# Patient Record
Sex: MALE | Race: WHITE | NOT HISPANIC OR LATINO | Employment: OTHER | URBAN - METROPOLITAN AREA
[De-identification: names, ages, dates, MRNs, and addresses within clinical notes are randomized per-mention and may not be internally consistent; named-entity substitution may affect disease eponyms.]

---

## 2019-08-28 ENCOUNTER — TRANSFERRED RECORDS (OUTPATIENT)
Dept: HEALTH INFORMATION MANAGEMENT | Facility: CLINIC | Age: 64
End: 2019-08-28

## 2019-09-17 ENCOUNTER — TRANSFERRED RECORDS (OUTPATIENT)
Dept: HEALTH INFORMATION MANAGEMENT | Facility: CLINIC | Age: 64
End: 2019-09-17

## 2019-10-30 ENCOUNTER — TRANSFERRED RECORDS (OUTPATIENT)
Dept: HEALTH INFORMATION MANAGEMENT | Facility: CLINIC | Age: 64
End: 2019-10-30

## 2019-10-31 ENCOUNTER — TRANSFERRED RECORDS (OUTPATIENT)
Dept: HEALTH INFORMATION MANAGEMENT | Facility: CLINIC | Age: 64
End: 2019-10-31

## 2019-11-13 ENCOUNTER — TRANSFERRED RECORDS (OUTPATIENT)
Dept: HEALTH INFORMATION MANAGEMENT | Facility: CLINIC | Age: 64
End: 2019-11-13

## 2020-01-01 ENCOUNTER — PATIENT OUTREACH (OUTPATIENT)
Dept: GASTROENTEROLOGY | Facility: CLINIC | Age: 65
End: 2020-01-01

## 2020-01-01 ENCOUNTER — ANESTHESIA (OUTPATIENT)
Dept: SURGERY | Facility: CLINIC | Age: 65
End: 2020-01-01
Payer: MEDICARE

## 2020-01-01 ENCOUNTER — PREP FOR PROCEDURE (OUTPATIENT)
Dept: GASTROENTEROLOGY | Facility: CLINIC | Age: 65
End: 2020-01-01

## 2020-01-01 ENCOUNTER — APPOINTMENT (OUTPATIENT)
Dept: GENERAL RADIOLOGY | Facility: CLINIC | Age: 65
End: 2020-01-01
Attending: INTERNAL MEDICINE
Payer: MEDICARE

## 2020-01-01 ENCOUNTER — TELEPHONE (OUTPATIENT)
Dept: GASTROENTEROLOGY | Facility: CLINIC | Age: 65
End: 2020-01-01

## 2020-01-01 ENCOUNTER — ANESTHESIA EVENT (OUTPATIENT)
Dept: SURGERY | Facility: CLINIC | Age: 65
End: 2020-01-01
Payer: MEDICARE

## 2020-01-01 ENCOUNTER — VIRTUAL VISIT (OUTPATIENT)
Dept: GASTROENTEROLOGY | Facility: CLINIC | Age: 65
End: 2020-01-01
Payer: MEDICARE

## 2020-01-01 ENCOUNTER — HOSPITAL ENCOUNTER (OUTPATIENT)
Facility: CLINIC | Age: 65
Discharge: HOME | End: 2020-09-29
Attending: INTERNAL MEDICINE | Admitting: INTERNAL MEDICINE
Payer: MEDICARE

## 2020-01-01 ENCOUNTER — ANCILLARY PROCEDURE (OUTPATIENT)
Dept: CT IMAGING | Facility: CLINIC | Age: 65
End: 2020-01-01
Attending: INTERNAL MEDICINE
Payer: MEDICARE

## 2020-01-01 ENCOUNTER — HEALTH MAINTENANCE LETTER (OUTPATIENT)
Age: 65
End: 2020-01-01

## 2020-01-01 ENCOUNTER — HOSPITAL ENCOUNTER (OUTPATIENT)
Facility: CLINIC | Age: 65
Discharge: HOME | End: 2020-12-09
Attending: INTERNAL MEDICINE | Admitting: INTERNAL MEDICINE
Payer: MEDICARE

## 2020-01-01 ENCOUNTER — DOCUMENTATION ONLY (OUTPATIENT)
Dept: GASTROENTEROLOGY | Facility: CLINIC | Age: 65
End: 2020-01-01

## 2020-01-01 VITALS
BODY MASS INDEX: 21.4 KG/M2 | RESPIRATION RATE: 14 BRPM | OXYGEN SATURATION: 96 % | SYSTOLIC BLOOD PRESSURE: 162 MMHG | DIASTOLIC BLOOD PRESSURE: 78 MMHG | HEIGHT: 72 IN | WEIGHT: 158 LBS | HEART RATE: 61 BPM | TEMPERATURE: 97.4 F

## 2020-01-01 VITALS — WEIGHT: 160 LBS | BODY MASS INDEX: 21.67 KG/M2 | HEIGHT: 72 IN

## 2020-01-01 VITALS
DIASTOLIC BLOOD PRESSURE: 81 MMHG | HEIGHT: 72 IN | SYSTOLIC BLOOD PRESSURE: 142 MMHG | OXYGEN SATURATION: 99 % | TEMPERATURE: 97.5 F | BODY MASS INDEX: 21.67 KG/M2 | RESPIRATION RATE: 16 BRPM | WEIGHT: 160 LBS | HEART RATE: 64 BPM

## 2020-01-01 DIAGNOSIS — K86.1 CHRONIC PANCREATITIS (H): ICD-10-CM

## 2020-01-01 DIAGNOSIS — K86.1 CHRONIC PANCREATITIS (H): Primary | ICD-10-CM

## 2020-01-01 DIAGNOSIS — Z11.59 ENCOUNTER FOR SCREENING FOR OTHER VIRAL DISEASES: ICD-10-CM

## 2020-01-01 DIAGNOSIS — K83.1 BILIARY STRICTURE (H): ICD-10-CM

## 2020-01-01 DIAGNOSIS — Z11.59 ENCOUNTER FOR SCREENING FOR OTHER VIRAL DISEASES: Primary | ICD-10-CM

## 2020-01-01 DIAGNOSIS — K86.89 PANCREATIC DUCT STONES: Primary | ICD-10-CM

## 2020-01-01 DIAGNOSIS — K86.89 PANCREATIC MASS: Primary | ICD-10-CM

## 2020-01-01 DIAGNOSIS — K86.89 PANCREATIC MASS: ICD-10-CM

## 2020-01-01 DIAGNOSIS — K86.1 IDIOPATHIC CHRONIC PANCREATITIS (H): Primary | ICD-10-CM

## 2020-01-01 LAB
ALBUMIN SERPL-MCNC: 2.5 G/DL (ref 3.4–5)
ALBUMIN SERPL-MCNC: 2.6 G/DL (ref 3.4–5)
ALP SERPL-CCNC: 101 U/L (ref 40–150)
ALP SERPL-CCNC: 113 U/L (ref 40–150)
ALT SERPL W P-5'-P-CCNC: 12 U/L (ref 0–70)
ALT SERPL W P-5'-P-CCNC: 14 U/L (ref 0–70)
AMYLASE SERPL-CCNC: 27 U/L (ref 30–110)
AMYLASE SERPL-CCNC: 34 U/L (ref 30–110)
AMYLASE SERPL-CCNC: 45 U/L (ref 30–110)
ANION GAP SERPL CALCULATED.3IONS-SCNC: 6 MMOL/L (ref 3–14)
ANION GAP SERPL CALCULATED.3IONS-SCNC: 8 MMOL/L (ref 3–14)
AST SERPL W P-5'-P-CCNC: 14 U/L (ref 0–45)
AST SERPL W P-5'-P-CCNC: 7 U/L (ref 0–45)
BILIRUB SERPL-MCNC: 0.3 MG/DL (ref 0.2–1.3)
BILIRUB SERPL-MCNC: 0.3 MG/DL (ref 0.2–1.3)
BUN SERPL-MCNC: 21 MG/DL (ref 7–30)
BUN SERPL-MCNC: 27 MG/DL (ref 7–30)
CALCIUM SERPL-MCNC: 8.8 MG/DL (ref 8.5–10.1)
CALCIUM SERPL-MCNC: 8.9 MG/DL (ref 8.5–10.1)
CHLORIDE SERPL-SCNC: 110 MMOL/L (ref 94–109)
CHLORIDE SERPL-SCNC: 112 MMOL/L (ref 94–109)
CO2 SERPL-SCNC: 22 MMOL/L (ref 20–32)
CO2 SERPL-SCNC: 22 MMOL/L (ref 20–32)
CREAT SERPL-MCNC: 1 MG/DL (ref 0.66–1.25)
CREAT SERPL-MCNC: 1.01 MG/DL (ref 0.66–1.25)
ERCP: NORMAL
ERCP: NORMAL
ERYTHROCYTE [DISTWIDTH] IN BLOOD BY AUTOMATED COUNT: 14.6 % (ref 10–15)
ERYTHROCYTE [DISTWIDTH] IN BLOOD BY AUTOMATED COUNT: 14.8 % (ref 10–15)
GFR SERPL CREATININE-BSD FRML MDRD: 77 ML/MIN/{1.73_M2}
GFR SERPL CREATININE-BSD FRML MDRD: 78 ML/MIN/{1.73_M2}
GLUCOSE BLDC GLUCOMTR-MCNC: 124 MG/DL (ref 70–99)
GLUCOSE BLDC GLUCOMTR-MCNC: 129 MG/DL (ref 70–99)
GLUCOSE BLDC GLUCOMTR-MCNC: 138 MG/DL (ref 70–99)
GLUCOSE BLDC GLUCOMTR-MCNC: 148 MG/DL (ref 70–99)
GLUCOSE SERPL-MCNC: 128 MG/DL (ref 70–99)
GLUCOSE SERPL-MCNC: 158 MG/DL (ref 70–99)
HCT VFR BLD AUTO: 32.8 % (ref 40–53)
HCT VFR BLD AUTO: 33.9 % (ref 40–53)
HGB BLD-MCNC: 10.7 G/DL (ref 13.3–17.7)
HGB BLD-MCNC: 11 G/DL (ref 13.3–17.7)
INR PPP: 1.12 (ref 0.86–1.14)
INR PPP: 1.19 (ref 0.86–1.14)
INTERPRETATION ECG - MUSE: NORMAL
LIPASE SERPL-CCNC: 210 U/L (ref 73–393)
LIPASE SERPL-CCNC: 41 U/L (ref 73–393)
LIPASE SERPL-CCNC: 58 U/L (ref 73–393)
MCH RBC QN AUTO: 28.6 PG (ref 26.5–33)
MCH RBC QN AUTO: 30 PG (ref 26.5–33)
MCHC RBC AUTO-ENTMCNC: 32.4 G/DL (ref 31.5–36.5)
MCHC RBC AUTO-ENTMCNC: 32.6 G/DL (ref 31.5–36.5)
MCV RBC AUTO: 88 FL (ref 78–100)
MCV RBC AUTO: 92 FL (ref 78–100)
PLATELET # BLD AUTO: 168 10E9/L (ref 150–450)
PLATELET # BLD AUTO: 208 10E9/L (ref 150–450)
POTASSIUM SERPL-SCNC: 3.8 MMOL/L (ref 3.4–5.3)
POTASSIUM SERPL-SCNC: 4.2 MMOL/L (ref 3.4–5.3)
PROT SERPL-MCNC: 6.3 G/DL (ref 6.8–8.8)
PROT SERPL-MCNC: 6.4 G/DL (ref 6.8–8.8)
RBC # BLD AUTO: 3.67 10E12/L (ref 4.4–5.9)
RBC # BLD AUTO: 3.74 10E12/L (ref 4.4–5.9)
SARS-COV-2 RNA SPEC QL NAA+PROBE: NOT DETECTED
SARS-COV-2 RNA SPEC QL NAA+PROBE: NOT DETECTED
SODIUM SERPL-SCNC: 140 MMOL/L (ref 133–144)
SODIUM SERPL-SCNC: 140 MMOL/L (ref 133–144)
SPECIMEN SOURCE: NORMAL
SPECIMEN SOURCE: NORMAL
WBC # BLD AUTO: 5 10E9/L (ref 4–11)
WBC # BLD AUTO: 5.7 10E9/L (ref 4–11)

## 2020-01-01 PROCEDURE — 999N000181 XR SURGERY CARM FLUORO GREATER THAN 5 MIN W STILLS: Mod: TC

## 2020-01-01 PROCEDURE — U0003 INFECTIOUS AGENT DETECTION BY NUCLEIC ACID (DNA OR RNA); SEVERE ACUTE RESPIRATORY SYNDROME CORONAVIRUS 2 (SARS-COV-2) (CORONAVIRUS DISEASE [COVID-19]), AMPLIFIED PROBE TECHNIQUE, MAKING USE OF HIGH THROUGHPUT TECHNOLOGIES AS DESCRIBED BY CMS-2020-01-R: HCPCS | Performed by: INTERNAL MEDICINE

## 2020-01-01 PROCEDURE — 80053 COMPREHEN METABOLIC PANEL: CPT | Performed by: STUDENT IN AN ORGANIZED HEALTH CARE EDUCATION/TRAINING PROGRAM

## 2020-01-01 PROCEDURE — 25000566 ZZH SEVOFLURANE, EA 15 MIN: Performed by: INTERNAL MEDICINE

## 2020-01-01 PROCEDURE — 36415 COLL VENOUS BLD VENIPUNCTURE: CPT | Performed by: STUDENT IN AN ORGANIZED HEALTH CARE EDUCATION/TRAINING PROGRAM

## 2020-01-01 PROCEDURE — 25000128 H RX IP 250 OP 636: Performed by: ANESTHESIOLOGY

## 2020-01-01 PROCEDURE — 71000014 ZZH RECOVERY PHASE 1 LEVEL 2 FIRST HR: Performed by: INTERNAL MEDICINE

## 2020-01-01 PROCEDURE — 999N000139 HC STATISTIC PRE-PROCEDURE ASSESSMENT II: Performed by: INTERNAL MEDICINE

## 2020-01-01 PROCEDURE — 250N000011 HC RX IP 250 OP 636

## 2020-01-01 PROCEDURE — 250N000003 HC SEVOFLURANE, EA 15 MIN: Performed by: INTERNAL MEDICINE

## 2020-01-01 PROCEDURE — 999N001017 HC STATISTIC GLUCOSE BY METER IP

## 2020-01-01 PROCEDURE — C1877 STENT, NON-COAT/COV W/O DEL: HCPCS | Performed by: INTERNAL MEDICINE

## 2020-01-01 PROCEDURE — 25000125 ZZHC RX 250: Performed by: NURSE ANESTHETIST, CERTIFIED REGISTERED

## 2020-01-01 PROCEDURE — 85610 PROTHROMBIN TIME: CPT | Performed by: STUDENT IN AN ORGANIZED HEALTH CARE EDUCATION/TRAINING PROGRAM

## 2020-01-01 PROCEDURE — 25800030 ZZH RX IP 258 OP 636: Performed by: ANESTHESIOLOGY

## 2020-01-01 PROCEDURE — C1769 GUIDE WIRE: HCPCS | Performed by: INTERNAL MEDICINE

## 2020-01-01 PROCEDURE — 258N000003 HC RX IP 258 OP 636: Performed by: ANESTHESIOLOGY

## 2020-01-01 PROCEDURE — 85027 COMPLETE CBC AUTOMATED: CPT | Performed by: STUDENT IN AN ORGANIZED HEALTH CARE EDUCATION/TRAINING PROGRAM

## 2020-01-01 PROCEDURE — 40000065 ZZH STATISTIC EKG NON-CHARGEABLE

## 2020-01-01 PROCEDURE — 36000059 ZZH SURGERY LEVEL 3 EA 15 ADDTL MIN UMMC: Performed by: INTERNAL MEDICINE

## 2020-01-01 PROCEDURE — 370N000001 HC ANESTHESIA TECHNICAL FEE, 1ST 30 MIN: Performed by: INTERNAL MEDICINE

## 2020-01-01 PROCEDURE — C1725 CATH, TRANSLUMIN NON-LASER: HCPCS | Performed by: INTERNAL MEDICINE

## 2020-01-01 PROCEDURE — 83690 ASSAY OF LIPASE: CPT | Performed by: STUDENT IN AN ORGANIZED HEALTH CARE EDUCATION/TRAINING PROGRAM

## 2020-01-01 PROCEDURE — C1726 CATH, BAL DIL, NON-VASCULAR: HCPCS | Performed by: INTERNAL MEDICINE

## 2020-01-01 PROCEDURE — 761N000003 HC RECOVERY PHASE 1 LEVEL 2 FIRST HR: Performed by: INTERNAL MEDICINE

## 2020-01-01 PROCEDURE — 25000125 ZZHC RX 250: Performed by: INTERNAL MEDICINE

## 2020-01-01 PROCEDURE — 82150 ASSAY OF AMYLASE: CPT | Performed by: STUDENT IN AN ORGANIZED HEALTH CARE EDUCATION/TRAINING PROGRAM

## 2020-01-01 PROCEDURE — 761N000007 HC RECOVERY PHASE 2 EACH 15 MINS: Performed by: INTERNAL MEDICINE

## 2020-01-01 PROCEDURE — 360N000023 HC SURGERY LEVEL 3 EA 15 ADDTL MIN UMMC: Performed by: INTERNAL MEDICINE

## 2020-01-01 PROCEDURE — 272N000001 HC OR GENERAL SUPPLY STERILE: Performed by: INTERNAL MEDICINE

## 2020-01-01 PROCEDURE — 36000061 ZZH SURGERY LEVEL 3 W FLUORO 1ST 30 MIN - UMMC: Performed by: INTERNAL MEDICINE

## 2020-01-01 PROCEDURE — 27210794 ZZH OR GENERAL SUPPLY STERILE: Performed by: INTERNAL MEDICINE

## 2020-01-01 PROCEDURE — 37000009 ZZH ANESTHESIA TECHNICAL FEE, EACH ADDTL 15 MIN: Performed by: INTERNAL MEDICINE

## 2020-01-01 PROCEDURE — 250N000009 HC RX 250

## 2020-01-01 PROCEDURE — 37000008 ZZH ANESTHESIA TECHNICAL FEE, 1ST 30 MIN: Performed by: INTERNAL MEDICINE

## 2020-01-01 PROCEDURE — 25500064 ZZH RX 255 OP 636: Performed by: INTERNAL MEDICINE

## 2020-01-01 PROCEDURE — 71000027 ZZH RECOVERY PHASE 2 EACH 15 MINS: Performed by: INTERNAL MEDICINE

## 2020-01-01 PROCEDURE — 82962 GLUCOSE BLOOD TEST: CPT | Mod: 91

## 2020-01-01 PROCEDURE — 43274 ERCP DUCT STENT PLACEMENT: CPT | Performed by: INTERNAL MEDICINE

## 2020-01-01 PROCEDURE — 40000170 ZZH STATISTIC PRE-PROCEDURE ASSESSMENT II: Performed by: INTERNAL MEDICINE

## 2020-01-01 PROCEDURE — 25800030 ZZH RX IP 258 OP 636: Performed by: NURSE ANESTHETIST, CERTIFIED REGISTERED

## 2020-01-01 PROCEDURE — 360N000025 HC SURGERY LEVEL 3 W FLUORO 1ST 30 MIN - UMMC: Performed by: INTERNAL MEDICINE

## 2020-01-01 PROCEDURE — 255N000002 HC RX 255 OP 636: Performed by: INTERNAL MEDICINE

## 2020-01-01 PROCEDURE — 258N000003 HC RX IP 258 OP 636

## 2020-01-01 PROCEDURE — 25000125 ZZHC RX 250: Performed by: STUDENT IN AN ORGANIZED HEALTH CARE EDUCATION/TRAINING PROGRAM

## 2020-01-01 PROCEDURE — 74328 X-RAY BILE DUCT ENDOSCOPY: CPT | Mod: 26 | Performed by: INTERNAL MEDICINE

## 2020-01-01 PROCEDURE — 40000277 XR SURGERY CARM FLUORO LESS THAN 5 MIN W STILLS: Mod: TC

## 2020-01-01 PROCEDURE — 250N000009 HC RX 250: Performed by: STUDENT IN AN ORGANIZED HEALTH CARE EDUCATION/TRAINING PROGRAM

## 2020-01-01 PROCEDURE — 25000128 H RX IP 250 OP 636: Performed by: NURSE ANESTHETIST, CERTIFIED REGISTERED

## 2020-01-01 PROCEDURE — 93010 ELECTROCARDIOGRAM REPORT: CPT | Mod: 59 | Performed by: INTERNAL MEDICINE

## 2020-01-01 PROCEDURE — 370N000002 HC ANESTHESIA TECHNICAL FEE, EACH ADDTL 15 MIN: Performed by: INTERNAL MEDICINE

## 2020-01-01 PROCEDURE — 82150 ASSAY OF AMYLASE: CPT | Mod: 91 | Performed by: STUDENT IN AN ORGANIZED HEALTH CARE EDUCATION/TRAINING PROGRAM

## 2020-01-01 PROCEDURE — 99215 OFFICE O/P EST HI 40 MIN: CPT | Mod: 95 | Performed by: INTERNAL MEDICINE

## 2020-01-01 DEVICE — ZIMMON PANCREATIC STENT
Type: IMPLANTABLE DEVICE | Site: BILE DUCT | Status: NON-FUNCTIONAL
Brand: ZIMMON
Removed: 2020-12-09

## 2020-01-01 DEVICE — IMPLANTABLE DEVICE: Type: IMPLANTABLE DEVICE | Site: BILE DUCT | Status: FUNCTIONAL

## 2020-01-01 DEVICE — IMPLANTABLE DEVICE
Type: IMPLANTABLE DEVICE | Site: BILE DUCT | Status: NON-FUNCTIONAL
Removed: 2021-01-13

## 2020-01-01 DEVICE — IMPLANTABLE DEVICE
Type: IMPLANTABLE DEVICE | Site: BILE DUCT | Status: NON-FUNCTIONAL
Removed: 2020-12-09

## 2020-01-01 RX ORDER — LIDOCAINE 40 MG/G
CREAM TOPICAL
Status: DISCONTINUED | OUTPATIENT
Start: 2020-01-01 | End: 2020-01-01 | Stop reason: HOSPADM

## 2020-01-01 RX ORDER — NALOXONE HYDROCHLORIDE 0.4 MG/ML
0.4 INJECTION, SOLUTION INTRAMUSCULAR; INTRAVENOUS; SUBCUTANEOUS
Status: DISCONTINUED | OUTPATIENT
Start: 2020-01-01 | End: 2020-01-01 | Stop reason: HOSPADM

## 2020-01-01 RX ORDER — ONDANSETRON 2 MG/ML
4 INJECTION INTRAMUSCULAR; INTRAVENOUS EVERY 30 MIN PRN
Status: DISCONTINUED | OUTPATIENT
Start: 2020-01-01 | End: 2020-01-01 | Stop reason: HOSPADM

## 2020-01-01 RX ORDER — ONDANSETRON 2 MG/ML
INJECTION INTRAMUSCULAR; INTRAVENOUS PRN
Status: DISCONTINUED | OUTPATIENT
Start: 2020-01-01 | End: 2020-01-01

## 2020-01-01 RX ORDER — FENTANYL CITRATE 50 UG/ML
25-50 INJECTION, SOLUTION INTRAMUSCULAR; INTRAVENOUS
Status: DISCONTINUED | OUTPATIENT
Start: 2020-01-01 | End: 2020-01-01 | Stop reason: HOSPADM

## 2020-01-01 RX ORDER — INDOMETHACIN 50 MG/1
100 SUPPOSITORY RECTAL
Status: COMPLETED | OUTPATIENT
Start: 2020-01-01 | End: 2020-01-01

## 2020-01-01 RX ORDER — NALOXONE HYDROCHLORIDE 0.4 MG/ML
0.2 INJECTION, SOLUTION INTRAMUSCULAR; INTRAVENOUS; SUBCUTANEOUS
Status: DISCONTINUED | OUTPATIENT
Start: 2020-01-01 | End: 2020-01-01 | Stop reason: HOSPADM

## 2020-01-01 RX ORDER — SODIUM CHLORIDE, SODIUM LACTATE, POTASSIUM CHLORIDE, CALCIUM CHLORIDE 600; 310; 30; 20 MG/100ML; MG/100ML; MG/100ML; MG/100ML
INJECTION, SOLUTION INTRAVENOUS CONTINUOUS
Status: DISCONTINUED | OUTPATIENT
Start: 2020-01-01 | End: 2020-01-01 | Stop reason: HOSPADM

## 2020-01-01 RX ORDER — ONDANSETRON 4 MG/1
4 TABLET, ORALLY DISINTEGRATING ORAL EVERY 30 MIN PRN
Status: DISCONTINUED | OUTPATIENT
Start: 2020-01-01 | End: 2020-01-01 | Stop reason: HOSPADM

## 2020-01-01 RX ORDER — LIDOCAINE HYDROCHLORIDE 20 MG/ML
INJECTION, SOLUTION INFILTRATION; PERINEURAL PRN
Status: DISCONTINUED | OUTPATIENT
Start: 2020-01-01 | End: 2020-01-01

## 2020-01-01 RX ORDER — ACETAMINOPHEN 325 MG/1
975 TABLET ORAL ONCE
Status: DISCONTINUED | OUTPATIENT
Start: 2020-01-01 | End: 2020-01-01 | Stop reason: HOSPADM

## 2020-01-01 RX ORDER — DEXAMETHASONE SODIUM PHOSPHATE 4 MG/ML
INJECTION, SOLUTION INTRA-ARTICULAR; INTRALESIONAL; INTRAMUSCULAR; INTRAVENOUS; SOFT TISSUE PRN
Status: DISCONTINUED | OUTPATIENT
Start: 2020-01-01 | End: 2020-01-01

## 2020-01-01 RX ORDER — PROPOFOL 10 MG/ML
INJECTION, EMULSION INTRAVENOUS PRN
Status: DISCONTINUED | OUTPATIENT
Start: 2020-01-01 | End: 2020-01-01

## 2020-01-01 RX ORDER — FLUMAZENIL 0.1 MG/ML
0.2 INJECTION, SOLUTION INTRAVENOUS
Status: DISCONTINUED | OUTPATIENT
Start: 2020-01-01 | End: 2020-01-01 | Stop reason: HOSPADM

## 2020-01-01 RX ORDER — NALOXONE HYDROCHLORIDE 0.4 MG/ML
.1-.4 INJECTION, SOLUTION INTRAMUSCULAR; INTRAVENOUS; SUBCUTANEOUS
Status: DISCONTINUED | OUTPATIENT
Start: 2020-01-01 | End: 2020-01-01 | Stop reason: HOSPADM

## 2020-01-01 RX ORDER — EPHEDRINE SULFATE 50 MG/ML
INJECTION, SOLUTION INTRAMUSCULAR; INTRAVENOUS; SUBCUTANEOUS PRN
Status: DISCONTINUED | OUTPATIENT
Start: 2020-01-01 | End: 2020-01-01

## 2020-01-01 RX ORDER — IOPAMIDOL 510 MG/ML
INJECTION, SOLUTION INTRAVASCULAR PRN
Status: DISCONTINUED | OUTPATIENT
Start: 2020-01-01 | End: 2020-01-01 | Stop reason: HOSPADM

## 2020-01-01 RX ORDER — HYDROMORPHONE HYDROCHLORIDE 1 MG/ML
.3-.5 INJECTION, SOLUTION INTRAMUSCULAR; INTRAVENOUS; SUBCUTANEOUS EVERY 10 MIN PRN
Status: DISCONTINUED | OUTPATIENT
Start: 2020-01-01 | End: 2020-01-01 | Stop reason: HOSPADM

## 2020-01-01 RX ORDER — ATORVASTATIN CALCIUM 20 MG/1
20 TABLET, FILM COATED ORAL DAILY
COMMUNITY
Start: 2020-01-01

## 2020-01-01 RX ORDER — IOPAMIDOL 755 MG/ML
101 INJECTION, SOLUTION INTRAVASCULAR ONCE
Status: COMPLETED | OUTPATIENT
Start: 2020-01-01 | End: 2020-01-01

## 2020-01-01 RX ORDER — BLOOD SUGAR DIAGNOSTIC
STRIP MISCELLANEOUS
COMMUNITY
Start: 2020-01-01

## 2020-01-01 RX ORDER — MEPERIDINE HYDROCHLORIDE 25 MG/ML
12.5 INJECTION INTRAMUSCULAR; INTRAVENOUS; SUBCUTANEOUS
Status: DISCONTINUED | OUTPATIENT
Start: 2020-01-01 | End: 2020-01-01 | Stop reason: HOSPADM

## 2020-01-01 RX ORDER — FENTANYL CITRATE 50 UG/ML
INJECTION, SOLUTION INTRAMUSCULAR; INTRAVENOUS PRN
Status: DISCONTINUED | OUTPATIENT
Start: 2020-01-01 | End: 2020-01-01

## 2020-01-01 RX ORDER — OXYCODONE HYDROCHLORIDE 5 MG/1
5 TABLET ORAL EVERY 6 HOURS PRN
COMMUNITY
End: 2021-01-01

## 2020-01-01 RX ADMIN — ONDANSETRON 4 MG: 2 INJECTION INTRAMUSCULAR; INTRAVENOUS at 09:15

## 2020-01-01 RX ADMIN — PHENYLEPHRINE HYDROCHLORIDE 100 MCG: 10 INJECTION INTRAVENOUS at 08:31

## 2020-01-01 RX ADMIN — SODIUM CHLORIDE, POTASSIUM CHLORIDE, SODIUM LACTATE AND CALCIUM CHLORIDE: 600; 310; 30; 20 INJECTION, SOLUTION INTRAVENOUS at 12:01

## 2020-01-01 RX ADMIN — PROPOFOL 120 MG: 10 INJECTION, EMULSION INTRAVENOUS at 08:06

## 2020-01-01 RX ADMIN — SUGAMMADEX 200 MG: 100 INJECTION, SOLUTION INTRAVENOUS at 09:17

## 2020-01-01 RX ADMIN — ONDANSETRON 4 MG: 2 INJECTION INTRAMUSCULAR; INTRAVENOUS at 13:57

## 2020-01-01 RX ADMIN — DEXAMETHASONE SODIUM PHOSPHATE 10 MG: 4 INJECTION, SOLUTION INTRA-ARTICULAR; INTRALESIONAL; INTRAMUSCULAR; INTRAVENOUS; SOFT TISSUE at 08:06

## 2020-01-01 RX ADMIN — ROCURONIUM BROMIDE 50 MG: 10 INJECTION INTRAVENOUS at 12:09

## 2020-01-01 RX ADMIN — PHENYLEPHRINE HYDROCHLORIDE 150 MCG: 10 INJECTION INTRAVENOUS at 12:13

## 2020-01-01 RX ADMIN — Medication 10 MG: at 08:06

## 2020-01-01 RX ADMIN — SUGAMMADEX 100 MG: 100 INJECTION, SOLUTION INTRAVENOUS at 14:20

## 2020-01-01 RX ADMIN — FENTANYL CITRATE 100 MCG: 50 INJECTION, SOLUTION INTRAMUSCULAR; INTRAVENOUS at 12:07

## 2020-01-01 RX ADMIN — FENTANYL CITRATE 25 MCG: 50 INJECTION, SOLUTION INTRAMUSCULAR; INTRAVENOUS at 09:48

## 2020-01-01 RX ADMIN — PHENYLEPHRINE HYDROCHLORIDE 100 MCG: 10 INJECTION INTRAVENOUS at 12:58

## 2020-01-01 RX ADMIN — PHENYLEPHRINE HYDROCHLORIDE 100 MCG: 10 INJECTION INTRAVENOUS at 12:49

## 2020-01-01 RX ADMIN — PHENYLEPHRINE HYDROCHLORIDE 100 MCG: 10 INJECTION INTRAVENOUS at 12:27

## 2020-01-01 RX ADMIN — MIDAZOLAM 2 MG: 1 INJECTION INTRAMUSCULAR; INTRAVENOUS at 08:06

## 2020-01-01 RX ADMIN — LIDOCAINE HYDROCHLORIDE 50 MG: 20 INJECTION, SOLUTION INFILTRATION; PERINEURAL at 12:07

## 2020-01-01 RX ADMIN — IOPAMIDOL 101 ML: 755 INJECTION, SOLUTION INTRAVASCULAR at 14:01

## 2020-01-01 RX ADMIN — ROCURONIUM BROMIDE 50 MG: 10 INJECTION INTRAVENOUS at 08:06

## 2020-01-01 RX ADMIN — PROPOFOL 100 MG: 10 INJECTION, EMULSION INTRAVENOUS at 12:07

## 2020-01-01 RX ADMIN — SODIUM CHLORIDE, POTASSIUM CHLORIDE, SODIUM LACTATE AND CALCIUM CHLORIDE: 600; 310; 30; 20 INJECTION, SOLUTION INTRAVENOUS at 07:57

## 2020-01-01 RX ADMIN — FENTANYL CITRATE 50 MCG: 50 INJECTION, SOLUTION INTRAMUSCULAR; INTRAVENOUS at 15:57

## 2020-01-01 RX ADMIN — PHENYLEPHRINE HYDROCHLORIDE 150 MCG: 10 INJECTION INTRAVENOUS at 12:20

## 2020-01-01 RX ADMIN — FENTANYL CITRATE 25 MCG: 50 INJECTION, SOLUTION INTRAMUSCULAR; INTRAVENOUS at 09:56

## 2020-01-01 RX ADMIN — PHENYLEPHRINE HYDROCHLORIDE 50 MCG: 10 INJECTION INTRAVENOUS at 13:53

## 2020-01-01 RX ADMIN — FENTANYL CITRATE 50 MCG: 50 INJECTION, SOLUTION INTRAMUSCULAR; INTRAVENOUS at 08:06

## 2020-01-01 RX ADMIN — PHENYLEPHRINE HYDROCHLORIDE 100 MCG: 10 INJECTION INTRAVENOUS at 12:37

## 2020-01-01 RX ADMIN — Medication 10 MG: at 08:10

## 2020-01-01 RX ADMIN — Medication 10 MG: at 08:12

## 2020-01-01 RX ADMIN — LIDOCAINE HYDROCHLORIDE 100 MG: 20 INJECTION, SOLUTION INFILTRATION; PERINEURAL at 08:06

## 2020-01-01 RX ADMIN — LIDOCAINE HYDROCHLORIDE 50 MG: 20 INJECTION, SOLUTION INFILTRATION; PERINEURAL at 12:06

## 2020-01-01 SDOH — HEALTH STABILITY: MENTAL HEALTH: CURRENT SMOKER: 0

## 2020-01-01 ASSESSMENT — MIFFLIN-ST. JEOR
SCORE: 1543.76
SCORE: 1534.68
SCORE: 1543.76

## 2020-01-01 ASSESSMENT — LIFESTYLE VARIABLES
TOBACCO_USE: 0
TOBACCO_USE: 1

## 2020-01-01 ASSESSMENT — PAIN SCALES - GENERAL: PAINLEVEL: NO PAIN (0)

## 2020-02-12 ENCOUNTER — TRANSFERRED RECORDS (OUTPATIENT)
Dept: HEALTH INFORMATION MANAGEMENT | Facility: CLINIC | Age: 65
End: 2020-02-12

## 2020-06-08 ENCOUNTER — TRANSCRIBE ORDERS (OUTPATIENT)
Dept: OTHER | Age: 65
End: 2020-06-08

## 2020-06-08 DIAGNOSIS — K86.89 PANCREATIC STONES: ICD-10-CM

## 2020-06-08 DIAGNOSIS — K86.89 PANCREATIC DUCT STRICTURE: Primary | ICD-10-CM

## 2020-06-10 ENCOUNTER — TELEPHONE (OUTPATIENT)
Dept: GASTROENTEROLOGY | Facility: CLINIC | Age: 65
End: 2020-06-10

## 2020-06-10 DIAGNOSIS — K86.89 PANCREATIC DUCT STRICTURE: Primary | ICD-10-CM

## 2020-06-10 NOTE — TELEPHONE ENCOUNTER
Advanced Endoscopy     Referring provider:  Jamari Rivas from [facility] referring     Provider Requested:  NA     Referral Received: 6/10/2020     Records received: in Care Everywhere     Images received: @ [facility], requested push 6/11/2020    Evaluation for:   ERCP needed - pancreatic duct stricture and stone     Clinical History (per RN review):     Abnormal CT scan, Common bile duct stricture found on                        ERCP, Pancreatic duct stricture found on ERCP.                        Suspected chronic pancreatitis on CT scan. No                        pancreas mass noted on the CT. Bile duct stricture                        sampling done during the ERCP is negative for                        malignancy. CEA and IgG4 is normal. CA 19-9 is                        borderline elevated. Plan: EUS for further evaluation      EUS- 5/26/2020- in CareEverywhere  Recommendation:      - Await fluid analysis results.                       - Levaquin 500 mg po daily x 5 days starting tomorrow                        for infection prophylaxis after FNA                       - Repeat CA 19-9 in few months                       - Would obtain MRI/MRCP with IV contrast for further                        evaluation of pancreatobiliary tree                       - Will likely need a repeat ERCP to remove/exchange                        biliary stent and further evaluation/treatment of                        bile and pancreatic ducts strictures with stenting.                       - Consider checking stool elastase if ongoing diarrhea                       - Return to primary care/referring provider for                        ongoing medical care                       - Above findings and recommendations were discussed                        with the patient, all questions were answered. He                        verbalized understanding and agreed with the plan.      ERCP 5/22/2020  Impression:          - Congested  duodenal mucosa.                       - Acquired duodenal stenosis.                       - A single severe diffuse biliary stricture was found                        in the lower third of the main bile duct. The                        stricture was indeterminate.                       - The upper third of the main bile duct and middle                        third of the main bile duct were moderately dilated.                       - Moderate dilatation of the pancreatic duct in the                        body of the pancreas and the pancreatic duct in the                        genu of the pancreas was found diffusely.                       - A pancreatic duct stricture was found.                       - A pancreatic sphincterotomy was performed.                       - One plastic pancreatic stent was placed into the                        ventral pancreatic duct. It will not go through the                        PD stricture, removed from the pancreatic duct.                       - A biliary sphincterotomy was performed.                       - Cells for cytology obtained in the lower third of                        the main duct.                       - Biopsy was performed in the lower third of the main                        duct.                       - One plastic biliary stent was placed into the                        common bile duct.                       - One plastic pancreatic stent was placed into the                        ventral pancreatic duct. It will not go through the                        PD stricture.                       - Finding are overall concerning for malignancy vs                        very complicated chronic pancreatitis with PD                        stricture/stone or IG4 disease. Will likely needed                        repeat imaging and/or EUS      CT 5/22/2020- @ [facility], read in CE  IMPRESSION:   1.  The previously visualized central biliary dilatation has improved and  currently there is a stent in the common bile duct.    2.  There is also a stent in the pancreatic duct at the head of the pancreas which terminates at the level of a stone which may be in the pancreatic duct itself. Pancreatic duct remains mildly dilated measuring up to 4 mm. No pancreatic mass lesion is seen.    3.  Ascites has increased from the prior exam.    4.  Small bowel wall thickening seen on the prior exam has improved. There are postoperative changes involving the small bowel.    5.  Dystrophic calcification and soft tissue thickening along the midline anterior abdominal wall and left pelvic wall. This is unchanged.    6.  Extensive aortoiliac calcification as well as calcification of the proximal celiac and proximal SMA and proximal renal arteries.    MD review date: 6/11/2020  MD Decision for clinic consultation/Orders:            Referral updates/Patient contacted:

## 2020-06-11 NOTE — TELEPHONE ENCOUNTER
Per Dr. Pepe    Procedure/Imaging/Clinic: Clinic visit (virtual) followed by ERCP possible EUS   Physician: Afshin   Timing: Within 2m   Procedure length: 90   Anesthesia:Gen   Dx: Pancreatic stone, stricture   Tier: 2   [place]         Comments: I havent checked but lets makes sure we have most recent MRI and CT and have these both overread by our radiologists, query, exclude malignancy    Called to resolve images in PACS. Orders placed for Overread to exclude malignancy.     Virtual clinic scheduled 7/2/2020- [facility] activation sent.    Lorrie Bansal, RN Care Coordinator

## 2020-06-15 ENCOUNTER — DOCUMENTATION ONLY (OUTPATIENT)
Dept: CARE COORDINATION | Facility: CLINIC | Age: 65
End: 2020-06-15

## 2020-06-17 ENCOUNTER — TELEPHONE (OUTPATIENT)
Dept: GASTROENTEROLOGY | Facility: CLINIC | Age: 65
End: 2020-06-17

## 2020-06-17 NOTE — TELEPHONE ENCOUNTER
[facility] Call Center    Phone Message    May a detailed message be left on voicemail: yes     Reason for Call: Other: Patient calling wondering if his Dr. Pepe appointment can be moved to a sooner date. Please call him on his home phone to discuss.     Action Taken: Message routed to:  [facility] ([facility] AE    Travel Screening: Not Applicable

## 2020-06-18 ENCOUNTER — PREP FOR PROCEDURE (OUTPATIENT)
Dept: GASTROENTEROLOGY | Facility: CLINIC | Age: 65
End: 2020-06-18

## 2020-06-18 DIAGNOSIS — K86.89 PANCREATIC STONES: Primary | ICD-10-CM

## 2020-06-18 DIAGNOSIS — K86.89 PANCREATIC DUCT STRICTURE: ICD-10-CM

## 2020-06-18 NOTE — TELEPHONE ENCOUNTER
Returned call to tell patient he has first open with Dr. Pepe, wants to move things along d/t continued symptoms. Verified overread. 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    Advised 7/2 first clinic visit. Will schedule procedure for 7/6    Procedure/Imaging/Clinic: Clinic visit (virtual) followed by ERCP possible EUS   Physician: Afshin   Timing: ASAP after clinic on 7/2/2020  Procedure length: 90   Anesthesia:Gen   Dx: Pancreatic stone, stricture   Tier: 2   Location: [place]       Called to discuss with patient. Explained they can expect a call for date and time for procedure, will need a , someone to stay with them for 24 hours and should stay in town for 24 hours (within 45 min of Hospital) post procedure    Patient needs to get pre-op physical completed and will fax a copy to us along with bringing a hard copy with them. Fax number given. [phone] Patients PCP will do preop  *If you do not get a preop physical, your procedure could be cancelled, patient voiced understanding*    Blood thinner -  no  ASA - no  Diabetic - on metformin, no insulin      A pre-op nurse will call 1-2 days prior to the procedure. Is advised to be NPO/no solid food 8 hours before the procedure. Ok to drink clear liquids (Water, Apple Juice or Gatorade) up to 2 hours prior to procedure.     Other specific details/comments:discussed need for COVID test    Verbalized understanding of all instructions. All questions answered.

## 2020-07-02 ENCOUNTER — VIRTUAL VISIT (OUTPATIENT)
Dept: GASTROENTEROLOGY | Facility: CLINIC | Age: 65
End: 2020-07-02
Attending: INTERNAL MEDICINE
Payer: MEDICARE

## 2020-07-02 ENCOUNTER — PREP FOR PROCEDURE (OUTPATIENT)
Dept: GASTROENTEROLOGY | Facility: CLINIC | Age: 65
End: 2020-07-02

## 2020-07-02 VITALS — BODY MASS INDEX: 22.35 KG/M2 | HEIGHT: 72 IN | WEIGHT: 165 LBS

## 2020-07-02 DIAGNOSIS — Z11.59 SCREENING FOR VIRAL DISEASE: Primary | ICD-10-CM

## 2020-07-02 DIAGNOSIS — K86.89 PANCREATIC STONES: ICD-10-CM

## 2020-07-02 DIAGNOSIS — K86.89 PANCREATIC DUCT STONES: ICD-10-CM

## 2020-07-02 DIAGNOSIS — K86.89 PANCREATIC DUCT STRICTURE: Primary | ICD-10-CM

## 2020-07-02 DIAGNOSIS — K86.89 PANCREATIC DUCT STRICTURE: ICD-10-CM

## 2020-07-02 SDOH — HEALTH STABILITY: MENTAL HEALTH: HOW OFTEN DO YOU HAVE A DRINK CONTAINING ALCOHOL?: NEVER

## 2020-07-02 ASSESSMENT — MIFFLIN-ST. JEOR: SCORE: 1571.44

## 2020-07-02 ASSESSMENT — PAIN SCALES - GENERAL: PAINLEVEL: MILD PAIN (2)

## 2020-07-02 NOTE — PROGRESS NOTES
"Nick Zamudio is a 64 year old male who is being evaluated via a billable telephone visit.      The patient has been notified of following:     \"This telephone visit will be conducted via a call between you and your physician/provider. We have found that certain health care needs can be provided without the need for a physical exam.  This service lets us provide the care you need with a short phone conversation.  If a prescription is necessary we can send it directly to your pharmacy.  If lab work is needed we can place an order for that and you can then stop by our lab to have the test done at a later time.    Telephone visits are billed at different rates depending on your insurance coverage. During this emergency period, for some insurers they may be billed the same as an in-person visit.  Please reach out to your insurance provider with any questions.    If during the course of the call the physician/provider feels a telephone visit is not appropriate, you will not be charged for this service.\"    Patient has given verbal consent for Telephone visit?  Yes    What phone number would you like to be contacted at? Mobile    How would you like to obtain your AVS? 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 This prompted an ERCP at [facility] in May which delineated both biliary and pancreatic strictures concerning for malignancy. A pancreatic sphincterotomy was performed though a stent could not be placed across the stenosis. The bile duct was sampled by biopsy and a stent " placed. An EUS followed demonstrating chronic calcific pancreatitis and the strictures were thought to be inflammatory without mass though shadowing from stone complicated the evaluation. The main duct was irregular and ventral dominant with intraductal stones. Perihepatic ascites was sampled and found to have candida. He is now referred for further evaluation.    CT from June was reinterpreted here confirming chronic pancreatitis with numerous calcifications, including one obstructing the main duct with the pancreatic stent seen terminating just short of this. There was distortion of the head with possible soft tissue, suggesting the possibility of malignancy. The bile duct stent was in place and the biliary system was decompressed.    He has been struggling with nausea and vomiting, vacillating with symptoms dating back years. They were previously sporadic and less severe, though more recently symptoms worsened and progressed in frequency. In September of 2019 he had his gallbladder removed and soon thereafter had his small bowel surgery for obstruction. Since that time he has had diarrhea, pain and vomiting. He has never taken pancreatic enzymes. He notes pain with vomiting.    His father had pancreatic cancer. He never abuse tobacco or alcohol.    Medications  Current Outpatient Medications   Medication     metFORMIN (GLUCOPHAGE) 500 MG tablet     No current facility-administered medications for this visit.      Past Medical    Medical History Date Comments   Diabetes mellitus (HC)       Paralysis of both lower limbs (HC)       Testicular cancer (HC)   age 17    PVD (peripheral vascular disease) (HC)       HTN (hypertension)       Kidney stone       Dependent edema       Status post radiation therapy   radiation syndrome - progressive paralysis,    UTI (urinary tract infection) 11/2017     Lymphedema       Fatigue 10/02/2019     Bowel obstruction (HC) 10/30/2019       Past Surgical  Surgery Date Site/Laterality  Comments   ORCHIECTOMY   Left Age 17 for testicular cancer     COLECTOMY 1980's   For radiation-induced problems     INCISION AND DRAINAGE LEFT GROIN ABCESS 04/24/2017   DR. EVANS     LEFT AXILLOFEMORAL BYPASS WITH COMPLETION ANGIOGRAM 10/31/2017   DR HARVEY, left 5th toe removed     INCISION AND DRAINAGE OF BONE CORTEX WITH ROTATIONAL FLAP CLOSURE LEFT FOOT 11/01/2017   DR DIEZ     CYSTOSCOPY RIGHT RETROGRADES. 12/05/2017 Right      PERCUTANEOUS NEPHROSTOMY 12/05/2017 Right Placed by Dr Hoover, [facility]     IR URETERAL STENT PLCMT WWO NEPHR/URETERGRAM EXIST ACCESS RIGHT 12/15/2017 Right PCN to JJ stent, Dr. Madrid, [place]     CYSTOSCOPY REMOVAL OF RIGHT URETERAL CATHETER RIGHT URETEROSCOPY WITH HOLMIUM LASER AND RIGHT URETERAL STENT PLACEMENT 01/04/2018 Right Streitz 6x26 contour     COLONOSCOPY W/ POLYPECTOMY 08/31/2019   Dr. Guerrero     ESOPHAGOSCOPY / EGD 08/31/2019   Dr. Guerrero     LAP CHOLECYSTECTOMY 09/17/2019          Social History  Tobacco Use Types Packs/Day Years Used Date   Never Smoker           Smokeless Tobacco: Never Used           Alcohol Use Drinks/Week oz/Week Comments   Yes     socially-rare     Sex Assigned at Birth Date Recorded   Not on file       Job Start Date Occupation Industry   Not on file Not on file Not on file     Travel History Travel Start Travel End     Family History  Medical History Relation Name Comments   Cancer-pancreatic Father         Relation Name Status Comments   Father           Objective:  Reported vitals:  There were no vitals taken for this visit.   GEN: Alert and no distress  PSYCH: Alert and oriented times 3; coherent speech, normal   rate and volume, able to articulate logical thoughts, able   to abstract reason, no tangential thoughts, no hallucinations   or delusions  His affect is normal  RESP: No cough, no audible wheezing, able to talk in full sentences  Remainder of exam unable to be completed due to phone visit     Outside Imaging  summaries:    Assessment and plan:  Mr Zamudio is a 65yo gentleman with chronic calcific pancreatitis of unclear etiology, perhaps hereditary or even secondary to his radiation therapy. He has never abused tobacco or alcohol. He is now found to have dual stenoses of the distal common and downstream main ducts with imaging by CT and EUS concerning for malignancy. Findings include soft tissue and vascular abnormalities as well as ascites. Of course, most of this may be explained by the chronic pancreatitis alone, however it is paramount to further exclude malignancy, in particular with his family history. We therefore will plan an EUS with sampling of any suspicious lesion in tandem with repeat ERCP which is needed regardless to reinterrogate the stenoses and for stent exchange or removal and to get at least a 3F stent across the stone to the tail. Sampling may occur during the ERCP as appropriate as well.    We treat what sounds to be pancreatic insufficiency with high dose Creon (72K units with meals) which may also assist with his pain and nausea. If he has improvement in symptoms, we may consider deferring aggressive management of his pancreatic stone disease. I will also see if his insurance will allow for a visit with our dietitian.    It was a pleasure to participate in the care of this patient; please contact us with any further questions.  A total of 45 minutes was spent in evaluation with this patient, >50% of which was counseling regarding the above delineated issues.    Christo Pepe MD PhD FACG VIC LEVI  Director of Endoscopy  Associate Professor of Medicine, Surgery and Pediatrics  Interventional and Therapeutic Endoscopy    [facility]  Division of Gastroenterology and Hepatology  [facility] [address], [place] [address]    New Consultations  [phone]  Procedure Scheduling [phone]  Clinical Nurse Coordinator [phone]  Clinical  Fax   [phone]  Administrative   [phone]  Administrative Fax  [phone]

## 2020-07-02 NOTE — NURSING NOTE
Chief Complaint   Patient presents with     New Patient     Pre ERCP/EUS       Vitals:    07/02/20 1115   Weight: 74.8 kg (165 lb)   Height: 1.829 m (6')       Body mass index is 22.38 kg/m .      Sally Manzano CMA

## 2020-07-02 NOTE — PATIENT INSTRUCTIONS
Follow up:    Dr. Pepe has outlined the following steps after your recent clinic visit:    EUS/ERCP- PCP will do preop, will call Curbside testing for COVID test, [phone]    Creon- medication ordered, sent to preferred pharmacy. Education follows the end of this document    Visit with dietian, we will reach out to schedule    Please call with any questions or concerns regarding your clinic visit today.    It is a pleasure being involved in your health care.    Contacts post-consultation depending on your need:    Schedule Clinic Appointments            [phone] # 1   M-F 7:30 - 5 pm    Lorrie Bansal RN Care Coordinator  [phone]     OR Procedure Scheduling                             [phone]    My Chart is available 24 hours a day and is a secure way to access your records and communicate with your care team.  I strongly recommend signing up if you haven't already done so, if you are comfortable with computers.  If you would like to inquire about this or are having problems with My Chart access, you may call [phone] or go online at [email or web address].  Please allow at least 24 hours for a response and extra time on weekends and Holidays.       Creon was sent to your Pharmacy, with refills.  Please call your Pharmacy to ensure script has been received.  Please let me know if the cost is very high.     With a current Creon prescription, you can register for the Creon ON COURSE program ([email or web address]) which is a very simple process.  By doing this, you can receive FREE Vitamin supplements & delivery, for all eligible patients (You cannot be on Medicare, Medicaid, Ucare or Government assistance programs)   You can look into [email or web address] if you don't have any coverage and cannot afford the medication.  It is based on your income with no guarantee that assistance would be available.  Also, you can look into: www.pparx.org for assistance.     You ve been prescribed pancreatic enzyme  therapy to help aid in your digestion because of pancreatic insufficiency and/or the symptoms you are exhibiting.  With normal digestion, pancreatic enzymes are released once nourishment is introduced by mouth, to aid in the breakdown of protein, carbohydrates and other elements.  When you have some insufficiency in this process, you can experience abdominal pain, bloating, nausea, vomiting, diarrhea +/- oily stools and/or abdominal cramping.     It s important to continue with a low fat diet, taking in small amounts of nourishment at a time, having a solid baseline of hydration at all times.  When pancreatic enzymes are recommended for you, it s best to take one enzyme pill at the start of getting nourishment; whether it s your first bite of solid food or initial drink of nutritional liquid supplements.  Keep in mind some liquid alternatives, like [employer] Instant Breakfast, Ensure and Boost, as well as smoothies and protein shakes.  It isn t necessary to take enzymes with clear liquids, but important to have water and clear liquids with you at all times for hydration.  Take another enzyme during that nourishment and at the end, up to prescription recommendations.     You may notice worsening or initiation of symptoms when introducing pancreatic enzymes to your digestive process, but should improve or subside within a week or so.  If you are experiencing significant symptoms, stop taking the enzymes and call your RN Care Coordinator.  If you are unsure if you are getting the response you should be and/or have any questions or concerns, please contact your RN Care Coordinator.      Important Safety Facts    The most common side effects include: increased (hyperglycemia) or decreased (hypoglycemia) blood sugars, pain in your stomach area (abdominal area), frequent or abnormal bowel movements, gas, vomiting, dizziness or sore throat and cough.    CREON may increase blood uric acid levels. This may cause worsening of  swollen, painful joints (gout).    CREON and other pancreatic enzyme products are made from the pancreas of pigs, the same pigs people eat as pork. These pigs may carry viruses. Although it has never been reported, it may be possible for a person to get a viral infection from taking pancreatic enzyme products that come from pigs.    CREON may increase your chance of having a rare bowel disorder called fibrosing colonopathy. This condition is serious and may require surgery. The risk of having this condition may be reduced by following the dosing instructions that your doctor gave you.  Before Starting  Tell your doctor if you:    are allergic to pork (pig) products    have gout, kidney disease, or high blood uric acid (hyperuricemia)    have trouble swallowing capsules    are pregnant or plan to become pregnant. It is not known if CREON will harm your unborn baby.    are breast-feeding or plan to breast-feed. It is not known if CREON passes into your breast milk.  CREON is a prescription medicine used to treat people who cannot digest food normally because their pancreas does not make enough enzymes.    Always take CREON with a meal or snack and enough liquid to swallow CREON completely.    Do not crush or chew CREON capsules or its contents, and do not hold the capsule or capsule contents in your mouth. This may cause irritation in your mouth or change the way CREON works in your body.

## 2020-07-02 NOTE — LETTER
7/2/2020     RE: Nick Zamudio  [address] [place] [address]    Dear Colleague,    Thank you for referring your patient, Nick Zamudio, to the [facility]. Please see a copy of my visit note below.    Nick Zamudio is a 64 year old male who is being evaluated via a billable telephone visit.      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 This prompted an ERCP at [facility] in May which delineated both biliary and pancreatic strictures concerning for malignancy. A pancreatic sphincterotomy was performed though a stent could not be placed across the stenosis. The bile duct was sampled by biopsy and a stent placed. An EUS followed demonstrating chronic calcific pancreatitis and the strictures were thought to be inflammatory without mass though shadowing from stone complicated the evaluation. The main duct was irregular and ventral dominant with intraductal stones. Perihepatic ascites was sampled and found to have candida. He is now referred for further evaluation.    CT from June was reinterpreted here confirming chronic pancreatitis with numerous calcifications, including one obstructing the main duct with the pancreatic stent seen terminating just short of this. There was distortion of the head with possible soft tissue, suggesting the possibility of malignancy. The bile duct stent was in place and the biliary system was decompressed.    He has been struggling with nausea and vomiting, vacillating with symptoms dating back years. They were previously sporadic  and less severe, though more recently symptoms worsened and progressed in frequency. In September of 2019 he had his gallbladder removed and soon thereafter had his small bowel surgery for obstruction. Since that time he has had diarrhea, pain and vomiting. He has never taken pancreatic enzymes. He notes pain with vomiting.    His father had pancreatic cancer. He never abuse tobacco or alcohol.    Medications  Current Outpatient Medications   Medication     metFORMIN (GLUCOPHAGE) 500 MG tablet     No current facility-administered medications for this visit.      Past Medical    Medical History Date Comments   Diabetes mellitus (HC)       Paralysis of both lower limbs (HC)       Testicular cancer (HC)   age 17    PVD (peripheral vascular disease) (HC)       HTN (hypertension)       Kidney stone       Dependent edema       Status post radiation therapy   radiation syndrome - progressive paralysis,    UTI (urinary tract infection) 11/2017     Lymphedema       Fatigue 10/02/2019     Bowel obstruction (HC) 10/30/2019       Past Surgical  Surgery Date Site/Laterality Comments   ORCHIECTOMY   Left Age 17 for testicular cancer     COLECTOMY 1980's   For radiation-induced problems     INCISION AND DRAINAGE LEFT GROIN ABCESS 04/24/2017   DR. EVANS     LEFT AXILLOFEMORAL BYPASS WITH COMPLETION ANGIOGRAM 10/31/2017   DR HARVEY, left 5th toe removed     INCISION AND DRAINAGE OF BONE CORTEX WITH ROTATIONAL FLAP CLOSURE LEFT FOOT 11/01/2017   DR DIEZ     CYSTOSCOPY RIGHT RETROGRADES. 12/05/2017 Right      PERCUTANEOUS NEPHROSTOMY 12/05/2017 Right Placed by [facility]     IR URETERAL STENT [facility] NEPHR/URETERGRAM EXIST ACCESS RIGHT 12/15/2017 Right PCN to JJ stent, [facility]     CYSTOSCOPY REMOVAL OF RIGHT URETERAL CATHETER RIGHT URETEROSCOPY WITH HOLMIUM LASER AND RIGHT URETERAL STENT PLACEMENT 01/04/2018 Right [place] 6x26 contour     COLONOSCOPY W/ POLYPECTOMY 08/31/2019   Dr. Guerrero     ESOPHAGOSCOPY  / EGD 08/31/2019   Dr. Guerrero     LAP CHOLECYSTECTOMY 09/17/2019          Social History  Tobacco Use Types Packs/Day Years Used Date   Never Smoker           Smokeless Tobacco: Never Used           Alcohol Use Drinks/Week oz/Week Comments   Yes     socially-rare     Sex Assigned at Birth Date Recorded   Not on file       Job Start Date Occupation Industry   Not on file Not on file Not on file     Travel History Travel Start Travel End     Family History  Medical History Relation Name Comments   Cancer-pancreatic Father         Relation Name Status Comments   Father           Objective:  Reported vitals:  There were no vitals taken for this visit.   GEN: Alert and no distress  PSYCH: Alert and oriented times 3; coherent speech, normal   rate and volume, able to articulate logical thoughts, able   to abstract reason, no tangential thoughts, no hallucinations   or delusions  His affect is normal  RESP: No cough, no audible wheezing, able to talk in full sentences  Remainder of exam unable to be completed due to phone visit     Outside Imaging summaries:    Assessment and plan:  Mr Zamudio is a 65yo gentleman with chronic calcific pancreatitis of unclear etiology, perhaps hereditary or even secondary to his radiation therapy. He has never abused tobacco or alcohol. He is now found to have dual stenoses of the distal common and downstream main ducts with imaging by CT and EUS concerning for malignancy. Findings include soft tissue and vascular abnormalities as well as ascites. Of course, most of this may be explained by the chronic pancreatitis alone, however it is paramount to further exclude malignancy, in particular with his family history. We therefore will plan an EUS with sampling of any suspicious lesion in tandem with repeat ERCP which is needed regardless to reinterrogate the stenoses and for stent exchange or removal and to get at least a 3F stent across the stone to the tail. Sampling may occur during the ERCP  as appropriate as well.    We treat what sounds to be pancreatic insufficiency with high dose Creon (72K units with meals) which may also assist with his pain and nausea. If he has improvement in symptoms, we may consider deferring aggressive management of his pancreatic stone disease. I will also see if his insurance will allow for a visit with our dietitian.    It was a pleasure to participate in the care of this patient; please contact us with any further questions.  A total of 45 minutes was spent in evaluation with this patient, >50% of which was counseling regarding the above delineated issues.    Christo Pepe MD PhD FACG VIC LEVI  Director of Endoscopy  Associate Professor of Medicine, Surgery and Pediatrics  Interventional and Therapeutic Endoscopy    [facility]  Division of Gastroenterology and Hepatology  [facility] [address]  [address] [id number]    New Consultations  [phone]  Procedure Scheduling [phone]  Clinical Nurse Coordinator [phone]  Clinical Fax   [phone]  Administrative   [phone]  Administrative Fax  [phone]        Again, thank you for allowing me to participate in the care of your patient.      Sincerely,      Christo Pepe MD

## 2020-07-10 DIAGNOSIS — Z11.59 SCREENING FOR VIRAL DISEASE: ICD-10-CM

## 2020-07-10 PROCEDURE — U0003 INFECTIOUS AGENT DETECTION BY NUCLEIC ACID (DNA OR RNA); SEVERE ACUTE RESPIRATORY SYNDROME CORONAVIRUS 2 (SARS-COV-2) (CORONAVIRUS DISEASE [COVID-19]), AMPLIFIED PROBE TECHNIQUE, MAKING USE OF HIGH THROUGHPUT TECHNOLOGIES AS DESCRIBED BY CMS-2020-01-R: HCPCS | Performed by: INTERNAL MEDICINE

## 2020-07-11 LAB
SARS-COV-2 RNA SPEC QL NAA+PROBE: NOT DETECTED
SPECIMEN SOURCE: NORMAL

## 2020-07-13 ENCOUNTER — APPOINTMENT (OUTPATIENT)
Dept: GENERAL RADIOLOGY | Facility: CLINIC | Age: 65
End: 2020-07-13
Attending: INTERNAL MEDICINE
Payer: MEDICARE

## 2020-07-13 ENCOUNTER — HOSPITAL ENCOUNTER (OUTPATIENT)
Facility: CLINIC | Age: 65
Discharge: HOME | End: 2020-07-13
Attending: INTERNAL MEDICINE | Admitting: INTERNAL MEDICINE
Payer: MEDICARE

## 2020-07-13 ENCOUNTER — ANESTHESIA (OUTPATIENT)
Dept: SURGERY | Facility: CLINIC | Age: 65
End: 2020-07-13
Payer: MEDICARE

## 2020-07-13 ENCOUNTER — ANESTHESIA EVENT (OUTPATIENT)
Dept: SURGERY | Facility: CLINIC | Age: 65
End: 2020-07-13
Payer: MEDICARE

## 2020-07-13 VITALS
TEMPERATURE: 97 F | RESPIRATION RATE: 20 BRPM | OXYGEN SATURATION: 99 % | HEIGHT: 72 IN | BODY MASS INDEX: 22.35 KG/M2 | SYSTOLIC BLOOD PRESSURE: 134 MMHG | DIASTOLIC BLOOD PRESSURE: 75 MMHG | WEIGHT: 165 LBS

## 2020-07-13 DIAGNOSIS — K86.89 PANCREATIC DUCT STONES: ICD-10-CM

## 2020-07-13 DIAGNOSIS — K86.89 PANCREATIC DUCT STRICTURE: ICD-10-CM

## 2020-07-13 LAB
AMYLASE SERPL-CCNC: 93 U/L (ref 30–110)
ERCP: NORMAL
GLUCOSE BLDC GLUCOMTR-MCNC: 128 MG/DL (ref 70–99)
GLUCOSE SERPL-MCNC: 131 MG/DL (ref 70–99)
INR PPP: 1.14 (ref 0.86–1.14)
LIPASE SERPL-CCNC: 1051 U/L (ref 73–393)
PLATELET # BLD AUTO: 150 10E9/L (ref 150–450)
UPPER EUS: NORMAL

## 2020-07-13 PROCEDURE — 27210286 ZZH BALLOON ADDITIONAL: Performed by: INTERNAL MEDICINE

## 2020-07-13 PROCEDURE — 71000014 ZZH RECOVERY PHASE 1 LEVEL 2 FIRST HR: Performed by: INTERNAL MEDICINE

## 2020-07-13 PROCEDURE — 36000056 ZZH SURGERY LEVEL 3 1ST 30 MIN: Performed by: INTERNAL MEDICINE

## 2020-07-13 PROCEDURE — 88172 CYTP DX EVAL FNA 1ST EA SITE: CPT | Performed by: INTERNAL MEDICINE

## 2020-07-13 PROCEDURE — 88161 CYTOPATH SMEAR OTHER SOURCE: CPT | Mod: 26,XU | Performed by: INTERNAL MEDICINE

## 2020-07-13 PROCEDURE — 82947 ASSAY GLUCOSE BLOOD QUANT: CPT | Mod: 91 | Performed by: INTERNAL MEDICINE

## 2020-07-13 PROCEDURE — 27210249 ZZH NEEDLE EUS, EACH ADDITIONAL: Performed by: INTERNAL MEDICINE

## 2020-07-13 PROCEDURE — 40000169 ZZH STATISTIC PRE-PROCEDURE ASSESSMENT I: Performed by: INTERNAL MEDICINE

## 2020-07-13 PROCEDURE — 88161 CYTOPATH SMEAR OTHER SOURCE: CPT | Performed by: INTERNAL MEDICINE

## 2020-07-13 PROCEDURE — C1769 GUIDE WIRE: HCPCS | Performed by: INTERNAL MEDICINE

## 2020-07-13 PROCEDURE — 36000058 ZZH SURGERY LEVEL 3 EA 15 ADDTL MIN: Performed by: INTERNAL MEDICINE

## 2020-07-13 PROCEDURE — 82962 GLUCOSE BLOOD TEST: CPT

## 2020-07-13 PROCEDURE — 25000128 H RX IP 250 OP 636: Performed by: NURSE ANESTHETIST, CERTIFIED REGISTERED

## 2020-07-13 PROCEDURE — 88173 CYTOPATH EVAL FNA REPORT: CPT | Mod: 26 | Performed by: INTERNAL MEDICINE

## 2020-07-13 PROCEDURE — 85610 PROTHROMBIN TIME: CPT | Performed by: INTERNAL MEDICINE

## 2020-07-13 PROCEDURE — 37000008 ZZH ANESTHESIA TECHNICAL FEE, 1ST 30 MIN: Performed by: INTERNAL MEDICINE

## 2020-07-13 PROCEDURE — 25800030 ZZH RX IP 258 OP 636: Performed by: NURSE ANESTHETIST, CERTIFIED REGISTERED

## 2020-07-13 PROCEDURE — C2617 STENT, NON-COR, TEM W/O DEL: HCPCS | Performed by: INTERNAL MEDICINE

## 2020-07-13 PROCEDURE — 25800030 ZZH RX IP 258 OP 636: Performed by: ANESTHESIOLOGY

## 2020-07-13 PROCEDURE — 74330 X-RAY BILE/PANC ENDOSCOPY: CPT

## 2020-07-13 PROCEDURE — 25000125 ZZHC RX 250: Performed by: NURSE ANESTHETIST, CERTIFIED REGISTERED

## 2020-07-13 PROCEDURE — 88305 TISSUE EXAM BY PATHOLOGIST: CPT | Mod: 26 | Performed by: INTERNAL MEDICINE

## 2020-07-13 PROCEDURE — 88173 CYTOPATH EVAL FNA REPORT: CPT | Performed by: INTERNAL MEDICINE

## 2020-07-13 PROCEDURE — 82150 ASSAY OF AMYLASE: CPT | Performed by: INTERNAL MEDICINE

## 2020-07-13 PROCEDURE — 88172 CYTP DX EVAL FNA 1ST EA SITE: CPT | Mod: 26 | Performed by: INTERNAL MEDICINE

## 2020-07-13 PROCEDURE — 37000009 ZZH ANESTHESIA TECHNICAL FEE, EACH ADDTL 15 MIN: Performed by: INTERNAL MEDICINE

## 2020-07-13 PROCEDURE — 88305 TISSUE EXAM BY PATHOLOGIST: CPT | Performed by: INTERNAL MEDICINE

## 2020-07-13 PROCEDURE — C1887 CATHETER, GUIDING: HCPCS | Performed by: INTERNAL MEDICINE

## 2020-07-13 PROCEDURE — 36415 COLL VENOUS BLD VENIPUNCTURE: CPT | Performed by: INTERNAL MEDICINE

## 2020-07-13 PROCEDURE — 25000566 ZZH SEVOFLURANE, EA 15 MIN: Performed by: INTERNAL MEDICINE

## 2020-07-13 PROCEDURE — 83690 ASSAY OF LIPASE: CPT | Performed by: INTERNAL MEDICINE

## 2020-07-13 PROCEDURE — 85049 AUTOMATED PLATELET COUNT: CPT | Performed by: INTERNAL MEDICINE

## 2020-07-13 PROCEDURE — 71000015 ZZH RECOVERY PHASE 1 LEVEL 2 EA ADDTL HR: Performed by: INTERNAL MEDICINE

## 2020-07-13 DEVICE — IMPLANTABLE DEVICE
Type: IMPLANTABLE DEVICE | Site: BILE DUCT | Status: NON-FUNCTIONAL
Removed: 2020-09-29

## 2020-07-13 RX ORDER — NALOXONE HYDROCHLORIDE 0.4 MG/ML
.1-.4 INJECTION, SOLUTION INTRAMUSCULAR; INTRAVENOUS; SUBCUTANEOUS
Status: DISCONTINUED | OUTPATIENT
Start: 2020-07-13 | End: 2020-07-13 | Stop reason: HOSPADM

## 2020-07-13 RX ORDER — FENTANYL CITRATE 50 UG/ML
INJECTION, SOLUTION INTRAMUSCULAR; INTRAVENOUS PRN
Status: DISCONTINUED | OUTPATIENT
Start: 2020-07-13 | End: 2020-07-13

## 2020-07-13 RX ORDER — GLYCOPYRROLATE 0.2 MG/ML
INJECTION, SOLUTION INTRAMUSCULAR; INTRAVENOUS PRN
Status: DISCONTINUED | OUTPATIENT
Start: 2020-07-13 | End: 2020-07-13

## 2020-07-13 RX ORDER — ONDANSETRON 2 MG/ML
INJECTION INTRAMUSCULAR; INTRAVENOUS PRN
Status: DISCONTINUED | OUTPATIENT
Start: 2020-07-13 | End: 2020-07-13

## 2020-07-13 RX ORDER — INDOMETHACIN 50 MG/1
100 SUPPOSITORY RECTAL
Status: DISCONTINUED | OUTPATIENT
Start: 2020-07-13 | End: 2020-07-13 | Stop reason: HOSPADM

## 2020-07-13 RX ORDER — NEOSTIGMINE METHYLSULFATE 1 MG/ML
VIAL (ML) INJECTION PRN
Status: DISCONTINUED | OUTPATIENT
Start: 2020-07-13 | End: 2020-07-13

## 2020-07-13 RX ORDER — ONDANSETRON 4 MG/1
4 TABLET, ORALLY DISINTEGRATING ORAL EVERY 30 MIN PRN
Status: DISCONTINUED | OUTPATIENT
Start: 2020-07-13 | End: 2020-07-13 | Stop reason: HOSPADM

## 2020-07-13 RX ORDER — EPHEDRINE SULFATE 50 MG/ML
INJECTION, SOLUTION INTRAMUSCULAR; INTRAVENOUS; SUBCUTANEOUS PRN
Status: DISCONTINUED | OUTPATIENT
Start: 2020-07-13 | End: 2020-07-13

## 2020-07-13 RX ORDER — LIDOCAINE 40 MG/G
CREAM TOPICAL
Status: DISCONTINUED | OUTPATIENT
Start: 2020-07-13 | End: 2020-07-13 | Stop reason: HOSPADM

## 2020-07-13 RX ORDER — ONDANSETRON 2 MG/ML
4 INJECTION INTRAMUSCULAR; INTRAVENOUS EVERY 30 MIN PRN
Status: DISCONTINUED | OUTPATIENT
Start: 2020-07-13 | End: 2020-07-13 | Stop reason: HOSPADM

## 2020-07-13 RX ORDER — FENTANYL CITRATE 50 UG/ML
25-50 INJECTION, SOLUTION INTRAMUSCULAR; INTRAVENOUS
Status: DISCONTINUED | OUTPATIENT
Start: 2020-07-13 | End: 2020-07-13 | Stop reason: HOSPADM

## 2020-07-13 RX ORDER — PROPOFOL 10 MG/ML
INJECTION, EMULSION INTRAVENOUS PRN
Status: DISCONTINUED | OUTPATIENT
Start: 2020-07-13 | End: 2020-07-13

## 2020-07-13 RX ORDER — LIDOCAINE HYDROCHLORIDE 20 MG/ML
INJECTION, SOLUTION INFILTRATION; PERINEURAL PRN
Status: DISCONTINUED | OUTPATIENT
Start: 2020-07-13 | End: 2020-07-13

## 2020-07-13 RX ORDER — HYDROMORPHONE HYDROCHLORIDE 1 MG/ML
.3-.5 INJECTION, SOLUTION INTRAMUSCULAR; INTRAVENOUS; SUBCUTANEOUS EVERY 5 MIN PRN
Status: DISCONTINUED | OUTPATIENT
Start: 2020-07-13 | End: 2020-07-13 | Stop reason: HOSPADM

## 2020-07-13 RX ORDER — SODIUM CHLORIDE, SODIUM LACTATE, POTASSIUM CHLORIDE, CALCIUM CHLORIDE 600; 310; 30; 20 MG/100ML; MG/100ML; MG/100ML; MG/100ML
INJECTION, SOLUTION INTRAVENOUS CONTINUOUS
Status: DISCONTINUED | OUTPATIENT
Start: 2020-07-13 | End: 2020-07-13 | Stop reason: HOSPADM

## 2020-07-13 RX ADMIN — GLYCOPYRROLATE 0.8 MG: 0.2 INJECTION, SOLUTION INTRAMUSCULAR; INTRAVENOUS at 12:18

## 2020-07-13 RX ADMIN — PROPOFOL 150 MG: 10 INJECTION, EMULSION INTRAVENOUS at 10:30

## 2020-07-13 RX ADMIN — ROCURONIUM BROMIDE 40 MG: 10 INJECTION INTRAVENOUS at 10:30

## 2020-07-13 RX ADMIN — LIDOCAINE HYDROCHLORIDE 100 MG: 20 INJECTION, SOLUTION INFILTRATION; PERINEURAL at 10:30

## 2020-07-13 RX ADMIN — ONDANSETRON 4 MG: 2 INJECTION INTRAMUSCULAR; INTRAVENOUS at 12:22

## 2020-07-13 RX ADMIN — PHENYLEPHRINE HYDROCHLORIDE 50 MCG: 10 INJECTION INTRAVENOUS at 11:15

## 2020-07-13 RX ADMIN — SODIUM CHLORIDE, POTASSIUM CHLORIDE, SODIUM LACTATE AND CALCIUM CHLORIDE: 600; 310; 30; 20 INJECTION, SOLUTION INTRAVENOUS at 10:27

## 2020-07-13 RX ADMIN — Medication 10 MG: at 10:37

## 2020-07-13 RX ADMIN — DEXMEDETOMIDINE HYDROCHLORIDE 8 MCG: 100 INJECTION, SOLUTION INTRAVENOUS at 10:48

## 2020-07-13 RX ADMIN — DEXMEDETOMIDINE HYDROCHLORIDE 8 MCG: 100 INJECTION, SOLUTION INTRAVENOUS at 12:12

## 2020-07-13 RX ADMIN — PHENYLEPHRINE HYDROCHLORIDE 25 MCG: 10 INJECTION INTRAVENOUS at 12:09

## 2020-07-13 RX ADMIN — NEOSTIGMINE METHYLSULFATE 4 MG: 1 INJECTION, SOLUTION INTRAVENOUS at 12:18

## 2020-07-13 RX ADMIN — FENTANYL CITRATE 100 MCG: 50 INJECTION, SOLUTION INTRAMUSCULAR; INTRAVENOUS at 10:30

## 2020-07-13 RX ADMIN — PHENYLEPHRINE HYDROCHLORIDE 50 MCG: 10 INJECTION INTRAVENOUS at 10:40

## 2020-07-13 ASSESSMENT — MIFFLIN-ST. JEOR: SCORE: 1571.44

## 2020-07-13 ASSESSMENT — LIFESTYLE VARIABLES: TOBACCO_USE: 0

## 2020-07-13 NOTE — DISCHARGE INSTRUCTIONS
Same Day Surgery Discharge Instructions for  Sedation and General Anesthesia       It's not unusual to feel dizzy, light-headed or faint for up to 24 hours after surgery or while taking pain medication.  If you have these symptoms: sit for a few minutes before standing and have someone assist you when you get up to walk or use the bathroom.      You should rest and relax for the next 24 hours. We recommend you make arrangements to have an adult stay with you for at least 24 hours after your discharge.  Avoid hazardous and strenuous activity.      DO NOT DRIVE any vehicle or operate mechanical equipment for 24 hours following the end of your surgery.  Even though you may feel normal, your reactions may be affected by the medication you have received.      Do not drink alcoholic beverages for 24 hours following surgery.       Slowly progress to your regular diet as you feel able. It's not unusual to feel nauseated and/or vomit after receiving anesthesia.  If you develop these symptoms, drink clear liquids (apple juice, ginger ale, broth, 7-up, etc. ) until you feel better.  If your nausea and vomiting persists for 24 hours, please notify your surgeon.        All narcotic pain medications, along with inactivity and anesthesia, can cause constipation. Drinking plenty of liquids and increasing fiber intake will help.      For any questions of a medical nature, call your surgeon.      Do not make important decisions for 24 hours.      If you had general anesthesia, you may have a sore throat for a couple of days related to the breathing tube used during surgery.  You may use Cepacol lozenges to help with this discomfort.  If it worsens or if you develop a fever, contact your surgeon.       If you feel your pain is not well managed with the pain medications prescribed by your surgeon, please contact your surgeon's office to let them know so they can address your concerns.       CoVid 19 Information    We want to give you  information regarding Covid. Please consult your primary care provider with any questions you might have.     Patient who have symptoms (cough, fever, or shortness of breath), need to isolate for 7 days from when symptoms started OR 72 hours after fever resolves (without fever reducing medications) AND improvement of respiratory symptoms (whichever is longer).      Isolate yourself at home (in own room/own bathroom if possible)    Do Not allow any visitors    Do Not go to work or school    Do Not go to [facility],  centers, shopping, or other public places.    Do Not shake hands.    Avoid close and intimate contact with others (hugging, kissing).    Follow CDC recommendations for household cleaning of frequently touched services.     After the initial 7 days, continue to isolate yourself from household members as much as possible. To continue decrease the risk of community spread and exposure, you and any members of your household should limit activities in public for 14 days after starting home isolation.     You can reference the following CDC link for helpful home isolation/care tips:  https://www.cdc.gov/coronavirus/2019-ncov/downloads/10Things.pdf    Protect Others:    Cover Your Mouth and Nose with a mask, disposable tissue or wash cloth to avoid spreading germs to others.    Wash your hands and face frequently with soap and water    Call Your Primary Doctor If: Breathing difficulty develops or you become worse.    For more information about COVID19 and options for caring for yourself at home, please visit the CDC website at https://www.cdc.gov/coronavirus/2019-ncov/about/steps-when-sick.html  For more options for care at [facility], please visit our website at [email or web address]    **If you have questions or concerns about your procedure,   call Dr. Pepe at [phone]**

## 2020-07-13 NOTE — OR NURSING
Discharge instructions read and given to patient and family.  Instructed on followup.  No questions/concerns.

## 2020-07-13 NOTE — OR NURSING
EUS with fine needle biopsy and ERCP and removal of stent, sphincterotomy and placement of CBD stent done in OR.  Specimen left with OR staff.

## 2020-07-13 NOTE — ANESTHESIA PREPROCEDURE EVALUATION
Anesthesia Pre-Procedure Evaluation    Patient: Nick Zamudio   MRN: [id number] : 1955          Preoperative Diagnosis: Pancreatic duct stricture [K86.89]  Pancreatic duct stones [K86.89]    Procedure(s):  ENDOSCOPIC ULTRASOUND, ESOPHAGOSCOPY / UPPER GASTROINTESTINAL TRACT (GI)  ENDOSCOPIC RETROGRADE CHOLANGIOPANCREATOGRAPHY    Past Medical History:   Diagnosis Date     Diabetes (H)      Hypertension      Intussusception (H)      Iron deficiency anemia      Lymphedema      PVD (peripheral vascular disease) (H)      Past Surgical History:   Procedure Laterality Date     AMPUTATION      left toe     CHOLECYSTECTOMY       GI SURGERY      bowel resection     TONSILLECTOMY         Anesthesia Evaluation     .             ROS/MED HX    ENT/Pulmonary:      (-) tobacco use and sleep apnea   Neurologic: Comment: Paraplegia, delayed onset after radiation for cancer      Cardiovascular:     (+) hypertension-Peripheral Vascular Disease---. : . . . :. .       METS/Exercise Tolerance:     Hematologic:     (+) Anemia, -      Musculoskeletal:         GI/Hepatic: Comment: Pancreatic stones  Intussusception, SB resection         Renal/Genitourinary:     (+) Nephrolithiasis ,       Endo:         Psychiatric:         Infectious Disease:         Malignancy:   (+) Malignancy History of Other  Other CA status post Radiation         Other:                          Physical Exam  Normal systems: cardiovascular, pulmonary and dental    Airway   Mallampati: II  TM distance: >3 FB  Neck ROM: full    Dental     Cardiovascular       Pulmonary             Lab Results   Component Value Date     2020     (H) 2020    LIPASE 1,051 (H) 2020    AMYLASE 93 2020    INR 1.14 2020       Preop Vitals  BP Readings from Last 3 Encounters:   20 130/66    Pulse Readings from Last 3 Encounters:   No data found for Pulse      Resp Readings from Last 3 Encounters:   20 16    SpO2 Readings from Last 3  Encounters:   07/13/20 100%      Temp Readings from Last 1 Encounters:   07/13/20 36.1  C (97  F) (Temporal)    Ht Readings from Last 1 Encounters:   07/13/20 1.829 m (6')      Wt Readings from Last 1 Encounters:   07/13/20 74.8 kg (165 lb)    Estimated body mass index is 22.38 kg/m  as calculated from the following:    Height as of this encounter: 1.829 m (6').    Weight as of this encounter: 74.8 kg (165 lb).       Anesthesia Plan      History & Physical Review  History and physical reviewed and following examination; no interval change.    ASA Status:  2 .    NPO Status:  > 8 hours    Plan for General with Intravenous and Propofol induction. Maintenance will be Inhalation.    PONV prophylaxis:  Ondansetron (or other 5HT-3) and Dexamethasone or Solumedrol         Postoperative Care  Postoperative pain management:  IV analgesics and Oral pain medications.      Consents  Anesthetic plan, risks, benefits and alternatives discussed with:  Patient..                 Alejandro Vazquez MD

## 2020-07-13 NOTE — ANESTHESIA POSTPROCEDURE EVALUATION
Patient: Nick Zamudio    Procedure(s):  ENDOSCOPIC ULTRASOUND, ESOPHAGOSCOPY / UPPER GASTROINTESTINAL TRACT (GI) WITH FINE NEEDLE BIOPSY  ENDOSCOPIC RETROGRADE CHOLANGIOPANCREATOGRAPHY, STENT RETRIEVAL, STENT PLACEMENT, BILIARY SPHINCTEROTOMY    Diagnosis:Pancreatic duct stricture [K86.89]  Pancreatic duct stones [K86.89]  Diagnosis Additional Information: No value filed.    Anesthesia Type:  General    Note:  Anesthesia Post Evaluation    Patient location during evaluation: PACU  Patient participation: Able to fully participate in evaluation  Level of consciousness: awake and alert  Pain management: adequate  Airway patency: patent  Cardiovascular status: acceptable  Respiratory status: acceptable  Hydration status: acceptable  PONV: none     Anesthetic complications: None          Last vitals:  Vitals:    07/13/20 0905 07/13/20 1236   BP: 130/66 134/75   Resp: 16 20   Temp: 36.1  C (97  F) 36.1  C (97  F)   SpO2: 100% 99%         Electronically Signed By: Alejandro Vazquez MD  July 13, 2020  3:10 PM

## 2020-07-13 NOTE — OP NOTE
ERCP  07/13/2020 10:21 AM  [facility]    [facility] Endoscopy Department   _______________________________________________________________________________   Patient Name: Nick Zamudio           Procedure Date: 7/13/2020 10:21 AM   MRN: [id number]                       Account Number: [id number]   YOB: 1955              Admit Type: Outpatient   Age: 64                               Room: OR   Note Status: Finalized                Attending MD: Christo Pepe MD   Total Sedation Time:                  Instrument Name: 408 [id number]-[id number] ERCP   _______________________________________________________________________________       Procedure:                ERCP   Indications:              Chronic pancreatitis, Bile duct stricture   Providers:                Christo Pepe MD, Tasneem Mejia RN, Shandra Garcia, Technician   Patient Profile:          Mr Zamudio is a 65yo gentleman with chronic calcific                             pancreatitis of unclear etiology, perhaps                             hereditary or even secondary to his radiation                             therapy (he has never abused tobacco or alcohol.)                             He is now found to have dual stenoses of the distal                             common and downstream main ducts with imaging by CT                             concerning for malignancy. Tandem EUS was without                             suspicious finding of malignancy/mass though                             abnormally enlarged periportal nodes were sampled                             and impressive calcifications of the head were                             found. He now proceeds to ERCP for management of                             the stent and biliary stricture.   Referring MD:             Jamari Rivas MD   Medicines:                General Anesthesia, Contraindication to indomethacin   Complications:             No immediate complications.   _______________________________________________________________________________   Procedure:                Pre-Anesthesia Assessment:                             - Prior to the procedure, a History and Physical                             was performed, and patient medications and                             allergies were reviewed. The patient is competent.                             The risks and benefits of the procedure and the                             sedation options and risks were discussed with the                             patient. All questions were answered and informed                             consent was obtained. Patient identification and                             proposed procedure were verified by the nurse in                             the pre-procedure area. Mental Status Examination:                             alert and oriented. Airway Examination: Mallampati                             Class II (the uvula but not tonsillar pillars                             visualized). Respiratory Examination: clear to                             auscultation. CV Examination: normal. ASA Grade                             Assessment: III - A patient with severe systemic                             disease. After reviewing the risks and benefits,                             the patient was deemed in satisfactory condition to                             undergo the procedure. The anesthesia plan was to                             use general anesthesia. Immediately prior to                             administration of medications, the patient was                             re-assessed for adequacy to receive sedatives. The                             heart rate, respiratory rate, oxygen saturations,                             blood pressure, adequacy of pulmonary ventilation,                             and response to care were monitored throughout the        "                      procedure. The physical status of the patient was                             re-assessed after the procedure. After obtaining                             informed consent, the scope was passed under direct                             vision. Throughout the procedure, the patient's                             blood pressure, pulse, and oxygen saturations were                             monitored continuously. The duodenoscope was                             introduced through the mouth, and used to inject                             contrast into and used to inject contrast into the                             bile duct. The ERCP was accomplished without                             difficulty. The patient tolerated the procedure                             well.                                                                                     Findings:        Like with the EUS,  films demonstrated the biliary stent and the        pancreatic stent appeared to have spontaneously ejected. Limited white        light views of the foregut were notable for impressive edema of the        sweep which complicated passage and position of the duodensocope. The        ampulla was also edematous and the in situ stent had migratred into the        duct. Using a sphincterotome and an 0.025\" Visiglide wire the stent was        cannulated and over this wire a snare was used to retrieve the stent        from a small biliary sphincterotomy. The bile duct was then deeply        cannulated with the short-nosed traction sphincterotome and 12 mm        balloon using the Visiglide wire. Contrast was injected and I personally        interpreted the bile duct images. Ductal flow of contrast was adequate,        image quality was excellent and contrast extended to the hepatic ducts.        The intrahepatic were decompressed and overall healthy appearing though        somewhat distorted in course. The bifucation " and common duct were        decompressed though a 3cm distal common duct stenosis was found in the        region of the head of the pancreas. The cystic stump was unremarkable. A        3 mm biliary sphincterotomy extension was made with a monofilament        traction (standard) sphincterotome using ERBE electrocautery. There was        no post-sphincterotomy bleeding. To discover objects, the biliary tree        was swept with a 12 mm balloon starting at the left intrahepatic duct(s)        and right intrahepatic duct(s). Nothing but contrast and bile were        recovered. A 10 Fr by 7 cm SofFlex stent with a single external flap and        a single internal flap was placed 7 cm into the common bile duct. Bile        flowed through the stent and the stent was in good position. A decision        as made against pancreartic duct cannulation given his improving        symptoms over time and with initiating Creon as well as his decompressed        ventral duct upstream of a main duct stone seen on EUS.                                                                                     Impression:               - Edematous duodenal sweep consistent with acute on                             chronic pancreatitis complicating position and                             visualization (Mod 22)                             - Uncomplicated retrieval of migrated (upstream)                             biliary stent as above                             - Spontaneous ejection of the pancreatic stent                             - Persistent distal common duct stenosis in the                             region of the pancreatic head without upstream                             dilation consistent with acute on chronic                             pancreatitis                             - Successful and uncomplicated biliary                             sphincterotomy extension and replacement of biliary                             stent                              - A decision as made against pancreartic duct                             cannulation given his improving symptoms over time                             and with initiating Creon as well as his                             decompressed ventral duct upstream of a main duct                             stone seen on EUS.   Recommendation:           - General anesthesia recovery with probable                             discharge home this afternoon                             - Refer to tandem EUS note                             - Repeat ERCP in 10 weeks for interval                             interrogation of the biliary system and stent                             exchange/upsize/removal as well consideration of                             pancreatic duct and stone management pending                             interval course                             - Continue high dose Creon and prescribed as well                             low fat pancreatitis diet                             - The findings and recommendations were discussed                             with the patient and their family

## 2020-07-13 NOTE — LETTER
Patient:  Nick Zamudio  :   1955  MRN:     [id number]        Mr.Arthur ELIO Zamudio  [address] [place] [address] [address] [place] [address]        2020    Dear ,    We are writing to inform you of your test results. In short, good news. The sampling of the lymph node returned without any concerning features to suggest cancer. As discussed previously, our plan includes a repeat CT prior to your planned interval ERCP.    I have included the formal documtentation of the results below. It continues to be a pleasure participating in your care.  Please feel free to contact our clinic with any further questions.      Sincerely,    Christo Jones MD PhD CHELO LEVI  Director of Endoscopy  Associate Professor of Medicine, Surgery and Pediatrics  Interventional and Therapeutic Endoscopy    [facility]  Division of Gastroenterology and Hepatology  [facility] [address] [address]    New Consultations  [phone]  Procedure Scheduling [phone]  Clinical Nurse Coordinator [phone]  Clinical Fax   [phone]  Administrative   [phone]  Administrative Fax  [phone]        Resulted Orders   Surgical pathology exam   Result Value Ref Range    Copath Report       Patient Name: NICK ZAMUDIO  MR#: [id number]  Specimen #: [id number]  Collected: 2020  Received: 2020  Reported: 2020 15:24  Ordering Phy(s): CHRISTO JONES    For improved result formatting, select 'View Enhanced Report Format' under   Linked Documents section.    SPECIMEN(S):  FNA-EUS guided, periportal lymph node    FINAL DIAGNOSIS:  Lymph node, randy hepatis, EUS guided FNA biopsy-  - Scant crushed lymphoid tissue, compatible with sampling of lymph node.   Negative for metastatic malignancy.  (Please see microscopic description)    I have reviewed this specimen and edited this report    Electronically signed out by:  Holli [place] M.D.    CLINICAL  "HISTORY:  Pancreatic duct stricture, pancreatic duct stones    GROSS:  A12 The specimen is received labelled \"lymph node randy hepatis pass #1 &   2 \" and consists of 3 glass slide  smears. 1 is stained with rapid Romanowsky stain and 2 are stained with   Hematoxylin and Eosin stain. Stat  results of \"diagnostic  tissue present\" is relayed to Dr Pepe by Dr Abbie TOMPKINS 3. A cell block is performed and entirely submitted in A3. (Dictated by:   Holden Balderrama 7/13/2020 01:50 PM)    INTRAOPERATIVE CONSULTATION:  A rapid adequacy assessment was performed by Dr. Leiva.  Adequacy/Interpretation:  A1: Not adequate  A2: Not adequate; Strips of bland gastric intestinal epithelium, no lymph   node identified, no evidence of  carcinoma    MICROSCOPIC:  A1-2: Examination of the cytologic smear preparations demonstrate strips   of bland gastrointestinal epithelium  and connective tissue sampling. There is no definite lymph node sampling   identified and no evidence of  carcinoma. (This part of the assessment done by Dr. Leiva)    A3.The cell block specimen shows crushed lymphohistiocytic tissue,   suggestive of possible sampling of the  lymph node. Additionally present is bland gastrointestinal epithelium   (needle pickup). Specimen is negative  for metastatic malignancy.    The technical component o f this testing was completed at the [facility], with the professional component performed   at the [facility]  Laboratory, [address] [place] [address]  [phone] ([phone])    CPT Codes:  A: 21135-YUNM, 54214-IDOH-XLQ, 39029-GSTI-TYU, 98728-DCVVA, 88245-QI5    COLLECTION SITE:  Client: [facility]  Location: [facility] (S)             "

## 2020-07-13 NOTE — ANESTHESIA CARE TRANSFER NOTE
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    Anesthesia Type:   General     Note:  Airway :Face Mask  Patient transferred to:PACU  Comments: Anesthesia Care Note    Patient: Nick Zamudio    Transferred to: PACU    Patient vital signs: Stable    Airway: FM    Monitors placed. VSS. PIV patent. No change in dentition. Report given to BERLIN Correa CRNA   7/13/2020  Handoff Report: Identifed the Patient, Identified the Reponsible Provider, Reviewed the pertinent medical history, Discussed the surgical course, Reviewed Intra-OP anesthesia mangement and issues during anesthesia, Set expectations for post-procedure period and Allowed opportunity for questions and acknowledgement of understanding      Vitals: (Last set prior to Anesthesia Care Transfer)    CRNA VITALS  7/13/2020 1205 - 7/13/2020 1237      7/13/2020             Pulse:  91    Ht Rate:  168    SpO2:  90 %    Resp Rate (set):  10                Electronically Signed By: DANICA Simmons CRNA  July 13, 2020  12:37 PM

## 2020-07-13 NOTE — OP NOTE
Upper EUS  07/13/2020 10:24 AM  [facility]    [facility]   [facility] Endoscopy Department   _______________________________________________________________________________   Patient Name: Nick Zamudio           Procedure Date: 7/13/2020 10:24 AM   MRN: [id number]                       Account Number: [id number]   YOB: 1955              Admit Type: Outpatient   Age: 64                               Room: OR   Note Status: Finalized                Attending MD: Christo Pepe MD   Total Sedation Time:                  Instrument Name: 428 [id number] EUS Linear   _______________________________________________________________________________       Procedure:                Upper EUS   Indications:              Suspected mass in pancreas on CT scan   Providers:                Christo Pepe MD, Tasneem Mejia, GIANCARLO, Shandra Garcia, Technician   Patient Profile:          Mr Zamudio is a 65yo gentleman with chronic calcific                             pancreatitis of unclear etiology, perhaps                              hereditary or even secondary to his radiation                             therapy (he has never abused tobacco or alcohol.)                             He is now found to have dual stenoses of the distal                             common and downstream main ducts with imaging by CT                             concerning for malignancy. He now proceeds to EUS                             with plans for tandem ERCP.   Referring MD:             Jamari Rivas MD   Medicines:                General Anesthesia   Complications:            No immediate complications.   _______________________________________________________________________________   Procedure:                Pre-Anesthesia Assessment:                             - Prior to the procedure, a History and Physical                             was performed, and patient medications and                              allergies were reviewed. The patient is competent.                             The risks and benefits of the procedure and the                             sedation options and risks were discussed with the                             patient. All questions were answered and informed                             consent was obtained. Patient identification and                             proposed procedure were verified by the nurse in                             the pre-procedure area. Mental Status Examination:                             alert and oriented. Airway Examination: Mallampati                             Class II (the uvula but not tonsillar pillars                             visualized). Respiratory Examination: clear to                             auscultation. CV Examination: normal. ASA Grade                             Assessment: III - A patient with severe systemic                             disease. After reviewing the risks and benefits,                             the patient was deemed in satisfactory condition to                             undergo the procedure. The anesthesia plan was to                             use general anesthesia. Immediately prior to                             administration of medications, the patient was                             re-assessed for adequacy to receive sedatives. The                             heart rate, respiratory rate, oxygen saturations,                             blood pressure, adequacy of pulmonary ventilation,                             and response to care were monitored throughout the                             procedure. The physical status of the patient was                             re-assessed after the procedure. After obtaining                             informed consent, the endoscope was passed under                             direct vision. Throughout the procedure, the                              patient's blood pressure, pulse, and oxygen                             saturations were monitored continuously. The                             endoscopes were introduced through the mouth, and                             advanced to the second part of duodenum. The upper                             EUS was accomplished without difficulty. The                             patient tolerated the procedure well.                                                                                     Findings:        White light and endosonographic findings :        We began with a duodenoscope and with difficulty identifying the biliary        stent due to stent migration upstream and duodenal edema, fluoroscopy        was utilized. Beyond this moderate edema, white light imaging was        unremarkable though limited. The biliary stent was left in situ to        ensure identifcation of the biliary orifice during subsequent ERCP. We        then exchanged for a linear echoendoscope. The pancreatic parenchyma was        diffusely (subjectively) atrophic, though without solid or liquid        lesions, beyond intraparenchymal and intraductal bright hyperechoic        structures with shadowing (calcifications). One of the calcifications,        4-5mm in diameter, was clearly in the ventral (main) duct in the head.        However the main duct upstream of the stone was diffusely decompressed        suggesting adequate drainage. The gallbladder was absent. The common        duct was traced from bifurcation to the ampulla and was found        decompressed, in particular within the head, suggesting stenosis. There        were no internal solid objects to suggest stone, nor was there any        suggestion of wall asymmetric thickening to suggest mass. The        intrahepatics were also decompressed. There were no suspicious lesions        of the left lobe, right lobe or caudate. There were abnormal (enalarged        over 1cm and  hypoechoic) in the periportal region, though these had        fuzzy borders, off shapes and did not involve adjacent structures. There        was no evidence of peripancreatic, perigastric or celiac region        lymphadenopathy. The narrowing of the portal confluence was appreciated        however there was no evidence of mass. Moreover, the gastroduodenal,        splenics, superior mesenterics and celiac were traditional and        unremarkable. Fine needle biopsy was performed and color Doppler imaging        was utilized prior to needle puncture to confirm a lack of significant        vascular structures within the needle path. Four passes were made with        the 22 gauge ultrasound biopsy needle using a transduodenal approach to        sample the periportal lymphadenopathy. A visible core of tissue was        obtained. Preliminary cytologic examination and touch preps were        performed. Final cytology results are pending.                                                                                     Impression:               - No evidence of a pancreatic, biliary, duodenal or                             ampullary mass, however the procedure was                             complicated and limited by active edema (Mod 22) of                             the duodenal sweep, migrated (upstream) of the                             biliary stent and numerous shadowing calcifications                             of the pancreatic head, at least one of which was                             in the main duct. The main duct upstream of the                             stent however was decompresed and the pancreatic                             parenchyma was diffusely atrophic.                             - Absent gallbladder with decompressed biliary                             system and migrated biliary stent.                             - Indeterminate periportal lymphadenopathy,                              sampled. No other abdominal lymphadenopathy was                             appreciated                             - Portal confluence narrowing appreciated as                             demonstrated on CT, however, this was no associated                             with a mass or irregular soft tissue growth; other                             vasculature were unremarkable.   Recommendation:           - Proceed to ERCP as planned, though given the                             patient's course, response to Creon and                             decompressed ventral duct we may elect to defer                             pancreatic duct management                             - Dr Pepe to communiate the results of histology                             when available                             - Interval CT in 9-10 weeks just before repeat ERCP                             to determine need for tandem EUS                             - Avoid antithrombotics for at least three days                             - Diet may resume as previous with Creon                             - The findings and recommendations were discussed                             with the patient and their family

## 2020-07-14 LAB — COPATH REPORT: NORMAL

## 2020-07-15 ENCOUNTER — VIRTUAL VISIT (OUTPATIENT)
Dept: GASTROENTEROLOGY | Facility: CLINIC | Age: 65
End: 2020-07-15
Payer: MEDICARE

## 2020-07-15 DIAGNOSIS — K86.89 PANCREATIC DUCT STONES: ICD-10-CM

## 2020-07-15 DIAGNOSIS — K86.1 CHRONIC PANCREATITIS (H): Primary | ICD-10-CM

## 2020-07-15 DIAGNOSIS — K86.89 PANCREATIC DUCT STRICTURE: ICD-10-CM

## 2020-07-15 DIAGNOSIS — Z71.3 NUTRITIONAL COUNSELING: ICD-10-CM

## 2020-07-15 NOTE — PATIENT INSTRUCTIONS
It was nice speaking with you today. Below are the nutrition recommendations we discussed at your visit today.  Please let me know if you have any additional questions.    Nutrition recommendations:    - Aim for eating 4-6 smaller meals. (or 3 small meals + 2-3 snacks) per day.    - Eat a lower fat diet:  -Choose lean proteins/lean cuts of meats. Remove skins from chicken and turkey breast, lean cuts of meats, 93% lean beef, fish (not fried/baked).  -Use lower fat cooking methods such as baking, broiling, grilling.  -Limit using a lot of fats/oil/high fat sauces/butter when preparing foods.  -Choose skim or 1% based dairy products (such as lowfat yogurts, skim/1% milk, non fat/1% cottage cheese, lowfat pudding).  -Do not eat large amounts of nuts, seeds, nut butters, avocado at a time.    - Can have a protein drink as a small meal/snack (these count towards your 4-6 smaller meals).   -Some examples include Ensure Max, Ensure high protein, Boost high protein, Premier protein, Pure protein or can make your own using a protein powder and mix with fluids.    -If feeling nausea, a clear protein drink may be better tolerated such as Protein 2O, Isopure or Premier Clear.    - Take enzyme dose as directed by MD. Take your enzyme capsules at the beginning meals, snacks, protein drinks. Or you can spread your dose out throughout the meal if this is better tolerated. For example can take 1 capsule at the beginning of the meal, 1 mid-meal and 1 capsule at end of meal.    - Do not drink alcoholic beverages.    - Drink plenty of fluids, at least 48 oz-64 oz or more water per day.    *limit caffeinated beverages to 1-2 cups per day unless otherwise directed by MD.   *carbonated beverage may contribute to gas pain/bloating.    -Continue to keep daily foods and beverage journals. If you would like to see how much you are eating, you can use a cell phone jessica or online options such as My Fitness pal or Lose it for example to track you  total daily calories and % of carbohydrates, fats and protein you are eating each day.    Can follow up as needed.    If you would like to schedule a follow up appointment with Mel Centeno, Registered Dietitian, please call [phone].    Mel Centeno, MS, RD, LD

## 2020-07-15 NOTE — PROGRESS NOTES
"Nick Zamudio is a 64 year old male who is being evaluated via a billable telephone visit.       The patient has been notified of following:      \"This telephone visit will be conducted via a call between you and your physician/provider. We have found that certain health care needs can be provided without the need for a physical exam.  This service lets us provide the care you need with a short phone conversation.  If a prescription is necessary we can send it directly to your pharmacy.  If lab work is needed we can place an order for that and you can then stop by our lab to have the test done at a later time.     Telephone visits are billed at different rates depending on your insurance coverage. During this emergency period, for some insurers they may be billed the same as an in-person visit.  Please reach out to your insurance provider with any questions.     If during the course of the call the physician/provider feels a telephone visit is not appropriate, you will not be charged for this service.\"     Patient has given verbal consent for Telephone visit?  Yes  During this virtual visit the patient is located in [place], patient verifies this as the location during the entirety of this visit.      What phone number would you like to be contacted at? [phone].     How would you like to obtain your AVS? FAD ? IO     Phone call duration: 37 minutes     Mel Centeno, MS, RD, LD     [facility] Medical Nutrition Therapy         Time Spent:  37 minutes  Session Type:  Initial session  Referring Physician:  Dr. Christo Pepe  Reason for RD Visit:   Chronic pancreatitis      Nutrition Assessment:  Patient is a 64 year old male with history of chronic calcific pancreatitis, pancreatic duct stricture and stones, type 2 diabetes, paraplegia to both lower limbs, small bowel obstruction with resection, testicular cancer, hypertension, kidney stone.  Patient stated that he has had abdominal surgeries over the past " couple of years and due to symptoms he was not able to eat much and had decreased appetite so lost 35 pounds over the past couple of years.  He stated that he has been told to follow many different diets in the past such as low-fat, low fiber and low carbohydrate, so he reported overall some confusion on what to eat.  He stated that he recently started PERT and is taking 2 to 3 capsules with meals and 2 with snacks and has noticed an improvement in his stools.  He has less frequency of stool, less urgency to have bowel movement and able to make it to the bathroom now as well as stools are becoming darker in color than previously were.  He denies having any pain with eating only when he was having a flareup then he would have pain and vomiting.  He reported improvement in symptoms more recently and also had an ERCP on Monday (2 days ago).  He typically eats 3 meals per day and will have a snack in the evening.  He has been tracking his intake on a calendar.  He has been avoiding alcohol over the last 9 months.  Prior to that he would drink up to a couple of beers once a week or less.  Had abdominal surgeries over 2 years and was not able to eat much due to symptoms and eating smaller amounts to reduce symptoms     Height:       Ht Readings from Last 1 Encounters:   07/13/20 1.829 m (6')     Weight:  Used to weight 200 lbs and over the past 2 years lost weight to 165 lbs. He would like to regain some weight         Wt Readings from Last 10 Encounters:   07/13/20 74.8 kg (165 lb)   07/02/20 74.8 kg (165 lb)     BMI: 22.38     Diet Recall:  (some recent meals below):   Meal Food    Breakfast Oatmeal with skim milk OR english muffin with butter occas PB OR cherrios with skim + 2 slice homemade zucchini bread   Lunch 1/2 turkey or ham sandwich with mustard or chicken noodle soup or small amount of pasta with meatless tomato sauce   Dinner Chicken (air fried), mashed potatoes/baked potato with butter or mashed, occas mixed  veg (caul/jason) OR thin crust veggie pizza OR small salad with lite Swedish/1000 island OR 2x/month: hamburger on bun with air fried fries/baked potato/mashed potato   Snacks In pm: popsicle or sherbet or frozen yogurt or prev ice cream bar   Beverages 16.9 oz water, 16 oz diet soda, 4-6 oz 50% lite OJ or 4 oz occas fresh squeezed. 1/2 c black Coffee today (none over 3 weeks)   Alcohol Intake None over the past 9 months. Prior to that 2 beers once per week or less.      Labs:  On 7/13/2020: Lipase 1,051, glucose 128. Others reviewed in EMR.     Pertinent Medications/vitamin and mineral supplements:  Creon 2-3 caps with meals, 2 with snacks and takes metformin.  Food Allergies:  NKFA     MALNUTRITION:  % Weight Loss:  Weight loss does not meet criteria for malnutrition   % Intake:  <75% for >/= 1 month (non-severe malnutrition)  Subcutaneous Fat Loss:  Unable to determine due to telephone visit  Muscle Loss:  Unable to determine due to telephone visit  Fluid Retention:  None noted     Malnutrition Diagnosis: Patient does not meet two of the above criteria necessary for diagnosing malnutrition  In Context of:  Chronic illness or disease     Nutrition Diagnosis:    Food and nutrition related knowledge deficit related to lack of previous diet education/lack of complete recall of previous diet education as evidenced by pt report and interest in diet education/review.      Nutrition Prescription: Nutrition education. Pancreatitis diet recommendations     Nutrition Intervention:    Nutrition Education/Counseling:  Nutrition Education: Provided diet education for pancreatitis as follows:     -Can eat 4-6 smaller meals/snacks per day if better tolerated. Do not skip meals.  -Explained diet lower in fat/moderate amounts of fat may be better tolerated than higher fat diet. Reviewed higher lower fat foods with tips and suggestions for eating lower to moderate fat diet as tolerated and discussed some lower fat substitutions for  some of patient's usual higher fat food choices.  -Can have protein drink/low fat oral nutrition supplement drink if unable to eat a meal/instead of skipping meals.   -Discussed adequate hydration and recommended patient drink at least 48 oz-64 oz fluids per day.  -Encouraged patient to continue to avoid all alcoholic beverages.  -Recommended taking enzymes as directed by MD/NP/PA.  Explained recommendation to take all capsules at the beginning of meals/snack OR if prefers/better tolerated can spread dose out throughout meal. For example take 1 capsule at beginning of meal, 1 mid-meal and 1 end of meal.     Patient verbalized understanding of education provided. See Goals below.      Goals:     - Aim for eating 4-6 smaller meals. (or 3 small meals + 2-3 snacks) per day.     - Eat a lower fat diet:  -Choose lean proteins/lean cuts of meats. Remove skins from chicken and turkey breast, lean cuts of meats, 93% lean beef, fish (not fried/baked).  -Use lower fat cooking methods such as baking, broiling, grilling.  -Limit using a lot of fats/oil/high fat sauces/butter when preparing foods.  -Choose skim or 1% based dairy products (such as lowfat yogurts, skim/1% milk, non fat/1% cottage cheese, lowfat pudding).  -Do not eat large amounts of nuts, seeds, nut butters, avocado at a time.     - Can have a protein drink as a small meal/snack (these count towards your 4-6 smaller meals).   -Some examples include Ensure Max, Ensure high protein, Boost high protein, Premier protein, Pure protein or can make your own using a protein powder and mix with fluids.     -If feeling nausea, a clear protein drink may be better tolerated such as Protein 2O, Isopure or Premier Clear.     - Take enzyme dose as directed by MD. Take your enzyme capsules at the beginning meals, snacks, protein drinks. Or you can spread your dose out throughout the meal if this is better tolerated. For example can take 1 capsule at the beginning of the meal, 1  mid-meal and 1 capsule at end of meal.     - Do not drink alcoholic beverages.     - Drink plenty of fluids, at least 48 oz-64 oz or more water per day.               *limit caffeinated beverages to 1-2 cups per day or less unless otherwise directed by MD.              *carbonated beverage may contribute to gas pain/bloating.     -Continue to keep daily foods and beverage journals. If you would like to see how much you are eating, you can use a cell phone jessica or online options such as My Fitness pal or Lose it for example to track you total daily calories and % of carbohydrates, fats and protein you are eating each day.     Nutrition Monitoring and Evaluation: Will monitor adherence to nutrition recommendations at future RD visits.      Further Medical Nutrition Therapy:  Follow up in as needed     Patient was encouraged to call/contact RD with any further questions.     Mel Centeno, MS, RD, LD

## 2020-07-15 NOTE — LETTER
7/15/2020         RE: Nick Zamudio  [address] [place] [address]        Dear Colleague,    Thank you for referring your patient, Nick Zamudio, to the [facility]. Please see a copy of my visit note below.    Nick Zamudio is a 64 year old male who is being evaluated via a billable telephone visit.          Phone call duration: 37 minutes     Mel Centeno, MS, RD, LD     [facility] Outpatient Medical Nutrition Therapy         Time Spent:  37 minutes  Session Type:  Initial session  Referring Physician:  Dr. Christo Pepe  Reason for RD Visit:   Chronic pancreatitis      Nutrition Assessment:  Patient is a 64 year old male with history of chronic calcific pancreatitis, pancreatic duct stricture and stones, type 2 diabetes, paraplegia to both lower limbs, small bowel obstruction with resection, testicular cancer, hypertension, kidney stone.  Patient stated that he has had abdominal surgeries over the past couple of years and due to symptoms he was not able to eat much and had decreased appetite so lost 35 pounds over the past couple of years.  He stated that he has been told to follow many different diets in the past such as low-fat, low fiber and low carbohydrate, so he reported overall some confusion on what to eat.  He stated that he recently started PERT and is taking 2 to 3 capsules with meals and 2 with snacks and has noticed an improvement in his stools.  He has less frequency of stool, less urgency to have bowel movement and able to make it to the bathroom now as well as stools are becoming darker in color than previously were.  He denies having any pain with eating only when he was having a flareup then he would have pain and vomiting.  He reported improvement in symptoms more recently and also had an ERCP on Monday (2 days ago).  He typically eats 3 meals per day and will have a snack in the evening.  He has been tracking his intake on a calendar.  He has been  avoiding alcohol over the last 9 months.  Prior to that he would drink up to a couple of beers once a week or less.  Had abdominal surgeries over 2 years and was not able to eat much due to symptoms and eating smaller amounts to reduce symptoms     Height:       Ht Readings from Last 1 Encounters:   07/13/20 1.829 m (6')     Weight:  Used to weight 200 lbs and over the past 2 years lost weight to 165 lbs. He would like to regain some weight         Wt Readings from Last 10 Encounters:   07/13/20 74.8 kg (165 lb)   07/02/20 74.8 kg (165 lb)     BMI: 22.38     Diet Recall:  (some recent meals below):   Meal Food    Breakfast Oatmeal with skim milk OR english muffin with butter occas PB OR cherrios with skim + 2 slice homemade zucchini bread   Lunch 1/2 turkey or ham sandwich with mustard or chicken noodle soup or small amount of pasta with meatless tomato sauce   Dinner Chicken (air fried), mashed potatoes/baked potato with butter or mashed, occas mixed veg (caul/jason) OR thin crust veggie pizza OR small salad with lite Albanian/1000 island OR 2x/month: hamburger on bun with air fried fries/baked potato/mashed potato   Snacks In pm: popsicle or sherbet or frozen yogurt or prev ice cream bar   Beverages 16.9 oz water, 16 oz diet soda, 4-6 oz 50% lite OJ or 4 oz occas fresh squeezed. 1/2 c black Coffee today (none over 3 weeks)   Alcohol Intake None over the past 9 months. Prior to that 2 beers once per week or less.      Labs:  On 7/13/2020: Lipase 1,051, glucose 128. Others reviewed in EMR.     Pertinent Medications/vitamin and mineral supplements:  Creon 2-3 caps with meals, 2 with snacks and takes metformin.  Food Allergies:  NKFA     MALNUTRITION:  % Weight Loss:  Weight loss does not meet criteria for malnutrition   % Intake:  <75% for >/= 1 month (non-severe malnutrition)  Subcutaneous Fat Loss:  Unable to determine due to telephone visit  Muscle Loss:  Unable to determine due to telephone visit  Fluid Retention:   None noted     Malnutrition Diagnosis: Patient does not meet two of the above criteria necessary for diagnosing malnutrition  In Context of:  Chronic illness or disease     Nutrition Diagnosis:    Food and nutrition related knowledge deficit related to lack of previous diet education/lack of complete recall of previous diet education as evidenced by pt report and interest in diet education/review.      Nutrition Prescription: Nutrition education. Pancreatitis diet recommendations     Nutrition Intervention:    Nutrition Education/Counseling:  Nutrition Education: Provided diet education for pancreatitis as follows:     -Can eat 4-6 smaller meals/snacks per day if better tolerated. Do not skip meals.  -Explained diet lower in fat/moderate amounts of fat may be better tolerated than higher fat diet. Reviewed higher lower fat foods with tips and suggestions for eating lower to moderate fat diet as tolerated and discussed some lower fat substitutions for some of patient's usual higher fat food choices.  -Can have protein drink/low fat oral nutrition supplement drink if unable to eat a meal/instead of skipping meals.   -Discussed adequate hydration and recommended patient drink at least 48 oz-64 oz fluids per day.  -Encouraged patient to continue to avoid all alcoholic beverages.  -Recommended taking enzymes as directed by MD/NP/PA.  Explained recommendation to take all capsules at the beginning of meals/snack OR if prefers/better tolerated can spread dose out throughout meal. For example take 1 capsule at beginning of meal, 1 mid-meal and 1 end of meal.     Patient verbalized understanding of education provided. See Goals below.      Goals:     - Aim for eating 4-6 smaller meals. (or 3 small meals + 2-3 snacks) per day.     - Eat a lower fat diet:  -Choose lean proteins/lean cuts of meats. Remove skins from chicken and turkey breast, lean cuts of meats, 93% lean beef, fish (not fried/baked).  -Use lower fat cooking  methods such as baking, broiling, grilling.  -Limit using a lot of fats/oil/high fat sauces/butter when preparing foods.  -Choose skim or 1% based dairy products (such as lowfat yogurts, skim/1% milk, non fat/1% cottage cheese, lowfat pudding).  -Do not eat large amounts of nuts, seeds, nut butters, avocado at a time.     - Can have a protein drink as a small meal/snack (these count towards your 4-6 smaller meals).   -Some examples include Ensure Max, Ensure high protein, Boost high protein, Premier protein, Pure protein or can make your own using a protein powder and mix with fluids.     -If feeling nausea, a clear protein drink may be better tolerated such as Protein 2O, Isopure or Premier Clear.     - Take enzyme dose as directed by MD. Take your enzyme capsules at the beginning meals, snacks, protein drinks. Or you can spread your dose out throughout the meal if this is better tolerated. For example can take 1 capsule at the beginning of the meal, 1 mid-meal and 1 capsule at end of meal.     - Do not drink alcoholic beverages.     - Drink plenty of fluids, at least 48 oz-64 oz or more water per day.               *limit caffeinated beverages to 1-2 cups per day or less unless otherwise directed by MD.              *carbonated beverage may contribute to gas pain/bloating.     -Continue to keep daily foods and beverage journals. If you would like to see how much you are eating, you can use a cell phone jessica or online options such as My Fitness pal or Lose it for example to track you total daily calories and % of carbohydrates, fats and protein you are eating each day.     Nutrition Monitoring and Evaluation: Will monitor adherence to nutrition recommendations at future RD visits.      Further Medical Nutrition Therapy:  Follow up in as needed     Patient was encouraged to call/contact RD with any further questions.     Mel Centeno, MS, RD, LD                        Nick Zamudio is a 64 year old male who is  "being evaluated via a billable telephone visit.      The patient has been notified of following:     \"This telephone visit will be conducted via a call between you and your physician/provider. We have found that certain health care needs can be provided without the need for a physical exam.  This service lets us provide the care you need with a short phone conversation.  If a prescription is necessary we can send it directly to your pharmacy.  If lab work is needed we can place an order for that and you can then stop by our lab to have the test done at a later time.    Telephone visits are billed at different rates depending on your insurance coverage. During this emergency period, for some insurers they may be billed the same as an in-person visit.  Please reach out to your insurance provider with any questions.    If during the course of the call the physician/provider feels a telephone visit is not appropriate, you will not be charged for this service.\"    Patient has given verbal consent for Telephone visit?  Yes  During this virtual visit the patient is located in [place], patient verifies this as the location during the entirety of this visit.     What phone number would you like to be contacted at? [phone].    How would you like to obtain your AVS? Happlinkt    Phone call duration: 37 minutes    Mel Centeno MS, RD, LD    Note: there is a duplicate note for visit today. System closed on writer during visit, so caused duplicate note.  [facility] Outpatient Medical Nutrition Therapy      Time Spent:  37 minutes  Session Type:  Initial session  Referring Physician:  Dr. Christo Pepe  Reason for RD Visit:   Chronic pancreatitis     Nutrition Assessment:  Patient is a 64 year old male with history of chronic calcific pancreatitis, pancreatic duct stricture and stones, type 2 diabetes, paraplegia to both lower limbs, small bowel obstruction with resection, testicular cancer, hypertension, kidney stone.  Patient " stated that he has had abdominal surgeries over the past couple of years and due to symptoms he was not able to eat much and had decreased appetite so lost 35 pounds over the past couple of years.  He stated that he has been told to follow many different diets in the past such as low-fat, low fiber and low carbohydrate, so he reported overall some confusion on what to eat.  He stated that he recently started PERT and is taking 2 to 3 capsules with meals and 2 with snacks and has noticed an improvement in his stools.  He has less frequency of stool, less urgency to have bowel movement and able to make it to the bathroom now as well as stools are becoming darker in color than previously were.  He denies having any pain with eating only when he was having a flareup then he would have pain and vomiting.  He reported improvement in symptoms more recently and also had an ERCP on Monday (2 days ago).  He typically eats 3 meals per day and will have a snack in the evening.  He has been tracking his intake on a calendar.  He has been avoiding alcohol over the last 9 months.  Prior to that he would drink up to a couple of beers once a week or less.  Had abdominal surgeries over 2 years and was not able to eat much due to symptoms and eating smaller amounts to reduce symptoms    Height:   Ht Readings from Last 1 Encounters:   07/13/20 1.829 m (6')     Weight:  Used to weight 200 lbs and over the past 2 years lost weight to 165 lbs. He would like to regain some weight    Wt Readings from Last 10 Encounters:   07/13/20 74.8 kg (165 lb)   07/02/20 74.8 kg (165 lb)     BMI: 22.38    Diet Recall:  (some recent meals below):   Meal Food    Breakfast Oatmeal with skim milk OR english muffin with butter occas PB OR cherrios with skim + 2 slice homemade zucchini bread   Lunch 1/2 turkey or ham sandwich with mustard or chicken noodle soup or small amount of pasta with meatless tomato sauce   Dinner Chicken (air fried), mashed  potatoes/baked potato with butter or mashed, occas mixed veg (caul/jason) OR thin crust veggie pizza OR small salad with lite Welsh/1000 island OR 2x/month: hamburger on bun with air fried fries/baked potato/mashed potato   Snacks In pm: popsicle or sherbet or frozen yogurt or prev ice cream bar   Beverages 16.9 oz water, 16 oz diet soda, 4-6 oz 50% lite OJ or 4 oz occas fresh squeezed. 1/2 c black Coffee today (none over 3 weeks)   Alcohol Intake None over the past 9 months. Prior to that 2 beers once per week or less.      Labs:  On 7/13/2020: Lipase 1,051, glucose 128. Others reviewed in EMR.    Pertinent Medications/vitamin and mineral supplements:  Creon 2-3 caps with meals, 2 with snacks and takes metformin.  Food Allergies:  NKFA    MALNUTRITION:  % Weight Loss:  Weight loss does not meet criteria for malnutrition   % Intake:  <75% for >/= 1 month (non-severe malnutrition)  Subcutaneous Fat Loss:  Unable to determine due to telephone visit  Muscle Loss:  Unable to determine due to telephone visit  Fluid Retention:  None noted    Malnutrition Diagnosis: Patient does not meet two of the above criteria necessary for diagnosing malnutrition  In Context of:  Chronic illness or disease    Nutrition Diagnosis:    Food and nutrition related knowledge deficit related to lack of previous diet education/lack of complete recall of previous diet education as evidenced by pt report and interest in diet education/review.     Nutrition Prescription: Nutrition education. Pancreatitis diet recommendations    Nutrition Intervention:    Nutrition Education/Counseling:  Nutrition Education: Provided diet education for pancreatitis as follows:    -Can eat 4-6 smaller meals/snacks per day if better tolerated. Do not skip meals.  -Explained diet lower in fat/moderate amounts of fat may be better tolerated than higher fat diet. Reviewed higher lower fat foods with tips and suggestions for eating lower to moderate fat diet as  tolerated and discussed some lower fat substitutions for some of patient's usual higher fat food choices.  -Can have protein drink/low fat oral nutrition supplement drink if unable to eat a meal/instead of skipping meals.   -Discussed adequate hydration and recommended patient drink at least 48 oz-64 oz fluids per day.  -Encouraged patient to continue to avoid all alcoholic beverages.  -Recommended taking enzymes as directed by MD/NP/PA.  Explained recommendation to take all capsules at the beginning of meals/snack OR if prefers/better tolerated can spread dose out throughout meal. For example take 1 capsule at beginning of meal, 1 mid-meal and 1 end of meal.    Patient verbalized understanding of education provided. See Goals below.     Goals:    - Aim for eating 4-6 smaller meals. (or 3 small meals + 2-3 snacks) per day.    - Eat a lower fat diet:  -Choose lean proteins/lean cuts of meats. Remove skins from chicken and turkey breast, lean cuts of meats, 93% lean beef, fish (not fried/baked).  -Use lower fat cooking methods such as baking, broiling, grilling.  -Limit using a lot of fats/oil/high fat sauces/butter when preparing foods.  -Choose skim or 1% based dairy products (such as lowfat yogurts, skim/1% milk, non fat/1% cottage cheese, lowfat pudding).  -Do not eat large amounts of nuts, seeds, nut butters, avocado at a time.    - Can have a protein drink as a small meal/snack (these count towards your 4-6 smaller meals).   -Some examples include Ensure Max, Ensure high protein, Boost high protein, Premier protein, Pure protein or can make your own using a protein powder and mix with fluids.    -If feeling nausea, a clear protein drink may be better tolerated such as Protein 2O, Isopure or Premier Clear.    - Take enzyme dose as directed by MD. Take your enzyme capsules at the beginning meals, snacks, protein drinks. Or you can spread your dose out throughout the meal if this is better tolerated. For example  can take 1 capsule at the beginning of the meal, 1 mid-meal and 1 capsule at end of meal.    - Do not drink alcoholic beverages.    - Drink plenty of fluids, at least 48 oz-64 oz or more water per day.    *limit caffeinated beverages to 1-2 cups per day or less unless otherwise directed by MD.   *carbonated beverage may contribute to gas pain/bloating.    -Continue to keep daily foods and beverage journals. If you would like to see how much you are eating, you can use a cell phone jessica or online options such as My Fitness pal or Lose it for example to track you total daily calories and % of carbohydrates, fats and protein you are eating each day.    Nutrition Monitoring and Evaluation: Will monitor adherence to nutrition recommendations at future RD visits.     Further Medical Nutrition Therapy:  Follow up in as needed    Patient was encouraged to call/contact RD with any further questions.    Mel Centeno, MS, RD, LD

## 2020-07-15 NOTE — PROGRESS NOTES
"Nick Zamudio is a 64 year old male who is being evaluated via a billable telephone visit.      The patient has been notified of following:     \"This telephone visit will be conducted via a call between you and your physician/provider. We have found that certain health care needs can be provided without the need for a physical exam.  This service lets us provide the care you need with a short phone conversation.  If a prescription is necessary we can send it directly to your pharmacy.  If lab work is needed we can place an order for that and you can then stop by our lab to have the test done at a later time.    Telephone visits are billed at different rates depending on your insurance coverage. During this emergency period, for some insurers they may be billed the same as an in-person visit.  Please reach out to your insurance provider with any questions.    If during the course of the call the physician/provider feels a telephone visit is not appropriate, you will not be charged for this service.\"    Patient has given verbal consent for Telephone visit?  Yes  During this virtual visit the patient is located in [place], patient verifies this as the location during the entirety of this visit.     What phone number would you like to be contacted at? [phone].    How would you like to obtain your AVS? Yatedot    Phone call duration: 37 minutes    Mel Centeno MS, RD, LD    Note: there is a duplicate note for visit today. System closed on writer during visit, so caused duplicate note.  [facility] Outpatient Medical Nutrition Therapy      Time Spent:  37 minutes  Session Type:  Initial session  Referring Physician:  Dr. Christo Pepe  Reason for RD Visit:   Chronic pancreatitis     Nutrition Assessment:  Patient is a 64 year old male with history of chronic calcific pancreatitis, pancreatic duct stricture and stones, type 2 diabetes, paraplegia to both lower limbs, small bowel obstruction with resection, testicular " cancer, hypertension, kidney stone.  Patient stated that he has had abdominal surgeries over the past couple of years and due to symptoms he was not able to eat much and had decreased appetite so lost 35 pounds over the past couple of years.  He stated that he has been told to follow many different diets in the past such as low-fat, low fiber and low carbohydrate, so he reported overall some confusion on what to eat.  He stated that he recently started PERT and is taking 2 to 3 capsules with meals and 2 with snacks and has noticed an improvement in his stools.  He has less frequency of stool, less urgency to have bowel movement and able to make it to the bathroom now as well as stools are becoming darker in color than previously were.  He denies having any pain with eating only when he was having a flareup then he would have pain and vomiting.  He reported improvement in symptoms more recently and also had an ERCP on Monday (2 days ago).  He typically eats 3 meals per day and will have a snack in the evening.  He has been tracking his intake on a calendar.  He has been avoiding alcohol over the last 9 months.  Prior to that he would drink up to a couple of beers once a week or less.  Had abdominal surgeries over 2 years and was not able to eat much due to symptoms and eating smaller amounts to reduce symptoms    Height:   Ht Readings from Last 1 Encounters:   07/13/20 1.829 m (6')     Weight:  Used to weight 200 lbs and over the past 2 years lost weight to 165 lbs. He would like to regain some weight    Wt Readings from Last 10 Encounters:   07/13/20 74.8 kg (165 lb)   07/02/20 74.8 kg (165 lb)     BMI: 22.38    Diet Recall:  (some recent meals below):   Meal Food    Breakfast Oatmeal with skim milk OR english muffin with butter occas PB OR cherrios with skim + 2 slice homemade zucchini bread   Lunch 1/2 turkey or ham sandwich with mustard or chicken noodle soup or small amount of pasta with meatless tomato sauce    Dinner Chicken (air fried), mashed potatoes/baked potato with butter or mashed, occas mixed veg (caul/jason) OR thin crust veggie pizza OR small salad with lite Polish/1000 island OR 2x/month: hamburger on bun with air fried fries/baked potato/mashed potato   Snacks In pm: popsicle or sherbet or frozen yogurt or prev ice cream bar   Beverages 16.9 oz water, 16 oz diet soda, 4-6 oz 50% lite OJ or 4 oz occas fresh squeezed. 1/2 c black Coffee today (none over 3 weeks)   Alcohol Intake None over the past 9 months. Prior to that 2 beers once per week or less.      Labs:  On 7/13/2020: Lipase 1,051, glucose 128. Others reviewed in EMR.    Pertinent Medications/vitamin and mineral supplements:  Creon 2-3 caps with meals, 2 with snacks and takes metformin.  Food Allergies:  NKFA    MALNUTRITION:  % Weight Loss:  Weight loss does not meet criteria for malnutrition   % Intake:  <75% for >/= 1 month (non-severe malnutrition)  Subcutaneous Fat Loss:  Unable to determine due to telephone visit  Muscle Loss:  Unable to determine due to telephone visit  Fluid Retention:  None noted    Malnutrition Diagnosis: Patient does not meet two of the above criteria necessary for diagnosing malnutrition  In Context of:  Chronic illness or disease    Nutrition Diagnosis:    Food and nutrition related knowledge deficit related to lack of previous diet education/lack of complete recall of previous diet education as evidenced by pt report and interest in diet education/review.     Nutrition Prescription: Nutrition education. Pancreatitis diet recommendations    Nutrition Intervention:    Nutrition Education/Counseling:  Nutrition Education: Provided diet education for pancreatitis as follows:    -Can eat 4-6 smaller meals/snacks per day if better tolerated. Do not skip meals.  -Explained diet lower in fat/moderate amounts of fat may be better tolerated than higher fat diet. Reviewed higher lower fat foods with tips and suggestions for eating  lower to moderate fat diet as tolerated and discussed some lower fat substitutions for some of patient's usual higher fat food choices.  -Can have protein drink/low fat oral nutrition supplement drink if unable to eat a meal/instead of skipping meals.   -Discussed adequate hydration and recommended patient drink at least 48 oz-64 oz fluids per day.  -Encouraged patient to continue to avoid all alcoholic beverages.  -Recommended taking enzymes as directed by MD/NP/PA.  Explained recommendation to take all capsules at the beginning of meals/snack OR if prefers/better tolerated can spread dose out throughout meal. For example take 1 capsule at beginning of meal, 1 mid-meal and 1 end of meal.    Patient verbalized understanding of education provided. See Goals below.     Goals:    - Aim for eating 4-6 smaller meals. (or 3 small meals + 2-3 snacks) per day.    - Eat a lower fat diet:  -Choose lean proteins/lean cuts of meats. Remove skins from chicken and turkey breast, lean cuts of meats, 93% lean beef, fish (not fried/baked).  -Use lower fat cooking methods such as baking, broiling, grilling.  -Limit using a lot of fats/oil/high fat sauces/butter when preparing foods.  -Choose skim or 1% based dairy products (such as lowfat yogurts, skim/1% milk, non fat/1% cottage cheese, lowfat pudding).  -Do not eat large amounts of nuts, seeds, nut butters, avocado at a time.    - Can have a protein drink as a small meal/snack (these count towards your 4-6 smaller meals).   -Some examples include Ensure Max, Ensure high protein, Boost high protein, Premier protein, Pure protein or can make your own using a protein powder and mix with fluids.    -If feeling nausea, a clear protein drink may be better tolerated such as Protein 2O, Isopure or Premier Clear.    - Take enzyme dose as directed by MD. Take your enzyme capsules at the beginning meals, snacks, protein drinks. Or you can spread your dose out throughout the meal if this is  better tolerated. For example can take 1 capsule at the beginning of the meal, 1 mid-meal and 1 capsule at end of meal.    - Do not drink alcoholic beverages.    - Drink plenty of fluids, at least 48 oz-64 oz or more water per day.    *limit caffeinated beverages to 1-2 cups per day or less unless otherwise directed by MD.   *carbonated beverage may contribute to gas pain/bloating.    -Continue to keep daily foods and beverage journals. If you would like to see how much you are eating, you can use a cell phone jessica or online options such as My Fitness pal or Lose it for example to track you total daily calories and % of carbohydrates, fats and protein you are eating each day.    Nutrition Monitoring and Evaluation: Will monitor adherence to nutrition recommendations at future RD visits.     Further Medical Nutrition Therapy:  Follow up in as needed    Patient was encouraged to call/contact RD with any further questions.    Mel Centeno, MS, RD, LD

## 2020-07-20 NOTE — TELEPHONE ENCOUNTER
Post ERCP (7/13/2020) with Dr. Pepe: Follow-up    Post procedure recommendations:   Interval CT in 9-10 weeks just before repeat ERCP                             to determine need for tandem EUS                             - Avoid antithrombotics for at least three days                             - Diet may resume as previous with Creon     Orders placed: CT, 9-10 weeks. Will place orders for f/up procedure once site clarified by Dr. Pepe    Please assist in scheduling:     Procedure/Imaging/Clinic: ERCP +/-EUS  Physician: Dr. Pepe    Timing: 10-11 weeks  Procedure length:75 minutes  Anesthesia:general  Dx: pancreatic mass  Tier:2  Location: [facility]    Patient states left message, patient returned call.     Feeling ok, eating well, working on lowfat diet. Taking creon, concerned about copay for medication.    Clinic contact and scheduling numbers verified for future questions/concerns.    Lorrie Bansal, RN Care Coordinator

## 2020-08-13 NOTE — PROGRESS NOTES
Started to have right sided abdominal pain, radiates to the back. Haven't eaten much today, but started to develop abdominal pain 2 hours ago. Had a granola bar & mini muffins this AM and for lunch - he had low fat hotdog and no bun and potato chips. Denies any nausea or emesis. Had bowel movement this AM, loose brown stool without hematochezia or melena. Denies lightheadedness or dizziness. Took his pancreatic enzymes today. Denies any fevers or chills. The discomfort is more like a gas/bloating discomfort, starting to yeyo in the last hour.     Plan for a clear liquid diet this evening for dinner given he feels unwell. If he develops nausea or emesis or his pain worsens, he will plan to seek further medical care in the ED.     He expressed understanding and agreement with this plan.

## 2020-08-19 PROBLEM — K86.89 PANCREATIC MASS: Status: ACTIVE | Noted: 2020-01-01

## 2020-08-21 NOTE — TELEPHONE ENCOUNTER
LVM for patient in regards to scheduled procedure with Dr. Pepe. Left direct line for patient to call to go over scheduling details.

## 2020-08-24 NOTE — TELEPHONE ENCOUNTER
Patient called in stating still having some symptoms, states he still has some vomiting (last vomited Friday) and still has sharp pain.    Per last procedure note:  Repeat ERCP in 10 weeks for interval                             interrogation of the biliary system and stent                             exchange/upsize/removal as well consideration of                             pancreatic duct and stone management pending                             interval course                             - Continue high dose Creon and prescribed as well                             low fat pancreatitis diet     Left message.    Lorrie Bansal RN Care Coordinator

## 2020-09-01 NOTE — TELEPHONE ENCOUNTER
LVM for patient in regards to scheduled procedure with Dr. Pepe on 9/29. Left direct line for patient to call to go over scheduling details.

## 2020-09-04 NOTE — TELEPHONE ENCOUNTER
Called patient to discuss return to OR, due 10 weeks after last procedure. Scheduling unsuccessful in connecting with patient.     Left message, letter sent.     ML

## 2020-09-04 NOTE — LETTER
September 4, 2020    Nick Zamudio                              [address] [place] [address]    Dear Nick,    You are due to return for procedure with Dr. Pepe the end of September. Per Dr. Pepe' s last post op note, the purpose of this procedure is to:    Repeat ERCP in 10 weeks for interval interrogation of the biliary system and stent    exchange/upsize/removal as well consideration of pancreatic duct and stone management pending interval course      We've made several attempts to reach you by phone, but have been unable to connect.     Please contact us as soon as possible if you wish to schedule a follow up procedure. OR slots do fill up fast and are in high demand. Please call scheduling directly at [phone].  If you have questions about the procedure, please feel free to contact me at the number below.      Sincerely,    Lorrie Bansal, RN Care Coordinator  Dr. Hicks, Dr. Pepe & Dr. Cronin  [facility]  [phone]

## 2020-09-23 NOTE — TELEPHONE ENCOUNTER
"Patient called to discuss concern of symptoms. Has had 10 \"flares\" since his last procedure, wondering if that's excessive. Has a few good days,then bad days. Taking creon, thinks its helping. Is monitoring diet and managing amount of food and foods that cause flares. Advised he should call PCP for pain meds and that flares that resolve are not unusual, although they are unfortunate.    Pt scheduled for repeat procedure next week. Number given to schedule covid test, pt encouraged to call PCP for preop. Understands requirements.    Lorrie Bansal RN Care Coordinator        "

## 2020-09-29 NOTE — ANESTHESIA PREPROCEDURE EVALUATION
Anesthesia Pre-Procedure Evaluation    Patient: Nick Zamudio   MRN: [id number] : 1955          Preoperative Diagnosis: Pancreatic duct stricture [K86.89]  Pancreatic duct stones [K86.89]    Procedure(s):  ENDOSCOPIC ULTRASOUND, ESOPHAGOSCOPY / UPPER GASTROINTESTINAL TRACT (GI)  ENDOSCOPIC RETROGRADE CHOLANGIOPANCREATOGRAPHY    Past Medical History:   Diagnosis Date     Diabetes (H)      Hypertension      Intussusception (H)      Iron deficiency anemia      Lymphedema      Pancreatic disease     calcific pancreas, multiple panc stones.     Paraplegia (H)     Due to radiation treatment testicular cancer     PVD (peripheral vascular disease) (H)      Renal disease     stone disease     Testicular cancer (H)     Radiation treatment     Past Surgical History:   Procedure Laterality Date     ABDOMEN SURGERY      Partial colectomy for damage due to radiation treatment testicular ca     AMPUTATION      left toe     CHOLECYSTECTOMY       COLONOSCOPY      polyp     ENDOSCOPIC RETROGRADE CHOLANGIOPANCREATOGRAM N/A 2020    Procedure: ENDOSCOPIC RETROGRADE CHOLANGIOPANCREATOGRAPHY, STENT RETRIEVAL, STENT PLACEMENT, BILIARY SPHINCTEROTOMY;  Surgeon: Christo Pepe MD;  Location:  OR     ENDOSCOPIC ULTRASOUND UPPER GASTROINTESTINAL TRACT (GI) N/A 2020    Procedure: ENDOSCOPIC ULTRASOUND, ESOPHAGOSCOPY / UPPER GASTROINTESTINAL TRACT (GI) WITH FINE NEEDLE BIOPSY;  Surgeon: Christo Pepe MD;  Location:  OR     GI SURGERY      bowel resection     ORTHOPEDIC SURGERY       TONSILLECTOMY         Anesthesia Evaluation     .             ROS/MED HX    ENT/Pulmonary:      (-) tobacco use and sleep apnea   Neurologic: Comment: Paraplegia, delayed onset after radiation for cancer      Cardiovascular:     (+) hypertension-Peripheral Vascular Disease---. : . . . :. .       METS/Exercise Tolerance:     Hematologic:     (+) Anemia, -      Musculoskeletal:         GI/Hepatic: Comment: Pancreatic  stones  Intussusception, SB resection         Renal/Genitourinary:     (+) Nephrolithiasis ,       Endo:         Psychiatric:         Infectious Disease:         Malignancy:   (+) Malignancy History of Other  Other CA status post Radiation         Other:                          Physical Exam  Normal systems: cardiovascular, pulmonary and dental    Airway   Mallampati: II  TM distance: >3 FB  Neck ROM: full    Dental     Cardiovascular       Pulmonary             Lab Results   Component Value Date     07/13/2020     (H) 07/13/2020    LIPASE 1,051 (H) 07/13/2020    AMYLASE 93 07/13/2020    INR 1.14 07/13/2020       Preop Vitals  BP Readings from Last 3 Encounters:   09/29/20 129/74   07/13/20 134/75    Pulse Readings from Last 3 Encounters:   09/29/20 74      Resp Readings from Last 3 Encounters:   09/29/20 16   07/13/20 20    SpO2 Readings from Last 3 Encounters:   09/29/20 100%   07/13/20 99%      Temp Readings from Last 1 Encounters:   09/29/20 36.8  C (98.2  F) (Oral)    Ht Readings from Last 1 Encounters:   09/29/20 1.829 m (6')      Wt Readings from Last 1 Encounters:   09/29/20 71.7 kg (158 lb)    Estimated body mass index is 21.43 kg/m  as calculated from the following:    Height as of an earlier encounter on 9/29/20: 1.829 m (6').    Weight as of an earlier encounter on 9/29/20: 71.7 kg (158 lb).       Anesthesia Plan      History & Physical Review  History and physical reviewed and following examination; no interval change.    ASA Status:  2 .    NPO Status:  > 8 hours    Plan for General with Intravenous and Propofol induction. Maintenance will be Inhalation.    PONV prophylaxis:  Ondansetron (or other 5HT-3) and Dexamethasone or Solumedrol         Postoperative Care  Postoperative pain management:  IV analgesics and Oral pain medications.      Consents  Anesthetic plan, risks, benefits and alternatives discussed with:  Patient..                 Hermann Horan MD        PHYSICAL EXAM:    Mental Status/Neuro: A/A/O   Airway: Facies: Feasible  Mallampati: I  Mouth/Opening: Full  TM distance: > 6 cm  Neck ROM: Full   Respiratory: Auscultation: CTAB     Resp. Rate: Normal     Resp. Effort: Normal      CV: Rhythm: Regular  Rate: Age appropriate  Heart: Normal Sounds  Edema: None   Comments:      Dental: Normal Dentition                Assessment:   ASA SCORE: 2    H&P: History and physical reviewed and following examination; no interval change.         Plan:   Anes. Type:  General   Pre-Medication: None   Induction:  IV (Standard)   Airway: ETT; Oral   Access/Monitoring: PIV   Maintenance: Balanced     Postop Plan:   Postop Pain: Opioids  Postop Sedation/Airway: Not planned     PONV Management:   Adult Risk Factors:, Postop Opioids   Prevention: Ondansetron, Dexamethasone     CONSENT: Direct conversation   Plan and risks discussed with: Patient   Blood Products: Consent Deferred (Minimal Blood Loss)

## 2020-09-29 NOTE — DISCHARGE INSTRUCTIONS
[facility]  Same-Day Surgery   Adult Discharge Orders & Instructions     For 24 hours after surgery    1. Get plenty of rest.  A responsible adult must stay with you for at least 24 hours after you leave the hospital.   2. Do not drive or use heavy equipment.  If you have weakness or tingling, don't drive or use heavy equipment until this feeling goes away.  3. Do not drink alcohol.  4. Avoid strenuous or risky activities.  Ask for help when climbing stairs.   5. You may feel lightheaded.  IF so, sit for a few minutes before standing.  Have someone help you get up.   6. If you have nausea (feel sick to your stomach): Drink only clear liquids such as apple juice, ginger ale, broth or 7-Up.  Rest may also help.  Be sure to drink enough fluids.  Move to a regular diet as you feel able.  7. You may have a slight fever. Call the doctor if your fever is over 100 F (37.7 C) (taken under the tongue) or lasts longer than 24 hours.  8. You may have a dry mouth, a sore throat, muscle aches or trouble sleeping.  These should go away after 24 hours.  9. Do not make important or legal decisions.   Call your doctor for any of the followin.  Signs of infection (fever, growing tenderness at the surgery site, a large amount of drainage or bleeding, severe pain, foul-smelling drainage, redness, swelling).    2. It has been over 8 to 10 hours since surgery and you are still not able to urinate (pass water).    3.  Headache for over 24 hours.    4.  Numbness, tingling or weakness the day after surgery (if you had spinal anesthesia).  To contact a doctor, call Dr Pepe at [phone] (clinic)  or:        [phone] and ask for the resident on call for   Gastroenterology (answered 24 hours a day)      Emergency Department:    [facility]: [phone]       (TTY for hearing impaired: [phone])    [facility]: [phone]       (TTY for hearing impaired:  [phone])

## 2020-09-29 NOTE — ANESTHESIA CARE TRANSFER NOTE
Patient: Nick Zamudio    Procedure(s):  ENDOSCOPIC RETROGRADE CHOLANGIOPANCREATOGRAPHY with balloon sweep of bile ducts, bile ducts stents exchanged    Diagnosis: Pancreatic mass [K86.89]  Diagnosis Additional Information: No value filed.    Anesthesia Type:   General     Note:  Airway :Nasal Cannula  Patient transferred to:PACU  Comments: Anesthesia Care Transfer Note    Patient: Nick Zamudio    Transferred to: PACU    Patient vital signs: stable    Airway: none    Monitors on, VSS, pt. Stable, Report given to PACU GIANCARLO.     Hilario Andres CRNA  9/29/2020 9:27 AM      Handoff Report: Identifed the Patient, Identified the Reponsible Provider, Reviewed the pertinent medical history, Discussed the surgical course, Reviewed Intra-OP anesthesia mangement and issues during anesthesia, Set expectations for post-procedure period and Allowed opportunity for questions and acknowledgement of understanding      Vitals: (Last set prior to Anesthesia Care Transfer)    CRNA VITALS  9/29/2020 0853 - 9/29/2020 0926      9/29/2020             Temp:  (!) 17.4  C (63.3  F)                Electronically Signed By: DANICA Champagne CRNA  September 29, 2020  9:26 AM

## 2020-09-29 NOTE — OP NOTE
ERCP  09/29/2020  7:21 AM  [facility], [address] [place] [address] [phone]     Endoscopy Department   _______________________________________________________________________________   Patient Name: Nick Zamudio           Procedure Date: 9/29/2020 7:21 AM   MRN: [id number]                       Account Number: [id number]   YOB: 1955              Admit Type: Outpatient   Age: 65                               Room: OR   Gender: Male                          Note Status: Finalized   Attending MD: Christo Pepe MD   Total Sedation Time:   _______________________________________________________________________________       Procedure:           ERCP   Indications:         Chronic pancreatitis, Bile duct stricture   Providers:           Christo Pepe MD   Patient Profile:     Mr Zamudio is a 64yo gentleman with chronic calcific                        pancreatitis of unclear etiology, perhaps hereditary or                        even secondary to his radiation therapy (he has never                        abused tobacco or alcohol.) He was found to have dual                        stenoses of the distal common and downstream main ducts                        with imaging by CT concerning for malignancy. EUS and                        interval imaging was without concerning mass though                        intraductal calcifcations were observed. He now proceeds                        to management of the biliary stricture by ERCP.   Referring MD:        Jamari Rivas MD   Medicines:           General Anesthesia, Indomethacin 100 mg LA   Complications:       No immediate complications.   _______________________________________________________________________________   Procedure:           Pre-Anesthesia Assessment:                        - Prior to the procedure, a History and Physical was                        performed, and patient medications and  allergies were                        reviewed. The patient is competent. The risks and                        benefits of the procedure and the sedation options and                        risks were discussed with the patient. All questions                        were answered and informed consent was obtained. Patient                        identification and proposed procedure were verified by                        the nurse in the pre-procedure area. Mental Status                        Examination: alert and oriented. Airway Examination:                        Mallampati Class II (the uvula but not tonsillar pillars                        visualized). Respiratory Examination: clear to                        auscultation. CV Examination: normal. ASA Grade                        Assessment: III - A patient with severe systemic                        disease. After reviewing the risks and benefits, the                        patient was deemed in satisfactory condition to undergo                        the procedure. The anesthesia plan was to use general                        anesthesia. Immediately prior to administration of                        medications, the patient was re-assessed for adequacy to                        receive sedatives. The heart rate, respiratory rate,                        oxygen saturations, blood pressure, adequacy of                        pulmonary ventilation, and response to care were                        monitored throughout the procedure. The physical status                        of the patient was re-assessed after the procedure.                        After obtaining informed consent, the scope was passed                        under direct vision. Throughout the procedure, the                        patient's blood pressure, pulse, and oxygen saturations                        were monitored continuously. The duodenoscpoe was                        introduced through  "the mouth, and used to inject                        contrast into and used to inject contrast into the bile                        duct. The ERCP was accomplished without difficulty. The                        patient tolerated the procedure well.                                                                                     Findings:        Various positions were attempted to pass both the 140 duodenscope as        well as the 180 duodenosocpe, ultimately being successful with the 180        in supine position.  films demonstrated the biliary stent in its        anticipated position. Again, the antrum and sweep were markedly        distorted positioning the ampulla at 9 oclock. The biliary stent was        removed using a rat-toothed forceps. The bile duct was deeply cannulated        with the 11.5 mm balloon and an angled 0.025\" Visiglide wire. Contrast        was injected and I personally interpreted the bile duct images. Ductal        flow of contrast was adequate, image quality was excellent and contrast        extended throughout the intrahepatics. The intrahepatics were mostly        decompressed without filling defects or stenoses. The bifurcation and        the common duct were also decompressed and without filling defect,        though there was a mild, persistent smooth stenosis in the region of the        panreatic head. To discover objects, the biliary tree was swept with an        11.5 mm balloon starting at the bifurcation. Nothing beyond bile and        contrast were recovered. A 7 Fr by 7 cm Zimmon stent with a single        external pigtail and a single internal flap was placed 7 cm into the        common bile duct. A 10 Fr by 5 cm SF stent with a single external flap        and a single internal flap was placed 5 cm into the common bile duct.        Bile flowed through the stents and the stents were in good position. A        decision was made not to pursue pancreatic duct manipulation. "                                                                                     Impression:          - Supine position required, due to the distorted course                        of the stomach and sweep the 180 was suitable                        - Uncomplicated removal of the in situ biliary stent                        from a patent biliary sphincterotomy                        - Mild residual distal common duct stenosis, benign                        appearing, and improved from previous                        - Otherwise unremarkable cholangiography                        - Successful placement of a two biliary stents, one 7F                        and one 10F across the distal bile stenosis                        - Cholangioscopy and lithotripsy likely not feasible due                        to anatomy   Recommendation:      - General anesthesia recovery with probable discharge                        home this morning                        - Clinic visit before next ERCP to discuss management of                        chronic pancreatitis and to consider options regarding                        intraductal stone                        - Repeat ERCP in 12 weeks                        - The findings and recommendations were discussed with                        the patient and their family                                                                                       electronically signed by LYDIA Pepe

## 2020-09-29 NOTE — OR NURSING
Wife, Ines given discharge instructions via telephone. Ines expressed understanding of teaching and pt is okay to discharge home.

## 2020-09-29 NOTE — ANESTHESIA POSTPROCEDURE EVALUATION
Anesthesia POST Procedure Evaluation    Patient: Nick Zamudio   MRN:     [id number] Gender:   male   Age:    65 year old :      1955        Preoperative Diagnosis: Pancreatic mass [K86.89]   Procedure(s):  ENDOSCOPIC RETROGRADE CHOLANGIOPANCREATOGRAPHY with balloon sweep of bile ducts, bile ducts stents exchanged   Postop Comments: No value filed.     Anesthesia Type: General       Disposition: Outpatient   Postop Pain Control: Uneventful            Sign Out: Well controlled pain   PONV: No   Neuro/Psych: Uneventful            Sign Out: Acceptable/Baseline neuro status   Airway/Respiratory: Uneventful            Sign Out: Acceptable/Baseline resp. status   CV/Hemodynamics: Uneventful            Sign Out: Acceptable CV status   Other NRE: NONE   DID A NON-ROUTINE EVENT OCCUR? No         Last Anesthesia Record Vitals:  CRNA VITALS  2020 0853 - 2020 0951      2020             Temp:  (!) 17.4  C (63.3  F)          Last PACU Vitals:  Vitals Value Taken Time   /77 2020  9:45 AM   Temp 36.3  C (97.4  F) 2020  9:25 AM   Pulse 68 2020  9:50 AM   Resp 14 2020  9:25 AM   SpO2 100 % 2020  9:50 AM   Temp src     NIBP     Pulse     SpO2     Resp     Temp     Ht Rate     Temp 2     Vitals shown include unvalidated device data.      Electronically Signed By: Hermann Horan MD, 2020, 9:51 AM

## 2020-10-02 NOTE — TELEPHONE ENCOUNTER
Post ERCP (2020) with Dr. Pepe: Follow-up    Post procedure recommendations:   Clinic visit before next ERCP to discuss management of  chronic pancreatitis and to consider options regarding    intraductal stone   - Repeat ERCP in 12 weeks     Orders placed:   Please assist in scheduling:     Procedure/Imaging/Clinic: ERCP  Physician: Dr. Pepe  Timin weeks  Procedure length:75 min  Anesthesia:gen  Dx: chronic panc, bile duct stricture  Tier:2  Location: [facility]     Patient states: feeling ok after ERCP, no concerning symptoms. Clinic scheduled  via video visit.     Clinic contact and scheduling numbers verified for future questions/concerns.    Lorrie Bansal, RN Care Coordinator

## 2020-10-05 NOTE — TELEPHONE ENCOUNTER
Pt called in reporting symptoms, returned call to finish triage. Having pain, nausea, no vomiting yet. Started at 10am, started to feel ill. Diffuse abd pain, some back pain. Has chills/sweats, has not taken temperature. Asked him to take temp, 97.9. By time I called him back he had vomited before, usually needs to vomit 4 times before he's feeling better. Pain usually better after vomiting. Poor appetite. Ate low fat yesterday    PCP took oxy at noon, seems to help. PCP also ordered Zofran has not filled yet. Encouraged hydration, comfortable to manage at home. Will check on him tomorrow.     Lorrie Bansal, RN Care Coordinator

## 2020-10-06 NOTE — TELEPHONE ENCOUNTER
"Called to check on symptoms. \"all better, takes 24 hours\" Only vomited x2, had diarrhea. 24 days since last attack. Reviewing his diet history from what he ate on Sunday, Reeses Peanut butter cups, noted fat intake was higher that day, does a great job of tracking his overall fat intake. Is on creon, taking regularly, 8-10 per day.     Reviewed reasons to go to emergency room for future flares. Clinic scheduled 11/12. Will f/ up with PCP related to PA for zofran.   ML  "

## 2020-11-12 NOTE — NURSING NOTE
Chief Complaint   Patient presents with     New Patient     ERCP follow up       Vitals:    11/12/20 0845   Weight: 72.6 kg (160 lb)   Height: 1.829 m (6')       Body mass index is 21.7 kg/m .      Dez Ferrara LPN

## 2020-11-12 NOTE — LETTER
"  11/12/2020      RE: Nick Zamudio  [address] [place] [address] [place] [address]      Dear Colleague,    Thank you for referring your patient, Nick Zamudio, to the [facility]. Please see a copy of my visit note below.    The patient has been notified of following:     \"This video visit will be conducted via a call between you and your physician/provider. We have found that certain health care needs can be provided without the need for an in-person physical exam.  This service lets us provide the care you need with a video conversation.  If a prescription is necessary we can send it directly to your pharmacy.  If lab work is needed we can place an order for that and you can then stop by our lab to have the test done at a later time.    Video visits are billed at different rates depending on your insurance coverage.  Please reach out to your insurance provider with any questions.    If during the course of the call the physician/provider feels a video visit is not appropriate, you will not be charged for this service.\"    Patient has given verbal consent for Video visit? Yes  How would you like to obtain your AVS? My Chart  If you are dropped from the video visit, the video invite should be resent to: Cell phone  Will anyone else be joining your video visit? No  {If patient encounters technical issues they should call [phone]     Video-Visit Details    Type of service:  Video Visit    Video Start Time: 0945  Video End Time: 1030    Originating Location (pt. Location): Home    Distant Location (provider location):  [facility]     Platform used for Video Visit: [facility]    Referring provider  Echo Lopez MD; Jamari Rivas MD  Query Chronic calcific pancreatitis    HPI  Mr Zamudio was referred to our group for evaluation of double duct dilation with dual duct stenoses in the setting of chronic calcific pancreatitis. He underwent EUS " "in July of this year which did not demonstrate a concerning mass, though there was evidence of acute and chronic pancreatitis in the form of inflammation and stones, one of which was in the main duct and at that time a decompressed biliary system. The portal confluence was narrowed though again no mass was seen. Tandem ERCP was performed to capture a migrated biliary stent and a distal common stenosis, though manipulation of the main duct of the pancreas was deferred as his symptoms improved dramatically on pancreatic supplementation alone. Interval CT was without suspicious mass though again demonstrated intraductal main duct stone (large stone in the head without significant upstream dilation) and mild ascites. Repeat ERCP was performed and two biliary stents were placed given ongoing distal common duct stenosis.    Currently, he notes symptoms despite monitoring his diet and using Creon. In October, he had nausea and vomiting and diarrhea and again with diarrhea alone in the beginning of November. He currently is taking Creon 24K units, 6-7 per day, 2 with meal. His appetite waxes and wanes. He is trying to stay below 30g of fat and 100g carbs, and he has seen a dietitian a couple months back. Pain is very infrequent and only happens with \"severe\" exacerbations, described as epigastric with radiation to the back    His father  of pancreatic cancer diagnosed at the age of 62; no others with pancreatic disease. He has no history of tobacco or alcohol abuse though had radiation to his abdomen/pelvis for testicular cancer.    Medications  Medication Sig Dispensed Refills Start Date End Date Status   blood sugar diagnostic (ONETOUCH VERIO) strip    Indications: Type 2 diabetes mellitus without complication, without long-term current use of insulin (HC) Dispense item covered by pt ins. E11.9 NIDDM type II - Test 2 times/day. Reason: High A1C 60 Each   2 2017   Active   terazosin (HYTRIN) 5 mg capsule  "   Indications: hypertension Take 5 mg by mouth once daily in the evening. Indications: high blood pressure   0 04/14/2018   Active   atorvastatin (LIPITOR) 20 mg tablet    Indications: hyperlipidemia Take 20 mg by mouth once daily in the evening. Indications: excessive fat in the blood   0     Active   metFORMIN (GLUCOPHAGE) 500 mg tablet    Indications: type 2 diabetes mellitus Take 500 mg by mouth once daily with a meal.   0     Active   docusate (COLACE) 100 mg capsule    Indications: constipation Take 1 capsule by mouth once daily.   0 11/14/2019        Past Medical  Problem Noted Date   Bowel obstruction 11/13/2019   Hydronephrosis of right kidney 11/13/2019   Bilateral pleural effusion 11/13/2019   Small bowel obstruction 11/13/2019   Hyperkalemia 09/18/2019   Stage 3 chronic kidney disease 09/18/2019   Rectal polyp 09/18/2019   Chronic anemia 09/18/2019   Hyperlipidemia 09/17/2019   HTN (hypertension) 09/17/2019   Acute cholecystitis 09/17/2019   Biliary colic 08/28/2019   Rule out Upper GI bleed 08/28/2019   Anemia 08/28/2019   Other osteomyelitis 10/29/2017   PVD (peripheral vascular disease) 10/29/2017   Type 2 diabetes mellitus with complication 10/29/2017   Lymphedema 10/29/2017   Cellulitis of left groin with abscess 04/23/2017   Hyperglycemia 04/23/2017   Paraparesis of both lower limbs 04/23/2017   Chronic lymphedema legs 04/23/2017   H/O testicular cancer 04/23/2017   Anemia 04/23/2017   Abnormal skin growth     Viral warts     Small bowel intussusception       Medical History Date Comments   Diabetes mellitus (HC)       Paralysis of both lower limbs (HC)       Testicular cancer (HC)   age 17    PVD (peripheral vascular disease) (HC)       HTN (hypertension)       Kidney stone       Dependent edema       Status post radiation therapy   radiation syndrome - progressive paralysis,    UTI (urinary tract infection) 11/2017     Lymphedema       Fatigue 10/02/2019     Bowel obstruction (HC) 10/30/2019        Past Surgical  Surgery Date Site/Laterality Comments   ORCHIECTOMY   Left Age 17 for testicular cancer     COLECTOMY 1980's   For radiation-induced problems     INCISION AND DRAINAGE LEFT GROIN ABCESS 04/24/2017   DR. EVANS     LEFT AXILLOFEMORAL BYPASS WITH COMPLETION ANGIOGRAM 10/31/2017   DR HARVEY, left 5th toe removed     INCISION AND DRAINAGE OF BONE CORTEX WITH ROTATIONAL FLAP CLOSURE LEFT FOOT 11/01/2017   DR DIEZ     CYSTOSCOPY RIGHT RETROGRADES. 12/05/2017 Right      PERCUTANEOUS NEPHROSTOMY 12/05/2017 Right Placed by Dr Hoover, [place] [facility]     IR URETERAL STENT [facility] NEPHR/URETERGRAM EXIST ACCESS RIGHT 12/15/2017 Right PCN to JJ stent, [place]     CYSTOSCOPY REMOVAL OF RIGHT URETERAL CATHETER RIGHT URETEROSCOPY WITH HOLMIUM LASER AND RIGHT URETERAL STENT PLACEMENT 01/04/2018 Right [place] 6x26 contour     COLONOSCOPY W/ POLYPECTOMY 08/31/2019   Dr. Guerrero     ESOPHAGOSCOPY / EGD 08/31/2019   Dr. Guerrero     LAP CHOLECYSTECTOMY 09/17/2019          Social History  Tobacco Use Types Packs/Day Years Used Date   Never Smoker           Smokeless Tobacco: Never Used           Alcohol Use Drinks/Week oz/Week Comments   Yes     socially-rare     Sex Assigned at Birth Date Recorded   Not on file       Family History  Medical History Relation Name Comments   Cancer-pancreatic Father         Relation Name Status Comments   Father           Objective:  Reported vitals:  There were no vitals taken for this visit.   GEN: Healthy, alert and no distress  PSYCH: Alert and oriented times 3; coherent speech, normal   rate and volume, able to articulate logical thoughts, able   to abstract reason, no tangential thoughts, no hallucinations   or delusions, affect seems normal  RESP: No cough, no audible wheezing, able to talk in full sentences  Remainder of exam unable to be completed due to virtual visit     Outside Imaging summaries:    Assessment and plan:  Mr Zamudio is a 64yo gentleman with idiopathic  chronic calcific pancreatitis complicated by a distal common duct stenosis managed by ERCP and biliary stent placement. There was initially concern for a pancreatic head mass, though this has been excluded by serial imaging including CT and EUS. He has an impressive stone (or conglomeration of stones) in the head of the pancreas, and as he had minimal symptoms we deferred directed therapy. Unfortunately, as of late, he has struggled with increasing nausea and lack of appetite at times with diarrhea and pain. Therefore, we will plan for main duct manipulation at the time of his next ERCP which was to occur in December. This will be organized as next available at this point and should take place on the [facility] as it will be complex. Of note, reaching the ampulla was difficult due to distortion and required supine positioning. While stone clearance is the goal, we will at least attempt to get a stent in place spanning the entire length of the duct to assess if poor drainage underlies his symptoms. I do have concerns that this is not the case as he has minimal dilation upstream of the stones. Of course during this procedure we will also evaluate and further manage the biliary stricture.  He seems to be doing an excellent job with his diet, though we will increase his Creon 24K units to three tabs with meals, even if they are small. If he has no benefit, he will back down to two.     The etiology of his pancreatitis remains elusive, though perhaps there is a hereditary component. It is noted that his father  of pancreatic cancer, though this alone would not significantly increase his odds.    It was a pleasure to participate in the care of this patient; please contact us with any further questions.  A total of 45 minutes was spent in evaluation with this patient, >50% of which was counseling regarding the above delineated issues.    Christo Pepe MD PhD CHELO LEVI  Director of Endoscopy    of Medicine, Surgery and Pediatrics  Interventional and Therapeutic Endoscopy    [facility]  Division of Gastroenterology and Hepatology  [facility] [address], [place] [address]    New Consultations  [phone]  Procedure Scheduling [phone]  Clinical Nurse Coordinator [phone]  Clinical Fax   [phone]  Administrative   [phone]  Administrative Fax  [phone]

## 2020-11-12 NOTE — PROGRESS NOTES
"The patient has been notified of following:     \"This video visit will be conducted via a call between you and your physician/provider. We have found that certain health care needs can be provided without the need for an in-person physical exam.  This service lets us provide the care you need with a video conversation.  If a prescription is necessary we can send it directly to your pharmacy.  If lab work is needed we can place an order for that and you can then stop by our lab to have the test done at a later time.    Video visits are billed at different rates depending on your insurance coverage.  Please reach out to your insurance provider with any questions.    If during the course of the call the physician/provider feels a video visit is not appropriate, you will not be charged for this service.\"    Patient has given verbal consent for Video visit? Yes  How would you like to obtain your AVS? My Chart  If you are dropped from the video visit, the video invite should be resent to: Cell phone  Will anyone else be joining your video visit? No  {If patient encounters technical issues they should call [phone]     Video-Visit Details    Type of service:  Video Visit    Video Start Time: 0945  Video End Time: 1030    Originating Location (pt. Location): Home    Distant Location (provider location):  [facility] [place]     Platform used for Video Visit: [employer]    Referring provider  Echo Lopez MD; Jamari Rivas MD  Query Chronic calcific pancreatitis    HPI  Mr Zamudio was referred to our group for evaluation of double duct dilation with dual duct stenoses in the setting of chronic calcific pancreatitis. He underwent EUS in July of this year which did not demonstrate a concerning mass, though there was evidence of acute and chronic pancreatitis in the form of inflammation and stones, one of which was in the main duct and at that time a decompressed biliary system. The portal " "confluence was narrowed though again no mass was seen. Tandem ERCP was performed to capture a migrated biliary stent and a distal common stenosis, though manipulation of the main duct of the pancreas was deferred as his symptoms improved dramatically on pancreatic supplementation alone. Interval CT was without suspicious mass though again demonstrated intraductal main duct stone (large stone in the head without significant upstream dilation) and mild ascites. Repeat ERCP was performed and two biliary stents were placed given ongoing distal common duct stenosis.    Currently, he notes symptoms despite monitoring his diet and using Creon. In October, he had nausea and vomiting and diarrhea and again with diarrhea alone in the beginning of November. He currently is taking Creon 24K units, 6-7 per day, 2 with meal. His appetite waxes and wanes. He is trying to stay below 30g of fat and 100g carbs, and he has seen a dietitian a couple months back. Pain is very infrequent and only happens with \"severe\" exacerbations, described as epigastric with radiation to the back    His father  of pancreatic cancer diagnosed at the age of 62; no others with pancreatic disease. He has no history of tobacco or alcohol abuse though had radiation to his abdomen/pelvis for testicular cancer.    Medications  Medication Sig Dispensed Refills Start Date End Date Status   blood sugar diagnostic (ONETOUCH VERIO) strip    Indications: Type 2 diabetes mellitus without complication, without long-term current use of insulin (HC) Dispense item covered by pt ins. E11.9 NIDDM type II - Test 2 times/day. Reason: High A1C 60 Each   2 2017   Active   terazosin (HYTRIN) 5 mg capsule    Indications: hypertension Take 5 mg by mouth once daily in the evening. Indications: high blood pressure   0 2018   Active   atorvastatin (LIPITOR) 20 mg tablet    Indications: hyperlipidemia Take 20 mg by mouth once daily in the evening. Indications: " excessive fat in the blood   0     Active   metFORMIN (GLUCOPHAGE) 500 mg tablet    Indications: type 2 diabetes mellitus Take 500 mg by mouth once daily with a meal.   0     Active   docusate (COLACE) 100 mg capsule    Indications: constipation Take 1 capsule by mouth once daily.   0 11/14/2019        Past Medical  Problem Noted Date   Bowel obstruction 11/13/2019   Hydronephrosis of right kidney 11/13/2019   Bilateral pleural effusion 11/13/2019   Small bowel obstruction 11/13/2019   Hyperkalemia 09/18/2019   Stage 3 chronic kidney disease 09/18/2019   Rectal polyp 09/18/2019   Chronic anemia 09/18/2019   Hyperlipidemia 09/17/2019   HTN (hypertension) 09/17/2019   Acute cholecystitis 09/17/2019   Biliary colic 08/28/2019   Rule out Upper GI bleed 08/28/2019   Anemia 08/28/2019   Other osteomyelitis 10/29/2017   PVD (peripheral vascular disease) 10/29/2017   Type 2 diabetes mellitus with complication 10/29/2017   Lymphedema 10/29/2017   Cellulitis of left groin with abscess 04/23/2017   Hyperglycemia 04/23/2017   Paraparesis of both lower limbs 04/23/2017   Chronic lymphedema legs 04/23/2017   H/O testicular cancer 04/23/2017   Anemia 04/23/2017   Abnormal skin growth     Viral warts     Small bowel intussusception       Medical History Date Comments   Diabetes mellitus (HC)       Paralysis of both lower limbs (HC)       Testicular cancer (HC)   age 17    PVD (peripheral vascular disease) (HC)       HTN (hypertension)       Kidney stone       Dependent edema       Status post radiation therapy   radiation syndrome - progressive paralysis,    UTI (urinary tract infection) 11/2017     Lymphedema       Fatigue 10/02/2019     Bowel obstruction (HC) 10/30/2019       Past Surgical  Surgery Date Site/Laterality Comments   ORCHIECTOMY   Left Age 17 for testicular cancer     COLECTOMY 1980's   For radiation-induced problems     INCISION AND DRAINAGE LEFT GROIN ABCESS 04/24/2017   DR. EVANS     LEFT AXILLOFEMORAL BYPASS  WITH COMPLETION ANGIOGRAM 10/31/2017   DR HARVEY, left 5th toe removed     INCISION AND DRAINAGE OF BONE CORTEX WITH ROTATIONAL FLAP CLOSURE LEFT FOOT 11/01/2017   DR DIEZ     CYSTOSCOPY RIGHT RETROGRADES. 12/05/2017 Right      PERCUTANEOUS NEPHROSTOMY 12/05/2017 Right Placed by [facility]     [facility] 12/15/2017 Right PCN to JJ stent, [facility]     CYSTOSCOPY REMOVAL OF RIGHT URETERAL CATHETER RIGHT URETEROSCOPY WITH HOLMIUM LASER AND RIGHT URETERAL STENT PLACEMENT 01/04/2018 Right [place] 6x26 contour     COLONOSCOPY W/ POLYPECTOMY 08/31/2019   Dr. Guerrero     ESOPHAGOSCOPY / EGD 08/31/2019   Dr. Guerrero     LAP CHOLECYSTECTOMY 09/17/2019          Social History  Tobacco Use Types Packs/Day Years Used Date   Never Smoker           Smokeless Tobacco: Never Used           Alcohol Use Drinks/Week oz/Week Comments   Yes     socially-rare     Sex Assigned at Birth Date Recorded   Not on file       Family History  Medical History Relation Name Comments   Cancer-pancreatic Father         Relation Name Status Comments   Father           Objective:  Reported vitals:  There were no vitals taken for this visit.   GEN: Healthy, alert and no distress  PSYCH: Alert and oriented times 3; coherent speech, normal   rate and volume, able to articulate logical thoughts, able   to abstract reason, no tangential thoughts, no hallucinations   or delusions, affect seems normal  RESP: No cough, no audible wheezing, able to talk in full sentences  Remainder of exam unable to be completed due to virtual visit     Outside Imaging summaries:    Assessment and plan:  Mr Zamudio is a 66yo gentleman with idiopathic chronic calcific pancreatitis complicated by a distal common duct stenosis managed by ERCP and biliary stent placement. There was initially concern for a pancreatic head mass, though this has been excluded by serial imaging including CT and EUS. He has an  impressive stone (or conglomeration of stones) in the head of the pancreas, and as he had minimal symptoms we deferred directed therapy. Unfortunately, as of late, he has struggled with increasing nausea and lack of appetite at times with diarrhea and pain. Therefore, we will plan for main duct manipulation at the time of his next ERCP which was to occur in December. This will be organized as next available at this point and should take place [place] as it will be complex. Of note, reaching the ampulla was difficult due to distortion and required supine positioning. While stone clearance is the goal, we will at least attempt to get a stent in place spanning the entire length of the duct to assess if poor drainage underlies his symptoms. I do have concerns that this is not the case as he has minimal dilation upstream of the stones. Of course during this procedure we will also evaluate and further manage the biliary stricture.  He seems to be doing an excellent job with his diet, though we will increase his Creon 24K units to three tabs with meals, even if they are small. If he has no benefit, he will back down to two.     The etiology of his pancreatitis remains elusive, though perhaps there is a hereditary component. It is noted that his father  of pancreatic cancer, though this alone would not significantly increase his odds.    It was a pleasure to participate in the care of this patient; please contact us with any further questions.  A total of 45 minutes was spent in evaluation with this patient, >50% of which was counseling regarding the above delineated issues.    Christo Pepe MD PhD CHELO LEVI  Director of Endoscopy  Associate Professor of Medicine, Surgery and Pediatrics  Interventional and Therapeutic Endoscopy    [facility]  Division of Gastroenterology and Hepatology  [facility] [address] [id number]  Consultations  [phone]  Procedure Scheduling [phone]  Clinical Nurse Coordinator [phone]  Clinical Fax   [phone]  Administrative   [phone]  Administrative Fax  [phone]

## 2020-11-12 NOTE — PATIENT INSTRUCTIONS
Follow up:    Dr. Pepe has outlined the following steps after your recent clinic visit:    ERCP, to be scheduled in December. We will reach out to find a day that works for you.    Increase Creon 24K units to three tabs with meals, even if they are small. If he has no benefit, he will back down to two.     Please call with any questions or concerns regarding your clinic visit today.    It is a pleasure being involved in your health care.    Contacts post-consultation depending on your need:    Schedule Clinic Appointments            [phone] # 1   M-F 7:30 - 5 pm    Lorrie Bansal RN Care Coordinator  [phone]     OR Procedure Scheduling                             [phone]    My Chart is available 24 hours a day and is a secure way to access your records and communicate with your care team.  I strongly recommend signing up if you haven't already done so, if you are comfortable with computers.  If you would like to inquire about this or are having problems with My Chart access, you may call [phone] or go online at [email or web address].  Please allow at least 24 hours for a response and extra time on weekends and Holidays.

## 2020-11-19 NOTE — TELEPHONE ENCOUNTER
LVM for patient in regards to scheduling procedure with Dr. Pepe. Left direct line for patient to call to go over scheduling details.

## 2020-11-30 PROBLEM — K83.1 BILIARY STRICTURE (H): Status: ACTIVE | Noted: 2020-01-01

## 2020-11-30 PROBLEM — K86.1 CHRONIC PANCREATITIS (H): Status: ACTIVE | Noted: 2020-01-01

## 2020-12-09 NOTE — ANESTHESIA CARE TRANSFER NOTE
Patient: Nick Zamudio    Procedure(s):  ENDOSCOPIC RETROGRADE CHOLANGIOPANCREATOGRAPHY, STENT EXCHANGE    Diagnosis: Chronic pancreatitis (H) [K86.1]  Biliary stricture [K83.1]  Diagnosis Additional Information: No value filed.    Anesthesia Type:   General     Note:  Airway :Face Mask  Patient transferred to:PACU  Comments: Patient arrives to PACU hemodynamically stable, denies PONV, and has minimal to moderate post-operative pain as expected.   Handoff Report: Identifed the Patient, Identified the Reponsible Provider, Reviewed the pertinent medical history, Discussed the surgical course, Reviewed Intra-OP anesthesia mangement and issues during anesthesia, Set expectations for post-procedure period and Allowed opportunity for questions and acknowledgement of understanding      Vitals: (Last set prior to Anesthesia Care Transfer)    CRNA VITALS  12/9/2020 1406 - 12/9/2020 1449      12/9/2020             Resp Rate (observed):  12                Electronically Signed By: Luis Carlos Parisi MD  December 9, 2020  2:49 PM

## 2020-12-09 NOTE — BRIEF OP NOTE
[facility] Brief Operative Note    Pre-operative diagnosis: Chronic pancreatitis (H) [K86.1]  Biliary stricture [K83.1]   Post-operative diagnosis Pancreatic duct stones   Procedure: Procedure(s):  ENDOSCOPIC RETROGRADE CHOLANGIOPANCREATOGRAPHY, STENT EXCHANGE   Surgeon: Christo Pepe MD, PhD   Assistants(s): Holden Blake MD   Estimated blood loss: None    Specimens: None       Findings:  - Proximal duodenal wall edema from adjacent chronic pancreatitis.  - Extraction of 2 indwelling plastic biliary stents.  - Prior biliary sphincterotomy widely patent.   - Cholangiogram with small calibre common bile duct, with excellent biliary drainage.  - Superficial ventral pancreatic ductal cannulation achieved (ie., sphincterotome advanced to head of pancreas), but unable to deeply cannulate ventral PD due to obstructing PD stones in the head of the pancreas and the genu.  - Balloon dilation of main pancreatic duct in the head of the pancreas to 3-mm using an angioplasty dilation balloon catheter.  - Freehand needle knife pancreatic sphincterotomy (septotomy).  - Deployment of Advanix 3-Fr x 9-cm single external pigtail plastic stent (deployed senior care into duct, senior care into duodenal lumen). Unable to advance stent beyond pancreatic genu due to obstructing stone burden in the genu.  - Deployment of Zimmon 5-Fr x 3-cm single external pigtail plastic stent (deployed senior care into duct, senior care into duodenal lumen). Unable to advance stent beyond head of pancreas due to obstructing stone burden in the genu.    Recommendations:  - Observe in PACU and administer Lactated Ringers 1 L IV bolus.  - Check amylase/lipase in 2-hours post-procedure.  - Discharge.    Holden Blake MD on 12/9/2020 at 2:37 PM

## 2020-12-09 NOTE — OP NOTE
ERCP 12/09/2020 11:59 AM [facility], [place], operated by [facility], [address] [place] [address] [phone]     Endoscopy Department   _______________________________________________________________________________   Patient Name: Nick Zamudio           Procedure Date: 12/9/2020 11:59 AM   MRN: [id number]                       Account Number: [id number]   YOB: 1955              Admit Type: Outpatient   Age: 65                               Room: OR   Gender: Male                          Note Status: Finalized   Attending MD: Christo Pepe MD   Total Sedation Time:   _______________________________________________________________________________       Procedure:           ERCP   Indications:         For therapy of chronic pancreatitis   Providers:           Christo Pepe MD, Holden Blake MD   Patient Profile:     Mr Zamudio is 64yo gentleman with idiopathic chronic                        calcific pancreatitis complicated by biliary stenosis                        initially managed by biliary focused ERCP. Mass was                        excluded in the head of the pancreas by EUS and cross                        sectional imaging and the pancreatic stones were                        observed as he was without abdominal pain or nausea. He                        more recently however has progressive nausea only                        minimally improved high dose enzyme supplementation. We                        therefore proceed to ERCP for biliary reiniterrogation                        and for pancreatic manipulation of the stones in the                        setting of upstream dilation.   Referring MD:        Echo Lopez   Requesting Provider: Jamari Rivas MD   Medicines:           General Anesthesia   Complications:       No immediate complications.   _______________________________________________________________________________   Procedure:           Pre-Anesthesia  Assessment:                        - Prior to the procedure, a History and Physical was                        performed, and patient medications and allergies were                        reviewed. The patient is competent. The risks and                        benefits of the procedure and the sedation options and                        risks were discussed with the patient. All questions                        were answered and informed consent was obtained. Patient                        identification and proposed procedure were verified by                        the nurse in the pre-procedure area. Mental Status                        Examination: alert and oriented. Airway Examination:                        Mallampati Class II (the uvula but not tonsillar pillars                        visualized). Respiratory Examination: clear to                        auscultation. CV Examination: systolic murmur. ASA Grade                        Assessment: III - A patient with severe systemic                        disease. After reviewing the risks and benefits, the                        patient was deemed in satisfactory condition to undergo                        the procedure. The anesthesia plan was to use general                        anesthesia. Immediately prior to administration of                        medications, the patient was re-assessed for adequacy to                        receive sedatives. The heart rate, respiratory rate,                        oxygen saturations, blood pressure, adequacy of                        pulmonary ventilation, and response to care were                        monitored throughout the procedure. The physical status                        of the patient was re-assessed after the procedure.                        After obtaining informed consent, the scope was passed                        under direct vision. Throughout the procedure, the                        patient's  blood pressure, pulse, and oxygen saturations                        were monitored continuously. The duodenoscop ewas                        introduced through the mouth, and used to inject                        contrast into and used to inject contrast into the bile                        duct and ventral pancreatic duct. The ERCP was                        accomplished without difficulty. The patient tolerated                        the procedure well.                                                                                     Findings:        A  film of the right upper quadrant demonstrated the two biliary        stents in their anticipated position. Limited white light imaging of the        foregut was notable only for these two stents internalized across a        patent biliary sphincterotomy. They were both partially occluded. The        two stents were removed by snare to allow improved access of the        pancreatic duct. The ventral pancreatic duct was deeply cannulated with        the short-nosed traction sphincterotome (21 and 35) in concert with        multiple wires (018 and 025). Contrast was injected and I personally        interpreted the bile duct images. Ductal flow of contrast was adequate,        image quality was adequate and contrast extended throughout the        pancreatic duct. The duct was diffusely dilated to 4mm with tight        stenoses (likely stones) in the distal head and downstream genu. The        duct in the upstream tail did not opacify suggesting either another        stenosis or atrophy. The stenose were next dilated to 3.5mm with an        angioplasty balloon however devices could still not pass. Attempts to        pass a 3F stent to the body/tail across the stenoses were not successful        and a 3F 9cm Advanix stent was left mostly out of the duct though with        the proximal end partially through the downstream stenosis. A 2 mm        ventral pancreatic  sphincterotomy was made with a monofilament needle        knife over a pancreatic stent using ERBE electrocautery. There was no        post-sphincterotomy bleeding. A 5F 3cm Zimmon stent with a single        external pigtail and a single internal flap was placed 1.5 cm alongside        the 3F stent. Clear fluid flowed through the stents and the stents werre        in good position. The bile duct was then deeply cannulated with the        short-nosed traction sphincterotome. Contrast was injected. The        intraheapatic and extrahepatic biliary tree was decompressed and while        the distal common tapered in the region of the head, drainage was        excellent and a decision was made against replacement of stents.                                                                                     Impression:          - Mod 22 (ultratight pancreatic stenoses)                        - Uncomplicated removal of two partially occluded                        biliary stents from a patent biliary sphincterotomy                        - Tapering of the distal common duct, however there was                        excellent drainage and no evidence of upstream dilation                        to suggest persistent stenosis; stents were not replaced                        - Two tight stenoses of the main duct, consistent with                        known location of the stones in the head and genu,                        limiting passage of devices and stents                        - Succssful dilation of the head and genu stenoses to                        3.5mm using an angioplasty balloon                        - Pancreatic stents could not bridge either stenosis,                        and therefore stents were placed downstream of the                        distal stone to increase caliber of the main duct for                        subsequent EHL                        - Uncomplicated, modest over the stent pancreatic                         sphincterotomy   Recommendation:      - General anesthesia recovery with 2h observation                        followed by laboratories with disposition based on                        course and results                        - Repeat ERCP in 4 weeks with attempt at                        electrohydraulic lithotripsy per [facility]                        - Continue high dose pancreatic enzyme supplementation                        - The findings and recommendations were discussed with                        the patient and their family                                                                                       electronically signed by LYDIA Pepe

## 2020-12-09 NOTE — ANESTHESIA PROCEDURE NOTES
Airway   Date/Time: 12/9/2020 12:12 PM   Patient location during procedure: OR    Staff -   Resident/Fellow: Luis Carlos Parisi MD  Performed By: resident    Indications and Patient Condition  Indications for airway management: crystal-procedural  Induction type:intravenousMask difficulty assessment: 1 - vent by mask    Final Airway Details  Final airway type: endotracheal airway  Successful airway:ETT - single  Endotracheal Airway Details   ETT size (mm): 7.5  Cuffed: yes  Successful intubation technique: direct laryngoscopy  Grade View of Cords: 1  Adjucts: stylet  Measured from: lips  Secured at (cm): 23  Secured with: pink tape  Bite Block used: ERCP bite block.    Post intubation assessment   Placement verified by: capnometry and equal breath sounds   Number of attempts at approach: 1  Secured with:pink tape  Ease of procedure: easy  Dentition: Intact and Unchanged

## 2020-12-09 NOTE — OR NURSING
The following page was sent to Dr. Blake: PACU 13. [facility] Ines requesting intraop update via phone. You may reach her at [phone]. Thanks! Cade MONDRAGON [phone]

## 2020-12-09 NOTE — ANESTHESIA POSTPROCEDURE EVALUATION
Anesthesia POST Procedure Evaluation    Patient: Nick Zamudio   MRN:     [id number] Gender:   male   Age:    65 year old :      1955        Preoperative Diagnosis: Chronic pancreatitis (H) [K86.1]  Biliary stricture [K83.1]   Procedure(s):  ENDOSCOPIC RETROGRADE CHOLANGIOPANCREATOGRAPHY, STENT EXCHANGE   Postop Comments: No value filed.     Anesthesia Type: General       Disposition: Outpatient   Postop Pain Control: Uneventful            Sign Out: Well controlled pain   PONV: No   Neuro/Psych: Uneventful            Sign Out: Acceptable/Baseline neuro status   Airway/Respiratory: Uneventful            Sign Out: Acceptable/Baseline resp. status   CV/Hemodynamics: Uneventful            Sign Out: Acceptable CV status   Other NRE: NONE   DID A NON-ROUTINE EVENT OCCUR? No         Last Anesthesia Record Vitals:  CRNA VITALS  2020 1406 - 2020 1506      2020             Resp Rate (observed):  12          Last PACU Vitals:  Vitals Value Taken Time   /70 20 1520   Temp     Pulse 53 20 1525   Resp     SpO2 100 % 20 1525   Temp src     NIBP     Pulse     SpO2     Resp     Temp     Ht Rate     Temp 2     Vitals shown include unvalidated device data.      Electronically Signed By: Indio Martin MD, 2020, 3:27 PM

## 2020-12-09 NOTE — DISCHARGE INSTRUCTIONS
[facility], [place]  Same-Day Surgery ERCP Procedure   Adult Discharge Orders & Instructions     You had a procedure known as an Endoscopic Retrograde Cholangiopancreatography (ERCP). Your healthcare provider performed the ERCP to look at your bile or pancreatic ducts, and to locate and/or treat blockages (dilation, stenting, removal, etc.) in these ducts. Often biopsies, small samples of tissue, are taken to help diagnose and/or classify stages of disease growth. This procedure is used to diagnose diseases of the pancreas, bile ducts, pancreatic duct, liver, and gallbladder.     After your procedure   1. Make sure to clarify with your healthcare provider any diet restrictions (For example, clear liquid, low fat, no caffeine, etc.)   2. Do NOT take aspirin containing medications or any other blood-thinning medicines (anticoagulants) until your healthcare provider says it's OK.     For 24 hours after surgery  1. Get plenty of rest.  A responsible adult must stay with you for at least 24 hours after you leave the hospital.   2. Do not drive or use heavy equipment.  If you have weakness or tingling, don't drive or use heavy equipment until this feeling goes away.  3. Do not drink alcohol.  4. Avoid strenuous or risky activities (gym, yoga, cycling, etc.).  Ask for help when climbing stairs.   5. You may feel lightheaded.  IF so, sit for a few minutes before standing.  Have someone help you get up.   6. If you have nausea (feel sick to your stomach): Drink only clear liquids such as apple juice, ginger ale, broth or 7-Up.  Rest may also help.  Be sure to drink enough fluids.  Move to a regular diet as you feel able.  7. If you feel bloated or have too much gas, use a heating pad on your belly to help reduce the discomfort. This should help you feel better.   8. You may have a slight fever. This is normal for the first 24 hours.   9. You may have a dry mouth, a sore throat, muscle aches or  trouble sleeping.  These are normal and will go away after 24 hours. A sore throat is most common. Use lozenges or gargle with salt water to ease the discomfort.   10. Do not make important or legal decisions.      Call your doctor for any of the followin. Chest pain, and/or shortness of breath  2. Abdominal  pain, bloating or cramping that has not improved or does not respond to pain reliving medications (Tylenol or narcotics if prescribed)   3. Difficulty swallowing or feeling as though food or liquids are stuck in your throat   4. Sore throat lasting more than 2 days or pain that has worsened over time   5. Black or tarry stools   6. Nausea and/or vomiting that is not resolving or has not responded to anti-nausea medications prescribed to you   7. It has been over 8 to 10 hours since surgery and you are still not able to urinate (pass water)   8. Headache for over 24 hours   9. Fever over 100.5 F (38 C) lasting more than 24 hours after the procedure   10. Signs of jaundice or blockage (fever, chills, abdominal pain, yellowing of the whites of your eyes, yellowing of your skin, and/or passing darker than normal urine)     To contact a doctor, call:   [ ] Dr Pepe at [phone] (clinic) (Monday thru Friday 8:00am to 4:30pm)   [ ] [phone] and ask for the Gastroenterology resident on call (answered 24 hours a day)   [ ] Emergency Department: [facility]: [phone]     Take it easy when you get home.  Remember, same day surgery DOES NOT MEAN SAME DAY RECOVERY!  Healing is a gradual process.  You will need some time to recover - you may be more tired than you realize at first.  Rest and relax for at least the first 24 hours at home.  You'll feel better and heal faster if you take good care of yourself.

## 2020-12-09 NOTE — ANESTHESIA PREPROCEDURE EVALUATION
Anesthesia Pre-Procedure Evaluation    Patient: Nick Zamuido   MRN: [id number] : 1955          Preoperative Diagnosis: Pancreatic duct stricture [K86.89]  Pancreatic duct stones [K86.89]    Procedure(s):  ENDOSCOPIC ULTRASOUND, ESOPHAGOSCOPY / UPPER GASTROINTESTINAL TRACT (GI)  ENDOSCOPIC RETROGRADE CHOLANGIOPANCREATOGRAPHY    Past Medical History:   Diagnosis Date     Diabetes (H)      Hypertension      Intussusception (H)      Iron deficiency anemia      Lymphedema      Pancreatic disease     calcific pancreas, multiple panc stones.     Paraplegia (H)     Due to radiation treatment testicular cancer     PVD (peripheral vascular disease) (H)      Renal disease     stone disease     Testicular cancer (H)     Radiation treatment     Past Surgical History:   Procedure Laterality Date     ABDOMEN SURGERY      Partial colectomy for damage due to radiation treatment testicular ca     AMPUTATION      left toe     CHOLECYSTECTOMY       COLONOSCOPY      polyp     ENDOSCOPIC RETROGRADE CHOLANGIOPANCREATOGRAM N/A 2020    Procedure: ENDOSCOPIC RETROGRADE CHOLANGIOPANCREATOGRAPHY, STENT RETRIEVAL, STENT PLACEMENT, BILIARY SPHINCTEROTOMY;  Surgeon: Christo Pepe MD;  Location:  OR     ENDOSCOPIC RETROGRADE CHOLANGIOPANCREATOGRAM N/A 2020    Procedure: ENDOSCOPIC RETROGRADE CHOLANGIOPANCREATOGRAPHY with balloon sweep of bile ducts, bile ducts stents exchanged;  Surgeon: Christo Pepe MD;  Location: [facility] OR     ENDOSCOPIC ULTRASOUND UPPER GASTROINTESTINAL TRACT (GI) N/A 2020    Procedure: ENDOSCOPIC ULTRASOUND, ESOPHAGOSCOPY / UPPER GASTROINTESTINAL TRACT (GI) WITH FINE NEEDLE BIOPSY;  Surgeon: Christo Pepe MD;  Location:  OR     GI SURGERY      bowel resection     ORTHOPEDIC SURGERY       TONSILLECTOMY         Anesthesia Evaluation     . Pt has had prior anesthetic. Type: General and MAC           ROS/MED HX    ENT/Pulmonary:     (+)tobacco use, Past use , . .   (-) sleep  apnea   Neurologic: Comment: Paraplegia, delayed onset after radiation for cancer    (+)other neuro    Spinal cord injury: paraplegia from XRT.   Cardiovascular:     (+) hypertension-Peripheral Vascular Disease---. : . . . :. .       METS/Exercise Tolerance:     Hematologic:     (+) Anemia, -      Musculoskeletal:  - neg musculoskeletal ROS       GI/Hepatic: Comment: Pancreatic stones, chronic pancreatitis and biliary stricture  Intussusception, SB resection for colon cancer    (+) Other GI/Hepatic pancreatitis      Renal/Genitourinary: Comment: History of testicular cancer s/p radiation treatment    (+) Nephrolithiasis ,       Endo:     (+) type II DM .      Psychiatric:  - neg psychiatric ROS       Infectious Disease:  - neg infectious disease ROS       Malignancy:   (+) Malignancy History of Other  Other CA status post Radiation         Other:                          Physical Exam  Normal systems: cardiovascular, pulmonary and dental    Airway   Mallampati: II  TM distance: >3 FB  Neck ROM: full    Dental     Cardiovascular       Pulmonary             Lab Results   Component Value Date    WBC 5.0 09/29/2020    HGB 10.7 (L) 09/29/2020    HCT 32.8 (L) 09/29/2020     09/29/2020     09/29/2020    POTASSIUM 4.2 09/29/2020    CHLORIDE 112 (H) 09/29/2020    CO2 22 09/29/2020    BUN 27 09/29/2020    CR 1.01 09/29/2020     (H) 09/29/2020    ELODIA 8.8 09/29/2020    ALBUMIN 2.6 (L) 09/29/2020    PROTTOTAL 6.3 (L) 09/29/2020    ALT 14 09/29/2020    AST 14 09/29/2020    ALKPHOS 101 09/29/2020    BILITOTAL 0.3 09/29/2020    LIPASE 210 09/29/2020    AMYLASE 45 09/29/2020    INR 1.12 09/29/2020       Preop Vitals  BP Readings from Last 3 Encounters:   09/29/20 (!) 162/78   07/13/20 134/75    Pulse Readings from Last 3 Encounters:   09/29/20 61      Resp Readings from Last 3 Encounters:   09/29/20 14   07/13/20 20    SpO2 Readings from Last 3 Encounters:   09/29/20 96%   07/13/20 99%      Temp Readings from  Last 1 Encounters:   09/29/20 36.3  C (97.4  F) (Oral)    Ht Readings from Last 1 Encounters:   11/12/20 1.829 m (6')      Wt Readings from Last 1 Encounters:   11/12/20 72.6 kg (160 lb)    Estimated body mass index is 21.7 kg/m  as calculated from the following:    Height as of 11/12/20: 1.829 m (6').    Weight as of 11/12/20: 72.6 kg (160 lb).       Anesthesia Plan      History & Physical Review  History and physical reviewed and following examination; no interval change.    ASA Status:  2 .    NPO Status:  > 8 hours    Plan for General with Intravenous and Propofol induction. Maintenance will be Balanced.    PONV prophylaxis:  Ondansetron (or other 5HT-3) and Dexamethasone or Solumedrol    The patient is not a current smoker      Postoperative Care  Postoperative pain management:  IV analgesics and Oral pain medications.      Consents  Anesthetic plan, risks, benefits and alternatives discussed with:  Patient..                 Luis Carlos Parisi MD        PHYSICAL EXAM:   Mental Status/Neuro: A/A/O   Airway: Facies: Feasible  Mallampati: I  Mouth/Opening: Full  TM distance: > 6 cm  Neck ROM: Full   Respiratory: Auscultation: CTAB     Resp. Rate: Normal     Resp. Effort: Normal      CV: Rhythm: Regular  Rate: Age appropriate  Heart: Normal Sounds  Edema: None   Comments:      Dental: Normal Dentition                Assessment:   ASA SCORE: 2    H&P: History and physical reviewed and following examination; no interval change.         Plan:   Anes. Type:  General   Pre-Medication: None   Induction:  IV (Standard)   Airway: ETT; Oral   Access/Monitoring: PIV   Maintenance: Balanced     Postop Plan:   Postop Pain: Opioids  Postop Sedation/Airway: Not planned     PONV Management:   Adult Risk Factors:, Postop Opioids   Prevention: Ondansetron, Dexamethasone     CONSENT: Direct conversation   Plan and risks discussed with: Patient   Blood Products: Consent Deferred (Minimal Blood Loss)

## 2020-12-15 NOTE — TELEPHONE ENCOUNTER
Post ERCP (2020) with Dr. Pepe: Follow-up    Post procedure recommendations:   Repeat ERCP in 4 weeks with attempt at  electrohydraulic lithotripsy per [facility]    - Continue high dose pancreatic enzyme supplementation    - The findings and recommendations were discussed with  the patient and their family     Orders placed:   Please assist in scheduling:     Procedure/Imaging/Clinic: ERCP w/ aylin  Physician: Dr. Pepe  Timin weeks  Procedure length:75 min  Anesthesia:gen  Dx: panc stones  Tier:2  Location: [facility]     Patient states: left message    Clinic contact and scheduling numbers verified for future questions/concerns.    Lrorie Bansal, RN Care Coordinator

## 2020-12-15 NOTE — TELEPHONE ENCOUNTER
LVM for patient in regards to scheduled procedure with Dr. Pepe on 1/13/2021 Left direct line for patient to call to go over scheduling details.

## 2020-12-15 NOTE — TELEPHONE ENCOUNTER
"Pt has had mild symptoms every day, diarrhea \"predominatant\", \"less severe but more consistent\" Still taking enzymes, reviewed next procedure scheduled on 1/13, ok per patient. No concerning symptoms. Has clinic number for follow up.    ML  "

## 2021-01-01 ENCOUNTER — TRANSFERRED RECORDS (OUTPATIENT)
Dept: HEALTH INFORMATION MANAGEMENT | Facility: CLINIC | Age: 66
End: 2021-01-01

## 2021-01-01 ENCOUNTER — PATIENT OUTREACH (OUTPATIENT)
Dept: GASTROENTEROLOGY | Facility: CLINIC | Age: 66
End: 2021-01-01

## 2021-01-01 ENCOUNTER — ANESTHESIA (OUTPATIENT)
Dept: SURGERY | Facility: CLINIC | Age: 66
End: 2021-01-01
Payer: MEDICARE

## 2021-01-01 ENCOUNTER — APPOINTMENT (OUTPATIENT)
Dept: CT IMAGING | Facility: CLINIC | Age: 66
DRG: 329 | End: 2021-01-01
Attending: EMERGENCY MEDICINE
Payer: MEDICARE

## 2021-01-01 ENCOUNTER — ANESTHESIA (OUTPATIENT)
Dept: SURGERY | Facility: CLINIC | Age: 66
DRG: 394 | End: 2021-01-01
Payer: MEDICARE

## 2021-01-01 ENCOUNTER — APPOINTMENT (OUTPATIENT)
Dept: PHYSICAL THERAPY | Facility: CLINIC | Age: 66
DRG: 394 | End: 2021-01-01
Payer: MEDICARE

## 2021-01-01 ENCOUNTER — APPOINTMENT (OUTPATIENT)
Dept: GENERAL RADIOLOGY | Facility: CLINIC | Age: 66
DRG: 329 | End: 2021-01-01
Attending: STUDENT IN AN ORGANIZED HEALTH CARE EDUCATION/TRAINING PROGRAM
Payer: MEDICARE

## 2021-01-01 ENCOUNTER — VIRTUAL VISIT (OUTPATIENT)
Dept: GASTROENTEROLOGY | Facility: CLINIC | Age: 66
End: 2021-01-01
Payer: MEDICARE

## 2021-01-01 ENCOUNTER — HOSPITAL ENCOUNTER (OUTPATIENT)
Facility: CLINIC | Age: 66
Discharge: HOME | End: 2021-04-06
Attending: INTERNAL MEDICINE | Admitting: INTERNAL MEDICINE
Payer: MEDICARE

## 2021-01-01 ENCOUNTER — ANCILLARY PROCEDURE (OUTPATIENT)
Dept: CT IMAGING | Facility: CLINIC | Age: 66
End: 2021-01-01
Attending: INTERNAL MEDICINE
Payer: MEDICARE

## 2021-01-01 ENCOUNTER — RECORDS - HEALTHEAST (OUTPATIENT)
Dept: LAB | Facility: CLINIC | Age: 66
End: 2021-01-01

## 2021-01-01 ENCOUNTER — APPOINTMENT (OUTPATIENT)
Dept: OCCUPATIONAL THERAPY | Facility: CLINIC | Age: 66
DRG: 329 | End: 2021-01-01
Payer: MEDICARE

## 2021-01-01 ENCOUNTER — APPOINTMENT (OUTPATIENT)
Dept: CT IMAGING | Facility: CLINIC | Age: 66
DRG: 394 | End: 2021-01-01
Attending: STUDENT IN AN ORGANIZED HEALTH CARE EDUCATION/TRAINING PROGRAM
Payer: MEDICARE

## 2021-01-01 ENCOUNTER — APPOINTMENT (OUTPATIENT)
Dept: GENERAL RADIOLOGY | Facility: CLINIC | Age: 66
End: 2021-01-01
Attending: INTERNAL MEDICINE
Payer: MEDICARE

## 2021-01-01 ENCOUNTER — HOSPITAL ENCOUNTER (INPATIENT)
Facility: CLINIC | Age: 66
LOS: 9 days | Discharge: NURSING, REHAB OR LONG-TERM CARE | DRG: 394 | End: 2021-06-12
Attending: EMERGENCY MEDICINE | Admitting: STUDENT IN AN ORGANIZED HEALTH CARE EDUCATION/TRAINING PROGRAM
Payer: MEDICARE

## 2021-01-01 ENCOUNTER — APPOINTMENT (OUTPATIENT)
Dept: GENERAL RADIOLOGY | Facility: CLINIC | Age: 66
DRG: 329 | End: 2021-01-01
Payer: MEDICARE

## 2021-01-01 ENCOUNTER — ANESTHESIA EVENT (OUTPATIENT)
Dept: SURGERY | Facility: CLINIC | Age: 66
DRG: 329 | End: 2021-01-01
Payer: MEDICARE

## 2021-01-01 ENCOUNTER — TELEPHONE (OUTPATIENT)
Dept: INTERVENTIONAL RADIOLOGY/VASCULAR | Facility: CLINIC | Age: 66
End: 2021-01-01

## 2021-01-01 ENCOUNTER — VIRTUAL VISIT (OUTPATIENT)
Dept: VASCULAR SURGERY | Facility: CLINIC | Age: 66
End: 2021-01-01
Payer: MEDICARE

## 2021-01-01 ENCOUNTER — APPOINTMENT (OUTPATIENT)
Dept: OCCUPATIONAL THERAPY | Facility: CLINIC | Age: 66
DRG: 329 | End: 2021-01-01
Attending: STUDENT IN AN ORGANIZED HEALTH CARE EDUCATION/TRAINING PROGRAM
Payer: MEDICARE

## 2021-01-01 ENCOUNTER — HOSPITAL ENCOUNTER (OUTPATIENT)
Facility: CLINIC | Age: 66
Discharge: HOME | End: 2021-01-13
Attending: INTERNAL MEDICINE | Admitting: INTERNAL MEDICINE
Payer: MEDICARE

## 2021-01-01 ENCOUNTER — HOSPITAL ENCOUNTER (OUTPATIENT)
Facility: CLINIC | Age: 66
Discharge: HOME | DRG: 394 | End: 2021-06-01
Attending: INTERNAL MEDICINE | Admitting: INTERNAL MEDICINE
Payer: MEDICARE

## 2021-01-01 ENCOUNTER — PATIENT OUTREACH (OUTPATIENT)
Dept: SURGERY | Facility: CLINIC | Age: 66
End: 2021-01-01

## 2021-01-01 ENCOUNTER — ANESTHESIA (OUTPATIENT)
Dept: SURGERY | Facility: CLINIC | Age: 66
DRG: 329 | End: 2021-01-01
Payer: MEDICARE

## 2021-01-01 ENCOUNTER — APPOINTMENT (OUTPATIENT)
Dept: CT IMAGING | Facility: CLINIC | Age: 66
DRG: 329 | End: 2021-01-01
Attending: STUDENT IN AN ORGANIZED HEALTH CARE EDUCATION/TRAINING PROGRAM
Payer: MEDICARE

## 2021-01-01 ENCOUNTER — APPOINTMENT (OUTPATIENT)
Dept: GENERAL RADIOLOGY | Facility: CLINIC | Age: 66
DRG: 394 | End: 2021-01-01
Attending: INTERNAL MEDICINE
Payer: MEDICARE

## 2021-01-01 ENCOUNTER — DOCUMENTATION ONLY (OUTPATIENT)
Dept: GASTROENTEROLOGY | Facility: CLINIC | Age: 66
End: 2021-01-01

## 2021-01-01 ENCOUNTER — ANCILLARY PROCEDURE (OUTPATIENT)
Dept: ULTRASOUND IMAGING | Facility: CLINIC | Age: 66
End: 2021-01-01
Attending: INTERNAL MEDICINE
Payer: MEDICARE

## 2021-01-01 ENCOUNTER — PRE VISIT (OUTPATIENT)
Dept: SURGERY | Facility: CLINIC | Age: 66
End: 2021-01-01

## 2021-01-01 ENCOUNTER — APPOINTMENT (OUTPATIENT)
Dept: GENERAL RADIOLOGY | Facility: CLINIC | Age: 66
DRG: 394 | End: 2021-01-01
Attending: STUDENT IN AN ORGANIZED HEALTH CARE EDUCATION/TRAINING PROGRAM
Payer: MEDICARE

## 2021-01-01 ENCOUNTER — APPOINTMENT (OUTPATIENT)
Dept: CT IMAGING | Facility: CLINIC | Age: 66
DRG: 394 | End: 2021-01-01
Attending: INTERNAL MEDICINE
Payer: MEDICARE

## 2021-01-01 ENCOUNTER — MEDICAL CORRESPONDENCE (OUTPATIENT)
Dept: HEALTH INFORMATION MANAGEMENT | Facility: CLINIC | Age: 66
End: 2021-01-01

## 2021-01-01 ENCOUNTER — ANESTHESIA EVENT (OUTPATIENT)
Dept: GASTROENTEROLOGY | Facility: CLINIC | Age: 66
DRG: 394 | End: 2021-01-01
Payer: MEDICARE

## 2021-01-01 ENCOUNTER — TELEPHONE (OUTPATIENT)
Dept: SURGERY | Facility: CLINIC | Age: 66
End: 2021-01-01

## 2021-01-01 ENCOUNTER — HOSPITAL ENCOUNTER (OUTPATIENT)
Facility: CLINIC | Age: 66
Discharge: HOME | End: 2021-03-15
Attending: RADIOLOGY | Admitting: RADIOLOGY
Payer: MEDICARE

## 2021-01-01 ENCOUNTER — APPOINTMENT (OUTPATIENT)
Dept: INTERVENTIONAL RADIOLOGY/VASCULAR | Facility: CLINIC | Age: 66
End: 2021-01-01
Attending: RADIOLOGY
Payer: MEDICARE

## 2021-01-01 ENCOUNTER — PREP FOR PROCEDURE (OUTPATIENT)
Dept: GASTROENTEROLOGY | Facility: CLINIC | Age: 66
End: 2021-01-01

## 2021-01-01 ENCOUNTER — ANESTHESIA (OUTPATIENT)
Dept: GASTROENTEROLOGY | Facility: CLINIC | Age: 66
DRG: 394 | End: 2021-01-01
Payer: MEDICARE

## 2021-01-01 ENCOUNTER — HOSPITAL ENCOUNTER (INPATIENT)
Facility: CLINIC | Age: 66
LOS: 11 days | DRG: 329 | End: 2021-07-16
Attending: EMERGENCY MEDICINE | Admitting: SURGERY
Payer: MEDICARE

## 2021-01-01 ENCOUNTER — ANESTHESIA EVENT (OUTPATIENT)
Dept: SURGERY | Facility: CLINIC | Age: 66
DRG: 394 | End: 2021-01-01
Payer: MEDICARE

## 2021-01-01 ENCOUNTER — APPOINTMENT (OUTPATIENT)
Dept: MEDSURG UNIT | Facility: CLINIC | Age: 66
End: 2021-01-01
Attending: RADIOLOGY
Payer: MEDICARE

## 2021-01-01 ENCOUNTER — RESULTS ONLY (OUTPATIENT)
Dept: GASTROENTEROLOGY | Facility: CLINIC | Age: 66
End: 2021-01-01

## 2021-01-01 ENCOUNTER — DOCUMENTATION ONLY (OUTPATIENT)
Dept: OTHER | Facility: CLINIC | Age: 66
End: 2021-01-01

## 2021-01-01 ENCOUNTER — HEALTH MAINTENANCE LETTER (OUTPATIENT)
Age: 66
End: 2021-01-01

## 2021-01-01 ENCOUNTER — PRE VISIT (OUTPATIENT)
Dept: VASCULAR SURGERY | Facility: CLINIC | Age: 66
End: 2021-01-01

## 2021-01-01 ENCOUNTER — APPOINTMENT (OUTPATIENT)
Dept: PHYSICAL THERAPY | Facility: CLINIC | Age: 66
DRG: 394 | End: 2021-01-01
Attending: STUDENT IN AN ORGANIZED HEALTH CARE EDUCATION/TRAINING PROGRAM
Payer: MEDICARE

## 2021-01-01 ENCOUNTER — TELEPHONE (OUTPATIENT)
Dept: VASCULAR SURGERY | Facility: CLINIC | Age: 66
End: 2021-01-01

## 2021-01-01 ENCOUNTER — APPOINTMENT (OUTPATIENT)
Dept: CT IMAGING | Facility: CLINIC | Age: 66
DRG: 329 | End: 2021-01-01
Payer: MEDICARE

## 2021-01-01 ENCOUNTER — ANESTHESIA EVENT (OUTPATIENT)
Dept: SURGERY | Facility: CLINIC | Age: 66
End: 2021-01-01
Payer: MEDICARE

## 2021-01-01 ENCOUNTER — APPOINTMENT (OUTPATIENT)
Dept: EDUCATION SERVICES | Facility: CLINIC | Age: 66
End: 2021-01-01
Attending: INTERNAL MEDICINE
Payer: MEDICARE

## 2021-01-01 VITALS
BODY MASS INDEX: 20.2 KG/M2 | WEIGHT: 141.09 LBS | HEART RATE: 88 BPM | OXYGEN SATURATION: 99 % | RESPIRATION RATE: 14 BRPM | DIASTOLIC BLOOD PRESSURE: 72 MMHG | HEIGHT: 70 IN | TEMPERATURE: 96.9 F | SYSTOLIC BLOOD PRESSURE: 127 MMHG

## 2021-01-01 VITALS
BODY MASS INDEX: 19.64 KG/M2 | OXYGEN SATURATION: 100 % | HEIGHT: 72 IN | SYSTOLIC BLOOD PRESSURE: 137 MMHG | DIASTOLIC BLOOD PRESSURE: 82 MMHG | WEIGHT: 145 LBS | RESPIRATION RATE: 16 BRPM | TEMPERATURE: 97.9 F | HEART RATE: 80 BPM

## 2021-01-01 VITALS
HEART RATE: 81 BPM | RESPIRATION RATE: 16 BRPM | OXYGEN SATURATION: 100 % | DIASTOLIC BLOOD PRESSURE: 58 MMHG | HEIGHT: 72 IN | TEMPERATURE: 97 F | SYSTOLIC BLOOD PRESSURE: 127 MMHG | BODY MASS INDEX: 19.67 KG/M2

## 2021-01-01 VITALS
TEMPERATURE: 98.6 F | BODY MASS INDEX: 18.1 KG/M2 | RESPIRATION RATE: 18 BRPM | OXYGEN SATURATION: 98 % | WEIGHT: 133.6 LBS | SYSTOLIC BLOOD PRESSURE: 108 MMHG | DIASTOLIC BLOOD PRESSURE: 48 MMHG | HEART RATE: 90 BPM | HEIGHT: 72 IN

## 2021-01-01 VITALS
BODY MASS INDEX: 20.26 KG/M2 | OXYGEN SATURATION: 100 % | WEIGHT: 149.6 LBS | TEMPERATURE: 97.9 F | HEIGHT: 72 IN | RESPIRATION RATE: 18 BRPM | SYSTOLIC BLOOD PRESSURE: 147 MMHG | DIASTOLIC BLOOD PRESSURE: 75 MMHG | HEART RATE: 84 BPM

## 2021-01-01 VITALS — BODY MASS INDEX: 20.32 KG/M2 | HEIGHT: 72 IN | WEIGHT: 150 LBS

## 2021-01-01 VITALS
HEART RATE: 68 BPM | TEMPERATURE: 98.1 F | OXYGEN SATURATION: 100 % | SYSTOLIC BLOOD PRESSURE: 97 MMHG | RESPIRATION RATE: 16 BRPM | DIASTOLIC BLOOD PRESSURE: 60 MMHG

## 2021-01-01 DIAGNOSIS — K86.89 PANCREATIC DUCT STRICTURE: ICD-10-CM

## 2021-01-01 DIAGNOSIS — K63.89 PNEUMATOSIS INTESTINALIS: ICD-10-CM

## 2021-01-01 DIAGNOSIS — E11.9 DIABETES MELLITUS (H): ICD-10-CM

## 2021-01-01 DIAGNOSIS — K86.89 PANCREATIC DUCT STONES: ICD-10-CM

## 2021-01-01 DIAGNOSIS — Z11.59 ENCOUNTER FOR SCREENING FOR OTHER VIRAL DISEASES: ICD-10-CM

## 2021-01-01 DIAGNOSIS — K86.1 IDIOPATHIC CHRONIC PANCREATITIS (H): ICD-10-CM

## 2021-01-01 DIAGNOSIS — R10.13 ABDOMINAL PAIN, EPIGASTRIC: ICD-10-CM

## 2021-01-01 DIAGNOSIS — Z20.822 CONTACT WITH AND (SUSPECTED) EXPOSURE TO COVID-19: ICD-10-CM

## 2021-01-01 DIAGNOSIS — K62.89 RECTAL MASS: Primary | ICD-10-CM

## 2021-01-01 DIAGNOSIS — K83.1 BILIARY STRICTURE (H): ICD-10-CM

## 2021-01-01 DIAGNOSIS — K55.1 SUPERIOR MESENTERIC ARTERY STENOSIS (H): ICD-10-CM

## 2021-01-01 DIAGNOSIS — Z09 ENCOUNTER FOR FOLLOW-UP EXAMINATION AFTER COMPLETED TREATMENT FOR CONDITIONS OTHER THAN MALIGNANT NEOPLASM: ICD-10-CM

## 2021-01-01 DIAGNOSIS — R62.7 ADULT FAILURE TO THRIVE: Primary | ICD-10-CM

## 2021-01-01 DIAGNOSIS — K55.029 ISCHEMIC NECROSIS OF SMALL BOWEL (H): Primary | ICD-10-CM

## 2021-01-01 DIAGNOSIS — K86.1 CHRONIC PANCREATITIS (H): Primary | ICD-10-CM

## 2021-01-01 DIAGNOSIS — I10 ESSENTIAL HYPERTENSION, BENIGN: ICD-10-CM

## 2021-01-01 DIAGNOSIS — K55.1 SUPERIOR MESENTERIC ARTERY STENOSIS (H): Primary | ICD-10-CM

## 2021-01-01 DIAGNOSIS — K92.2 GASTROINTESTINAL HEMORRHAGE, UNSPECIFIED GASTROINTESTINAL HEMORRHAGE TYPE: ICD-10-CM

## 2021-01-01 DIAGNOSIS — R19.7 DIARRHEA: ICD-10-CM

## 2021-01-01 DIAGNOSIS — N13.30 HYDRONEPHROSIS, UNSPECIFIED HYDRONEPHROSIS TYPE: ICD-10-CM

## 2021-01-01 DIAGNOSIS — R93.3 ABNORMAL FINDING ON GI TRACT IMAGING: ICD-10-CM

## 2021-01-01 DIAGNOSIS — E11.69 TYPE 2 DIABETES MELLITUS WITH OTHER SPECIFIED COMPLICATION, UNSPECIFIED WHETHER LONG TERM INSULIN USE (H): ICD-10-CM

## 2021-01-01 DIAGNOSIS — K86.1 CHRONIC PANCREATITIS, UNSPECIFIED PANCREATITIS TYPE (H): ICD-10-CM

## 2021-01-01 DIAGNOSIS — K31.5 DUODENAL STENOSIS: ICD-10-CM

## 2021-01-01 DIAGNOSIS — R63.4 UNINTENDED WEIGHT LOSS: ICD-10-CM

## 2021-01-01 DIAGNOSIS — K31.5 DUODENAL STENOSIS: Primary | ICD-10-CM

## 2021-01-01 DIAGNOSIS — R62.7 ADULT FAILURE TO THRIVE: ICD-10-CM

## 2021-01-01 DIAGNOSIS — Z71.3 NUTRITIONAL COUNSELING: ICD-10-CM

## 2021-01-01 DIAGNOSIS — R11.0 NAUSEA: Primary | ICD-10-CM

## 2021-01-01 DIAGNOSIS — Z46.59 ENCOUNTER FOR CARE RELATED TO FEEDING TUBE: ICD-10-CM

## 2021-01-01 DIAGNOSIS — R19.7 DIARRHEA: Primary | ICD-10-CM

## 2021-01-01 DIAGNOSIS — K86.1 CHRONIC PANCREATITIS (H): ICD-10-CM

## 2021-01-01 LAB
A-TOCOPHEROL VIT E SERPL-MCNC: 3.5 MG/L (ref 5.5–18)
A-TOCOPHEROL VIT E SERPL-MCNC: 3.7 MG/L (ref 5.5–18)
ABO + RH BLD: NORMAL
ALBUMIN SERPL-MCNC: 1.2 G/DL (ref 3.4–5)
ALBUMIN SERPL-MCNC: 1.4 G/DL (ref 3.5–5)
ALBUMIN SERPL-MCNC: 1.5 G/DL (ref 3.5–5)
ALBUMIN SERPL-MCNC: 1.5 G/DL (ref 3.5–5)
ALBUMIN SERPL-MCNC: 1.6 G/DL (ref 3.4–5)
ALBUMIN SERPL-MCNC: 1.7 G/DL (ref 3.4–5)
ALBUMIN SERPL-MCNC: 1.7 G/DL (ref 3.5–5)
ALBUMIN SERPL-MCNC: 1.8 G/DL (ref 3.5–5)
ALBUMIN SERPL-MCNC: 1.9 G/DL (ref 3.4–5)
ALBUMIN SERPL-MCNC: 2.4 G/DL (ref 3.4–5)
ALBUMIN SERPL-MCNC: 2.7 G/DL (ref 3.4–5)
ALBUMIN UR-MCNC: 30 MG/DL
ALBUMIN UR-MCNC: 50 MG/DL
ALBUMIN UR-MCNC: NEGATIVE MG/DL
ALP SERPL-CCNC: 111 U/L (ref 40–150)
ALP SERPL-CCNC: 114 U/L (ref 40–150)
ALP SERPL-CCNC: 50 U/L (ref 40–150)
ALP SERPL-CCNC: 62 U/L (ref 40–150)
ALP SERPL-CCNC: 65 U/L (ref 40–150)
ALP SERPL-CCNC: 67 U/L (ref 45–120)
ALP SERPL-CCNC: 69 U/L (ref 40–150)
ALP SERPL-CCNC: 72 U/L (ref 45–120)
ALP SERPL-CCNC: 74 U/L (ref 45–120)
ALP SERPL-CCNC: 75 U/L (ref 45–120)
ALP SERPL-CCNC: 80 U/L (ref 45–120)
ALP SERPL-CCNC: 85 U/L (ref 45–120)
ALP SERPL-CCNC: 91 U/L (ref 40–150)
ALP SERPL-CCNC: 95 U/L (ref 45–120)
ALT SERPL W P-5'-P-CCNC: 14 U/L (ref 0–70)
ALT SERPL W P-5'-P-CCNC: 15 U/L (ref 0–45)
ALT SERPL W P-5'-P-CCNC: 17 U/L (ref 0–70)
ALT SERPL W P-5'-P-CCNC: 19 U/L (ref 0–45)
ALT SERPL W P-5'-P-CCNC: 46 U/L (ref 0–45)
ALT SERPL W P-5'-P-CCNC: 47 U/L (ref 0–45)
ALT SERPL W P-5'-P-CCNC: 7 U/L (ref 0–70)
ALT SERPL W P-5'-P-CCNC: 7 U/L (ref 0–70)
ALT SERPL W P-5'-P-CCNC: 8 U/L (ref 0–70)
ALT SERPL W P-5'-P-CCNC: 9 U/L (ref 0–45)
ALT SERPL W P-5'-P-CCNC: 9 U/L (ref 0–70)
ALT SERPL W P-5'-P-CCNC: <9 U/L (ref 0–45)
ALT SERPL W P-5'-P-CCNC: <9 U/L (ref 0–45)
ALT SERPL-CCNC: <9 U/L (ref 0–45)
AMYLASE SERPL-CCNC: 26 U/L (ref 30–110)
AMYLASE SERPL-CCNC: 28 U/L (ref 30–110)
ANION GAP SERPL CALCULATED.3IONS-SCNC: 10 MMOL/L (ref 3–14)
ANION GAP SERPL CALCULATED.3IONS-SCNC: 10 MMOL/L (ref 3–14)
ANION GAP SERPL CALCULATED.3IONS-SCNC: 2 MMOL/L (ref 3–14)
ANION GAP SERPL CALCULATED.3IONS-SCNC: 2 MMOL/L (ref 3–14)
ANION GAP SERPL CALCULATED.3IONS-SCNC: 3 MMOL/L (ref 3–14)
ANION GAP SERPL CALCULATED.3IONS-SCNC: 4 MMOL/L (ref 3–14)
ANION GAP SERPL CALCULATED.3IONS-SCNC: 4 MMOL/L (ref 3–14)
ANION GAP SERPL CALCULATED.3IONS-SCNC: 5 MMOL/L (ref 3–14)
ANION GAP SERPL CALCULATED.3IONS-SCNC: 5 MMOL/L (ref 5–18)
ANION GAP SERPL CALCULATED.3IONS-SCNC: 6 MMOL/L (ref 3–14)
ANION GAP SERPL CALCULATED.3IONS-SCNC: 6 MMOL/L (ref 5–18)
ANION GAP SERPL CALCULATED.3IONS-SCNC: 6 MMOL/L (ref 5–18)
ANION GAP SERPL CALCULATED.3IONS-SCNC: 7 MMOL/L (ref 5–18)
ANION GAP SERPL CALCULATED.3IONS-SCNC: 8 MMOL/L (ref 3–14)
ANION GAP SERPL CALCULATED.3IONS-SCNC: 8 MMOL/L (ref 5–18)
ANION GAP SERPL CALCULATED.3IONS-SCNC: 9 MMOL/L (ref 3–14)
ANION GAP SERPL CALCULATED.3IONS-SCNC: 9 MMOL/L (ref 3–14)
ANION GAP SERPL CALCULATED.3IONS-SCNC: 9 MMOL/L (ref 5–18)
ANNOTATION COMMENT IMP: ABNORMAL
ANNOTATION COMMENT IMP: ABNORMAL
APPEARANCE UR: ABNORMAL
APTT PPP: 30 SEC (ref 22–37)
AST SERPL W P-5'-P-CCNC: 10 U/L (ref 0–40)
AST SERPL W P-5'-P-CCNC: 10 U/L (ref 0–40)
AST SERPL W P-5'-P-CCNC: 11 U/L (ref 0–45)
AST SERPL W P-5'-P-CCNC: 24 U/L (ref 0–40)
AST SERPL W P-5'-P-CCNC: 28 U/L (ref 0–40)
AST SERPL W P-5'-P-CCNC: 6 U/L (ref 0–45)
AST SERPL W P-5'-P-CCNC: 7 U/L (ref 0–40)
AST SERPL W P-5'-P-CCNC: 7 U/L (ref 0–40)
AST SERPL W P-5'-P-CCNC: 7 U/L (ref 0–45)
AST SERPL W P-5'-P-CCNC: 8 U/L (ref 0–40)
AST SERPL W P-5'-P-CCNC: 8 U/L (ref 0–45)
AST SERPL W P-5'-P-CCNC: 8 U/L (ref 0–45)
AST SERPL W P-5'-P-CCNC: <3 U/L (ref 0–45)
AST SERPL-CCNC: 8 U/L (ref 0–40)
BACTERIA #/AREA URNS HPF: ABNORMAL /HPF
BACTERIA SPEC CULT: ABNORMAL
BACTERIA SPEC CULT: NORMAL
BACTERIA UR CULT: ABNORMAL
BASE DEFICIT BLDA-SCNC: 0.3 MMOL/L
BASE DEFICIT BLDA-SCNC: 3.2 MMOL/L
BASE DEFICIT BLDA-SCNC: 5.5 MMOL/L
BASE EXCESS BLDA CALC-SCNC: -12.7 MMOL/L (ref -9.6–2)
BASE EXCESS BLDA CALC-SCNC: -13.7 MMOL/L (ref -9.6–2)
BASE EXCESS BLDA CALC-SCNC: -14.9 MMOL/L (ref -9.6–2)
BASE EXCESS BLDA CALC-SCNC: 0.1 MMOL/L
BASE EXCESS BLDA CALC-SCNC: 0.5 MMOL/L
BASOPHILS # BLD AUTO: 0 10E9/L (ref 0–0.2)
BASOPHILS NFR BLD AUTO: 0.1 %
BASOPHILS NFR BLD AUTO: 0.2 %
BASOPHILS NFR BLD AUTO: 0.2 %
BETA+GAMMA TOCOPHEROL SERPL-MCNC: 1.1 MG/L (ref 0–6)
BETA+GAMMA TOCOPHEROL SERPL-MCNC: 3.2 MG/L (ref 0–6)
BILIRUB DIRECT SERPL-MCNC: <0.1 MG/DL (ref 0–0.2)
BILIRUB DIRECT SERPL-MCNC: <0.1 MG/DL (ref 0–0.2)
BILIRUB SERPL-MCNC: 0.1 MG/DL (ref 0–1)
BILIRUB SERPL-MCNC: 0.2 MG/DL (ref 0.2–1.3)
BILIRUB SERPL-MCNC: 0.2 MG/DL (ref 0–1)
BILIRUB SERPL-MCNC: 0.3 MG/DL (ref 0–1)
BILIRUB SERPL-MCNC: 0.5 MG/DL (ref 0.2–1.3)
BILIRUB SERPL-MCNC: 0.8 MG/DL (ref 0.2–1.3)
BILIRUB UR QL STRIP: NEGATIVE
BLD GP AB SCN SERPL QL: NORMAL
BLD GP AB SCN SERPL QL: NORMAL
BLD PROD TYP BPU: NORMAL
BLD UNIT ID BPU: 0
BLOOD BANK CMNT PATIENT-IMP: NORMAL
BLOOD PRODUCT CODE: NORMAL
BPU ID: NORMAL
BUN SERPL-MCNC: 14 MG/DL (ref 7–30)
BUN SERPL-MCNC: 14 MG/DL (ref 7–30)
BUN SERPL-MCNC: 16 MG/DL (ref 7–30)
BUN SERPL-MCNC: 19 MG/DL (ref 7–30)
BUN SERPL-MCNC: 19 MG/DL (ref 7–30)
BUN SERPL-MCNC: 22 MG/DL (ref 7–30)
BUN SERPL-MCNC: 26 MG/DL (ref 7–30)
BUN SERPL-MCNC: 28 MG/DL (ref 7–30)
BUN SERPL-MCNC: 31 MG/DL (ref 7–30)
BUN SERPL-MCNC: 33 MG/DL (ref 7–30)
BUN SERPL-MCNC: 33 MG/DL (ref 7–30)
BUN SERPL-MCNC: 35 MG/DL (ref 7–30)
BUN SERPL-MCNC: 37 MG/DL (ref 7–30)
BUN SERPL-MCNC: 38 MG/DL (ref 7–30)
BUN SERPL-MCNC: 41 MG/DL (ref 7–30)
BUN SERPL-MCNC: 41 MG/DL (ref 7–30)
BUN SERPL-MCNC: 45 MG/DL (ref 8–22)
BUN SERPL-MCNC: 46 MG/DL (ref 7–30)
BUN SERPL-MCNC: 47 MG/DL (ref 7–30)
BUN SERPL-MCNC: 50 MG/DL (ref 7–30)
BUN SERPL-MCNC: 50 MG/DL (ref 8–22)
BUN SERPL-MCNC: 50 MG/DL (ref 8–22)
BUN SERPL-MCNC: 52 MG/DL (ref 7–30)
BUN SERPL-MCNC: 53 MG/DL (ref 7–30)
BUN SERPL-MCNC: 54 MG/DL (ref 8–22)
BUN SERPL-MCNC: 59 MG/DL (ref 8–22)
BUN SERPL-MCNC: 61 MG/DL (ref 8–22)
BUN SERPL-MCNC: 62 MG/DL (ref 8–22)
BUN SERPL-MCNC: 62 MG/DL (ref 8–22)
BUN SERPL-MCNC: 65 MG/DL (ref 7–30)
BUN SERPL-MCNC: 65 MG/DL (ref 8–22)
C DIFF TOX B STL QL: NEGATIVE
CA-I BLD-MCNC: 4.9 MG/DL (ref 4.4–5.2)
CA-I BLD-MCNC: 5 MG/DL (ref 4.4–5.2)
CA-I BLD-MCNC: 5.1 MG/DL (ref 4.4–5.2)
CA-I BLD-MCNC: 5.1 MG/DL (ref 4.4–5.2)
CA-I BLD-MCNC: 5.4 MG/DL (ref 4.4–5.2)
CA-I BLD-MCNC: 5.6 MG/DL (ref 4.4–5.2)
CALCIUM SERPL-MCNC: 6.9 MG/DL (ref 8.5–10.1)
CALCIUM SERPL-MCNC: 7.3 MG/DL (ref 8.5–10.5)
CALCIUM SERPL-MCNC: 7.6 MG/DL (ref 8.5–10.1)
CALCIUM SERPL-MCNC: 7.7 MG/DL (ref 8.5–10.5)
CALCIUM SERPL-MCNC: 7.8 MG/DL (ref 8.5–10.1)
CALCIUM SERPL-MCNC: 7.8 MG/DL (ref 8.5–10.1)
CALCIUM SERPL-MCNC: 7.8 MG/DL (ref 8.5–10.5)
CALCIUM SERPL-MCNC: 7.8 MG/DL (ref 8.5–10.5)
CALCIUM SERPL-MCNC: 7.9 MG/DL (ref 8.5–10.1)
CALCIUM SERPL-MCNC: 7.9 MG/DL (ref 8.5–10.5)
CALCIUM SERPL-MCNC: 8 MG/DL (ref 8.5–10.1)
CALCIUM SERPL-MCNC: 8 MG/DL (ref 8.5–10.5)
CALCIUM SERPL-MCNC: 8 MG/DL (ref 8.5–10.5)
CALCIUM SERPL-MCNC: 8.3 MG/DL (ref 8.5–10.1)
CALCIUM SERPL-MCNC: 8.5 MG/DL (ref 8.5–10.1)
CALCIUM SERPL-MCNC: 8.8 MG/DL (ref 8.5–10.1)
CALCIUM SERPL-MCNC: 8.9 MG/DL (ref 8.5–10.1)
CALCIUM SERPL-MCNC: 9 MG/DL (ref 8.5–10.1)
CALCIUM SERPL-MCNC: 9.1 MG/DL (ref 8.5–10.1)
CALCIUM SERPL-MCNC: 9.3 MG/DL (ref 8.5–10.1)
CALCIUM SERPL-MCNC: 9.3 MG/DL (ref 8.5–10.1)
CEA SERPL-MCNC: 1.4 UG/L (ref 0–2.5)
CHLORIDE BLD-SCNC: 103 MMOL/L (ref 98–107)
CHLORIDE BLD-SCNC: 104 MMOL/L (ref 98–107)
CHLORIDE BLD-SCNC: 104 MMOL/L (ref 98–107)
CHLORIDE BLD-SCNC: 105 MMOL/L (ref 98–107)
CHLORIDE BLD-SCNC: 106 MMOL/L (ref 98–107)
CHLORIDE BLD-SCNC: 117 MMOL/L (ref 94–109)
CHLORIDE BLD-SCNC: 118 MMOL/L (ref 94–109)
CHLORIDE BLD-SCNC: 121 MMOL/L (ref 94–109)
CHLORIDE SERPL-SCNC: 104 MMOL/L (ref 94–109)
CHLORIDE SERPL-SCNC: 104 MMOL/L (ref 94–109)
CHLORIDE SERPL-SCNC: 105 MMOL/L (ref 94–109)
CHLORIDE SERPL-SCNC: 105 MMOL/L (ref 94–109)
CHLORIDE SERPL-SCNC: 106 MMOL/L (ref 94–109)
CHLORIDE SERPL-SCNC: 107 MMOL/L (ref 94–109)
CHLORIDE SERPL-SCNC: 108 MMOL/L (ref 94–109)
CHLORIDE SERPL-SCNC: 109 MMOL/L (ref 94–109)
CHLORIDE SERPL-SCNC: 111 MMOL/L (ref 94–109)
CHLORIDE SERPL-SCNC: 111 MMOL/L (ref 94–109)
CHLORIDE SERPL-SCNC: 114 MMOL/L (ref 94–109)
CHLORIDE SERPL-SCNC: 115 MMOL/L (ref 94–109)
CHLORIDE SERPL-SCNC: 119 MMOL/L (ref 94–109)
CHLORIDE SERPL-SCNC: 121 MMOL/L (ref 94–109)
CHLORIDE SERPL-SCNC: 123 MMOL/L (ref 94–109)
CHOLEST SERPL-MCNC: 59 MG/DL
CHOLEST SERPL-MCNC: 60 MG/DL
CHOLEST SERPL-MCNC: 63 MG/DL
CHOLEST SERPL-MCNC: 67 MG/DL
CHOLEST SERPL-MCNC: 70 MG/DL
CHOLEST SERPL-MCNC: 72 MG/DL
CO2 SERPL-SCNC: 18 MMOL/L (ref 20–32)
CO2 SERPL-SCNC: 19 MMOL/L (ref 20–32)
CO2 SERPL-SCNC: 20 MMOL/L (ref 20–32)
CO2 SERPL-SCNC: 21 MMOL/L (ref 22–31)
CO2 SERPL-SCNC: 22 MMOL/L (ref 20–32)
CO2 SERPL-SCNC: 22 MMOL/L (ref 22–31)
CO2 SERPL-SCNC: 23 MMOL/L (ref 20–32)
CO2 SERPL-SCNC: 23 MMOL/L (ref 22–31)
CO2 SERPL-SCNC: 24 MMOL/L (ref 20–32)
CO2 SERPL-SCNC: 24 MMOL/L (ref 22–31)
CO2 SERPL-SCNC: 25 MMOL/L (ref 20–32)
CO2 SERPL-SCNC: 25 MMOL/L (ref 20–32)
CO2 SERPL-SCNC: 26 MMOL/L (ref 20–32)
CO2 SERPL-SCNC: 27 MMOL/L (ref 20–32)
CO2 SERPL-SCNC: 28 MMOL/L (ref 20–32)
CO2 SERPL-SCNC: 28 MMOL/L (ref 20–32)
CO2 SERPL-SCNC: 29 MMOL/L (ref 20–32)
CO2 SERPL-SCNC: 31 MMOL/L (ref 20–32)
COLONOSCOPY: NORMAL
COLOR UR AUTO: ABNORMAL
COLOR UR AUTO: YELLOW
COLOR UR AUTO: YELLOW
COPATH REPORT: NORMAL
CREAT BLD-MCNC: 1.7 MG/DL (ref 0.66–1.25)
CREAT SERPL-MCNC: 0.74 MG/DL (ref 0.7–1.3)
CREAT SERPL-MCNC: 0.75 MG/DL (ref 0.7–1.3)
CREAT SERPL-MCNC: 0.77 MG/DL (ref 0.7–1.3)
CREAT SERPL-MCNC: 0.77 MG/DL (ref 0.7–1.3)
CREAT SERPL-MCNC: 0.78 MG/DL (ref 0.7–1.3)
CREAT SERPL-MCNC: 0.79 MG/DL (ref 0.7–1.3)
CREAT SERPL-MCNC: 0.79 MG/DL (ref 0.7–1.3)
CREAT SERPL-MCNC: 0.84 MG/DL (ref 0.66–1.25)
CREAT SERPL-MCNC: 0.88 MG/DL (ref 0.66–1.25)
CREAT SERPL-MCNC: 0.88 MG/DL (ref 0.7–1.3)
CREAT SERPL-MCNC: 0.89 MG/DL (ref 0.66–1.25)
CREAT SERPL-MCNC: 0.91 MG/DL (ref 0.66–1.25)
CREAT SERPL-MCNC: 0.94 MG/DL (ref 0.66–1.25)
CREAT SERPL-MCNC: 0.96 MG/DL (ref 0.66–1.25)
CREAT SERPL-MCNC: 0.97 MG/DL (ref 0.66–1.25)
CREAT SERPL-MCNC: 0.98 MG/DL (ref 0.66–1.25)
CREAT SERPL-MCNC: 0.98 MG/DL (ref 0.66–1.25)
CREAT SERPL-MCNC: 0.99 MG/DL (ref 0.66–1.25)
CREAT SERPL-MCNC: 0.99 MG/DL (ref 0.66–1.25)
CREAT SERPL-MCNC: 1 MG/DL (ref 0.66–1.25)
CREAT SERPL-MCNC: 1.04 MG/DL (ref 0.66–1.25)
CREAT SERPL-MCNC: 1.08 MG/DL (ref 0.66–1.25)
CREAT SERPL-MCNC: 1.09 MG/DL (ref 0.66–1.25)
CREAT SERPL-MCNC: 1.11 MG/DL (ref 0.66–1.25)
CREAT SERPL-MCNC: 1.13 MG/DL (ref 0.66–1.25)
CREAT SERPL-MCNC: 1.17 MG/DL (ref 0.66–1.25)
CRP SERPL-MCNC: 250 MG/L (ref 0–8)
DEPRECATED CALCIDIOL+CALCIFEROL SERPL-MC: 5 UG/L (ref 20–75)
DEPRECATED CALCIDIOL+CALCIFEROL SERPL-MC: 6 UG/L (ref 20–75)
DIFFERENTIAL METHOD BLD: ABNORMAL
ELASTASE PANC STL-MCNT: 4.4 UG/G
ELASTASE PANC STL-MCNT: 6.8 UG/G
EOSINOPHIL # BLD AUTO: 0 10E9/L (ref 0–0.7)
EOSINOPHIL # BLD AUTO: 0 10E9/L (ref 0–0.7)
EOSINOPHIL # BLD AUTO: 0.1 10E9/L (ref 0–0.7)
EOSINOPHIL NFR BLD AUTO: 0 %
EOSINOPHIL NFR BLD AUTO: 0.4 %
EOSINOPHIL NFR BLD AUTO: 0.5 %
ERCP: NORMAL
ERYTHROCYTE [DISTWIDTH] IN BLOOD BY AUTOMATED COUNT: 13.6 % (ref 10–15)
ERYTHROCYTE [DISTWIDTH] IN BLOOD BY AUTOMATED COUNT: 13.7 % (ref 10–15)
ERYTHROCYTE [DISTWIDTH] IN BLOOD BY AUTOMATED COUNT: 13.8 % (ref 10–15)
ERYTHROCYTE [DISTWIDTH] IN BLOOD BY AUTOMATED COUNT: 13.9 % (ref 10–15)
ERYTHROCYTE [DISTWIDTH] IN BLOOD BY AUTOMATED COUNT: 14.1 % (ref 10–15)
ERYTHROCYTE [DISTWIDTH] IN BLOOD BY AUTOMATED COUNT: 14.1 % (ref 10–15)
ERYTHROCYTE [DISTWIDTH] IN BLOOD BY AUTOMATED COUNT: 14.2 % (ref 10–15)
ERYTHROCYTE [DISTWIDTH] IN BLOOD BY AUTOMATED COUNT: 14.3 % (ref 10–15)
ERYTHROCYTE [DISTWIDTH] IN BLOOD BY AUTOMATED COUNT: 14.4 % (ref 10–15)
ERYTHROCYTE [DISTWIDTH] IN BLOOD BY AUTOMATED COUNT: 14.6 % (ref 10–15)
ERYTHROCYTE [DISTWIDTH] IN BLOOD BY AUTOMATED COUNT: 14.7 % (ref 10–15)
ERYTHROCYTE [DISTWIDTH] IN BLOOD BY AUTOMATED COUNT: 14.8 % (ref 10–15)
ERYTHROCYTE [DISTWIDTH] IN BLOOD BY AUTOMATED COUNT: 15.1 % (ref 10–15)
ERYTHROCYTE [DISTWIDTH] IN BLOOD BY AUTOMATED COUNT: 15.5 % (ref 10–15)
ERYTHROCYTE [DISTWIDTH] IN BLOOD BY AUTOMATED COUNT: 15.7 % (ref 10–15)
ERYTHROCYTE [DISTWIDTH] IN BLOOD BY AUTOMATED COUNT: 15.9 % (ref 10–15)
ERYTHROCYTE [DISTWIDTH] IN BLOOD BY AUTOMATED COUNT: 15.9 % (ref 10–15)
ERYTHROCYTE [DISTWIDTH] IN BLOOD BY AUTOMATED COUNT: NORMAL % (ref 10–15)
FASTING STATUS PATIENT QL REPORTED: ABNORMAL
GFR SERPL CREATININE-BSD FRML MDRD: 41 ML/MIN/{1.73_M2}
GFR SERPL CREATININE-BSD FRML MDRD: 65 ML/MIN/1.73M2
GFR SERPL CREATININE-BSD FRML MDRD: 67 ML/MIN/{1.73_M2}
GFR SERPL CREATININE-BSD FRML MDRD: 69 ML/MIN/{1.73_M2}
GFR SERPL CREATININE-BSD FRML MDRD: 70 ML/MIN/{1.73_M2}
GFR SERPL CREATININE-BSD FRML MDRD: 71 ML/MIN/{1.73_M2}
GFR SERPL CREATININE-BSD FRML MDRD: 74 ML/MIN/1.73M2
GFR SERPL CREATININE-BSD FRML MDRD: 78 ML/MIN/{1.73_M2}
GFR SERPL CREATININE-BSD FRML MDRD: 79 ML/MIN/{1.73_M2}
GFR SERPL CREATININE-BSD FRML MDRD: 80 ML/MIN/{1.73_M2}
GFR SERPL CREATININE-BSD FRML MDRD: 80 ML/MIN/{1.73_M2}
GFR SERPL CREATININE-BSD FRML MDRD: 81 ML/MIN/{1.73_M2}
GFR SERPL CREATININE-BSD FRML MDRD: 82 ML/MIN/{1.73_M2}
GFR SERPL CREATININE-BSD FRML MDRD: 82 ML/MIN/{1.73_M2}
GFR SERPL CREATININE-BSD FRML MDRD: 83 ML/MIN/{1.73_M2}
GFR SERPL CREATININE-BSD FRML MDRD: 87 ML/MIN/{1.73_M2}
GFR SERPL CREATININE-BSD FRML MDRD: 88 ML/MIN/1.73M2
GFR SERPL CREATININE-BSD FRML MDRD: 88 ML/MIN/{1.73_M2}
GFR SERPL CREATININE-BSD FRML MDRD: 89 ML/MIN/{1.73_M2}
GFR SERPL CREATININE-BSD FRML MDRD: >60 ML/MIN/1.73M2
GFR SERPL CREATININE-BSD FRML MDRD: >90 ML/MIN/{1.73_M2}
GLUCOSE BLD-MCNC: 137 MG/DL (ref 70–99)
GLUCOSE BLD-MCNC: 139 MG/DL (ref 70–125)
GLUCOSE BLD-MCNC: 154 MG/DL (ref 70–99)
GLUCOSE BLD-MCNC: 155 MG/DL (ref 70–99)
GLUCOSE BLD-MCNC: 156 MG/DL (ref 70–99)
GLUCOSE BLD-MCNC: 163 MG/DL (ref 70–125)
GLUCOSE BLD-MCNC: 163 MG/DL (ref 70–125)
GLUCOSE BLD-MCNC: 170 MG/DL (ref 70–99)
GLUCOSE BLD-MCNC: 175 MG/DL (ref 70–99)
GLUCOSE BLD-MCNC: 176 MG/DL (ref 70–99)
GLUCOSE BLD-MCNC: 178 MG/DL (ref 70–125)
GLUCOSE BLD-MCNC: 184 MG/DL (ref 70–125)
GLUCOSE BLD-MCNC: 184 MG/DL (ref 70–99)
GLUCOSE BLD-MCNC: 190 MG/DL (ref 70–125)
GLUCOSE BLD-MCNC: 197 MG/DL (ref 70–99)
GLUCOSE BLD-MCNC: 253 MG/DL (ref 70–125)
GLUCOSE BLD-MCNC: 340 MG/DL (ref 70–125)
GLUCOSE BLDC GLUCOMTR-MCNC: 100 MG/DL (ref 70–99)
GLUCOSE BLDC GLUCOMTR-MCNC: 100 MG/DL (ref 70–99)
GLUCOSE BLDC GLUCOMTR-MCNC: 101 MG/DL (ref 70–99)
GLUCOSE BLDC GLUCOMTR-MCNC: 102 MG/DL (ref 70–99)
GLUCOSE BLDC GLUCOMTR-MCNC: 103 MG/DL (ref 70–99)
GLUCOSE BLDC GLUCOMTR-MCNC: 103 MG/DL (ref 70–99)
GLUCOSE BLDC GLUCOMTR-MCNC: 104 MG/DL (ref 70–99)
GLUCOSE BLDC GLUCOMTR-MCNC: 104 MG/DL (ref 70–99)
GLUCOSE BLDC GLUCOMTR-MCNC: 106 MG/DL (ref 70–99)
GLUCOSE BLDC GLUCOMTR-MCNC: 107 MG/DL (ref 70–99)
GLUCOSE BLDC GLUCOMTR-MCNC: 107 MG/DL (ref 70–99)
GLUCOSE BLDC GLUCOMTR-MCNC: 108 MG/DL (ref 70–99)
GLUCOSE BLDC GLUCOMTR-MCNC: 109 MG/DL (ref 70–99)
GLUCOSE BLDC GLUCOMTR-MCNC: 110 MG/DL (ref 70–99)
GLUCOSE BLDC GLUCOMTR-MCNC: 110 MG/DL (ref 70–99)
GLUCOSE BLDC GLUCOMTR-MCNC: 111 MG/DL (ref 70–99)
GLUCOSE BLDC GLUCOMTR-MCNC: 111 MG/DL (ref 70–99)
GLUCOSE BLDC GLUCOMTR-MCNC: 112 MG/DL (ref 70–99)
GLUCOSE BLDC GLUCOMTR-MCNC: 113 MG/DL (ref 70–99)
GLUCOSE BLDC GLUCOMTR-MCNC: 113 MG/DL (ref 70–99)
GLUCOSE BLDC GLUCOMTR-MCNC: 114 MG/DL (ref 70–99)
GLUCOSE BLDC GLUCOMTR-MCNC: 116 MG/DL (ref 70–99)
GLUCOSE BLDC GLUCOMTR-MCNC: 116 MG/DL (ref 70–99)
GLUCOSE BLDC GLUCOMTR-MCNC: 117 MG/DL (ref 70–99)
GLUCOSE BLDC GLUCOMTR-MCNC: 117 MG/DL (ref 70–99)
GLUCOSE BLDC GLUCOMTR-MCNC: 118 MG/DL (ref 70–99)
GLUCOSE BLDC GLUCOMTR-MCNC: 119 MG/DL (ref 70–99)
GLUCOSE BLDC GLUCOMTR-MCNC: 120 MG/DL (ref 70–99)
GLUCOSE BLDC GLUCOMTR-MCNC: 120 MG/DL (ref 70–99)
GLUCOSE BLDC GLUCOMTR-MCNC: 121 MG/DL (ref 70–99)
GLUCOSE BLDC GLUCOMTR-MCNC: 122 MG/DL (ref 70–99)
GLUCOSE BLDC GLUCOMTR-MCNC: 123 MG/DL (ref 70–99)
GLUCOSE BLDC GLUCOMTR-MCNC: 124 MG/DL (ref 70–99)
GLUCOSE BLDC GLUCOMTR-MCNC: 125 MG/DL (ref 70–99)
GLUCOSE BLDC GLUCOMTR-MCNC: 126 MG/DL (ref 70–99)
GLUCOSE BLDC GLUCOMTR-MCNC: 128 MG/DL (ref 70–99)
GLUCOSE BLDC GLUCOMTR-MCNC: 128 MG/DL (ref 70–99)
GLUCOSE BLDC GLUCOMTR-MCNC: 129 MG/DL (ref 70–99)
GLUCOSE BLDC GLUCOMTR-MCNC: 130 MG/DL (ref 70–99)
GLUCOSE BLDC GLUCOMTR-MCNC: 131 MG/DL (ref 70–99)
GLUCOSE BLDC GLUCOMTR-MCNC: 132 MG/DL (ref 70–99)
GLUCOSE BLDC GLUCOMTR-MCNC: 133 MG/DL (ref 70–99)
GLUCOSE BLDC GLUCOMTR-MCNC: 133 MG/DL (ref 70–99)
GLUCOSE BLDC GLUCOMTR-MCNC: 134 MG/DL (ref 70–99)
GLUCOSE BLDC GLUCOMTR-MCNC: 135 MG/DL (ref 70–99)
GLUCOSE BLDC GLUCOMTR-MCNC: 136 MG/DL (ref 70–99)
GLUCOSE BLDC GLUCOMTR-MCNC: 136 MG/DL (ref 70–99)
GLUCOSE BLDC GLUCOMTR-MCNC: 137 MG/DL (ref 70–99)
GLUCOSE BLDC GLUCOMTR-MCNC: 138 MG/DL (ref 70–99)
GLUCOSE BLDC GLUCOMTR-MCNC: 139 MG/DL (ref 70–99)
GLUCOSE BLDC GLUCOMTR-MCNC: 140 MG/DL (ref 70–99)
GLUCOSE BLDC GLUCOMTR-MCNC: 140 MG/DL (ref 70–99)
GLUCOSE BLDC GLUCOMTR-MCNC: 141 MG/DL (ref 70–99)
GLUCOSE BLDC GLUCOMTR-MCNC: 141 MG/DL (ref 70–99)
GLUCOSE BLDC GLUCOMTR-MCNC: 144 MG/DL (ref 70–99)
GLUCOSE BLDC GLUCOMTR-MCNC: 145 MG/DL (ref 70–99)
GLUCOSE BLDC GLUCOMTR-MCNC: 146 MG/DL (ref 70–99)
GLUCOSE BLDC GLUCOMTR-MCNC: 146 MG/DL (ref 70–99)
GLUCOSE BLDC GLUCOMTR-MCNC: 147 MG/DL (ref 70–99)
GLUCOSE BLDC GLUCOMTR-MCNC: 148 MG/DL (ref 70–99)
GLUCOSE BLDC GLUCOMTR-MCNC: 148 MG/DL (ref 70–99)
GLUCOSE BLDC GLUCOMTR-MCNC: 149 MG/DL (ref 70–99)
GLUCOSE BLDC GLUCOMTR-MCNC: 151 MG/DL (ref 70–99)
GLUCOSE BLDC GLUCOMTR-MCNC: 152 MG/DL (ref 70–99)
GLUCOSE BLDC GLUCOMTR-MCNC: 154 MG/DL (ref 70–99)
GLUCOSE BLDC GLUCOMTR-MCNC: 155 MG/DL (ref 70–99)
GLUCOSE BLDC GLUCOMTR-MCNC: 156 MG/DL (ref 70–99)
GLUCOSE BLDC GLUCOMTR-MCNC: 157 MG/DL (ref 70–99)
GLUCOSE BLDC GLUCOMTR-MCNC: 157 MG/DL (ref 70–99)
GLUCOSE BLDC GLUCOMTR-MCNC: 158 MG/DL (ref 70–99)
GLUCOSE BLDC GLUCOMTR-MCNC: 158 MG/DL (ref 70–99)
GLUCOSE BLDC GLUCOMTR-MCNC: 161 MG/DL (ref 70–99)
GLUCOSE BLDC GLUCOMTR-MCNC: 163 MG/DL (ref 70–99)
GLUCOSE BLDC GLUCOMTR-MCNC: 163 MG/DL (ref 70–99)
GLUCOSE BLDC GLUCOMTR-MCNC: 164 MG/DL (ref 70–99)
GLUCOSE BLDC GLUCOMTR-MCNC: 164 MG/DL (ref 70–99)
GLUCOSE BLDC GLUCOMTR-MCNC: 165 MG/DL (ref 70–99)
GLUCOSE BLDC GLUCOMTR-MCNC: 169 MG/DL (ref 70–99)
GLUCOSE BLDC GLUCOMTR-MCNC: 170 MG/DL (ref 70–99)
GLUCOSE BLDC GLUCOMTR-MCNC: 170 MG/DL (ref 70–99)
GLUCOSE BLDC GLUCOMTR-MCNC: 172 MG/DL (ref 70–99)
GLUCOSE BLDC GLUCOMTR-MCNC: 172 MG/DL (ref 70–99)
GLUCOSE BLDC GLUCOMTR-MCNC: 173 MG/DL (ref 70–99)
GLUCOSE BLDC GLUCOMTR-MCNC: 174 MG/DL (ref 70–99)
GLUCOSE BLDC GLUCOMTR-MCNC: 175 MG/DL (ref 70–99)
GLUCOSE BLDC GLUCOMTR-MCNC: 176 MG/DL (ref 70–99)
GLUCOSE BLDC GLUCOMTR-MCNC: 177 MG/DL (ref 70–99)
GLUCOSE BLDC GLUCOMTR-MCNC: 177 MG/DL (ref 70–99)
GLUCOSE BLDC GLUCOMTR-MCNC: 180 MG/DL (ref 70–99)
GLUCOSE BLDC GLUCOMTR-MCNC: 181 MG/DL (ref 70–99)
GLUCOSE BLDC GLUCOMTR-MCNC: 185 MG/DL (ref 70–99)
GLUCOSE BLDC GLUCOMTR-MCNC: 186 MG/DL (ref 70–99)
GLUCOSE BLDC GLUCOMTR-MCNC: 187 MG/DL (ref 70–99)
GLUCOSE BLDC GLUCOMTR-MCNC: 192 MG/DL (ref 70–99)
GLUCOSE BLDC GLUCOMTR-MCNC: 192 MG/DL (ref 70–99)
GLUCOSE BLDC GLUCOMTR-MCNC: 195 MG/DL (ref 70–99)
GLUCOSE BLDC GLUCOMTR-MCNC: 198 MG/DL (ref 70–99)
GLUCOSE BLDC GLUCOMTR-MCNC: 199 MG/DL (ref 70–99)
GLUCOSE BLDC GLUCOMTR-MCNC: 201 MG/DL (ref 70–99)
GLUCOSE BLDC GLUCOMTR-MCNC: 201 MG/DL (ref 70–99)
GLUCOSE BLDC GLUCOMTR-MCNC: 202 MG/DL (ref 70–99)
GLUCOSE BLDC GLUCOMTR-MCNC: 205 MG/DL (ref 70–99)
GLUCOSE BLDC GLUCOMTR-MCNC: 208 MG/DL (ref 70–99)
GLUCOSE BLDC GLUCOMTR-MCNC: 210 MG/DL (ref 70–99)
GLUCOSE BLDC GLUCOMTR-MCNC: 214 MG/DL (ref 70–99)
GLUCOSE BLDC GLUCOMTR-MCNC: 218 MG/DL (ref 70–99)
GLUCOSE BLDC GLUCOMTR-MCNC: 219 MG/DL (ref 70–99)
GLUCOSE BLDC GLUCOMTR-MCNC: 230 MG/DL (ref 70–99)
GLUCOSE BLDC GLUCOMTR-MCNC: 243 MG/DL (ref 70–99)
GLUCOSE BLDC GLUCOMTR-MCNC: 248 MG/DL (ref 70–99)
GLUCOSE BLDC GLUCOMTR-MCNC: 263 MG/DL (ref 70–99)
GLUCOSE BLDC GLUCOMTR-MCNC: 272 MG/DL (ref 70–99)
GLUCOSE BLDC GLUCOMTR-MCNC: 288 MG/DL (ref 70–99)
GLUCOSE BLDC GLUCOMTR-MCNC: 321 MG/DL (ref 70–99)
GLUCOSE BLDC GLUCOMTR-MCNC: 338 MG/DL (ref 70–99)
GLUCOSE BLDC GLUCOMTR-MCNC: 58 MG/DL (ref 70–99)
GLUCOSE BLDC GLUCOMTR-MCNC: 62 MG/DL (ref 70–99)
GLUCOSE BLDC GLUCOMTR-MCNC: 67 MG/DL (ref 70–99)
GLUCOSE BLDC GLUCOMTR-MCNC: 67 MG/DL (ref 70–99)
GLUCOSE BLDC GLUCOMTR-MCNC: 70 MG/DL (ref 70–99)
GLUCOSE BLDC GLUCOMTR-MCNC: 72 MG/DL (ref 70–99)
GLUCOSE BLDC GLUCOMTR-MCNC: 75 MG/DL (ref 70–99)
GLUCOSE BLDC GLUCOMTR-MCNC: 76 MG/DL (ref 70–99)
GLUCOSE BLDC GLUCOMTR-MCNC: 77 MG/DL (ref 70–99)
GLUCOSE BLDC GLUCOMTR-MCNC: 79 MG/DL (ref 70–99)
GLUCOSE BLDC GLUCOMTR-MCNC: 79 MG/DL (ref 70–99)
GLUCOSE BLDC GLUCOMTR-MCNC: 80 MG/DL (ref 70–99)
GLUCOSE BLDC GLUCOMTR-MCNC: 86 MG/DL (ref 70–99)
GLUCOSE BLDC GLUCOMTR-MCNC: 87 MG/DL (ref 70–99)
GLUCOSE BLDC GLUCOMTR-MCNC: 88 MG/DL (ref 70–99)
GLUCOSE BLDC GLUCOMTR-MCNC: 89 MG/DL (ref 70–99)
GLUCOSE BLDC GLUCOMTR-MCNC: 94 MG/DL (ref 70–99)
GLUCOSE BLDC GLUCOMTR-MCNC: 94 MG/DL (ref 70–99)
GLUCOSE BLDC GLUCOMTR-MCNC: 98 MG/DL (ref 70–99)
GLUCOSE BLDC GLUCOMTR-MCNC: 98 MG/DL (ref 70–99)
GLUCOSE SERPL-MCNC: 104 MG/DL (ref 70–99)
GLUCOSE SERPL-MCNC: 116 MG/DL (ref 70–99)
GLUCOSE SERPL-MCNC: 117 MG/DL (ref 70–99)
GLUCOSE SERPL-MCNC: 119 MG/DL (ref 70–99)
GLUCOSE SERPL-MCNC: 128 MG/DL (ref 70–99)
GLUCOSE SERPL-MCNC: 135 MG/DL (ref 70–99)
GLUCOSE SERPL-MCNC: 139 MG/DL (ref 70–125)
GLUCOSE SERPL-MCNC: 144 MG/DL (ref 70–99)
GLUCOSE SERPL-MCNC: 147 MG/DL (ref 70–99)
GLUCOSE SERPL-MCNC: 153 MG/DL (ref 70–99)
GLUCOSE SERPL-MCNC: 155 MG/DL (ref 70–99)
GLUCOSE SERPL-MCNC: 161 MG/DL (ref 70–99)
GLUCOSE SERPL-MCNC: 185 MG/DL (ref 70–99)
GLUCOSE SERPL-MCNC: 198 MG/DL (ref 70–99)
GLUCOSE SERPL-MCNC: 201 MG/DL (ref 70–99)
GLUCOSE SERPL-MCNC: 207 MG/DL (ref 70–99)
GLUCOSE SERPL-MCNC: 211 MG/DL (ref 70–99)
GLUCOSE SERPL-MCNC: 267 MG/DL (ref 70–99)
GLUCOSE SERPL-MCNC: 322 MG/DL (ref 70–99)
GLUCOSE SERPL-MCNC: 345 MG/DL (ref 70–99)
GLUCOSE SERPL-MCNC: 96 MG/DL (ref 70–99)
GLUCOSE UR STRIP-MCNC: NEGATIVE MG/DL
HBA1C MFR BLD: 6.4 % (ref 0–5.6)
HBA1C MFR BLD: 6.8 % (ref 0–5.6)
HCO3 BLD-SCNC: 19 MMOL/L (ref 21–28)
HCO3 BLD-SCNC: 22 MMOL/L (ref 21–28)
HCO3 BLD-SCNC: 24 MMOL/L (ref 21–28)
HCO3 BLD-SCNC: 25 MMOL/L (ref 21–28)
HCO3 BLD-SCNC: 25 MMOL/L (ref 21–28)
HCO3 BLDA-SCNC: 13 MMOL/L (ref 21–28)
HCO3 BLDA-SCNC: 13 MMOL/L (ref 21–28)
HCO3 BLDA-SCNC: 14 MMOL/L (ref 21–28)
HCT VFR BLD AUTO: 21 % (ref 40–53)
HCT VFR BLD AUTO: 22.7 % (ref 40–53)
HCT VFR BLD AUTO: 22.8 % (ref 40–53)
HCT VFR BLD AUTO: 22.9 % (ref 40–53)
HCT VFR BLD AUTO: 22.9 % (ref 40–53)
HCT VFR BLD AUTO: 23.1 % (ref 40–53)
HCT VFR BLD AUTO: 23.3 % (ref 40–53)
HCT VFR BLD AUTO: 23.8 % (ref 40–53)
HCT VFR BLD AUTO: 24.2 % (ref 40–53)
HCT VFR BLD AUTO: 24.3 % (ref 40–53)
HCT VFR BLD AUTO: 25 % (ref 40–53)
HCT VFR BLD AUTO: 25.1 % (ref 40–53)
HCT VFR BLD AUTO: 25.6 % (ref 40–53)
HCT VFR BLD AUTO: 25.9 % (ref 40–53)
HCT VFR BLD AUTO: 26.3 % (ref 40–53)
HCT VFR BLD AUTO: 26.6 % (ref 40–53)
HCT VFR BLD AUTO: 26.6 % (ref 40–53)
HCT VFR BLD AUTO: 26.8 % (ref 40–53)
HCT VFR BLD AUTO: 26.8 % (ref 40–53)
HCT VFR BLD AUTO: 27.4 % (ref 40–53)
HCT VFR BLD AUTO: 28.4 % (ref 40–53)
HCT VFR BLD AUTO: 35.7 % (ref 40–53)
HCT VFR BLD AUTO: 36.9 % (ref 40–53)
HCT VFR BLD AUTO: NORMAL % (ref 40–53)
HDLC SERPL-MCNC: 19 MG/DL
HDLC SERPL-MCNC: 21 MG/DL
HDLC SERPL-MCNC: 21 MG/DL
HDLC SERPL-MCNC: 22 MG/DL
HDLC SERPL-MCNC: 22 MG/DL
HDLC SERPL-MCNC: 25 MG/DL
HDLC SERPL-MCNC: 25 MG/DL
HDLC SERPL-MCNC: 28 MG/DL
HGB BLD-MCNC: 11.5 G/DL (ref 13.3–17.7)
HGB BLD-MCNC: 11.9 G/DL (ref 13.3–17.7)
HGB BLD-MCNC: 6.7 G/DL (ref 13.3–17.7)
HGB BLD-MCNC: 7 G/DL (ref 13.3–17.7)
HGB BLD-MCNC: 7.1 G/DL (ref 13.3–17.7)
HGB BLD-MCNC: 7.1 G/DL (ref 13.3–17.7)
HGB BLD-MCNC: 7.2 G/DL (ref 13.3–17.7)
HGB BLD-MCNC: 7.2 G/DL (ref 13.3–17.7)
HGB BLD-MCNC: 7.3 G/DL (ref 13.3–17.7)
HGB BLD-MCNC: 7.3 G/DL (ref 13.3–17.7)
HGB BLD-MCNC: 7.4 G/DL (ref 13.3–17.7)
HGB BLD-MCNC: 7.5 G/DL (ref 13.3–17.7)
HGB BLD-MCNC: 7.6 G/DL (ref 13.3–17.7)
HGB BLD-MCNC: 7.6 G/DL (ref 13.3–17.7)
HGB BLD-MCNC: 7.7 G/DL (ref 13.3–17.7)
HGB BLD-MCNC: 7.8 G/DL (ref 13.3–17.7)
HGB BLD-MCNC: 7.8 G/DL (ref 13.3–17.7)
HGB BLD-MCNC: 7.9 G/DL (ref 13.3–17.7)
HGB BLD-MCNC: 7.9 G/DL (ref 13.3–17.7)
HGB BLD-MCNC: 8 G/DL (ref 13.3–17.7)
HGB BLD-MCNC: 8 G/DL (ref 13.3–17.7)
HGB BLD-MCNC: 8.1 G/DL (ref 13.3–17.7)
HGB BLD-MCNC: 8.1 G/DL (ref 13.3–17.7)
HGB BLD-MCNC: 8.2 G/DL (ref 13.3–17.7)
HGB BLD-MCNC: 8.3 G/DL (ref 13.3–17.7)
HGB BLD-MCNC: 8.6 G/DL (ref 13.3–17.7)
HGB BLD-MCNC: 8.6 G/DL (ref 13.3–17.7)
HGB BLD-MCNC: 9 G/DL (ref 13.3–17.7)
HGB BLD-MCNC: 9.1 G/DL (ref 13.3–17.7)
HGB BLD-MCNC: 9.3 G/DL (ref 13.3–17.7)
HGB BLD-MCNC: NORMAL G/DL (ref 13.3–17.7)
HGB UR QL STRIP: ABNORMAL
IMM GRANULOCYTES # BLD: 0.1 10E9/L (ref 0–0.4)
IMM GRANULOCYTES # BLD: 0.1 10E9/L (ref 0–0.4)
IMM GRANULOCYTES # BLD: 0.2 10E9/L (ref 0–0.4)
IMM GRANULOCYTES NFR BLD: 0.7 %
IMM GRANULOCYTES NFR BLD: 1.2 %
IMM GRANULOCYTES NFR BLD: 1.3 %
INR PPP: 1.09 (ref 0.86–1.14)
INR PPP: 1.12 (ref 0.86–1.14)
INR PPP: 1.28 (ref 0.86–1.14)
INR PPP: 1.3 (ref 0.86–1.14)
INTERPRETATION ECG - MUSE: NORMAL
INTERPRETATION ECG - MUSE: NORMAL
KETONES UR STRIP-MCNC: NEGATIVE MG/DL
LABORATORY COMMENT REPORT: NORMAL
LACTATE BLD-SCNC: 1 MMOL/L (ref 0.7–2)
LACTATE BLD-SCNC: 1.3 MMOL/L (ref 0.7–2)
LACTATE BLD-SCNC: 1.3 MMOL/L (ref 0.7–2)
LACTATE BLD-SCNC: 1.8 MMOL/L
LACTATE BLD-SCNC: 1.9 MMOL/L (ref 0.7–2)
LACTATE BLD-SCNC: 1.9 MMOL/L (ref 0.7–2)
LACTATE BLD-SCNC: 2.1 MMOL/L (ref 0.7–2)
LACTATE BLD-SCNC: 2.4 MMOL/L
LACTATE BLD-SCNC: 2.7 MMOL/L
LACTATE BLD-SCNC: 3 MMOL/L (ref 0.7–2)
LACTATE SERPL-SCNC: 1.9 MMOL/L (ref 0.7–2)
LDLC SERPL CALC-MCNC: 26 MG/DL
LDLC SERPL CALC-MCNC: 26 MG/DL
LDLC SERPL CALC-MCNC: 31 MG/DL
LDLC SERPL CALC-MCNC: 32 MG/DL
LDLC SERPL CALC-MCNC: 32 MG/DL
LDLC SERPL CALC-MCNC: 33 MG/DL
LDLC SERPL CALC-MCNC: 36 MG/DL
LDLC SERPL CALC-MCNC: 45 MG/DL
LEUKOCYTE ESTERASE UR QL STRIP: ABNORMAL
LIPASE SERPL-CCNC: 28 U/L (ref 73–393)
LIPASE SERPL-CCNC: 42 U/L (ref 73–393)
LIPASE SERPL-CCNC: <10 U/L (ref 73–393)
LIPASE SERPL-CCNC: <9 U/L (ref 0–52)
LYMPHOCYTES # BLD AUTO: 0.2 10E9/L (ref 0.8–5.3)
LYMPHOCYTES # BLD AUTO: 0.3 10E9/L (ref 0.8–5.3)
LYMPHOCYTES # BLD AUTO: 0.8 10E9/L (ref 0.8–5.3)
LYMPHOCYTES NFR BLD AUTO: 3.2 %
LYMPHOCYTES NFR BLD AUTO: 6 %
LYMPHOCYTES NFR BLD AUTO: 7.2 %
Lab: NORMAL
MAGNESIUM SERPL-MCNC: 1.6 MG/DL (ref 1.6–2.3)
MAGNESIUM SERPL-MCNC: 1.7 MG/DL (ref 1.6–2.3)
MAGNESIUM SERPL-MCNC: 1.8 MG/DL (ref 1.6–2.3)
MAGNESIUM SERPL-MCNC: 1.8 MG/DL (ref 1.8–2.6)
MAGNESIUM SERPL-MCNC: 1.9 MG/DL (ref 1.8–2.6)
MAGNESIUM SERPL-MCNC: 1.9 MG/DL (ref 1.8–2.6)
MAGNESIUM SERPL-MCNC: 2 MG/DL (ref 1.6–2.3)
MAGNESIUM SERPL-MCNC: 2 MG/DL (ref 1.8–2.6)
MAGNESIUM SERPL-MCNC: 2.1 MG/DL (ref 1.6–2.3)
MAGNESIUM SERPL-MCNC: 2.1 MG/DL (ref 1.8–2.6)
MAGNESIUM SERPL-MCNC: 2.1 MG/DL (ref 1.8–2.6)
MAGNESIUM SERPL-MCNC: 2.2 MG/DL (ref 1.6–2.3)
MAGNESIUM SERPL-MCNC: 2.2 MG/DL (ref 1.6–2.3)
MCH RBC QN AUTO: 27.8 PG (ref 26.5–33)
MCH RBC QN AUTO: 27.9 PG (ref 26.5–33)
MCH RBC QN AUTO: 28 PG (ref 26.5–33)
MCH RBC QN AUTO: 28 PG (ref 26.5–33)
MCH RBC QN AUTO: 28.1 PG (ref 26.5–33)
MCH RBC QN AUTO: 28.1 PG (ref 26.5–33)
MCH RBC QN AUTO: 28.2 PG (ref 26.5–33)
MCH RBC QN AUTO: 28.2 PG (ref 26.5–33)
MCH RBC QN AUTO: 28.4 PG (ref 26.5–33)
MCH RBC QN AUTO: 28.6 PG (ref 26.5–33)
MCH RBC QN AUTO: 28.9 PG (ref 26.5–33)
MCH RBC QN AUTO: 29.2 PG (ref 26.5–33)
MCH RBC QN AUTO: 29.3 PG (ref 26.5–33)
MCH RBC QN AUTO: 29.7 PG (ref 26.5–33)
MCH RBC QN AUTO: 29.8 PG (ref 26.5–33)
MCH RBC QN AUTO: 29.9 PG (ref 26.5–33)
MCH RBC QN AUTO: 30.1 PG (ref 26.5–33)
MCH RBC QN AUTO: 30.1 PG (ref 26.5–33)
MCH RBC QN AUTO: 30.4 PG (ref 26.5–33)
MCH RBC QN AUTO: NORMAL PG (ref 26.5–33)
MCHC RBC AUTO-ENTMCNC: 29.7 G/DL (ref 31.5–36.5)
MCHC RBC AUTO-ENTMCNC: 30 G/DL (ref 31.5–36.5)
MCHC RBC AUTO-ENTMCNC: 30.1 G/DL (ref 31.5–36.5)
MCHC RBC AUTO-ENTMCNC: 30.2 G/DL (ref 31.5–36.5)
MCHC RBC AUTO-ENTMCNC: 30.3 G/DL (ref 31.5–36.5)
MCHC RBC AUTO-ENTMCNC: 30.3 G/DL (ref 31.5–36.5)
MCHC RBC AUTO-ENTMCNC: 30.6 G/DL (ref 31.5–36.5)
MCHC RBC AUTO-ENTMCNC: 30.7 G/DL (ref 31.5–36.5)
MCHC RBC AUTO-ENTMCNC: 30.8 G/DL (ref 31.5–36.5)
MCHC RBC AUTO-ENTMCNC: 31 G/DL (ref 31.5–36.5)
MCHC RBC AUTO-ENTMCNC: 31 G/DL (ref 31.5–36.5)
MCHC RBC AUTO-ENTMCNC: 31.1 G/DL (ref 31.5–36.5)
MCHC RBC AUTO-ENTMCNC: 31.3 G/DL (ref 31.5–36.5)
MCHC RBC AUTO-ENTMCNC: 31.4 G/DL (ref 31.5–36.5)
MCHC RBC AUTO-ENTMCNC: 31.7 G/DL (ref 31.5–36.5)
MCHC RBC AUTO-ENTMCNC: 31.8 G/DL (ref 31.5–36.5)
MCHC RBC AUTO-ENTMCNC: 31.9 G/DL (ref 31.5–36.5)
MCHC RBC AUTO-ENTMCNC: 32 G/DL (ref 31.5–36.5)
MCHC RBC AUTO-ENTMCNC: 32.2 G/DL (ref 31.5–36.5)
MCHC RBC AUTO-ENTMCNC: 32.3 G/DL (ref 31.5–36.5)
MCHC RBC AUTO-ENTMCNC: 32.7 G/DL (ref 31.5–36.5)
MCHC RBC AUTO-ENTMCNC: NORMAL G/DL (ref 31.5–36.5)
MCV RBC AUTO: 87 FL (ref 78–100)
MCV RBC AUTO: 87 FL (ref 78–100)
MCV RBC AUTO: 89 FL (ref 78–100)
MCV RBC AUTO: 89 FL (ref 78–100)
MCV RBC AUTO: 90 FL (ref 78–100)
MCV RBC AUTO: 90 FL (ref 78–100)
MCV RBC AUTO: 91 FL (ref 78–100)
MCV RBC AUTO: 91 FL (ref 78–100)
MCV RBC AUTO: 92 FL (ref 78–100)
MCV RBC AUTO: 93 FL (ref 78–100)
MCV RBC AUTO: 94 FL (ref 78–100)
MCV RBC AUTO: 94 FL (ref 78–100)
MCV RBC AUTO: 95 FL (ref 78–100)
MCV RBC AUTO: 96 FL (ref 78–100)
MCV RBC AUTO: 96 FL (ref 78–100)
MCV RBC AUTO: 97 FL (ref 78–100)
MCV RBC AUTO: 97 FL (ref 78–100)
MCV RBC AUTO: 98 FL (ref 78–100)
MCV RBC AUTO: 98 FL (ref 78–100)
MCV RBC AUTO: NORMAL FL (ref 78–100)
MONOCYTES # BLD AUTO: 0.3 10E9/L (ref 0–1.3)
MONOCYTES # BLD AUTO: 0.3 10E9/L (ref 0–1.3)
MONOCYTES # BLD AUTO: 0.7 10E9/L (ref 0–1.3)
MONOCYTES NFR BLD AUTO: 4.5 %
MONOCYTES NFR BLD AUTO: 5.5 %
MONOCYTES NFR BLD AUTO: 7.7 %
MRSA DNA SPEC QL NAA+PROBE: POSITIVE
MUCOUS THREADS #/AREA URNS LPF: PRESENT /LPF
MUCOUS THREADS #/AREA URNS LPF: PRESENT /LPF
NEUTROPHILS # BLD AUTO: 11.3 10E9/L (ref 1.6–8.3)
NEUTROPHILS # BLD AUTO: 3.5 10E9/L (ref 1.6–8.3)
NEUTROPHILS # BLD AUTO: 7 10E9/L (ref 1.6–8.3)
NEUTROPHILS NFR BLD AUTO: 83.2 %
NEUTROPHILS NFR BLD AUTO: 86.6 %
NEUTROPHILS NFR BLD AUTO: 91.5 %
NITRATE UR QL: NEGATIVE
NRBC # BLD AUTO: 0 10*3/UL
NRBC BLD AUTO-RTO: 0 /100
NUM BPU REQUESTED: 1
NUM BPU REQUESTED: 3
O2/TOTAL GAS SETTING VFR VENT: 40 %
O2/TOTAL GAS SETTING VFR VENT: 40 %
O2/TOTAL GAS SETTING VFR VENT: 45 %
O2/TOTAL GAS SETTING VFR VENT: 45 %
O2/TOTAL GAS SETTING VFR VENT: 60 %
OXYHGB MFR BLD: 98 % (ref 92–100)
OXYHGB MFR BLD: 99 % (ref 92–100)
OXYHGB MFR BLDA: 97 % (ref 92–100)
PCO2 BLD: 31 MM HG (ref 35–45)
PCO2 BLD: 38 MM HG (ref 35–45)
PCO2 BLD: 38 MM HG (ref 35–45)
PCO2 BLD: 39 MM HG (ref 35–45)
PCO2 BLD: 40 MM HG (ref 35–45)
PCO2 BLDA: 32 MM HG (ref 35–45)
PCO2 BLDA: 32 MM HG (ref 35–45)
PCO2 BLDA: 37 MM HG (ref 35–45)
PH BLD: 7.37 PH (ref 7.35–7.45)
PH BLD: 7.4 PH (ref 7.35–7.45)
PH BLD: 7.41 PH (ref 7.35–7.45)
PH BLDA: 7.15 [PH] (ref 7.35–7.45)
PH BLDA: 7.21 [PH] (ref 7.35–7.45)
PH BLDA: 7.24 [PH] (ref 7.35–7.45)
PH UR STRIP: 5.5 PH (ref 5–7)
PH UR STRIP: 5.5 [PH] (ref 5–7)
PH UR STRIP: 6.5 PH (ref 5–7)
PHOSPHATE SERPL-MCNC: 2.1 MG/DL (ref 2.5–4.5)
PHOSPHATE SERPL-MCNC: 2.3 MG/DL (ref 2.5–4.5)
PHOSPHATE SERPL-MCNC: 2.3 MG/DL (ref 2.5–4.5)
PHOSPHATE SERPL-MCNC: 2.6 MG/DL (ref 2.5–4.5)
PHOSPHATE SERPL-MCNC: 2.6 MG/DL (ref 2.5–4.5)
PHOSPHATE SERPL-MCNC: 2.7 MG/DL (ref 2.5–4.5)
PHOSPHATE SERPL-MCNC: 2.8 MG/DL (ref 2.5–4.5)
PHOSPHATE SERPL-MCNC: 3.1 MG/DL (ref 2.5–4.5)
PHOSPHATE SERPL-MCNC: 3.4 MG/DL (ref 2.5–4.5)
PHOSPHATE SERPL-MCNC: 3.6 MG/DL (ref 2.5–4.5)
PHOSPHATE SERPL-MCNC: 3.6 MG/DL (ref 2.5–4.5)
PHOSPHATE SERPL-MCNC: 4.2 MG/DL (ref 2.5–4.5)
PHOSPHATE SERPL-MCNC: 4.3 MG/DL (ref 2.5–4.5)
PHOSPHATE SERPL-MCNC: 4.7 MG/DL (ref 2.5–4.5)
PHOSPHATE SERPL-MCNC: 4.7 MG/DL (ref 2.5–4.5)
PHOSPHATE SERPL-MCNC: 4.9 MG/DL (ref 2.5–4.5)
PHOSPHATE SERPL-MCNC: 5.5 MG/DL (ref 2.5–4.5)
PHYTONADIONE SERPL-MCNC: 0.22 NMOL/L (ref 0.22–4.88)
PHYTONADIONE SERPL-MCNC: 8.43 NMOL/L (ref 0.22–4.88)
PLATELET # BLD AUTO: 127 10E9/L (ref 150–450)
PLATELET # BLD AUTO: 144 10E9/L (ref 150–450)
PLATELET # BLD AUTO: 150 10E9/L (ref 150–450)
PLATELET # BLD AUTO: 151 10E9/L (ref 150–450)
PLATELET # BLD AUTO: 158 THOU/UL (ref 140–440)
PLATELET # BLD AUTO: 160 10E9/L (ref 150–450)
PLATELET # BLD AUTO: 164 THOU/UL (ref 140–440)
PLATELET # BLD AUTO: 169 10E9/L (ref 150–450)
PLATELET # BLD AUTO: 173 10E9/L (ref 150–450)
PLATELET # BLD AUTO: 177 THOU/UL (ref 140–440)
PLATELET # BLD AUTO: 181 THOU/UL (ref 140–440)
PLATELET # BLD AUTO: 186 THOU/UL (ref 140–440)
PLATELET # BLD AUTO: 191 10E9/L (ref 150–450)
PLATELET # BLD AUTO: 194 10E9/L (ref 150–450)
PLATELET # BLD AUTO: 199 10E9/L (ref 150–450)
PLATELET # BLD AUTO: 200 10E9/L (ref 150–450)
PLATELET # BLD AUTO: 208 10E9/L (ref 150–450)
PLATELET # BLD AUTO: 210 10E9/L (ref 150–450)
PLATELET # BLD AUTO: 218 THOU/UL (ref 140–440)
PLATELET # BLD AUTO: 224 10E9/L (ref 150–450)
PLATELET # BLD AUTO: 224 10E9/L (ref 150–450)
PLATELET # BLD AUTO: 231 THOU/UL (ref 140–440)
PLATELET # BLD AUTO: 240 10E9/L (ref 150–450)
PLATELET # BLD AUTO: 247 10E3/UL (ref 150–450)
PLATELET # BLD AUTO: 247 10E9/L (ref 150–450)
PLATELET # BLD AUTO: 271 10E9/L (ref 150–450)
PLATELET # BLD AUTO: 297 10E9/L (ref 150–450)
PLATELET # BLD AUTO: 312 10E3/UL (ref 150–450)
PLATELET # BLD AUTO: 354 10E3/UL (ref 150–450)
PLATELET # BLD AUTO: 397 10E9/L (ref 150–450)
PLATELET # BLD AUTO: NORMAL 10E9/L (ref 150–450)
PO2 BLD: 183 MM HG (ref 80–105)
PO2 BLD: 192 MM HG (ref 80–105)
PO2 BLD: 193 MM HG (ref 80–105)
PO2 BLD: 195 MM HG (ref 80–105)
PO2 BLD: 356 MM HG (ref 80–105)
PO2 BLDA: 108 MM HG (ref 80–105)
PO2 BLDA: 110 MM HG (ref 80–105)
PO2 BLDA: 117 MM HG (ref 80–105)
POTASSIUM BLD-SCNC: 2.9 MMOL/L (ref 3.4–5.3)
POTASSIUM BLD-SCNC: 2.9 MMOL/L (ref 3.4–5.3)
POTASSIUM BLD-SCNC: 3.2 MMOL/L (ref 3.4–5.3)
POTASSIUM BLD-SCNC: 3.3 MMOL/L (ref 3.4–5.3)
POTASSIUM BLD-SCNC: 3.4 MMOL/L (ref 3.5–5)
POTASSIUM BLD-SCNC: 3.5 MMOL/L (ref 3.5–5)
POTASSIUM BLD-SCNC: 3.5 MMOL/L (ref 3.5–5)
POTASSIUM BLD-SCNC: 3.9 MMOL/L (ref 3.4–5.3)
POTASSIUM BLD-SCNC: 4.3 MMOL/L (ref 3.4–5.3)
POTASSIUM BLD-SCNC: 4.4 MMOL/L (ref 3.4–5.3)
POTASSIUM BLD-SCNC: 4.5 MMOL/L (ref 3.5–5)
POTASSIUM BLD-SCNC: 4.5 MMOL/L (ref 3.5–5)
POTASSIUM BLD-SCNC: 4.6 MMOL/L (ref 3.5–5)
POTASSIUM BLD-SCNC: 4.7 MMOL/L (ref 3.5–5)
POTASSIUM BLD-SCNC: 4.8 MMOL/L (ref 3.5–5)
POTASSIUM BLD-SCNC: 4.8 MMOL/L (ref 3.5–5)
POTASSIUM BLD-SCNC: 4.9 MMOL/L (ref 3.5–5)
POTASSIUM BLD-SCNC: 4.9 MMOL/L (ref 3.5–5)
POTASSIUM SERPL-SCNC: 3.1 MMOL/L (ref 3.4–5.3)
POTASSIUM SERPL-SCNC: 3.3 MMOL/L (ref 3.4–5.3)
POTASSIUM SERPL-SCNC: 3.3 MMOL/L (ref 3.4–5.3)
POTASSIUM SERPL-SCNC: 3.5 MMOL/L (ref 3.4–5.3)
POTASSIUM SERPL-SCNC: 3.5 MMOL/L (ref 3.4–5.3)
POTASSIUM SERPL-SCNC: 3.6 MMOL/L (ref 3.4–5.3)
POTASSIUM SERPL-SCNC: 3.7 MMOL/L (ref 3.4–5.3)
POTASSIUM SERPL-SCNC: 3.7 MMOL/L (ref 3.4–5.3)
POTASSIUM SERPL-SCNC: 3.8 MMOL/L (ref 3.4–5.3)
POTASSIUM SERPL-SCNC: 3.8 MMOL/L (ref 3.4–5.3)
POTASSIUM SERPL-SCNC: 4 MMOL/L (ref 3.4–5.3)
POTASSIUM SERPL-SCNC: 4.1 MMOL/L (ref 3.4–5.3)
POTASSIUM SERPL-SCNC: 4.2 MMOL/L (ref 3.4–5.3)
POTASSIUM SERPL-SCNC: 4.3 MMOL/L (ref 3.4–5.3)
POTASSIUM SERPL-SCNC: 4.3 MMOL/L (ref 3.4–5.3)
POTASSIUM SERPL-SCNC: 4.5 MMOL/L (ref 3.4–5.3)
POTASSIUM SERPL-SCNC: 4.5 MMOL/L (ref 3.5–5)
POTASSIUM SERPL-SCNC: 4.8 MMOL/L (ref 3.4–5.3)
POTASSIUM SERPL-SCNC: 5.1 MMOL/L (ref 3.4–5.3)
POTASSIUM SERPL-SCNC: 5.4 MMOL/L (ref 3.4–5.3)
PREALB SERPL IA-MCNC: 10 MG/DL (ref 15–45)
PREALB SERPL IA-MCNC: 14 MG/DL (ref 15–45)
PROCALCITONIN SERPL-MCNC: 3.18 NG/ML
PROT SERPL-MCNC: 4 G/DL (ref 6–8)
PROT SERPL-MCNC: 4.2 G/DL (ref 6–8)
PROT SERPL-MCNC: 4.5 G/DL (ref 6–8)
PROT SERPL-MCNC: 4.6 G/DL (ref 6–8)
PROT SERPL-MCNC: 4.7 G/DL (ref 6.8–8.8)
PROT SERPL-MCNC: 4.7 G/DL (ref 6–8)
PROT SERPL-MCNC: 4.8 G/DL (ref 6–8)
PROT SERPL-MCNC: 4.9 G/DL (ref 6.8–8.8)
PROT SERPL-MCNC: 4.9 G/DL (ref 6–8)
PROT SERPL-MCNC: 5.1 G/DL (ref 6.8–8.8)
PROT SERPL-MCNC: 5.3 G/DL (ref 6.8–8.8)
PROT SERPL-MCNC: 6 G/DL (ref 6.8–8.8)
PROT SERPL-MCNC: 6.9 G/DL (ref 6.8–8.8)
PROVATION GI EXAM: NORMAL
RADIOLOGIST FLAGS: ABNORMAL
RBC # BLD AUTO: 2.36 10E12/L (ref 4.4–5.9)
RBC # BLD AUTO: 2.36 10E12/L (ref 4.4–5.9)
RBC # BLD AUTO: 2.41 10E12/L (ref 4.4–5.9)
RBC # BLD AUTO: 2.46 10E12/L (ref 4.4–5.9)
RBC # BLD AUTO: 2.46 10E12/L (ref 4.4–5.9)
RBC # BLD AUTO: 2.49 10E12/L (ref 4.4–5.9)
RBC # BLD AUTO: 2.49 10E12/L (ref 4.4–5.9)
RBC # BLD AUTO: 2.52 10E12/L (ref 4.4–5.9)
RBC # BLD AUTO: 2.54 10E12/L (ref 4.4–5.9)
RBC # BLD AUTO: 2.59 10E12/L (ref 4.4–5.9)
RBC # BLD AUTO: 2.63 10E12/L (ref 4.4–5.9)
RBC # BLD AUTO: 2.71 10E12/L (ref 4.4–5.9)
RBC # BLD AUTO: 2.74 10E12/L (ref 4.4–5.9)
RBC # BLD AUTO: 2.79 10E12/L (ref 4.4–5.9)
RBC # BLD AUTO: 2.82 10E6/UL (ref 4.4–5.9)
RBC # BLD AUTO: 2.91 10E12/L (ref 4.4–5.9)
RBC # BLD AUTO: 2.91 10E6/UL (ref 4.4–5.9)
RBC # BLD AUTO: 2.97 10E6/UL (ref 4.4–5.9)
RBC # BLD AUTO: 3.03 10E12/L (ref 4.4–5.9)
RBC # BLD AUTO: 3.05 10E12/L (ref 4.4–5.9)
RBC # BLD AUTO: 3.15 10E12/L (ref 4.4–5.9)
RBC # BLD AUTO: 3.87 10E12/L (ref 4.4–5.9)
RBC # BLD AUTO: 3.92 10E12/L (ref 4.4–5.9)
RBC # BLD AUTO: NORMAL 10E12/L (ref 4.4–5.9)
RBC #/AREA URNS AUTO: 1 /HPF (ref 0–2)
RBC #/AREA URNS AUTO: <1 /HPF (ref 0–2)
RBC URINE: 6 /HPF
RETINYL PALMITATE SERPL-MCNC: <0.02 MG/L (ref 0–0.1)
RETINYL PALMITATE SERPL-MCNC: <0.02 MG/L (ref 0–0.1)
SARS-COV-2 PCR COMMENT: NORMAL
SARS-COV-2 RNA RESP QL NAA+PROBE: NEGATIVE
SARS-COV-2 RNA RESP QL NAA+PROBE: NORMAL
SARS-COV-2 RNA SPEC QL NAA+PROBE: NEGATIVE
SARS-COV-2 VIRUS SPECIMEN SOURCE: NORMAL
SODIUM BLD-SCNC: 135 MMOL/L (ref 133–144)
SODIUM BLD-SCNC: 136 MMOL/L (ref 133–144)
SODIUM BLD-SCNC: 137 MMOL/L (ref 133–144)
SODIUM BLD-SCNC: 137 MMOL/L (ref 133–144)
SODIUM BLD-SCNC: 138 MMOL/L (ref 133–144)
SODIUM BLD-SCNC: 139 MMOL/L (ref 133–144)
SODIUM SERPL-SCNC: 134 MMOL/L (ref 136–145)
SODIUM SERPL-SCNC: 135 MMOL/L (ref 133–144)
SODIUM SERPL-SCNC: 135 MMOL/L (ref 136–145)
SODIUM SERPL-SCNC: 137 MMOL/L (ref 133–144)
SODIUM SERPL-SCNC: 138 MMOL/L (ref 133–144)
SODIUM SERPL-SCNC: 138 MMOL/L (ref 133–144)
SODIUM SERPL-SCNC: 139 MMOL/L (ref 133–144)
SODIUM SERPL-SCNC: 140 MMOL/L (ref 133–144)
SODIUM SERPL-SCNC: 140 MMOL/L (ref 133–144)
SODIUM SERPL-SCNC: 141 MMOL/L (ref 133–144)
SODIUM SERPL-SCNC: 142 MMOL/L (ref 133–144)
SODIUM SERPL-SCNC: 144 MMOL/L (ref 133–144)
SODIUM SERPL-SCNC: 145 MMOL/L (ref 133–144)
SODIUM SERPL-SCNC: 146 MMOL/L (ref 133–144)
SODIUM SERPL-SCNC: 147 MMOL/L (ref 133–144)
SODIUM SERPL-SCNC: 149 MMOL/L (ref 133–144)
SODIUM SERPL-SCNC: 151 MMOL/L (ref 133–144)
SODIUM SERPL-SCNC: 151 MMOL/L (ref 133–144)
SOURCE: ABNORMAL
SOURCE: ABNORMAL
SP GR UR STRIP: 1.01 (ref 1–1.03)
SP GR UR STRIP: 1.02 (ref 1–1.03)
SP GR UR STRIP: 1.03 (ref 1–1.03)
SPECIMEN EXP DATE BLD: NORMAL
SPECIMEN EXP DATE BLD: NORMAL
SPECIMEN SOURCE: ABNORMAL
SPECIMEN SOURCE: ABNORMAL
SPECIMEN SOURCE: NORMAL
SQUAMOUS #/AREA URNS AUTO: 0 /HPF (ref 0–1)
SQUAMOUS #/AREA URNS AUTO: <1 /HPF (ref 0–1)
SQUAMOUS EPITHELIAL: 1 /HPF
TRANSFUSION STATUS PATIENT QL: NORMAL
TRANSITIONAL EPI: <1 /HPF
TRIGL SERPL-MCNC: 18 MG/DL
TRIGL SERPL-MCNC: 29 MG/DL
TRIGL SERPL-MCNC: 30 MG/DL
TRIGL SERPL-MCNC: 40 MG/DL
TRIGL SERPL-MCNC: 45 MG/DL
TRIGL SERPL-MCNC: 62 MG/DL
TRIGL SERPL-MCNC: 62 MG/DL
TRIGL SERPL-MCNC: 65 MG/DL
TRIGL SERPL-MCNC: 81 MG/DL
TROPONIN I SERPL-MCNC: <0.015 UG/L (ref 0–0.04)
UPPER GI ENDOSCOPY: NORMAL
UROBILINOGEN UR STRIP-MCNC: NORMAL MG/DL
UROBILINOGEN UR STRIP-MCNC: NORMAL MG/DL (ref 0–2)
UROBILINOGEN UR STRIP-MCNC: NORMAL MG/DL (ref 0–2)
VIT A SERPL-MCNC: 0.11 MG/L (ref 0.3–1.2)
VIT A SERPL-MCNC: 0.2 MG/L (ref 0.3–1.2)
WBC # BLD AUTO: 10 10E9/L (ref 4–11)
WBC # BLD AUTO: 13 10E9/L (ref 4–11)
WBC # BLD AUTO: 13 10E9/L (ref 4–11)
WBC # BLD AUTO: 13.5 10E9/L (ref 4–11)
WBC # BLD AUTO: 13.6 10E9/L (ref 4–11)
WBC # BLD AUTO: 20.3 10E3/UL (ref 4–11)
WBC # BLD AUTO: 21.9 10E9/L (ref 4–11)
WBC # BLD AUTO: 25.7 10E3/UL (ref 4–11)
WBC # BLD AUTO: 3.9 10E9/L (ref 4–11)
WBC # BLD AUTO: 4 10E9/L (ref 4–11)
WBC # BLD AUTO: 4.2 10E9/L (ref 4–11)
WBC # BLD AUTO: 4.4 10E9/L (ref 4–11)
WBC # BLD AUTO: 4.4 10E9/L (ref 4–11)
WBC # BLD AUTO: 4.8 10E9/L (ref 4–11)
WBC # BLD AUTO: 43.1 10E3/UL (ref 4–11)
WBC # BLD AUTO: 5.1 10E9/L (ref 4–11)
WBC # BLD AUTO: 5.1 10E9/L (ref 4–11)
WBC # BLD AUTO: 5.4 10E9/L (ref 4–11)
WBC # BLD AUTO: 5.5 10E9/L (ref 4–11)
WBC # BLD AUTO: 7 10E9/L (ref 4–11)
WBC # BLD AUTO: 7.2 10E9/L (ref 4–11)
WBC # BLD AUTO: 7.6 10E9/L (ref 4–11)
WBC # BLD AUTO: 9.8 10E9/L (ref 4–11)
WBC # BLD AUTO: NORMAL 10E9/L (ref 4–11)
WBC #/AREA URNS AUTO: 58 /HPF (ref 0–5)
WBC #/AREA URNS AUTO: >100 /HPF (ref 0–5)
WBC CLUMPS #/AREA URNS HPF: PRESENT /HPF
WBC URINE: >182 /HPF
YEAST #/AREA URNS HPF: ABNORMAL /HPF
YEAST #/AREA URNS HPF: ABNORMAL /HPF
ZINC SERPL-MCNC: 21.6 UG/DL (ref 60–120)
ZINC SERPL-MCNC: 23.7 UG/DL (ref 60–120)

## 2021-01-01 PROCEDURE — 80048 BASIC METABOLIC PNL TOTAL CA: CPT

## 2021-01-01 PROCEDURE — 250N000013 HC RX MED GY IP 250 OP 250 PS 637

## 2021-01-01 PROCEDURE — 250N000009 HC RX 250: Performed by: SURGERY

## 2021-01-01 PROCEDURE — 82805 BLOOD GASES W/O2 SATURATION: CPT | Performed by: STUDENT IN AN ORGANIZED HEALTH CARE EDUCATION/TRAINING PROGRAM

## 2021-01-01 PROCEDURE — 87641 MR-STAPH DNA AMP PROBE: CPT | Performed by: STUDENT IN AN ORGANIZED HEALTH CARE EDUCATION/TRAINING PROGRAM

## 2021-01-01 PROCEDURE — 258N000001 HC RX 258

## 2021-01-01 PROCEDURE — 999N000065 XR ABDOMEN PORT 1 VIEWS

## 2021-01-01 PROCEDURE — 82150 ASSAY OF AMYLASE: CPT | Mod: 91 | Performed by: STUDENT IN AN ORGANIZED HEALTH CARE EDUCATION/TRAINING PROGRAM

## 2021-01-01 PROCEDURE — 999N001017 HC STATISTIC GLUCOSE BY METER IP

## 2021-01-01 PROCEDURE — 36592 COLLECT BLOOD FROM PICC: CPT | Performed by: STUDENT IN AN ORGANIZED HEALTH CARE EDUCATION/TRAINING PROGRAM

## 2021-01-01 PROCEDURE — 80053 COMPREHEN METABOLIC PANEL: CPT | Performed by: STUDENT IN AN ORGANIZED HEALTH CARE EDUCATION/TRAINING PROGRAM

## 2021-01-01 PROCEDURE — 250N000011 HC RX IP 250 OP 636: Performed by: STUDENT IN AN ORGANIZED HEALTH CARE EDUCATION/TRAINING PROGRAM

## 2021-01-01 PROCEDURE — 250N000013 HC RX MED GY IP 250 OP 250 PS 637: Performed by: STUDENT IN AN ORGANIZED HEALTH CARE EDUCATION/TRAINING PROGRAM

## 2021-01-01 PROCEDURE — 88307 TISSUE EXAM BY PATHOLOGIST: CPT | Mod: TC | Performed by: SURGERY

## 2021-01-01 PROCEDURE — 84132 ASSAY OF SERUM POTASSIUM: CPT | Performed by: SURGERY

## 2021-01-01 PROCEDURE — 360N000076 HC SURGERY LEVEL 3, PER MIN: Performed by: SURGERY

## 2021-01-01 PROCEDURE — 82378 CARCINOEMBRYONIC ANTIGEN: CPT | Performed by: HOSPITALIST

## 2021-01-01 PROCEDURE — 3E0436Z INTRODUCTION OF NUTRITIONAL SUBSTANCE INTO CENTRAL VEIN, PERCUTANEOUS APPROACH: ICD-10-PCS | Performed by: INTERNAL MEDICINE

## 2021-01-01 PROCEDURE — 83735 ASSAY OF MAGNESIUM: CPT | Performed by: STUDENT IN AN ORGANIZED HEALTH CARE EDUCATION/TRAINING PROGRAM

## 2021-01-01 PROCEDURE — 74177 CT ABD & PELVIS W/CONTRAST: CPT

## 2021-01-01 PROCEDURE — 85027 COMPLETE CBC AUTOMATED: CPT

## 2021-01-01 PROCEDURE — 250N000025 HC SEVOFLURANE, PER MIN: Performed by: SURGERY

## 2021-01-01 PROCEDURE — 85027 COMPLETE CBC AUTOMATED: CPT | Performed by: SURGERY

## 2021-01-01 PROCEDURE — 88307 TISSUE EXAM BY PATHOLOGIST: CPT | Mod: 26 | Performed by: PATHOLOGY

## 2021-01-01 PROCEDURE — 84630 ASSAY OF ZINC: CPT | Performed by: STUDENT IN AN ORGANIZED HEALTH CARE EDUCATION/TRAINING PROGRAM

## 2021-01-01 PROCEDURE — 83036 HEMOGLOBIN GLYCOSYLATED A1C: CPT | Performed by: STUDENT IN AN ORGANIZED HEALTH CARE EDUCATION/TRAINING PROGRAM

## 2021-01-01 PROCEDURE — 0JN80ZZ RELEASE ABDOMEN SUBCUTANEOUS TISSUE AND FASCIA, OPEN APPROACH: ICD-10-PCS | Performed by: SURGERY

## 2021-01-01 PROCEDURE — 82962 GLUCOSE BLOOD TEST: CPT

## 2021-01-01 PROCEDURE — 99232 SBSQ HOSP IP/OBS MODERATE 35: CPT | Performed by: INTERNAL MEDICINE

## 2021-01-01 PROCEDURE — C9113 INJ PANTOPRAZOLE SODIUM, VIA: HCPCS | Performed by: STUDENT IN AN ORGANIZED HEALTH CARE EDUCATION/TRAINING PROGRAM

## 2021-01-01 PROCEDURE — U0005 INFEC AGEN DETEC AMPLI PROBE: HCPCS | Performed by: INTERNAL MEDICINE

## 2021-01-01 PROCEDURE — 36415 COLL VENOUS BLD VENIPUNCTURE: CPT | Performed by: STUDENT IN AN ORGANIZED HEALTH CARE EDUCATION/TRAINING PROGRAM

## 2021-01-01 PROCEDURE — C1760 CLOSURE DEV, VASC: HCPCS

## 2021-01-01 PROCEDURE — 85610 PROTHROMBIN TIME: CPT | Performed by: EMERGENCY MEDICINE

## 2021-01-01 PROCEDURE — 36416 COLLJ CAPILLARY BLOOD SPEC: CPT | Performed by: STUDENT IN AN ORGANIZED HEALTH CARE EDUCATION/TRAINING PROGRAM

## 2021-01-01 PROCEDURE — 360N000075 HC SURGERY LEVEL 2, PER MIN: Performed by: INTERNAL MEDICINE

## 2021-01-01 PROCEDURE — C1769 GUIDE WIRE: HCPCS

## 2021-01-01 PROCEDURE — 87493 C DIFF AMPLIFIED PROBE: CPT

## 2021-01-01 PROCEDURE — 999N000015 HC STATISTIC ARTERIAL MONITORING DAILY

## 2021-01-01 PROCEDURE — 250N000011 HC RX IP 250 OP 636: Performed by: EMERGENCY MEDICINE

## 2021-01-01 PROCEDURE — 83735 ASSAY OF MAGNESIUM: CPT | Performed by: SURGERY

## 2021-01-01 PROCEDURE — 36592 COLLECT BLOOD FROM PICC: CPT | Performed by: HOSPITALIST

## 2021-01-01 PROCEDURE — C9113 INJ PANTOPRAZOLE SODIUM, VIA: HCPCS | Performed by: EMERGENCY MEDICINE

## 2021-01-01 PROCEDURE — 250N000009 HC RX 250: Performed by: NURSE ANESTHETIST, CERTIFIED REGISTERED

## 2021-01-01 PROCEDURE — 258N000003 HC RX IP 258 OP 636

## 2021-01-01 PROCEDURE — U0003 INFECTIOUS AGENT DETECTION BY NUCLEIC ACID (DNA OR RNA); SEVERE ACUTE RESPIRATORY SYNDROME CORONAVIRUS 2 (SARS-COV-2) (CORONAVIRUS DISEASE [COVID-19]), AMPLIFIED PROBE TECHNIQUE, MAKING USE OF HIGH THROUGHPUT TECHNOLOGIES AS DESCRIBED BY CMS-2020-01-R: HCPCS | Performed by: INTERNAL MEDICINE

## 2021-01-01 PROCEDURE — 82803 BLOOD GASES ANY COMBINATION: CPT

## 2021-01-01 PROCEDURE — 74177 CT ABD & PELVIS W/CONTRAST: CPT | Mod: 26 | Performed by: RADIOLOGY

## 2021-01-01 PROCEDURE — 83605 ASSAY OF LACTIC ACID: CPT | Performed by: EMERGENCY MEDICINE

## 2021-01-01 PROCEDURE — 360N000082 HC SURGERY LEVEL 2 W/ FLUORO, PER MIN: Performed by: INTERNAL MEDICINE

## 2021-01-01 PROCEDURE — 258N000003 HC RX IP 258 OP 636: Performed by: SURGERY

## 2021-01-01 PROCEDURE — 88321 CONSLTJ&REPRT SLD PREP ELSWR: CPT | Performed by: PATHOLOGY

## 2021-01-01 PROCEDURE — 94003 VENT MGMT INPAT SUBQ DAY: CPT

## 2021-01-01 PROCEDURE — 82656 EL-1 FECAL QUAL/SEMIQ: CPT

## 2021-01-01 PROCEDURE — G1004 CDSM NDSC: HCPCS | Performed by: RADIOLOGY

## 2021-01-01 PROCEDURE — 250N000024 HC ISOFLURANE, PER MIN: Performed by: SURGERY

## 2021-01-01 PROCEDURE — 99291 CRITICAL CARE FIRST HOUR: CPT | Performed by: SURGERY

## 2021-01-01 PROCEDURE — 86923 COMPATIBILITY TEST ELECTRIC: CPT | Performed by: EMERGENCY MEDICINE

## 2021-01-01 PROCEDURE — 258N000003 HC RX IP 258 OP 636: Performed by: STUDENT IN AN ORGANIZED HEALTH CARE EDUCATION/TRAINING PROGRAM

## 2021-01-01 PROCEDURE — 272N000192 HC ACCESSORY CR2

## 2021-01-01 PROCEDURE — 258N000003 HC RX IP 258 OP 636: Performed by: NURSE ANESTHETIST, CERTIFIED REGISTERED

## 2021-01-01 PROCEDURE — 999N000179 XR SURGERY CARM FLUORO LESS THAN 5 MIN W STILLS

## 2021-01-01 PROCEDURE — 370N000017 HC ANESTHESIA TECHNICAL FEE, PER MIN: Performed by: INTERNAL MEDICINE

## 2021-01-01 PROCEDURE — 97140 MANUAL THERAPY 1/> REGIONS: CPT | Mod: GO | Performed by: OCCUPATIONAL THERAPIST

## 2021-01-01 PROCEDURE — 85025 COMPLETE CBC W/AUTO DIFF WBC: CPT | Performed by: HOSPITALIST

## 2021-01-01 PROCEDURE — 84100 ASSAY OF PHOSPHORUS: CPT | Performed by: STUDENT IN AN ORGANIZED HEALTH CARE EDUCATION/TRAINING PROGRAM

## 2021-01-01 PROCEDURE — 85027 COMPLETE CBC AUTOMATED: CPT | Performed by: INTERNAL MEDICINE

## 2021-01-01 PROCEDURE — 272N000001 HC OR GENERAL SUPPLY STERILE: Performed by: INTERNAL MEDICINE

## 2021-01-01 PROCEDURE — 85018 HEMOGLOBIN: CPT | Performed by: STUDENT IN AN ORGANIZED HEALTH CARE EDUCATION/TRAINING PROGRAM

## 2021-01-01 PROCEDURE — 250N000013 HC RX MED GY IP 250 OP 250 PS 637: Performed by: PHYSICIAN ASSISTANT

## 2021-01-01 PROCEDURE — 250N000025 HC SEVOFLURANE, PER MIN: Performed by: INTERNAL MEDICINE

## 2021-01-01 PROCEDURE — 99291 CRITICAL CARE FIRST HOUR: CPT | Mod: 24 | Performed by: STUDENT IN AN ORGANIZED HEALTH CARE EDUCATION/TRAINING PROGRAM

## 2021-01-01 PROCEDURE — C9803 HOPD COVID-19 SPEC COLLECT: HCPCS | Performed by: EMERGENCY MEDICINE

## 2021-01-01 PROCEDURE — 999N000067 HC STATISTIC FAILED PERIPHERAL IV START

## 2021-01-01 PROCEDURE — 250N000009 HC RX 250

## 2021-01-01 PROCEDURE — 258N000001 HC RX 258: Performed by: STUDENT IN AN ORGANIZED HEALTH CARE EDUCATION/TRAINING PROGRAM

## 2021-01-01 PROCEDURE — 99291 CRITICAL CARE FIRST HOUR: CPT | Mod: 25 | Performed by: EMERGENCY MEDICINE

## 2021-01-01 PROCEDURE — 71260 CT THORAX DX C+: CPT | Mod: MG

## 2021-01-01 PROCEDURE — 85027 COMPLETE CBC AUTOMATED: CPT | Performed by: HOSPITALIST

## 2021-01-01 PROCEDURE — 250N000012 HC RX MED GY IP 250 OP 636 PS 637: Performed by: STUDENT IN AN ORGANIZED HEALTH CARE EDUCATION/TRAINING PROGRAM

## 2021-01-01 PROCEDURE — 97110 THERAPEUTIC EXERCISES: CPT | Mod: GP

## 2021-01-01 PROCEDURE — 83036 HEMOGLOBIN GLYCOSYLATED A1C: CPT

## 2021-01-01 PROCEDURE — 120N000002 HC R&B MED SURG/OB UMMC

## 2021-01-01 PROCEDURE — 250N000009 HC RX 250: Performed by: EMERGENCY MEDICINE

## 2021-01-01 PROCEDURE — 250N000011 HC RX IP 250 OP 636: Performed by: NURSE ANESTHETIST, CERTIFIED REGISTERED

## 2021-01-01 PROCEDURE — 36415 COLL VENOUS BLD VENIPUNCTURE: CPT | Performed by: ANESTHESIOLOGY

## 2021-01-01 PROCEDURE — 85610 PROTHROMBIN TIME: CPT | Performed by: STUDENT IN AN ORGANIZED HEALTH CARE EDUCATION/TRAINING PROGRAM

## 2021-01-01 PROCEDURE — 250N000013 HC RX MED GY IP 250 OP 250 PS 637: Performed by: NURSE PRACTITIONER

## 2021-01-01 PROCEDURE — 93005 ELECTROCARDIOGRAM TRACING: CPT | Performed by: EMERGENCY MEDICINE

## 2021-01-01 PROCEDURE — 272N000002 HC OR SUPPLY OTHER OPNP: Performed by: INTERNAL MEDICINE

## 2021-01-01 PROCEDURE — 85027 COMPLETE CBC AUTOMATED: CPT | Performed by: STUDENT IN AN ORGANIZED HEALTH CARE EDUCATION/TRAINING PROGRAM

## 2021-01-01 PROCEDURE — 250N000013 HC RX MED GY IP 250 OP 250 PS 637: Performed by: INTERNAL MEDICINE

## 2021-01-01 PROCEDURE — 0DB80ZZ EXCISION OF SMALL INTESTINE, OPEN APPROACH: ICD-10-PCS | Performed by: SURGERY

## 2021-01-01 PROCEDURE — 370N000017 HC ANESTHESIA TECHNICAL FEE, PER MIN: Performed by: SURGERY

## 2021-01-01 PROCEDURE — 3E043XZ INTRODUCTION OF VASOPRESSOR INTO CENTRAL VEIN, PERCUTANEOUS APPROACH: ICD-10-PCS | Performed by: SURGERY

## 2021-01-01 PROCEDURE — G0463 HOSPITAL OUTPT CLINIC VISIT: HCPCS

## 2021-01-01 PROCEDURE — 83690 ASSAY OF LIPASE: CPT | Performed by: STUDENT IN AN ORGANIZED HEALTH CARE EDUCATION/TRAINING PROGRAM

## 2021-01-01 PROCEDURE — 200N000002 HC R&B ICU UMMC

## 2021-01-01 PROCEDURE — 36415 COLL VENOUS BLD VENIPUNCTURE: CPT

## 2021-01-01 PROCEDURE — 99233 SBSQ HOSP IP/OBS HIGH 50: CPT | Performed by: SURGERY

## 2021-01-01 PROCEDURE — 250N000011 HC RX IP 250 OP 636

## 2021-01-01 PROCEDURE — 250N000013 HC RX MED GY IP 250 OP 250 PS 637: Performed by: HOSPITALIST

## 2021-01-01 PROCEDURE — 75726 ARTERY X-RAYS ABDOMEN: CPT

## 2021-01-01 PROCEDURE — 83735 ASSAY OF MAGNESIUM: CPT

## 2021-01-01 PROCEDURE — 99285 EMERGENCY DEPT VISIT HI MDM: CPT | Mod: 25 | Performed by: EMERGENCY MEDICINE

## 2021-01-01 PROCEDURE — 87106 FUNGI IDENTIFICATION YEAST: CPT

## 2021-01-01 PROCEDURE — 999N000157 HC STATISTIC RCP TIME EA 10 MIN

## 2021-01-01 PROCEDURE — 82330 ASSAY OF CALCIUM: CPT

## 2021-01-01 PROCEDURE — 94002 VENT MGMT INPAT INIT DAY: CPT

## 2021-01-01 PROCEDURE — 99153 MOD SED SAME PHYS/QHP EA: CPT

## 2021-01-01 PROCEDURE — 99233 SBSQ HOSP IP/OBS HIGH 50: CPT | Mod: 24 | Performed by: PHYSICIAN ASSISTANT

## 2021-01-01 PROCEDURE — 84075 ASSAY ALKALINE PHOSPHATASE: CPT

## 2021-01-01 PROCEDURE — 250N000009 HC RX 250: Performed by: PEDIATRICS

## 2021-01-01 PROCEDURE — 84134 ASSAY OF PREALBUMIN: CPT | Performed by: STUDENT IN AN ORGANIZED HEALTH CARE EDUCATION/TRAINING PROGRAM

## 2021-01-01 PROCEDURE — 71045 X-RAY EXAM CHEST 1 VIEW: CPT | Mod: 26 | Performed by: RADIOLOGY

## 2021-01-01 PROCEDURE — 36415 COLL VENOUS BLD VENIPUNCTURE: CPT | Performed by: SURGERY

## 2021-01-01 PROCEDURE — 36592 COLLECT BLOOD FROM PICC: CPT | Performed by: INTERNAL MEDICINE

## 2021-01-01 PROCEDURE — 82810 BLOOD GASES O2 SAT ONLY: CPT

## 2021-01-01 PROCEDURE — 86901 BLOOD TYPING SEROLOGIC RH(D): CPT | Performed by: EMERGENCY MEDICINE

## 2021-01-01 PROCEDURE — 84590 ASSAY OF VITAMIN A: CPT

## 2021-01-01 PROCEDURE — 87086 URINE CULTURE/COLONY COUNT: CPT | Performed by: EMERGENCY MEDICINE

## 2021-01-01 PROCEDURE — 82947 ASSAY GLUCOSE BLOOD QUANT: CPT | Performed by: RADIOLOGY

## 2021-01-01 PROCEDURE — 80053 COMPREHEN METABOLIC PANEL: CPT | Performed by: INTERNAL MEDICINE

## 2021-01-01 PROCEDURE — 255N000002 HC RX 255 OP 636: Performed by: INTERNAL MEDICINE

## 2021-01-01 PROCEDURE — 44120 REMOVAL OF SMALL INTESTINE: CPT | Mod: 52 | Performed by: SURGERY

## 2021-01-01 PROCEDURE — 99207 XR SURGERY CARM FLUORO LESS THAN 5 MIN W STILLS: CPT | Performed by: RADIOLOGY

## 2021-01-01 PROCEDURE — 86140 C-REACTIVE PROTEIN: CPT

## 2021-01-01 PROCEDURE — U0003 INFECTIOUS AGENT DETECTION BY NUCLEIC ACID (DNA OR RNA); SEVERE ACUTE RESPIRATORY SYNDROME CORONAVIRUS 2 (SARS-COV-2) (CORONAVIRUS DISEASE [COVID-19]), AMPLIFIED PROBE TECHNIQUE, MAKING USE OF HIGH THROUGHPUT TECHNOLOGIES AS DESCRIBED BY CMS-2020-01-R: HCPCS | Performed by: RADIOLOGY

## 2021-01-01 PROCEDURE — 36246 INS CATH ABD/L-EXT ART 2ND: CPT

## 2021-01-01 PROCEDURE — 74177 CT ABD & PELVIS W/CONTRAST: CPT | Mod: MG | Performed by: RADIOLOGY

## 2021-01-01 PROCEDURE — 82656 EL-1 FECAL QUAL/SEMIQ: CPT | Performed by: STUDENT IN AN ORGANIZED HEALTH CARE EDUCATION/TRAINING PROGRAM

## 2021-01-01 PROCEDURE — 999N000181 XR SURGERY CARM FLUORO GREATER THAN 5 MIN W STILLS: Mod: TC

## 2021-01-01 PROCEDURE — 710N000010 HC RECOVERY PHASE 1, LEVEL 2, PER MIN: Performed by: INTERNAL MEDICINE

## 2021-01-01 PROCEDURE — 85610 PROTHROMBIN TIME: CPT | Performed by: RADIOLOGY

## 2021-01-01 PROCEDURE — 83735 ASSAY OF MAGNESIUM: CPT | Performed by: EMERGENCY MEDICINE

## 2021-01-01 PROCEDURE — 97530 THERAPEUTIC ACTIVITIES: CPT | Mod: GP

## 2021-01-01 PROCEDURE — 97535 SELF CARE MNGMENT TRAINING: CPT | Mod: GO | Performed by: OCCUPATIONAL THERAPIST

## 2021-01-01 PROCEDURE — 88309 TISSUE EXAM BY PATHOLOGIST: CPT | Mod: 26 | Performed by: PATHOLOGY

## 2021-01-01 PROCEDURE — 87086 URINE CULTURE/COLONY COUNT: CPT

## 2021-01-01 PROCEDURE — 84132 ASSAY OF SERUM POTASSIUM: CPT

## 2021-01-01 PROCEDURE — 250N000012 HC RX MED GY IP 250 OP 636 PS 637: Performed by: INTERNAL MEDICINE

## 2021-01-01 PROCEDURE — 0DJD8ZZ INSPECTION OF LOWER INTESTINAL TRACT, VIA NATURAL OR ARTIFICIAL OPENING ENDOSCOPIC: ICD-10-PCS | Performed by: INTERNAL MEDICINE

## 2021-01-01 PROCEDURE — 84597 ASSAY OF VITAMIN K: CPT | Performed by: STUDENT IN AN ORGANIZED HEALTH CARE EDUCATION/TRAINING PROGRAM

## 2021-01-01 PROCEDURE — 82565 ASSAY OF CREATININE: CPT | Performed by: RADIOLOGY

## 2021-01-01 PROCEDURE — 83735 ASSAY OF MAGNESIUM: CPT | Performed by: INTERNAL MEDICINE

## 2021-01-01 PROCEDURE — 84100 ASSAY OF PHOSPHORUS: CPT | Performed by: EMERGENCY MEDICINE

## 2021-01-01 PROCEDURE — 99221 1ST HOSP IP/OBS SF/LOW 40: CPT | Mod: GC | Performed by: SURGERY

## 2021-01-01 PROCEDURE — 80048 BASIC METABOLIC PNL TOTAL CA: CPT | Performed by: STUDENT IN AN ORGANIZED HEALTH CARE EDUCATION/TRAINING PROGRAM

## 2021-01-01 PROCEDURE — 97530 THERAPEUTIC ACTIVITIES: CPT | Mod: GO | Performed by: OCCUPATIONAL THERAPIST

## 2021-01-01 PROCEDURE — 999N000155 HC STATISTIC RAPCV CVP MONITORING

## 2021-01-01 PROCEDURE — 96376 TX/PRO/DX INJ SAME DRUG ADON: CPT | Performed by: EMERGENCY MEDICINE

## 2021-01-01 PROCEDURE — 99215 OFFICE O/P EST HI 40 MIN: CPT | Mod: 95 | Performed by: INTERNAL MEDICINE

## 2021-01-01 PROCEDURE — 258N000003 HC RX IP 258 OP 636: Performed by: EMERGENCY MEDICINE

## 2021-01-01 PROCEDURE — 99152 MOD SED SAME PHYS/QHP 5/>YRS: CPT | Mod: GC | Performed by: RADIOLOGY

## 2021-01-01 PROCEDURE — 82248 BILIRUBIN DIRECT: CPT

## 2021-01-01 PROCEDURE — 250N000011 HC RX IP 250 OP 636: Performed by: INTERNAL MEDICINE

## 2021-01-01 PROCEDURE — 99232 SBSQ HOSP IP/OBS MODERATE 35: CPT | Performed by: NURSE PRACTITIONER

## 2021-01-01 PROCEDURE — 82306 VITAMIN D 25 HYDROXY: CPT

## 2021-01-01 PROCEDURE — U0003 INFECTIOUS AGENT DETECTION BY NUCLEIC ACID (DNA OR RNA); SEVERE ACUTE RESPIRATORY SYNDROME CORONAVIRUS 2 (SARS-COV-2) (CORONAVIRUS DISEASE [COVID-19]), AMPLIFIED PROBE TECHNIQUE, MAKING USE OF HIGH THROUGHPUT TECHNOLOGIES AS DESCRIBED BY CMS-2020-01-R: HCPCS | Performed by: EMERGENCY MEDICINE

## 2021-01-01 PROCEDURE — 999N001032 HC STATISTIC REVIEW OUTSIDE SLIDES TC 88321: Performed by: SURGERY

## 2021-01-01 PROCEDURE — 250N000009 HC RX 250: Performed by: STUDENT IN AN ORGANIZED HEALTH CARE EDUCATION/TRAINING PROGRAM

## 2021-01-01 PROCEDURE — 88305 TISSUE EXAM BY PATHOLOGIST: CPT | Mod: TC | Performed by: INTERNAL MEDICINE

## 2021-01-01 PROCEDURE — 999N000179 XR SURGERY CARM FLUORO LESS THAN 5 MIN W STILLS: Mod: TC

## 2021-01-01 PROCEDURE — 84100 ASSAY OF PHOSPHORUS: CPT | Performed by: SURGERY

## 2021-01-01 PROCEDURE — U0005 INFEC AGEN DETEC AMPLI PROBE: HCPCS | Performed by: EMERGENCY MEDICINE

## 2021-01-01 PROCEDURE — 81003 URINALYSIS AUTO W/O SCOPE: CPT | Performed by: EMERGENCY MEDICINE

## 2021-01-01 PROCEDURE — 250N000011 HC RX IP 250 OP 636: Performed by: FAMILY MEDICINE

## 2021-01-01 PROCEDURE — 84100 ASSAY OF PHOSPHORUS: CPT

## 2021-01-01 PROCEDURE — 86850 RBC ANTIBODY SCREEN: CPT | Performed by: EMERGENCY MEDICINE

## 2021-01-01 PROCEDURE — 71260 CT THORAX DX C+: CPT | Mod: 26 | Performed by: RADIOLOGY

## 2021-01-01 PROCEDURE — 272N000473 HC KIT, VPS RHYTHM STYLET

## 2021-01-01 PROCEDURE — 93010 ELECTROCARDIOGRAM REPORT: CPT | Performed by: EMERGENCY MEDICINE

## 2021-01-01 PROCEDURE — 99222 1ST HOSP IP/OBS MODERATE 55: CPT | Mod: 24 | Performed by: INTERNAL MEDICINE

## 2021-01-01 PROCEDURE — G1004 CDSM NDSC: HCPCS | Mod: GC | Performed by: RADIOLOGY

## 2021-01-01 PROCEDURE — 88305 TISSUE EXAM BY PATHOLOGIST: CPT | Mod: 26 | Performed by: PATHOLOGY

## 2021-01-01 PROCEDURE — 250N000013 HC RX MED GY IP 250 OP 250 PS 637: Performed by: SURGERY

## 2021-01-01 PROCEDURE — 99233 SBSQ HOSP IP/OBS HIGH 50: CPT | Mod: 25 | Performed by: PHYSICIAN ASSISTANT

## 2021-01-01 PROCEDURE — 80048 BASIC METABOLIC PNL TOTAL CA: CPT | Performed by: INTERNAL MEDICINE

## 2021-01-01 PROCEDURE — 82565 ASSAY OF CREATININE: CPT | Performed by: PATHOLOGY

## 2021-01-01 PROCEDURE — 96361 HYDRATE IV INFUSION ADD-ON: CPT | Performed by: EMERGENCY MEDICINE

## 2021-01-01 PROCEDURE — 0DN80ZZ RELEASE SMALL INTESTINE, OPEN APPROACH: ICD-10-PCS | Performed by: SURGERY

## 2021-01-01 PROCEDURE — 84132 ASSAY OF SERUM POTASSIUM: CPT | Performed by: ANESTHESIOLOGY

## 2021-01-01 PROCEDURE — 120N000003 HC R&B IMCU UMMC

## 2021-01-01 PROCEDURE — 258N000003 HC RX IP 258 OP 636: Performed by: NURSE PRACTITIONER

## 2021-01-01 PROCEDURE — 999N000007 HC SITE CHECK

## 2021-01-01 PROCEDURE — 85025 COMPLETE CBC W/AUTO DIFF WBC: CPT | Performed by: EMERGENCY MEDICINE

## 2021-01-01 PROCEDURE — 258N000003 HC RX IP 258 OP 636: Performed by: ANESTHESIOLOGY

## 2021-01-01 PROCEDURE — 97803 MED NUTRITION INDIV SUBSEQ: CPT | Mod: 95 | Performed by: DIETITIAN, REGISTERED

## 2021-01-01 PROCEDURE — 84484 ASSAY OF TROPONIN QUANT: CPT | Performed by: EMERGENCY MEDICINE

## 2021-01-01 PROCEDURE — 96365 THER/PROPH/DIAG IV INF INIT: CPT | Mod: 59 | Performed by: EMERGENCY MEDICINE

## 2021-01-01 PROCEDURE — 75726 ARTERY X-RAYS ABDOMEN: CPT | Mod: 26 | Performed by: RADIOLOGY

## 2021-01-01 PROCEDURE — 83690 ASSAY OF LIPASE: CPT | Performed by: EMERGENCY MEDICINE

## 2021-01-01 PROCEDURE — 99239 HOSP IP/OBS DSCHRG MGMT >30: CPT | Performed by: INTERNAL MEDICINE

## 2021-01-01 PROCEDURE — 80053 COMPREHEN METABOLIC PANEL: CPT | Performed by: EMERGENCY MEDICINE

## 2021-01-01 PROCEDURE — 250N000009 HC RX 250: Performed by: INTERNAL MEDICINE

## 2021-01-01 PROCEDURE — 250N000011 HC RX IP 250 OP 636: Performed by: SURGERY

## 2021-01-01 PROCEDURE — 74018 RADEX ABDOMEN 1 VIEW: CPT | Mod: 26 | Performed by: RADIOLOGY

## 2021-01-01 PROCEDURE — P9016 RBC LEUKOCYTES REDUCED: HCPCS | Performed by: EMERGENCY MEDICINE

## 2021-01-01 PROCEDURE — 999N000127 HC STATISTIC PERIPHERAL IV START W US GUIDANCE

## 2021-01-01 PROCEDURE — 250N000011 HC RX IP 250 OP 636: Performed by: RADIOLOGY

## 2021-01-01 PROCEDURE — 82150 ASSAY OF AMYLASE: CPT | Performed by: INTERNAL MEDICINE

## 2021-01-01 PROCEDURE — 999N000141 HC STATISTIC PRE-PROCEDURE NURSING ASSESSMENT: Performed by: INTERNAL MEDICINE

## 2021-01-01 PROCEDURE — C9113 INJ PANTOPRAZOLE SODIUM, VIA: HCPCS

## 2021-01-01 PROCEDURE — 0DNU0ZZ RELEASE OMENTUM, OPEN APPROACH: ICD-10-PCS | Performed by: SURGERY

## 2021-01-01 PROCEDURE — 99207 PR NO CHARGE LOS: CPT | Performed by: PHYSICIAN ASSISTANT

## 2021-01-01 PROCEDURE — G1004 CDSM NDSC: HCPCS

## 2021-01-01 PROCEDURE — 97165 OT EVAL LOW COMPLEX 30 MIN: CPT | Mod: GO | Performed by: OCCUPATIONAL THERAPIST

## 2021-01-01 PROCEDURE — 99207 PR SERVICE NOT STAFFED W/SUPERV PROV: CPT | Performed by: INTERNAL MEDICINE

## 2021-01-01 PROCEDURE — 272N000566 HC SHEATH CR3

## 2021-01-01 PROCEDURE — 84446 ASSAY OF VITAMIN E: CPT

## 2021-01-01 PROCEDURE — 85018 HEMOGLOBIN: CPT

## 2021-01-01 PROCEDURE — 85610 PROTHROMBIN TIME: CPT

## 2021-01-01 PROCEDURE — 710N000012 HC RECOVERY PHASE 2, PER MINUTE: Performed by: INTERNAL MEDICINE

## 2021-01-01 PROCEDURE — 0DH63UZ INSERTION OF FEEDING DEVICE INTO STOMACH, PERCUTANEOUS APPROACH: ICD-10-PCS | Performed by: INTERNAL MEDICINE

## 2021-01-01 PROCEDURE — 97168 OT RE-EVAL EST PLAN CARE: CPT | Mod: GO | Performed by: OCCUPATIONAL THERAPIST

## 2021-01-01 PROCEDURE — 43275 ERCP REMOVE FORGN BODY DUCT: CPT | Performed by: INTERNAL MEDICINE

## 2021-01-01 PROCEDURE — 99233 SBSQ HOSP IP/OBS HIGH 50: CPT | Performed by: INTERNAL MEDICINE

## 2021-01-01 PROCEDURE — 99152 MOD SED SAME PHYS/QHP 5/>YRS: CPT

## 2021-01-01 PROCEDURE — 82248 BILIRUBIN DIRECT: CPT | Performed by: STUDENT IN AN ORGANIZED HEALTH CARE EDUCATION/TRAINING PROGRAM

## 2021-01-01 PROCEDURE — 94660 CPAP INITIATION&MGMT: CPT

## 2021-01-01 PROCEDURE — U0005 INFEC AGEN DETEC AMPLI PROBE: HCPCS | Performed by: RADIOLOGY

## 2021-01-01 PROCEDURE — 36569 INSJ PICC 5 YR+ W/O IMAGING: CPT

## 2021-01-01 PROCEDURE — 84478 ASSAY OF TRIGLYCERIDES: CPT

## 2021-01-01 PROCEDURE — U0003 INFECTIOUS AGENT DETECTION BY NUCLEIC ACID (DNA OR RNA); SEVERE ACUTE RESPIRATORY SYNDROME CORONAVIRUS 2 (SARS-COV-2) (CORONAVIRUS DISEASE [COVID-19]), AMPLIFIED PROBE TECHNIQUE, MAKING USE OF HIGH THROUGHPUT TECHNOLOGIES AS DESCRIBED BY CMS-2020-01-R: HCPCS

## 2021-01-01 PROCEDURE — C1726 CATH, BAL DIL, NON-VASCULAR: HCPCS | Performed by: INTERNAL MEDICINE

## 2021-01-01 PROCEDURE — 81001 URINALYSIS AUTO W/SCOPE: CPT | Performed by: STUDENT IN AN ORGANIZED HEALTH CARE EDUCATION/TRAINING PROGRAM

## 2021-01-01 PROCEDURE — 99233 SBSQ HOSP IP/OBS HIGH 50: CPT | Performed by: PEDIATRICS

## 2021-01-01 PROCEDURE — 84100 ASSAY OF PHOSPHORUS: CPT | Performed by: INTERNAL MEDICINE

## 2021-01-01 PROCEDURE — 99223 1ST HOSP IP/OBS HIGH 75: CPT | Mod: AI | Performed by: STUDENT IN AN ORGANIZED HEALTH CARE EDUCATION/TRAINING PROGRAM

## 2021-01-01 PROCEDURE — 999N000147 HC STATISTIC PT IP EVAL DEFER

## 2021-01-01 PROCEDURE — 36415 COLL VENOUS BLD VENIPUNCTURE: CPT | Performed by: PATHOLOGY

## 2021-01-01 PROCEDURE — C1887 CATHETER, GUIDING: HCPCS

## 2021-01-01 PROCEDURE — 84295 ASSAY OF SERUM SODIUM: CPT

## 2021-01-01 PROCEDURE — 87086 URINE CULTURE/COLONY COUNT: CPT | Performed by: HOSPITALIST

## 2021-01-01 PROCEDURE — 999N000065 XR CHEST PORT 1 VIEW

## 2021-01-01 PROCEDURE — 36415 COLL VENOUS BLD VENIPUNCTURE: CPT | Performed by: INTERNAL MEDICINE

## 2021-01-01 PROCEDURE — 85027 COMPLETE CBC AUTOMATED: CPT | Performed by: RADIOLOGY

## 2021-01-01 PROCEDURE — 99221 1ST HOSP IP/OBS SF/LOW 40: CPT | Mod: GC | Performed by: INTERNAL MEDICINE

## 2021-01-01 PROCEDURE — 80048 BASIC METABOLIC PNL TOTAL CA: CPT | Performed by: SURGERY

## 2021-01-01 PROCEDURE — 81001 URINALYSIS AUTO W/SCOPE: CPT

## 2021-01-01 PROCEDURE — 272N000001 HC OR GENERAL SUPPLY STERILE: Performed by: SURGERY

## 2021-01-01 PROCEDURE — 272N000143 HC KIT CR3

## 2021-01-01 PROCEDURE — 96375 TX/PRO/DX INJ NEW DRUG ADDON: CPT | Performed by: EMERGENCY MEDICINE

## 2021-01-01 PROCEDURE — 3E0436Z INTRODUCTION OF NUTRITIONAL SUBSTANCE INTO CENTRAL VEIN, PERCUTANEOUS APPROACH: ICD-10-PCS | Performed by: SURGERY

## 2021-01-01 PROCEDURE — 71260 CT THORAX DX C+: CPT

## 2021-01-01 PROCEDURE — 99233 SBSQ HOSP IP/OBS HIGH 50: CPT | Performed by: NURSE PRACTITIONER

## 2021-01-01 PROCEDURE — 45380 COLONOSCOPY AND BIOPSY: CPT | Performed by: INTERNAL MEDICINE

## 2021-01-01 PROCEDURE — 99233 SBSQ HOSP IP/OBS HIGH 50: CPT | Mod: 25 | Performed by: NURSE PRACTITIONER

## 2021-01-01 PROCEDURE — 83690 ASSAY OF LIPASE: CPT | Performed by: INTERNAL MEDICINE

## 2021-01-01 PROCEDURE — 96367 TX/PROPH/DG ADDL SEQ IV INF: CPT | Performed by: EMERGENCY MEDICINE

## 2021-01-01 PROCEDURE — 36245 INS CATH ABD/L-EXT ART 1ST: CPT | Mod: GC | Performed by: RADIOLOGY

## 2021-01-01 PROCEDURE — 74329 X-RAY FOR PANCREAS ENDOSCOPY: CPT | Mod: 26 | Performed by: INTERNAL MEDICINE

## 2021-01-01 PROCEDURE — 99223 1ST HOSP IP/OBS HIGH 75: CPT | Performed by: NURSE PRACTITIONER

## 2021-01-01 PROCEDURE — C1725 CATH, TRANSLUMIN NON-LASER: HCPCS | Performed by: INTERNAL MEDICINE

## 2021-01-01 PROCEDURE — 76937 US GUIDE VASCULAR ACCESS: CPT | Mod: 26 | Performed by: RADIOLOGY

## 2021-01-01 PROCEDURE — 999N000128 HC STATISTIC PERIPHERAL IV START W/O US GUIDANCE

## 2021-01-01 PROCEDURE — 84597 ASSAY OF VITAMIN K: CPT

## 2021-01-01 PROCEDURE — 86900 BLOOD TYPING SEROLOGIC ABO: CPT | Performed by: EMERGENCY MEDICINE

## 2021-01-01 PROCEDURE — 84590 ASSAY OF VITAMIN A: CPT | Performed by: EMERGENCY MEDICINE

## 2021-01-01 PROCEDURE — 250N000011 HC RX IP 250 OP 636: Performed by: NEUROLOGICAL SURGERY

## 2021-01-01 PROCEDURE — 99205 OFFICE O/P NEW HI 60 MIN: CPT | Mod: 95 | Performed by: RADIOLOGY

## 2021-01-01 PROCEDURE — 272N000458 ZZ HC KIT, 5 FR DL BIOFLO OPEN ENDED PICC

## 2021-01-01 PROCEDURE — 99223 1ST HOSP IP/OBS HIGH 75: CPT | Performed by: FAMILY MEDICINE

## 2021-01-01 PROCEDURE — 999N000044 HC STATISTIC CVC DRESSING CHANGE

## 2021-01-01 PROCEDURE — 76700 US EXAM ABDOM COMPLETE: CPT | Mod: GC | Performed by: RADIOLOGY

## 2021-01-01 PROCEDURE — 99233 SBSQ HOSP IP/OBS HIGH 50: CPT | Performed by: HOSPITALIST

## 2021-01-01 PROCEDURE — 83605 ASSAY OF LACTIC ACID: CPT

## 2021-01-01 PROCEDURE — C1769 GUIDE WIRE: HCPCS | Performed by: INTERNAL MEDICINE

## 2021-01-01 PROCEDURE — 97161 PT EVAL LOW COMPLEX 20 MIN: CPT | Mod: GP

## 2021-01-01 PROCEDURE — 999N000111 HC STATISTIC OT IP EVAL DEFER: Performed by: OCCUPATIONAL THERAPIST

## 2021-01-01 PROCEDURE — 250N000009 HC RX 250: Performed by: ANESTHESIOLOGY

## 2021-01-01 PROCEDURE — 82306 VITAMIN D 25 HYDROXY: CPT | Performed by: EMERGENCY MEDICINE

## 2021-01-01 PROCEDURE — 258N000003 HC RX IP 258 OP 636: Performed by: DENTIST

## 2021-01-01 PROCEDURE — 97110 THERAPEUTIC EXERCISES: CPT | Mod: GO | Performed by: OCCUPATIONAL THERAPIST

## 2021-01-01 PROCEDURE — 84145 PROCALCITONIN (PCT): CPT

## 2021-01-01 PROCEDURE — 250N000009 HC RX 250: Performed by: DENTIST

## 2021-01-01 PROCEDURE — 272N000201 ZZ HC ADHESIVE SKIN CLOSURE, DERMABOND

## 2021-01-01 PROCEDURE — 0DBP8ZX EXCISION OF RECTUM, VIA NATURAL OR ARTIFICIAL OPENING ENDOSCOPIC, DIAGNOSTIC: ICD-10-PCS | Performed by: INTERNAL MEDICINE

## 2021-01-01 PROCEDURE — 0D798ZZ DILATION OF DUODENUM, VIA NATURAL OR ARTIFICIAL OPENING ENDOSCOPIC: ICD-10-PCS | Performed by: INTERNAL MEDICINE

## 2021-01-01 PROCEDURE — 250N000011 HC RX IP 250 OP 636: Performed by: DENTIST

## 2021-01-01 PROCEDURE — 87640 STAPH A DNA AMP PROBE: CPT | Performed by: STUDENT IN AN ORGANIZED HEALTH CARE EDUCATION/TRAINING PROGRAM

## 2021-01-01 PROCEDURE — 84446 ASSAY OF VITAMIN E: CPT | Performed by: EMERGENCY MEDICINE

## 2021-01-01 PROCEDURE — 999N000134 HC STATISTIC PP CARE STAGE 3

## 2021-01-01 PROCEDURE — P9041 ALBUMIN (HUMAN),5%, 50ML: HCPCS | Performed by: NURSE ANESTHETIST, CERTIFIED REGISTERED

## 2021-01-01 PROCEDURE — 74176 CT ABD & PELVIS W/O CONTRAST: CPT | Mod: 26 | Performed by: RADIOLOGY

## 2021-01-01 PROCEDURE — 85730 THROMBOPLASTIN TIME PARTIAL: CPT | Performed by: EMERGENCY MEDICINE

## 2021-01-01 PROCEDURE — 272N000504 HC NEEDLE CR4

## 2021-01-01 PROCEDURE — 258N000003 HC RX IP 258 OP 636: Performed by: RADIOLOGY

## 2021-01-01 PROCEDURE — 250N000012 HC RX MED GY IP 250 OP 636 PS 637: Performed by: CLINICAL NURSE SPECIALIST

## 2021-01-01 PROCEDURE — 71045 X-RAY EXAM CHEST 1 VIEW: CPT

## 2021-01-01 PROCEDURE — 84630 ASSAY OF ZINC: CPT

## 2021-01-01 PROCEDURE — 83605 ASSAY OF LACTIC ACID: CPT | Performed by: SURGERY

## 2021-01-01 PROCEDURE — 255N000002 HC RX 255 OP 636: Performed by: RADIOLOGY

## 2021-01-01 PROCEDURE — 88309 TISSUE EXAM BY PATHOLOGIST: CPT | Mod: TC | Performed by: SURGERY

## 2021-01-01 PROCEDURE — 999N000197 HC STATISTIC WOC PT EDUCATION, 0-15 MIN

## 2021-01-01 PROCEDURE — 99232 SBSQ HOSP IP/OBS MODERATE 35: CPT | Mod: 24 | Performed by: INTERNAL MEDICINE

## 2021-01-01 PROCEDURE — C1894 INTRO/SHEATH, NON-LASER: HCPCS | Performed by: INTERNAL MEDICINE

## 2021-01-01 PROCEDURE — 36430 TRANSFUSION BLD/BLD COMPNT: CPT | Performed by: EMERGENCY MEDICINE

## 2021-01-01 PROCEDURE — 96374 THER/PROPH/DIAG INJ IV PUSH: CPT | Mod: 59 | Performed by: EMERGENCY MEDICINE

## 2021-01-01 PROCEDURE — 99233 SBSQ HOSP IP/OBS HIGH 50: CPT | Performed by: CLINICAL NURSE SPECIALIST

## 2021-01-01 PROCEDURE — 250N000012 HC RX MED GY IP 250 OP 636 PS 637: Performed by: NURSE PRACTITIONER

## 2021-01-01 PROCEDURE — 82947 ASSAY GLUCOSE BLOOD QUANT: CPT

## 2021-01-01 PROCEDURE — 83605 ASSAY OF LACTIC ACID: CPT | Performed by: STUDENT IN AN ORGANIZED HEALTH CARE EDUCATION/TRAINING PROGRAM

## 2021-01-01 RX ORDER — ONDANSETRON 4 MG/1
4 TABLET, ORALLY DISINTEGRATING ORAL EVERY 30 MIN PRN
Status: CANCELLED | OUTPATIENT
Start: 2021-01-01

## 2021-01-01 RX ORDER — PEDIATRIC MULTIVIT 61/D3/VIT K 1500-800
1 CAPSULE ORAL DAILY
Status: DISCONTINUED | OUTPATIENT
Start: 2021-01-01 | End: 2021-01-01 | Stop reason: HOSPADM

## 2021-01-01 RX ORDER — SODIUM CHLORIDE, SODIUM LACTATE, POTASSIUM CHLORIDE, CALCIUM CHLORIDE 600; 310; 30; 20 MG/100ML; MG/100ML; MG/100ML; MG/100ML
INJECTION, SOLUTION INTRAVENOUS CONTINUOUS PRN
Status: DISCONTINUED | OUTPATIENT
Start: 2021-01-01 | End: 2021-01-01

## 2021-01-01 RX ORDER — DEXTROSE MONOHYDRATE 25 G/50ML
25-50 INJECTION, SOLUTION INTRAVENOUS
Status: DISCONTINUED | OUTPATIENT
Start: 2021-01-01 | End: 2021-01-01

## 2021-01-01 RX ORDER — SODIUM CHLORIDE 9 MG/ML
INJECTION, SOLUTION INTRAVENOUS CONTINUOUS
Status: DISCONTINUED | OUTPATIENT
Start: 2021-01-01 | End: 2021-01-01

## 2021-01-01 RX ORDER — SODIUM CHLORIDE, SODIUM LACTATE, POTASSIUM CHLORIDE, CALCIUM CHLORIDE 600; 310; 30; 20 MG/100ML; MG/100ML; MG/100ML; MG/100ML
INJECTION, SOLUTION INTRAVENOUS CONTINUOUS
Status: DISCONTINUED | OUTPATIENT
Start: 2021-01-01 | End: 2021-01-01

## 2021-01-01 RX ORDER — PANTOPRAZOLE SODIUM 40 MG/1
40 TABLET, DELAYED RELEASE ORAL DAILY
COMMUNITY

## 2021-01-01 RX ORDER — FENTANYL CITRATE 50 UG/ML
INJECTION, SOLUTION INTRAMUSCULAR; INTRAVENOUS PRN
Status: DISCONTINUED | OUTPATIENT
Start: 2021-01-01 | End: 2021-01-01

## 2021-01-01 RX ORDER — IOPAMIDOL 510 MG/ML
INJECTION, SOLUTION INTRAVASCULAR PRN
Status: DISCONTINUED | OUTPATIENT
Start: 2021-01-01 | End: 2021-01-01 | Stop reason: HOSPADM

## 2021-01-01 RX ORDER — LIDOCAINE 40 MG/G
CREAM TOPICAL
Status: DISCONTINUED | OUTPATIENT
Start: 2021-01-01 | End: 2021-01-01 | Stop reason: HOSPADM

## 2021-01-01 RX ORDER — NALOXONE HYDROCHLORIDE 0.4 MG/ML
0.2 INJECTION, SOLUTION INTRAMUSCULAR; INTRAVENOUS; SUBCUTANEOUS
Status: DISCONTINUED | OUTPATIENT
Start: 2021-01-01 | End: 2021-01-01 | Stop reason: HOSPADM

## 2021-01-01 RX ORDER — PROCHLORPERAZINE MALEATE 5 MG
5 TABLET ORAL EVERY 6 HOURS PRN
Status: DISCONTINUED | OUTPATIENT
Start: 2021-01-01 | End: 2021-01-01 | Stop reason: CLARIF

## 2021-01-01 RX ORDER — ONDANSETRON 2 MG/ML
INJECTION INTRAMUSCULAR; INTRAVENOUS PRN
Status: DISCONTINUED | OUTPATIENT
Start: 2021-01-01 | End: 2021-01-01

## 2021-01-01 RX ORDER — NALOXONE HYDROCHLORIDE 0.4 MG/ML
0.4 INJECTION, SOLUTION INTRAMUSCULAR; INTRAVENOUS; SUBCUTANEOUS
Status: DISCONTINUED | OUTPATIENT
Start: 2021-01-01 | End: 2021-01-01 | Stop reason: HOSPADM

## 2021-01-01 RX ORDER — FLUMAZENIL 0.1 MG/ML
0.2 INJECTION, SOLUTION INTRAVENOUS
Status: DISCONTINUED | OUTPATIENT
Start: 2021-01-01 | End: 2021-01-01 | Stop reason: HOSPADM

## 2021-01-01 RX ORDER — FENTANYL CITRATE 50 UG/ML
25-50 INJECTION, SOLUTION INTRAMUSCULAR; INTRAVENOUS
Status: DISCONTINUED | OUTPATIENT
Start: 2021-01-01 | End: 2021-01-01 | Stop reason: HOSPADM

## 2021-01-01 RX ORDER — PROPOFOL 10 MG/ML
INJECTION, EMULSION INTRAVENOUS PRN
Status: DISCONTINUED | OUTPATIENT
Start: 2021-01-01 | End: 2021-01-01

## 2021-01-01 RX ORDER — NICOTINE POLACRILEX 4 MG
15-30 LOZENGE BUCCAL
Status: DISCONTINUED | OUTPATIENT
Start: 2021-01-01 | End: 2021-01-01 | Stop reason: HOSPADM

## 2021-01-01 RX ORDER — POTASSIUM CHLORIDE 20MEQ/15ML
20 LIQUID (ML) ORAL ONCE
Status: COMPLETED | OUTPATIENT
Start: 2021-01-01 | End: 2021-01-01

## 2021-01-01 RX ORDER — PROPOFOL 10 MG/ML
10-20 INJECTION, EMULSION INTRAVENOUS EVERY 30 MIN PRN
Status: DISCONTINUED | OUTPATIENT
Start: 2021-01-01 | End: 2021-01-01

## 2021-01-01 RX ORDER — HYDROMORPHONE HYDROCHLORIDE 1 MG/ML
0.5 INJECTION, SOLUTION INTRAMUSCULAR; INTRAVENOUS; SUBCUTANEOUS ONCE
Status: COMPLETED | OUTPATIENT
Start: 2021-01-01 | End: 2021-01-01

## 2021-01-01 RX ORDER — PROCHLORPERAZINE 25 MG
12.5 SUPPOSITORY, RECTAL RECTAL EVERY 12 HOURS PRN
Status: DISCONTINUED | OUTPATIENT
Start: 2021-01-01 | End: 2021-01-01 | Stop reason: HOSPADM

## 2021-01-01 RX ORDER — POTASSIUM CHLORIDE 7.45 MG/ML
10 INJECTION INTRAVENOUS
Status: COMPLETED | OUTPATIENT
Start: 2021-01-01 | End: 2021-01-01

## 2021-01-01 RX ORDER — ACETAMINOPHEN 325 MG/1
975 TABLET ORAL ONCE
Status: DISCONTINUED | OUTPATIENT
Start: 2021-01-01 | End: 2021-01-01 | Stop reason: HOSPADM

## 2021-01-01 RX ORDER — SODIUM BICARBONATE 325 MG/1
325 TABLET ORAL EVERY 4 HOURS
Status: DISCONTINUED | OUTPATIENT
Start: 2021-01-01 | End: 2021-01-01

## 2021-01-01 RX ORDER — LIDOCAINE 4 G/G
2 PATCH TOPICAL
Status: DISCONTINUED | OUTPATIENT
Start: 2021-01-01 | End: 2021-01-01

## 2021-01-01 RX ORDER — METHOCARBAMOL 100 MG/ML
1000 INJECTION, SOLUTION INTRAMUSCULAR; INTRAVENOUS EVERY 8 HOURS
Status: DISCONTINUED | OUTPATIENT
Start: 2021-01-01 | End: 2021-01-01

## 2021-01-01 RX ORDER — POTASSIUM CHLORIDE 7.45 MG/ML
10 INJECTION INTRAVENOUS
Status: DISCONTINUED | OUTPATIENT
Start: 2021-01-01 | End: 2021-01-01

## 2021-01-01 RX ORDER — FENTANYL CITRATE 50 UG/ML
25-50 INJECTION, SOLUTION INTRAMUSCULAR; INTRAVENOUS
Status: DISCONTINUED | OUTPATIENT
Start: 2021-01-01 | End: 2021-01-01

## 2021-01-01 RX ORDER — ONDANSETRON 4 MG/1
4 TABLET, FILM COATED ORAL EVERY 6 HOURS PRN
Qty: 30 TABLET | Refills: 0 | Status: SHIPPED | OUTPATIENT
Start: 2021-01-01

## 2021-01-01 RX ORDER — MORPHINE SULFATE 4 MG/ML
5 INJECTION, SOLUTION INTRAMUSCULAR; INTRAVENOUS EVERY 30 MIN PRN
Status: DISCONTINUED | OUTPATIENT
Start: 2021-01-01 | End: 2021-01-01

## 2021-01-01 RX ORDER — LORAZEPAM 2 MG/ML
1-2 INJECTION INTRAMUSCULAR
Status: DISCONTINUED | OUTPATIENT
Start: 2021-01-01 | End: 2021-01-01 | Stop reason: HOSPADM

## 2021-01-01 RX ORDER — METHOCARBAMOL 100 MG/ML
750 INJECTION, SOLUTION INTRAMUSCULAR; INTRAVENOUS EVERY 6 HOURS
Status: COMPLETED | OUTPATIENT
Start: 2021-01-01 | End: 2021-01-01

## 2021-01-01 RX ORDER — SODIUM CHLORIDE 9 MG/ML
INJECTION, SOLUTION INTRAVENOUS CONTINUOUS
Status: DISCONTINUED | OUTPATIENT
Start: 2021-01-01 | End: 2021-01-01 | Stop reason: HOSPADM

## 2021-01-01 RX ORDER — FENTANYL CITRATE 50 UG/ML
25-50 INJECTION, SOLUTION INTRAMUSCULAR; INTRAVENOUS EVERY 5 MIN PRN
Status: DISCONTINUED | OUTPATIENT
Start: 2021-01-01 | End: 2021-01-01 | Stop reason: HOSPADM

## 2021-01-01 RX ORDER — LIDOCAINE HYDROCHLORIDE 20 MG/ML
INJECTION, SOLUTION INFILTRATION; PERINEURAL PRN
Status: DISCONTINUED | OUTPATIENT
Start: 2021-01-01 | End: 2021-01-01

## 2021-01-01 RX ORDER — NOREPINEPHRINE BITARTRATE 0.06 MG/ML
.01-.6 INJECTION, SOLUTION INTRAVENOUS CONTINUOUS
Status: DISCONTINUED | OUTPATIENT
Start: 2021-01-01 | End: 2021-01-01

## 2021-01-01 RX ORDER — CEFAZOLIN SODIUM 2 G/100ML
2 INJECTION, SOLUTION INTRAVENOUS
Status: CANCELLED | OUTPATIENT
Start: 2021-01-01

## 2021-01-01 RX ORDER — IOPAMIDOL 755 MG/ML
86 INJECTION, SOLUTION INTRAVASCULAR ONCE
Status: COMPLETED | OUTPATIENT
Start: 2021-01-01 | End: 2021-01-01

## 2021-01-01 RX ORDER — DEXTROSE MONOHYDRATE 25 G/50ML
25-50 INJECTION, SOLUTION INTRAVENOUS
Status: DISCONTINUED | OUTPATIENT
Start: 2021-01-01 | End: 2021-01-01 | Stop reason: HOSPADM

## 2021-01-01 RX ORDER — ONDANSETRON 2 MG/ML
4 INJECTION INTRAMUSCULAR; INTRAVENOUS
Status: DISCONTINUED | OUTPATIENT
Start: 2021-01-01 | End: 2021-01-01 | Stop reason: HOSPADM

## 2021-01-01 RX ORDER — SODIUM CHLORIDE, SODIUM LACTATE, POTASSIUM CHLORIDE, CALCIUM CHLORIDE 600; 310; 30; 20 MG/100ML; MG/100ML; MG/100ML; MG/100ML
INJECTION, SOLUTION INTRAVENOUS CONTINUOUS
Status: DISCONTINUED | OUTPATIENT
Start: 2021-01-01 | End: 2021-01-01 | Stop reason: HOSPADM

## 2021-01-01 RX ORDER — ONDANSETRON 2 MG/ML
4 INJECTION INTRAMUSCULAR; INTRAVENOUS EVERY 30 MIN PRN
Status: DISCONTINUED | OUTPATIENT
Start: 2021-01-01 | End: 2021-01-01 | Stop reason: HOSPADM

## 2021-01-01 RX ORDER — SENNOSIDES A AND B 8.6 MG/1
1 TABLET, FILM COATED ORAL 2 TIMES DAILY PRN
COMMUNITY

## 2021-01-01 RX ORDER — ONDANSETRON 2 MG/ML
4 INJECTION INTRAMUSCULAR; INTRAVENOUS EVERY 6 HOURS PRN
Status: DISCONTINUED | OUTPATIENT
Start: 2021-01-01 | End: 2021-01-01 | Stop reason: HOSPADM

## 2021-01-01 RX ORDER — ONDANSETRON 4 MG/1
4 TABLET, ORALLY DISINTEGRATING ORAL EVERY 6 HOURS PRN
Status: DISCONTINUED | OUTPATIENT
Start: 2021-01-01 | End: 2021-01-01 | Stop reason: HOSPADM

## 2021-01-01 RX ORDER — POTASSIUM CHLORIDE 29.8 MG/ML
20 INJECTION INTRAVENOUS ONCE
Status: COMPLETED | OUTPATIENT
Start: 2021-01-01 | End: 2021-01-01

## 2021-01-01 RX ORDER — PROPOFOL 10 MG/ML
5-75 INJECTION, EMULSION INTRAVENOUS CONTINUOUS
Status: DISCONTINUED | OUTPATIENT
Start: 2021-01-01 | End: 2021-01-01 | Stop reason: DRUGHIGH

## 2021-01-01 RX ORDER — PIPERACILLIN SODIUM, TAZOBACTAM SODIUM 3; .375 G/15ML; G/15ML
3.38 INJECTION, POWDER, LYOPHILIZED, FOR SOLUTION INTRAVENOUS EVERY 6 HOURS
Status: COMPLETED | OUTPATIENT
Start: 2021-01-01 | End: 2021-01-01

## 2021-01-01 RX ORDER — ZINC SULFATE 50(220)MG
220 CAPSULE ORAL DAILY
Status: DISCONTINUED | OUTPATIENT
Start: 2021-01-01 | End: 2021-01-01

## 2021-01-01 RX ORDER — AMINO AC/PROTEIN HYDR/WHEY PRO 10G-100/30
2 LIQUID (ML) ORAL 2 TIMES DAILY
Status: DISCONTINUED | OUTPATIENT
Start: 2021-01-01 | End: 2021-01-01

## 2021-01-01 RX ORDER — MORPHINE SULFATE 10 MG/ML
5 INJECTION, SOLUTION INTRAMUSCULAR; INTRAVENOUS EVERY 10 MIN PRN
Status: CANCELLED | OUTPATIENT
Start: 2021-01-01

## 2021-01-01 RX ORDER — OXYCODONE AND ACETAMINOPHEN 5; 325 MG/1; MG/1
1 TABLET ORAL EVERY 4 HOURS PRN
COMMUNITY

## 2021-01-01 RX ORDER — ONDANSETRON 2 MG/ML
4 INJECTION INTRAMUSCULAR; INTRAVENOUS ONCE
Status: COMPLETED | OUTPATIENT
Start: 2021-01-01 | End: 2021-01-01

## 2021-01-01 RX ORDER — PROPOFOL 10 MG/ML
INJECTION, EMULSION INTRAVENOUS CONTINUOUS PRN
Status: DISCONTINUED | OUTPATIENT
Start: 2021-01-01 | End: 2021-01-01

## 2021-01-01 RX ORDER — ZINC SULFATE 50(220)MG
220 CAPSULE ORAL DAILY
DISCHARGE
Start: 2021-01-01

## 2021-01-01 RX ORDER — SODIUM CHLORIDE, SODIUM LACTATE, POTASSIUM CHLORIDE, CALCIUM CHLORIDE 600; 310; 30; 20 MG/100ML; MG/100ML; MG/100ML; MG/100ML
INJECTION, SOLUTION INTRAVENOUS CONTINUOUS
Status: ACTIVE | OUTPATIENT
Start: 2021-01-01 | End: 2021-01-01

## 2021-01-01 RX ORDER — EPHEDRINE SULFATE 50 MG/ML
INJECTION, SOLUTION INTRAMUSCULAR; INTRAVENOUS; SUBCUTANEOUS PRN
Status: DISCONTINUED | OUTPATIENT
Start: 2021-01-01 | End: 2021-01-01

## 2021-01-01 RX ORDER — METHOCARBAMOL 750 MG/1
750 TABLET, FILM COATED ORAL 4 TIMES DAILY
Status: CANCELLED | OUTPATIENT
Start: 2021-01-01

## 2021-01-01 RX ORDER — METHOCARBAMOL 500 MG/1
500 TABLET, FILM COATED ORAL 4 TIMES DAILY
Status: DISCONTINUED | OUTPATIENT
Start: 2021-01-01 | End: 2021-01-01

## 2021-01-01 RX ORDER — HYDROMORPHONE HYDROCHLORIDE 1 MG/ML
0.3 INJECTION, SOLUTION INTRAMUSCULAR; INTRAVENOUS; SUBCUTANEOUS EVERY 6 HOURS PRN
Status: DISCONTINUED | OUTPATIENT
Start: 2021-01-01 | End: 2021-01-01

## 2021-01-01 RX ORDER — PROPOFOL 10 MG/ML
10 INJECTION, EMULSION INTRAVENOUS CONTINUOUS
Status: DISCONTINUED | OUTPATIENT
Start: 2021-01-01 | End: 2021-01-01

## 2021-01-01 RX ORDER — LORAZEPAM 2 MG/ML
1 INJECTION INTRAMUSCULAR EVERY 10 MIN PRN
Status: CANCELLED | OUTPATIENT
Start: 2021-01-01

## 2021-01-01 RX ORDER — INSULIN GLARGINE 100 [IU]/ML
20 INJECTION, SOLUTION SUBCUTANEOUS AT BEDTIME
COMMUNITY
End: 2021-01-01

## 2021-01-01 RX ORDER — HYDROMORPHONE HYDROCHLORIDE 1 MG/ML
.3-.5 INJECTION, SOLUTION INTRAMUSCULAR; INTRAVENOUS; SUBCUTANEOUS
Status: DISCONTINUED | OUTPATIENT
Start: 2021-01-01 | End: 2021-01-01 | Stop reason: ALTCHOICE

## 2021-01-01 RX ORDER — CEFAZOLIN SODIUM 2 G/100ML
INJECTION, SOLUTION INTRAVENOUS PRN
Status: DISCONTINUED | OUTPATIENT
Start: 2021-01-01 | End: 2021-01-01

## 2021-01-01 RX ORDER — NICOTINE POLACRILEX 4 MG
15-30 LOZENGE BUCCAL
Status: CANCELLED | OUTPATIENT
Start: 2021-01-01

## 2021-01-01 RX ORDER — HYDROMORPHONE HYDROCHLORIDE 1 MG/ML
.3-.5 INJECTION, SOLUTION INTRAMUSCULAR; INTRAVENOUS; SUBCUTANEOUS EVERY 10 MIN PRN
Status: DISCONTINUED | OUTPATIENT
Start: 2021-01-01 | End: 2021-01-01 | Stop reason: HOSPADM

## 2021-01-01 RX ORDER — ACETAMINOPHEN 325 MG/1
975 TABLET ORAL EVERY 6 HOURS PRN
Status: DISCONTINUED | OUTPATIENT
Start: 2021-01-01 | End: 2021-01-01 | Stop reason: HOSPADM

## 2021-01-01 RX ORDER — POTASSIUM CHLORIDE 1500 MG/1
20 TABLET, EXTENDED RELEASE ORAL ONCE
Status: COMPLETED | OUTPATIENT
Start: 2021-01-01 | End: 2021-01-01

## 2021-01-01 RX ORDER — PIPERACILLIN SODIUM, TAZOBACTAM SODIUM 3; .375 G/15ML; G/15ML
3.38 INJECTION, POWDER, LYOPHILIZED, FOR SOLUTION INTRAVENOUS EVERY 8 HOURS
Status: DISCONTINUED | OUTPATIENT
Start: 2021-01-01 | End: 2021-01-01

## 2021-01-01 RX ORDER — SODIUM BICARBONATE 325 MG/1
325 TABLET ORAL
COMMUNITY

## 2021-01-01 RX ORDER — OXYCODONE HCL 5 MG/5 ML
5-10 SOLUTION, ORAL ORAL EVERY 4 HOURS PRN
Status: DISCONTINUED | OUTPATIENT
Start: 2021-01-01 | End: 2021-01-01

## 2021-01-01 RX ORDER — CEFAZOLIN SODIUM 2 G/100ML
2 INJECTION, SOLUTION INTRAVENOUS SEE ADMIN INSTRUCTIONS
Status: CANCELLED | OUTPATIENT
Start: 2021-01-01

## 2021-01-01 RX ORDER — ALBUTEROL SULFATE 0.83 MG/ML
2.5 SOLUTION RESPIRATORY (INHALATION)
Status: DISCONTINUED | OUTPATIENT
Start: 2021-01-01 | End: 2021-01-01 | Stop reason: HOSPADM

## 2021-01-01 RX ORDER — ACETAMINOPHEN 650 MG/1
650 SUPPOSITORY RECTAL EVERY 4 HOURS PRN
Status: DISCONTINUED | OUTPATIENT
Start: 2021-01-01 | End: 2021-01-01

## 2021-01-01 RX ORDER — SODIUM BICARBONATE 325 MG/1
325 TABLET ORAL EVERY 4 HOURS
DISCHARGE
Start: 2021-01-01

## 2021-01-01 RX ORDER — DEXTROSE MONOHYDRATE 100 MG/ML
INJECTION, SOLUTION INTRAVENOUS CONTINUOUS PRN
Status: DISCONTINUED | OUTPATIENT
Start: 2021-01-01 | End: 2021-01-01

## 2021-01-01 RX ORDER — POTASSIUM CHLORIDE 29.8 MG/ML
20 INJECTION INTRAVENOUS ONCE
Status: DISCONTINUED | OUTPATIENT
Start: 2021-01-01 | End: 2021-01-01

## 2021-01-01 RX ORDER — GLYCOPYRROLATE 0.2 MG/ML
INJECTION, SOLUTION INTRAMUSCULAR; INTRAVENOUS PRN
Status: DISCONTINUED | OUTPATIENT
Start: 2021-01-01 | End: 2021-01-01

## 2021-01-01 RX ORDER — ONDANSETRON 4 MG/1
4 TABLET, ORALLY DISINTEGRATING ORAL EVERY 6 HOURS PRN
Status: DISCONTINUED | OUTPATIENT
Start: 2021-01-01 | End: 2021-01-01

## 2021-01-01 RX ORDER — CIPROFLOXACIN 2 MG/ML
400 INJECTION, SOLUTION INTRAVENOUS ONCE
Status: COMPLETED | OUTPATIENT
Start: 2021-01-01 | End: 2021-01-01

## 2021-01-01 RX ORDER — FENTANYL CITRATE 50 UG/ML
25 INJECTION, SOLUTION INTRAMUSCULAR; INTRAVENOUS EVERY 30 MIN PRN
Status: DISCONTINUED | OUTPATIENT
Start: 2021-01-01 | End: 2021-01-01

## 2021-01-01 RX ORDER — PROCHLORPERAZINE MALEATE 5 MG
5 TABLET ORAL EVERY 6 HOURS PRN
Status: DISCONTINUED | OUTPATIENT
Start: 2021-01-01 | End: 2021-01-01 | Stop reason: HOSPADM

## 2021-01-01 RX ORDER — NICOTINE POLACRILEX 4 MG
15-30 LOZENGE BUCCAL
Status: DISCONTINUED | OUTPATIENT
Start: 2021-01-01 | End: 2021-01-01

## 2021-01-01 RX ORDER — ONDANSETRON 4 MG/1
4 TABLET, ORALLY DISINTEGRATING ORAL EVERY 30 MIN PRN
Status: DISCONTINUED | OUTPATIENT
Start: 2021-01-01 | End: 2021-01-01 | Stop reason: HOSPADM

## 2021-01-01 RX ORDER — DEXTROSE MONOHYDRATE 100 MG/ML
INJECTION, SOLUTION INTRAVENOUS CONTINUOUS PRN
Status: DISCONTINUED | OUTPATIENT
Start: 2021-01-01 | End: 2021-01-01 | Stop reason: HOSPADM

## 2021-01-01 RX ORDER — MORPHINE SULFATE 10 MG/ML
5 INJECTION, SOLUTION INTRAMUSCULAR; INTRAVENOUS EVERY 10 MIN PRN
Status: DISCONTINUED | OUTPATIENT
Start: 2021-01-01 | End: 2021-01-01 | Stop reason: ALTCHOICE

## 2021-01-01 RX ORDER — OXYCODONE AND ACETAMINOPHEN 5; 325 MG/1; MG/1
TABLET ORAL
COMMUNITY
Start: 2021-01-01

## 2021-01-01 RX ORDER — GLYCOPYRROLATE 0.2 MG/ML
0.1 INJECTION, SOLUTION INTRAMUSCULAR; INTRAVENOUS EVERY 4 HOURS PRN
Status: CANCELLED | OUTPATIENT
Start: 2021-01-01

## 2021-01-01 RX ORDER — NALOXONE HYDROCHLORIDE 0.4 MG/ML
0.4 INJECTION, SOLUTION INTRAMUSCULAR; INTRAVENOUS; SUBCUTANEOUS
Status: CANCELLED | OUTPATIENT
Start: 2021-01-01

## 2021-01-01 RX ORDER — ONDANSETRON 4 MG/1
4 TABLET, ORALLY DISINTEGRATING ORAL EVERY 6 HOURS PRN
Status: DISCONTINUED | OUTPATIENT
Start: 2021-01-01 | End: 2021-01-01 | Stop reason: CLARIF

## 2021-01-01 RX ORDER — IOPAMIDOL 755 MG/ML
91 INJECTION, SOLUTION INTRAVASCULAR ONCE
Status: COMPLETED | OUTPATIENT
Start: 2021-01-01 | End: 2021-01-01

## 2021-01-01 RX ORDER — ZINC SULFATE 50(220)MG
220 CAPSULE ORAL DAILY
Status: DISCONTINUED | OUTPATIENT
Start: 2021-01-01 | End: 2021-01-01 | Stop reason: HOSPADM

## 2021-01-01 RX ORDER — HEPARIN SODIUM,PORCINE 10 UNIT/ML
5-20 VIAL (ML) INTRAVENOUS EVERY 24 HOURS
Status: DISCONTINUED | OUTPATIENT
Start: 2021-01-01 | End: 2021-01-01 | Stop reason: HOSPADM

## 2021-01-01 RX ORDER — SODIUM BICARBONATE 325 MG/1
325 TABLET ORAL EVERY 4 HOURS
Status: DISCONTINUED | OUTPATIENT
Start: 2021-01-01 | End: 2021-01-01 | Stop reason: HOSPADM

## 2021-01-01 RX ORDER — IOPAMIDOL 755 MG/ML
89 INJECTION, SOLUTION INTRAVASCULAR ONCE
Status: COMPLETED | OUTPATIENT
Start: 2021-01-01 | End: 2021-01-01

## 2021-01-01 RX ORDER — DIPHENHYDRAMINE HYDROCHLORIDE AND LIDOCAINE HYDROCHLORIDE AND ALUMINUM HYDROXIDE AND MAGNESIUM HYDRO
10 KIT EVERY 6 HOURS PRN
Status: DISCONTINUED | OUTPATIENT
Start: 2021-01-01 | End: 2021-01-01 | Stop reason: HOSPADM

## 2021-01-01 RX ORDER — DEXTROSE, SODIUM CHLORIDE, AND POTASSIUM CHLORIDE 5; .2; .15 G/100ML; G/100ML; G/100ML
INJECTION INTRAVENOUS CONTINUOUS
Status: DISCONTINUED | OUTPATIENT
Start: 2021-01-01 | End: 2021-01-01

## 2021-01-01 RX ORDER — NALOXONE HYDROCHLORIDE 0.4 MG/ML
0.2 INJECTION, SOLUTION INTRAMUSCULAR; INTRAVENOUS; SUBCUTANEOUS
Status: CANCELLED | OUTPATIENT
Start: 2021-01-01

## 2021-01-01 RX ORDER — ETOMIDATE 2 MG/ML
INJECTION INTRAVENOUS PRN
Status: DISCONTINUED | OUTPATIENT
Start: 2021-01-01 | End: 2021-01-01

## 2021-01-01 RX ORDER — MAGNESIUM SULFATE HEPTAHYDRATE 40 MG/ML
2 INJECTION, SOLUTION INTRAVENOUS ONCE
Status: COMPLETED | OUTPATIENT
Start: 2021-01-01 | End: 2021-01-01

## 2021-01-01 RX ORDER — MORPHINE SULFATE 4 MG/ML
5 INJECTION, SOLUTION INTRAMUSCULAR; INTRAVENOUS EVERY 10 MIN PRN
Status: DISCONTINUED | OUTPATIENT
Start: 2021-01-01 | End: 2021-01-01

## 2021-01-01 RX ORDER — OXYCODONE HCL 5 MG/5 ML
5-10 SOLUTION, ORAL ORAL
Status: DISCONTINUED | OUTPATIENT
Start: 2021-01-01 | End: 2021-01-01 | Stop reason: ALTCHOICE

## 2021-01-01 RX ORDER — FENTANYL CITRATE 50 UG/ML
0.5 INJECTION, SOLUTION INTRAMUSCULAR; INTRAVENOUS ONCE
Status: DISCONTINUED | OUTPATIENT
Start: 2021-01-01 | End: 2021-01-01 | Stop reason: CLARIF

## 2021-01-01 RX ORDER — MEPERIDINE HYDROCHLORIDE 25 MG/ML
12.5 INJECTION INTRAMUSCULAR; INTRAVENOUS; SUBCUTANEOUS
Status: DISCONTINUED | OUTPATIENT
Start: 2021-01-01 | End: 2021-01-01 | Stop reason: HOSPADM

## 2021-01-01 RX ORDER — HEPARIN SODIUM,PORCINE 10 UNIT/ML
5-20 VIAL (ML) INTRAVENOUS
Status: DISCONTINUED | OUTPATIENT
Start: 2021-01-01 | End: 2021-01-01

## 2021-01-01 RX ORDER — LIDOCAINE 4 G/G
1 PATCH TOPICAL
Status: DISCONTINUED | OUTPATIENT
Start: 2021-01-01 | End: 2021-01-01 | Stop reason: HOSPADM

## 2021-01-01 RX ORDER — IOPAMIDOL 755 MG/ML
80 INJECTION, SOLUTION INTRAVASCULAR ONCE
Status: COMPLETED | OUTPATIENT
Start: 2021-01-01 | End: 2021-01-01

## 2021-01-01 RX ORDER — DEXAMETHASONE SODIUM PHOSPHATE 4 MG/ML
INJECTION, SOLUTION INTRA-ARTICULAR; INTRALESIONAL; INTRAMUSCULAR; INTRAVENOUS; SOFT TISSUE PRN
Status: DISCONTINUED | OUTPATIENT
Start: 2021-01-01 | End: 2021-01-01

## 2021-01-01 RX ORDER — DEXTROSE MONOHYDRATE 25 G/50ML
25-50 INJECTION, SOLUTION INTRAVENOUS
Status: CANCELLED | OUTPATIENT
Start: 2021-01-01

## 2021-01-01 RX ORDER — ALBUMIN, HUMAN INJ 5% 5 %
SOLUTION INTRAVENOUS CONTINUOUS PRN
Status: DISCONTINUED | OUTPATIENT
Start: 2021-01-01 | End: 2021-01-01

## 2021-01-01 RX ORDER — PIPERACILLIN SODIUM, TAZOBACTAM SODIUM 4; .5 G/20ML; G/20ML
4.5 INJECTION, POWDER, LYOPHILIZED, FOR SOLUTION INTRAVENOUS ONCE
Status: DISCONTINUED | OUTPATIENT
Start: 2021-01-01 | End: 2021-01-01

## 2021-01-01 RX ORDER — HEPARIN SODIUM 200 [USP'U]/100ML
1 INJECTION, SOLUTION INTRAVENOUS CONTINUOUS PRN
Status: DISCONTINUED | OUTPATIENT
Start: 2021-01-01 | End: 2021-01-01 | Stop reason: HOSPADM

## 2021-01-01 RX ORDER — IOPAMIDOL 755 MG/ML
65 INJECTION, SOLUTION INTRAVASCULAR ONCE
Status: COMPLETED | OUTPATIENT
Start: 2021-01-01 | End: 2021-01-01

## 2021-01-01 RX ORDER — GABAPENTIN 250 MG/5ML
300 SOLUTION ORAL EVERY 12 HOURS SCHEDULED
Status: DISCONTINUED | OUTPATIENT
Start: 2021-01-01 | End: 2021-01-01

## 2021-01-01 RX ORDER — SODIUM CHLORIDE, SODIUM LACTATE, POTASSIUM CHLORIDE, CALCIUM CHLORIDE 600; 310; 30; 20 MG/100ML; MG/100ML; MG/100ML; MG/100ML
INJECTION, SOLUTION INTRAVENOUS CONTINUOUS
Status: CANCELLED | OUTPATIENT
Start: 2021-01-01

## 2021-01-01 RX ORDER — ACETAMINOPHEN 500 MG
500 TABLET ORAL EVERY 6 HOURS PRN
Status: DISCONTINUED | OUTPATIENT
Start: 2021-01-01 | End: 2021-01-01 | Stop reason: HOSPADM

## 2021-01-01 RX ORDER — OXYCODONE HYDROCHLORIDE 5 MG/1
5 TABLET ORAL EVERY 6 HOURS PRN
Status: DISCONTINUED | OUTPATIENT
Start: 2021-01-01 | End: 2021-01-01 | Stop reason: HOSPADM

## 2021-01-01 RX ORDER — POTASSIUM CHLORIDE 7.45 MG/ML
10 INJECTION INTRAVENOUS ONCE
Status: COMPLETED | OUTPATIENT
Start: 2021-01-01 | End: 2021-01-01

## 2021-01-01 RX ORDER — CARBOXYMETHYLCELLULOSE SODIUM 5 MG/ML
1 SOLUTION/ DROPS OPHTHALMIC EVERY 8 HOURS PRN
Status: DISCONTINUED | OUTPATIENT
Start: 2021-01-01 | End: 2021-01-01 | Stop reason: HOSPADM

## 2021-01-01 RX ORDER — ONDANSETRON 4 MG/1
4 TABLET, ORALLY DISINTEGRATING ORAL EVERY 8 HOURS PRN
COMMUNITY

## 2021-01-01 RX ORDER — PEDIATRIC MULTIVIT 61/D3/VIT K 1500-800
1 CAPSULE ORAL DAILY
DISCHARGE
Start: 2021-01-01

## 2021-01-01 RX ORDER — MORPHINE SULFATE 10 MG/ML
5 INJECTION, SOLUTION INTRAMUSCULAR; INTRAVENOUS EVERY 30 MIN PRN
Status: DISCONTINUED | OUTPATIENT
Start: 2021-01-01 | End: 2021-01-01 | Stop reason: ALTCHOICE

## 2021-01-01 RX ORDER — METRONIDAZOLE 500 MG/100ML
1000 INJECTION, SOLUTION INTRAVENOUS ONCE
Status: DISCONTINUED | OUTPATIENT
Start: 2021-01-01 | End: 2021-01-01 | Stop reason: CLARIF

## 2021-01-01 RX ORDER — POTASSIUM CHLORIDE 1.5 G/1.58G
20 POWDER, FOR SOLUTION ORAL ONCE
Status: COMPLETED | OUTPATIENT
Start: 2021-01-01 | End: 2021-01-01

## 2021-01-01 RX ORDER — ONDANSETRON 2 MG/ML
4 INJECTION INTRAMUSCULAR; INTRAVENOUS EVERY 30 MIN PRN
Status: CANCELLED | OUTPATIENT
Start: 2021-01-01

## 2021-01-01 RX ORDER — INSULIN GLARGINE 100 [IU]/ML
20 INJECTION, SOLUTION SUBCUTANEOUS AT BEDTIME
Status: ON HOLD | COMMUNITY
Start: 2021-01-01 | End: 2021-01-01

## 2021-01-01 RX ORDER — PROCHLORPERAZINE 25 MG
12.5 SUPPOSITORY, RECTAL RECTAL EVERY 12 HOURS PRN
Status: DISCONTINUED | OUTPATIENT
Start: 2021-01-01 | End: 2021-01-01 | Stop reason: CLARIF

## 2021-01-01 RX ORDER — CHOLESTYRAMINE 4 G/9G
1 POWDER, FOR SUSPENSION ORAL DAILY
DISCHARGE
Start: 2021-01-01

## 2021-01-01 RX ORDER — DEXTROSE MONOHYDRATE, SODIUM CHLORIDE, AND POTASSIUM CHLORIDE 50; 1.49; 4.5 G/1000ML; G/1000ML; G/1000ML
INJECTION, SOLUTION INTRAVENOUS CONTINUOUS
Status: DISCONTINUED | OUTPATIENT
Start: 2021-01-01 | End: 2021-01-01

## 2021-01-01 RX ORDER — PHENYLEPHRINE HCL IN 0.9% NACL 50MG/250ML
.1-6 PLASTIC BAG, INJECTION (ML) INTRAVENOUS CONTINUOUS
Status: DISCONTINUED | OUTPATIENT
Start: 2021-01-01 | End: 2021-01-01

## 2021-01-01 RX ORDER — ALBUTEROL SULFATE 0.83 MG/ML
2.5 SOLUTION RESPIRATORY (INHALATION) EVERY 4 HOURS PRN
Status: DISCONTINUED | OUTPATIENT
Start: 2021-01-01 | End: 2021-01-01 | Stop reason: HOSPADM

## 2021-01-01 RX ORDER — MORPHINE SULFATE 10 MG/ML
5 INJECTION, SOLUTION INTRAMUSCULAR; INTRAVENOUS EVERY 30 MIN PRN
Status: CANCELLED | OUTPATIENT
Start: 2021-01-01

## 2021-01-01 RX ORDER — CHOLESTYRAMINE 4 G/9G
1 POWDER, FOR SUSPENSION ORAL DAILY
Status: DISCONTINUED | OUTPATIENT
Start: 2021-01-01 | End: 2021-01-01 | Stop reason: HOSPADM

## 2021-01-01 RX ORDER — OXYCODONE HYDROCHLORIDE 5 MG/1
5 TABLET ORAL EVERY 4 HOURS PRN
Status: DISCONTINUED | OUTPATIENT
Start: 2021-01-01 | End: 2021-01-01 | Stop reason: HOSPADM

## 2021-01-01 RX ORDER — ACETAMINOPHEN 325 MG/1
650 TABLET ORAL EVERY 6 HOURS PRN
Status: DISCONTINUED | OUTPATIENT
Start: 2021-01-01 | End: 2021-01-01 | Stop reason: HOSPADM

## 2021-01-01 RX ORDER — DEXTROSE MONOHYDRATE 50 MG/ML
INJECTION, SOLUTION INTRAVENOUS CONTINUOUS
Status: DISCONTINUED | OUTPATIENT
Start: 2021-01-01 | End: 2021-01-01

## 2021-01-01 RX ORDER — GLYCOPYRROLATE 0.2 MG/ML
0.1 INJECTION, SOLUTION INTRAMUSCULAR; INTRAVENOUS EVERY 4 HOURS PRN
Status: DISCONTINUED | OUTPATIENT
Start: 2021-01-01 | End: 2021-01-01

## 2021-01-01 RX ORDER — FENTANYL CITRATE 50 UG/ML
50-100 INJECTION, SOLUTION INTRAMUSCULAR; INTRAVENOUS
Status: DISCONTINUED | OUTPATIENT
Start: 2021-01-01 | End: 2021-01-01

## 2021-01-01 RX ORDER — METRONIDAZOLE 500 MG/100ML
1000 INJECTION, SOLUTION INTRAVENOUS ONCE
Status: COMPLETED | OUTPATIENT
Start: 2021-01-01 | End: 2021-01-01

## 2021-01-01 RX ORDER — ACETAMINOPHEN 325 MG/10.15ML
975 LIQUID ORAL EVERY 6 HOURS
Status: DISCONTINUED | OUTPATIENT
Start: 2021-01-01 | End: 2021-01-01

## 2021-01-01 RX ORDER — FENTANYL CITRATE 50 UG/ML
25-50 INJECTION, SOLUTION INTRAMUSCULAR; INTRAVENOUS
Status: CANCELLED | OUTPATIENT
Start: 2021-01-01

## 2021-01-01 RX ORDER — CALCIUM CHLORIDE 100 MG/ML
INJECTION INTRAVENOUS; INTRAVENTRICULAR PRN
Status: DISCONTINUED | OUTPATIENT
Start: 2021-01-01 | End: 2021-01-01

## 2021-01-01 RX ORDER — LORAZEPAM 2 MG/ML
1 INJECTION INTRAMUSCULAR EVERY 10 MIN PRN
Status: DISCONTINUED | OUTPATIENT
Start: 2021-01-01 | End: 2021-01-01

## 2021-01-01 RX ORDER — INDOMETHACIN 50 MG/1
100 SUPPOSITORY RECTAL
Status: DISCONTINUED | OUTPATIENT
Start: 2021-01-01 | End: 2021-01-01 | Stop reason: HOSPADM

## 2021-01-01 RX ORDER — HYDROMORPHONE HYDROCHLORIDE 1 MG/ML
.3-.5 INJECTION, SOLUTION INTRAMUSCULAR; INTRAVENOUS; SUBCUTANEOUS EVERY 5 MIN PRN
Status: CANCELLED | OUTPATIENT
Start: 2021-01-01

## 2021-01-01 RX ORDER — HEPARIN SODIUM 200 [USP'U]/100ML
1 INJECTION, SOLUTION INTRAVENOUS CONTINUOUS PRN
Status: CANCELLED | OUTPATIENT
Start: 2021-01-01

## 2021-01-01 RX ORDER — ONDANSETRON 2 MG/ML
4 INJECTION INTRAMUSCULAR; INTRAVENOUS EVERY 6 HOURS PRN
Status: DISCONTINUED | OUTPATIENT
Start: 2021-01-01 | End: 2021-01-01 | Stop reason: CLARIF

## 2021-01-01 RX ORDER — METHOCARBAMOL 100 MG/ML
500 INJECTION, SOLUTION INTRAMUSCULAR; INTRAVENOUS EVERY 6 HOURS PRN
Status: DISCONTINUED | OUTPATIENT
Start: 2021-01-01 | End: 2021-01-01

## 2021-01-01 RX ORDER — SIMETHICONE 40MG/0.6ML
SUSPENSION, DROPS(FINAL DOSAGE FORM)(ML) ORAL PRN
Status: COMPLETED | OUTPATIENT
Start: 2021-01-01 | End: 2021-01-01

## 2021-01-01 RX ORDER — IODIXANOL 320 MG/ML
150 INJECTION, SOLUTION INTRAVASCULAR ONCE
Status: COMPLETED | OUTPATIENT
Start: 2021-01-01 | End: 2021-01-01

## 2021-01-01 RX ORDER — FENTANYL CITRATE 50 UG/ML
50 INJECTION, SOLUTION INTRAMUSCULAR; INTRAVENOUS
Status: DISCONTINUED | OUTPATIENT
Start: 2021-01-01 | End: 2021-01-01 | Stop reason: HOSPADM

## 2021-01-01 RX ORDER — PROPOFOL 10 MG/ML
5-75 INJECTION, EMULSION INTRAVENOUS CONTINUOUS
Status: DISCONTINUED | OUTPATIENT
Start: 2021-01-01 | End: 2021-01-01

## 2021-01-01 RX ORDER — GLYCOPYRROLATE 0.2 MG/ML
0.2 INJECTION, SOLUTION INTRAMUSCULAR; INTRAVENOUS ONCE
Status: DISCONTINUED | OUTPATIENT
Start: 2021-01-01 | End: 2021-01-01 | Stop reason: CLARIF

## 2021-01-01 RX ORDER — DEXTROSE, SODIUM CHLORIDE, SODIUM LACTATE, POTASSIUM CHLORIDE, AND CALCIUM CHLORIDE 5; .6; .31; .03; .02 G/100ML; G/100ML; G/100ML; G/100ML; G/100ML
INJECTION, SOLUTION INTRAVENOUS CONTINUOUS
Status: DISCONTINUED | OUTPATIENT
Start: 2021-01-01 | End: 2021-01-01

## 2021-01-01 RX ORDER — METHOCARBAMOL 100 MG/ML
500 INJECTION, SOLUTION INTRAMUSCULAR; INTRAVENOUS 4 TIMES DAILY
Status: COMPLETED | OUTPATIENT
Start: 2021-01-01 | End: 2021-01-01

## 2021-01-01 RX ADMIN — OXYCODONE HYDROCHLORIDE 5 MG: 5 TABLET ORAL at 19:49

## 2021-01-01 RX ADMIN — PIPERACILLIN AND TAZOBACTAM 3.38 G: 3; .375 INJECTION, POWDER, FOR SOLUTION INTRAVENOUS at 17:01

## 2021-01-01 RX ADMIN — FENTANYL CITRATE 100 MCG: 50 INJECTION, SOLUTION INTRAMUSCULAR; INTRAVENOUS at 07:36

## 2021-01-01 RX ADMIN — PANTOPRAZOLE SODIUM 40 MG: 40 INJECTION, POWDER, FOR SOLUTION INTRAVENOUS at 08:44

## 2021-01-01 RX ADMIN — POTASSIUM CHLORIDE 10 MEQ: 7.46 INJECTION, SOLUTION INTRAVENOUS at 05:26

## 2021-01-01 RX ADMIN — PANTOPRAZOLE SODIUM 40 MG: 40 INJECTION, POWDER, FOR SOLUTION INTRAVENOUS at 19:55

## 2021-01-01 RX ADMIN — ONDANSETRON 4 MG: 2 INJECTION INTRAMUSCULAR; INTRAVENOUS at 07:09

## 2021-01-01 RX ADMIN — PHENYLEPHRINE HYDROCHLORIDE 100 MCG: 10 INJECTION INTRAVENOUS at 07:42

## 2021-01-01 RX ADMIN — DEXTROSE MONOHYDRATE 25 ML: 500 INJECTION PARENTERAL at 20:27

## 2021-01-01 RX ADMIN — OXYCODONE HYDROCHLORIDE 10 MG: 5 SOLUTION ORAL at 06:02

## 2021-01-01 RX ADMIN — POTASSIUM CHLORIDE: 2 INJECTION, SOLUTION, CONCENTRATE INTRAVENOUS at 20:17

## 2021-01-01 RX ADMIN — HUMAN INSULIN 1.5 UNITS/HR: 100 INJECTION, SOLUTION SUBCUTANEOUS at 15:25

## 2021-01-01 RX ADMIN — PHENYLEPHRINE HYDROCHLORIDE 100 MCG: 10 INJECTION INTRAVENOUS at 10:23

## 2021-01-01 RX ADMIN — PHENYLEPHRINE HYDROCHLORIDE 200 MCG: 10 INJECTION INTRAVENOUS at 07:49

## 2021-01-01 RX ADMIN — ONDANSETRON 4 MG: 4 TABLET, ORALLY DISINTEGRATING ORAL at 23:44

## 2021-01-01 RX ADMIN — CHOLESTYRAMINE 4 G: 4 POWDER, FOR SUSPENSION ORAL at 07:52

## 2021-01-01 RX ADMIN — Medication 2 PACKET: at 20:15

## 2021-01-01 RX ADMIN — SODIUM CHLORIDE, POTASSIUM CHLORIDE, SODIUM LACTATE AND CALCIUM CHLORIDE 1000 ML: 600; 310; 30; 20 INJECTION, SOLUTION INTRAVENOUS at 07:46

## 2021-01-01 RX ADMIN — CHOLESTYRAMINE 4 G: 4 POWDER, FOR SUSPENSION ORAL at 09:43

## 2021-01-01 RX ADMIN — SODIUM BICARBONATE 325 MG: 325 TABLET ORAL at 08:35

## 2021-01-01 RX ADMIN — Medication 12.5 MCG: at 04:17

## 2021-01-01 RX ADMIN — PHENYLEPHRINE HYDROCHLORIDE 200 MCG: 10 INJECTION INTRAVENOUS at 15:17

## 2021-01-01 RX ADMIN — ENOXAPARIN SODIUM 40 MG: 40 INJECTION SUBCUTANEOUS at 08:48

## 2021-01-01 RX ADMIN — SODIUM BICARBONATE 325 MG: 325 TABLET ORAL at 10:38

## 2021-01-01 RX ADMIN — ONDANSETRON 4 MG: 4 TABLET, ORALLY DISINTEGRATING ORAL at 14:55

## 2021-01-01 RX ADMIN — PANTOPRAZOLE SODIUM 40 MG: 40 INJECTION, POWDER, FOR SOLUTION INTRAVENOUS at 19:33

## 2021-01-01 RX ADMIN — ONDANSETRON 4 MG: 2 INJECTION INTRAMUSCULAR; INTRAVENOUS at 12:53

## 2021-01-01 RX ADMIN — PHENYLEPHRINE HYDROCHLORIDE 100 MCG: 10 INJECTION INTRAVENOUS at 13:34

## 2021-01-01 RX ADMIN — PIPERACILLIN AND TAZOBACTAM 3.38 G: 3; .375 INJECTION, POWDER, FOR SOLUTION INTRAVENOUS at 22:38

## 2021-01-01 RX ADMIN — ONDANSETRON 4 MG: 2 INJECTION INTRAMUSCULAR; INTRAVENOUS at 08:43

## 2021-01-01 RX ADMIN — ENOXAPARIN SODIUM 40 MG: 40 INJECTION SUBCUTANEOUS at 08:52

## 2021-01-01 RX ADMIN — ENOXAPARIN SODIUM 40 MG: 40 INJECTION SUBCUTANEOUS at 08:35

## 2021-01-01 RX ADMIN — SODIUM CHLORIDE, POTASSIUM CHLORIDE, SODIUM LACTATE AND CALCIUM CHLORIDE: 600; 310; 30; 20 INJECTION, SOLUTION INTRAVENOUS at 13:54

## 2021-01-01 RX ADMIN — PANCRELIPASE 2 CAPSULE: 24000; 76000; 120000 CAPSULE, DELAYED RELEASE PELLETS ORAL at 19:50

## 2021-01-01 RX ADMIN — POTASSIUM CHLORIDE 10 MEQ: 7.46 INJECTION, SOLUTION INTRAVENOUS at 05:13

## 2021-01-01 RX ADMIN — SODIUM BICARBONATE 325 MG: 325 TABLET ORAL at 20:37

## 2021-01-01 RX ADMIN — INSULIN ASPART 1 UNITS: 100 INJECTION, SOLUTION INTRAVENOUS; SUBCUTANEOUS at 20:37

## 2021-01-01 RX ADMIN — Medication 2 PACKET: at 21:03

## 2021-01-01 RX ADMIN — POTASSIUM CHLORIDE 10 MEQ: 7.46 INJECTION, SOLUTION INTRAVENOUS at 21:27

## 2021-01-01 RX ADMIN — INSULIN ASPART 1 UNITS: 100 INJECTION, SOLUTION INTRAVENOUS; SUBCUTANEOUS at 04:15

## 2021-01-01 RX ADMIN — PANCRELIPASE 1 CAPSULE: 24000; 76000; 120000 CAPSULE, DELAYED RELEASE PELLETS ORAL at 13:00

## 2021-01-01 RX ADMIN — INSULIN ASPART 3 UNITS: 100 INJECTION, SOLUTION INTRAVENOUS; SUBCUTANEOUS at 00:29

## 2021-01-01 RX ADMIN — SODIUM CHLORIDE, POTASSIUM CHLORIDE, SODIUM LACTATE AND CALCIUM CHLORIDE: 600; 310; 30; 20 INJECTION, SOLUTION INTRAVENOUS at 13:24

## 2021-01-01 RX ADMIN — I.V. FAT EMULSION 250 ML: 20 EMULSION INTRAVENOUS at 20:34

## 2021-01-01 RX ADMIN — SODIUM BICARBONATE 325 MG: 325 TABLET ORAL at 08:01

## 2021-01-01 RX ADMIN — HYDROMORPHONE HYDROCHLORIDE 0.5 MG: 1 INJECTION, SOLUTION INTRAMUSCULAR; INTRAVENOUS; SUBCUTANEOUS at 22:54

## 2021-01-01 RX ADMIN — Medication 5 MG: at 13:31

## 2021-01-01 RX ADMIN — ONDANSETRON 4 MG: 4 TABLET, ORALLY DISINTEGRATING ORAL at 18:50

## 2021-01-01 RX ADMIN — ZINC SULFATE 220 MG (50 MG) CAPSULE 220 MG: CAPSULE at 08:22

## 2021-01-01 RX ADMIN — OXYCODONE HYDROCHLORIDE 10 MG: 5 SOLUTION ORAL at 23:59

## 2021-01-01 RX ADMIN — PANTOPRAZOLE SODIUM 40 MG: 40 INJECTION, POWDER, FOR SOLUTION INTRAVENOUS at 07:59

## 2021-01-01 RX ADMIN — Medication 0.03 MCG/KG/MIN: at 23:18

## 2021-01-01 RX ADMIN — FENTANYL CITRATE 25 MCG: 50 INJECTION, SOLUTION INTRAMUSCULAR; INTRAVENOUS at 09:05

## 2021-01-01 RX ADMIN — SODIUM CHLORIDE, SODIUM LACTATE, POTASSIUM CHLORIDE, CALCIUM CHLORIDE: 600; 310; 30; 20 INJECTION, SOLUTION INTRAVENOUS at 23:26

## 2021-01-01 RX ADMIN — ONDANSETRON 4 MG: 4 TABLET, ORALLY DISINTEGRATING ORAL at 15:47

## 2021-01-01 RX ADMIN — ROCURONIUM BROMIDE 30 MG: 10 INJECTION INTRAVENOUS at 16:12

## 2021-01-01 RX ADMIN — PANCRELIPASE 1 CAPSULE: 24000; 76000; 120000 CAPSULE, DELAYED RELEASE PELLETS ORAL at 19:51

## 2021-01-01 RX ADMIN — GABAPENTIN 300 MG: 250 SOLUTION ORAL at 08:23

## 2021-01-01 RX ADMIN — PHENYLEPHRINE HYDROCHLORIDE 100 MCG: 10 INJECTION INTRAVENOUS at 07:37

## 2021-01-01 RX ADMIN — SODIUM BICARBONATE 325 MG: 325 TABLET ORAL at 00:58

## 2021-01-01 RX ADMIN — PANCRELIPASE 3 CAPSULE: 24000; 76000; 120000 CAPSULE, DELAYED RELEASE PELLETS ORAL at 17:46

## 2021-01-01 RX ADMIN — HYDROMORPHONE HYDROCHLORIDE 0.5 MG: 1 INJECTION, SOLUTION INTRAMUSCULAR; INTRAVENOUS; SUBCUTANEOUS at 01:18

## 2021-01-01 RX ADMIN — OXYCODONE HYDROCHLORIDE 10 MG: 5 SOLUTION ORAL at 00:36

## 2021-01-01 RX ADMIN — PHENYLEPHRINE HYDROCHLORIDE 100 MCG: 10 INJECTION INTRAVENOUS at 10:59

## 2021-01-01 RX ADMIN — OXYCODONE HYDROCHLORIDE 5 MG: 5 TABLET ORAL at 03:32

## 2021-01-01 RX ADMIN — IOPAMIDOL 80 ML: 755 INJECTION, SOLUTION INTRAVENOUS at 06:06

## 2021-01-01 RX ADMIN — PHENYLEPHRINE HYDROCHLORIDE 100 MCG: 10 INJECTION INTRAVENOUS at 10:48

## 2021-01-01 RX ADMIN — NOREPINEPHRINE BITARTRATE 6.4 MCG: 1 INJECTION, SOLUTION, CONCENTRATE INTRAVENOUS at 00:09

## 2021-01-01 RX ADMIN — PANCRELIPASE 2 CAPSULE: 24000; 76000; 120000 CAPSULE, DELAYED RELEASE PELLETS ORAL at 13:32

## 2021-01-01 RX ADMIN — INSULIN HUMAN 10 UNITS: 100 INJECTION, SUSPENSION SUBCUTANEOUS at 06:14

## 2021-01-01 RX ADMIN — PANTOPRAZOLE SODIUM 40 MG: 40 INJECTION, POWDER, FOR SOLUTION INTRAVENOUS at 20:54

## 2021-01-01 RX ADMIN — PROPOFOL 30 MCG/KG/MIN: 10 INJECTION, EMULSION INTRAVENOUS at 03:53

## 2021-01-01 RX ADMIN — OXYCODONE HYDROCHLORIDE 10 MG: 5 SOLUTION ORAL at 10:28

## 2021-01-01 RX ADMIN — HYDROMORPHONE HYDROCHLORIDE 0.5 MG: 1 INJECTION, SOLUTION INTRAMUSCULAR; INTRAVENOUS; SUBCUTANEOUS at 09:53

## 2021-01-01 RX ADMIN — SODIUM CHLORIDE, POTASSIUM CHLORIDE, SODIUM LACTATE AND CALCIUM CHLORIDE 1000 ML: 600; 310; 30; 20 INJECTION, SOLUTION INTRAVENOUS at 06:59

## 2021-01-01 RX ADMIN — SODIUM CHLORIDE, POTASSIUM CHLORIDE, SODIUM LACTATE AND CALCIUM CHLORIDE: 600; 310; 30; 20 INJECTION, SOLUTION INTRAVENOUS at 10:25

## 2021-01-01 RX ADMIN — IOPAMIDOL 86 ML: 755 INJECTION, SOLUTION INTRAVENOUS at 13:18

## 2021-01-01 RX ADMIN — HYDROMORPHONE HYDROCHLORIDE 0.5 MG: 1 INJECTION, SOLUTION INTRAMUSCULAR; INTRAVENOUS; SUBCUTANEOUS at 03:11

## 2021-01-01 RX ADMIN — ROCURONIUM BROMIDE 100 MG: 10 INJECTION INTRAVENOUS at 09:46

## 2021-01-01 RX ADMIN — SUGAMMADEX 200 MG: 100 INJECTION, SOLUTION INTRAVENOUS at 16:27

## 2021-01-01 RX ADMIN — SODIUM BICARBONATE 325 MG: 325 TABLET ORAL at 01:29

## 2021-01-01 RX ADMIN — PHENYLEPHRINE HYDROCHLORIDE 200 MCG: 10 INJECTION INTRAVENOUS at 09:55

## 2021-01-01 RX ADMIN — POTASSIUM & SODIUM PHOSPHATES POWDER PACK 280-160-250 MG 1 PACKET: 280-160-250 PACK at 08:52

## 2021-01-01 RX ADMIN — ACETAMINOPHEN 975 MG: 325 TABLET, FILM COATED ORAL at 01:07

## 2021-01-01 RX ADMIN — OXYCODONE HYDROCHLORIDE 5 MG: 5 TABLET ORAL at 08:22

## 2021-01-01 RX ADMIN — ONDANSETRON 4 MG: 2 INJECTION INTRAMUSCULAR; INTRAVENOUS at 06:42

## 2021-01-01 RX ADMIN — OXYCODONE HYDROCHLORIDE 10 MG: 5 SOLUTION ORAL at 15:51

## 2021-01-01 RX ADMIN — Medication 50 MCG/HR: at 16:38

## 2021-01-01 RX ADMIN — METHOCARBAMOL 500 MG: 100 INJECTION, SOLUTION INTRAMUSCULAR; INTRAVENOUS at 17:10

## 2021-01-01 RX ADMIN — METHOCARBAMOL 500 MG: 500 TABLET ORAL at 08:52

## 2021-01-01 RX ADMIN — METRONIDAZOLE 1000 MG: 500 INJECTION, SOLUTION INTRAVENOUS at 08:25

## 2021-01-01 RX ADMIN — HYDROMORPHONE HYDROCHLORIDE 0.5 MG: 1 INJECTION, SOLUTION INTRAMUSCULAR; INTRAVENOUS; SUBCUTANEOUS at 00:12

## 2021-01-01 RX ADMIN — NOREPINEPHRINE BITARTRATE 6.4 MCG: 1 INJECTION, SOLUTION, CONCENTRATE INTRAVENOUS at 23:23

## 2021-01-01 RX ADMIN — OXYCODONE HYDROCHLORIDE 5 MG: 5 TABLET ORAL at 01:19

## 2021-01-01 RX ADMIN — HEPARIN SODIUM 2 BAG: 200 INJECTION, SOLUTION INTRAVENOUS at 09:15

## 2021-01-01 RX ADMIN — HYDROMORPHONE HYDROCHLORIDE 1 MG: 1 INJECTION, SOLUTION INTRAMUSCULAR; INTRAVENOUS; SUBCUTANEOUS at 05:26

## 2021-01-01 RX ADMIN — ACETAMINOPHEN 975 MG: 325 SOLUTION ORAL at 13:18

## 2021-01-01 RX ADMIN — SODIUM CHLORIDE 1000 ML: 9 INJECTION, SOLUTION INTRAVENOUS at 05:28

## 2021-01-01 RX ADMIN — PIPERACILLIN AND TAZOBACTAM 3.38 G: 3; .375 INJECTION, POWDER, LYOPHILIZED, FOR SOLUTION INTRAVENOUS at 13:46

## 2021-01-01 RX ADMIN — METHOCARBAMOL 500 MG: 100 INJECTION, SOLUTION INTRAMUSCULAR; INTRAVENOUS at 08:38

## 2021-01-01 RX ADMIN — ONDANSETRON 4 MG: 4 TABLET, ORALLY DISINTEGRATING ORAL at 03:32

## 2021-01-01 RX ADMIN — INSULIN ASPART 1 UNITS: 100 INJECTION, SOLUTION INTRAVENOUS; SUBCUTANEOUS at 16:35

## 2021-01-01 RX ADMIN — PROPOFOL 80 MG: 10 INJECTION, EMULSION INTRAVENOUS at 15:12

## 2021-01-01 RX ADMIN — Medication 1 TABLET: at 08:22

## 2021-01-01 RX ADMIN — PHENYLEPHRINE HYDROCHLORIDE 50 MCG: 10 INJECTION INTRAVENOUS at 15:46

## 2021-01-01 RX ADMIN — ONDANSETRON 4 MG: 2 INJECTION INTRAMUSCULAR; INTRAVENOUS at 01:19

## 2021-01-01 RX ADMIN — FENTANYL CITRATE 50 MCG: 50 INJECTION, SOLUTION INTRAMUSCULAR; INTRAVENOUS at 02:43

## 2021-01-01 RX ADMIN — DEXAMETHASONE SODIUM PHOSPHATE 6 MG: 4 INJECTION, SOLUTION INTRA-ARTICULAR; INTRALESIONAL; INTRAMUSCULAR; INTRAVENOUS; SOFT TISSUE at 10:39

## 2021-01-01 RX ADMIN — METHOCARBAMOL 500 MG: 100 INJECTION, SOLUTION INTRAMUSCULAR; INTRAVENOUS at 16:30

## 2021-01-01 RX ADMIN — OXYCODONE HYDROCHLORIDE 5 MG: 5 TABLET ORAL at 18:18

## 2021-01-01 RX ADMIN — DEXTROSE MONOHYDRATE: 50 INJECTION, SOLUTION INTRAVENOUS at 17:14

## 2021-01-01 RX ADMIN — KETAMINE HYDROCHLORIDE 10 MG/HR: 100 INJECTION, SOLUTION, CONCENTRATE INTRAMUSCULAR; INTRAVENOUS at 05:56

## 2021-01-01 RX ADMIN — FENTANYL CITRATE 25 MCG: 50 INJECTION, SOLUTION INTRAMUSCULAR; INTRAVENOUS at 12:16

## 2021-01-01 RX ADMIN — INSULIN ASPART 1 UNITS: 100 INJECTION, SOLUTION INTRAVENOUS; SUBCUTANEOUS at 15:46

## 2021-01-01 RX ADMIN — ONDANSETRON 4 MG: 2 INJECTION INTRAMUSCULAR; INTRAVENOUS at 00:19

## 2021-01-01 RX ADMIN — MICONAZOLE NITRATE: 20 POWDER TOPICAL at 08:35

## 2021-01-01 RX ADMIN — ALBUMIN (HUMAN): 12.5 SOLUTION INTRAVENOUS at 11:17

## 2021-01-01 RX ADMIN — ONDANSETRON 4 MG: 4 TABLET, ORALLY DISINTEGRATING ORAL at 15:42

## 2021-01-01 RX ADMIN — MICONAZOLE NITRATE: 20 POWDER TOPICAL at 21:33

## 2021-01-01 RX ADMIN — PANCRELIPASE 2 CAPSULE: 24000; 76000; 120000 CAPSULE, DELAYED RELEASE PELLETS ORAL at 12:00

## 2021-01-01 RX ADMIN — Medication 2 PACKET: at 08:53

## 2021-01-01 RX ADMIN — ENOXAPARIN SODIUM 40 MG: 40 INJECTION SUBCUTANEOUS at 08:38

## 2021-01-01 RX ADMIN — PANCRELIPASE 3 CAPSULE: 24000; 76000; 120000 CAPSULE, DELAYED RELEASE PELLETS ORAL at 18:33

## 2021-01-01 RX ADMIN — PHENYLEPHRINE HYDROCHLORIDE 150 MCG: 10 INJECTION INTRAVENOUS at 16:21

## 2021-01-01 RX ADMIN — SODIUM BICARBONATE 50 MEQ: 84 INJECTION, SOLUTION INTRAVENOUS at 00:17

## 2021-01-01 RX ADMIN — PANCRELIPASE 1 CAPSULE: 24000; 76000; 120000 CAPSULE, DELAYED RELEASE PELLETS ORAL at 00:10

## 2021-01-01 RX ADMIN — POTASSIUM CHLORIDE, DEXTROSE MONOHYDRATE AND SODIUM CHLORIDE: 150; 5; 450 INJECTION, SOLUTION INTRAVENOUS at 09:53

## 2021-01-01 RX ADMIN — SODIUM CHLORIDE, POTASSIUM CHLORIDE, SODIUM LACTATE AND CALCIUM CHLORIDE: 600; 310; 30; 20 INJECTION, SOLUTION INTRAVENOUS at 10:38

## 2021-01-01 RX ADMIN — PANCRELIPASE 1 CAPSULE: 24000; 76000; 120000 CAPSULE, DELAYED RELEASE PELLETS ORAL at 17:36

## 2021-01-01 RX ADMIN — ONDANSETRON 4 MG: 4 TABLET, ORALLY DISINTEGRATING ORAL at 20:34

## 2021-01-01 RX ADMIN — MIDAZOLAM 0.5 MG: 1 INJECTION INTRAMUSCULAR; INTRAVENOUS at 09:05

## 2021-01-01 RX ADMIN — HYDROMORPHONE HYDROCHLORIDE 0.5 MG: 1 INJECTION, SOLUTION INTRAMUSCULAR; INTRAVENOUS; SUBCUTANEOUS at 11:33

## 2021-01-01 RX ADMIN — HYDROMORPHONE HYDROCHLORIDE 0.5 MG: 1 INJECTION, SOLUTION INTRAMUSCULAR; INTRAVENOUS; SUBCUTANEOUS at 23:05

## 2021-01-01 RX ADMIN — IOPAMIDOL 89 ML: 755 INJECTION, SOLUTION INTRAVASCULAR at 14:39

## 2021-01-01 RX ADMIN — METHOCARBAMOL 500 MG: 100 INJECTION, SOLUTION INTRAMUSCULAR; INTRAVENOUS at 13:28

## 2021-01-01 RX ADMIN — SODIUM BICARBONATE 325 MG: 325 TABLET ORAL at 19:49

## 2021-01-01 RX ADMIN — HYDROMORPHONE HYDROCHLORIDE 1 MG: 1 INJECTION, SOLUTION INTRAMUSCULAR; INTRAVENOUS; SUBCUTANEOUS at 08:24

## 2021-01-01 RX ADMIN — SODIUM CHLORIDE, POTASSIUM CHLORIDE, SODIUM LACTATE AND CALCIUM CHLORIDE 500 ML: 600; 310; 30; 20 INJECTION, SOLUTION INTRAVENOUS at 09:54

## 2021-01-01 RX ADMIN — KETAMINE HYDROCHLORIDE 10 MG/HR: 100 INJECTION, SOLUTION, CONCENTRATE INTRAMUSCULAR; INTRAVENOUS at 09:14

## 2021-01-01 RX ADMIN — POTASSIUM CHLORIDE, DEXTROSE MONOHYDRATE AND SODIUM CHLORIDE: 150; 5; 200 INJECTION, SOLUTION INTRAVENOUS at 13:45

## 2021-01-01 RX ADMIN — SODIUM CHLORIDE, SODIUM LACTATE, POTASSIUM CHLORIDE, CALCIUM CHLORIDE AND DEXTROSE MONOHYDRATE: 5; 600; 310; 30; 20 INJECTION, SOLUTION INTRAVENOUS at 02:34

## 2021-01-01 RX ADMIN — PANTOPRAZOLE SODIUM 40 MG: 40 INJECTION, POWDER, FOR SOLUTION INTRAVENOUS at 20:47

## 2021-01-01 RX ADMIN — PANTOPRAZOLE SODIUM 40 MG: 40 TABLET, DELAYED RELEASE ORAL at 21:33

## 2021-01-01 RX ADMIN — MAGNESIUM SULFATE IN WATER 2 G: 40 INJECTION, SOLUTION INTRAVENOUS at 08:44

## 2021-01-01 RX ADMIN — METHOCARBAMOL 750 MG: 100 INJECTION, SOLUTION INTRAMUSCULAR; INTRAVENOUS at 11:17

## 2021-01-01 RX ADMIN — ONDANSETRON 4 MG: 2 INJECTION INTRAMUSCULAR; INTRAVENOUS at 08:40

## 2021-01-01 RX ADMIN — PHENYLEPHRINE HYDROCHLORIDE 50 MCG: 10 INJECTION INTRAVENOUS at 15:25

## 2021-01-01 RX ADMIN — Medication 10 MG: at 07:53

## 2021-01-01 RX ADMIN — PANTOPRAZOLE SODIUM 40 MG: 40 INJECTION, POWDER, FOR SOLUTION INTRAVENOUS at 09:43

## 2021-01-01 RX ADMIN — GABAPENTIN 300 MG: 250 SOLUTION ORAL at 08:52

## 2021-01-01 RX ADMIN — PIPERACILLIN AND TAZOBACTAM 3.38 G: 3; .375 INJECTION, POWDER, FOR SOLUTION INTRAVENOUS at 22:18

## 2021-01-01 RX ADMIN — MICONAZOLE NITRATE: 20 POWDER TOPICAL at 20:54

## 2021-01-01 RX ADMIN — PANCRELIPASE 2 CAPSULE: 24000; 76000; 120000 CAPSULE, DELAYED RELEASE PELLETS ORAL at 06:08

## 2021-01-01 RX ADMIN — IODIXANOL 135 ML: 320 INJECTION, SOLUTION INTRAVASCULAR at 09:37

## 2021-01-01 RX ADMIN — OXYCODONE HYDROCHLORIDE 10 MG: 5 SOLUTION ORAL at 14:30

## 2021-01-01 RX ADMIN — OXYCODONE HYDROCHLORIDE 10 MG: 5 SOLUTION ORAL at 05:24

## 2021-01-01 RX ADMIN — VANCOMYCIN HYDROCHLORIDE 1250 MG: 100 INJECTION, POWDER, LYOPHILIZED, FOR SOLUTION INTRAVENOUS at 12:48

## 2021-01-01 RX ADMIN — FENTANYL CITRATE 25 MCG: 50 INJECTION, SOLUTION INTRAMUSCULAR; INTRAVENOUS at 04:11

## 2021-01-01 RX ADMIN — OXYCODONE HYDROCHLORIDE 5 MG: 5 TABLET ORAL at 08:08

## 2021-01-01 RX ADMIN — Medication 133 MG: at 13:19

## 2021-01-01 RX ADMIN — PIPERACILLIN AND TAZOBACTAM 3.38 G: 3; .375 INJECTION, POWDER, LYOPHILIZED, FOR SOLUTION INTRAVENOUS at 04:17

## 2021-01-01 RX ADMIN — GABAPENTIN 300 MG: 250 SOLUTION ORAL at 20:15

## 2021-01-01 RX ADMIN — PANTOPRAZOLE SODIUM 80 MG: 40 INJECTION, POWDER, FOR SOLUTION INTRAVENOUS at 07:52

## 2021-01-01 RX ADMIN — PIPERACILLIN AND TAZOBACTAM 3.38 G: 3; .375 INJECTION, POWDER, FOR SOLUTION INTRAVENOUS at 04:12

## 2021-01-01 RX ADMIN — OXYCODONE HYDROCHLORIDE 5 MG: 5 TABLET ORAL at 00:15

## 2021-01-01 RX ADMIN — GLYCOPYRROLATE 0.2 MG: 0.2 INJECTION, SOLUTION INTRAMUSCULAR; INTRAVENOUS at 16:15

## 2021-01-01 RX ADMIN — SUGAMMADEX 150 MG: 100 INJECTION, SOLUTION INTRAVENOUS at 16:55

## 2021-01-01 RX ADMIN — SODIUM BICARBONATE 325 MG: 325 TABLET ORAL at 11:59

## 2021-01-01 RX ADMIN — ONDANSETRON 4 MG: 4 TABLET, ORALLY DISINTEGRATING ORAL at 06:41

## 2021-01-01 RX ADMIN — INSULIN ASPART 1 UNITS: 100 INJECTION, SOLUTION INTRAVENOUS; SUBCUTANEOUS at 08:40

## 2021-01-01 RX ADMIN — PHENYLEPHRINE HYDROCHLORIDE 150 MCG: 10 INJECTION INTRAVENOUS at 15:34

## 2021-01-01 RX ADMIN — OXYCODONE HYDROCHLORIDE 5 MG: 5 SOLUTION ORAL at 21:19

## 2021-01-01 RX ADMIN — HYDROMORPHONE HYDROCHLORIDE 0.5 MG: 1 INJECTION, SOLUTION INTRAMUSCULAR; INTRAVENOUS; SUBCUTANEOUS at 04:46

## 2021-01-01 RX ADMIN — OXYCODONE HYDROCHLORIDE 10 MG: 5 SOLUTION ORAL at 10:32

## 2021-01-01 RX ADMIN — ONDANSETRON 4 MG: 2 INJECTION INTRAMUSCULAR; INTRAVENOUS at 07:48

## 2021-01-01 RX ADMIN — SODIUM CHLORIDE, POTASSIUM CHLORIDE, SODIUM LACTATE AND CALCIUM CHLORIDE 500 ML: 600; 310; 30; 20 INJECTION, SOLUTION INTRAVENOUS at 03:52

## 2021-01-01 RX ADMIN — ONDANSETRON 4 MG: 4 TABLET, ORALLY DISINTEGRATING ORAL at 07:51

## 2021-01-01 RX ADMIN — PANCRELIPASE 2 CAPSULE: 24000; 76000; 120000 CAPSULE, DELAYED RELEASE PELLETS ORAL at 04:55

## 2021-01-01 RX ADMIN — METHOCARBAMOL 500 MG: 100 INJECTION, SOLUTION INTRAMUSCULAR; INTRAVENOUS at 15:36

## 2021-01-01 RX ADMIN — INSULIN ASPART 2 UNITS: 100 INJECTION, SOLUTION INTRAVENOUS; SUBCUTANEOUS at 08:42

## 2021-01-01 RX ADMIN — ONDANSETRON 4 MG: 4 TABLET, ORALLY DISINTEGRATING ORAL at 08:08

## 2021-01-01 RX ADMIN — Medication 12.5 MCG: at 08:27

## 2021-01-01 RX ADMIN — SODIUM BICARBONATE 325 MG: 325 TABLET ORAL at 09:33

## 2021-01-01 RX ADMIN — SODIUM CHLORIDE, POTASSIUM CHLORIDE, SODIUM LACTATE AND CALCIUM CHLORIDE: 600; 310; 30; 20 INJECTION, SOLUTION INTRAVENOUS at 09:36

## 2021-01-01 RX ADMIN — Medication 15 ML: at 08:43

## 2021-01-01 RX ADMIN — PHENYLEPHRINE HYDROCHLORIDE 100 MCG: 10 INJECTION INTRAVENOUS at 08:06

## 2021-01-01 RX ADMIN — PANCRELIPASE 1 CAPSULE: 24000; 76000; 120000 CAPSULE, DELAYED RELEASE PELLETS ORAL at 09:33

## 2021-01-01 RX ADMIN — HYDROMORPHONE HYDROCHLORIDE 0.5 MG: 1 INJECTION, SOLUTION INTRAMUSCULAR; INTRAVENOUS; SUBCUTANEOUS at 18:41

## 2021-01-01 RX ADMIN — METHOCARBAMOL 500 MG: 100 INJECTION, SOLUTION INTRAMUSCULAR; INTRAVENOUS at 21:48

## 2021-01-01 RX ADMIN — ONDANSETRON 4 MG: 4 TABLET, ORALLY DISINTEGRATING ORAL at 02:54

## 2021-01-01 RX ADMIN — PIPERACILLIN AND TAZOBACTAM 3.38 G: 3; .375 INJECTION, POWDER, FOR SOLUTION INTRAVENOUS at 11:32

## 2021-01-01 RX ADMIN — SODIUM BICARBONATE 325 MG: 325 TABLET ORAL at 04:06

## 2021-01-01 RX ADMIN — ACETAMINOPHEN 975 MG: 325 SOLUTION ORAL at 16:46

## 2021-01-01 RX ADMIN — POTASSIUM CHLORIDE 10 MEQ: 7.46 INJECTION, SOLUTION INTRAVENOUS at 11:47

## 2021-01-01 RX ADMIN — PANCRELIPASE 2 CAPSULE: 24000; 76000; 120000 CAPSULE, DELAYED RELEASE PELLETS ORAL at 16:12

## 2021-01-01 RX ADMIN — GABAPENTIN 300 MG: 250 SOLUTION ORAL at 09:00

## 2021-01-01 RX ADMIN — POTASSIUM CHLORIDE 10 MEQ: 7.46 INJECTION, SOLUTION INTRAVENOUS at 12:55

## 2021-01-01 RX ADMIN — Medication 1 MG: at 00:09

## 2021-01-01 RX ADMIN — INSULIN HUMAN 4 UNITS: 100 INJECTION, SUSPENSION SUBCUTANEOUS at 08:34

## 2021-01-01 RX ADMIN — HYDROMORPHONE HYDROCHLORIDE 0.5 MG: 1 INJECTION, SOLUTION INTRAMUSCULAR; INTRAVENOUS; SUBCUTANEOUS at 11:54

## 2021-01-01 RX ADMIN — INSULIN ASPART 2 UNITS: 100 INJECTION, SOLUTION INTRAVENOUS; SUBCUTANEOUS at 11:16

## 2021-01-01 RX ADMIN — ACETAMINOPHEN 975 MG: 325 SOLUTION ORAL at 22:19

## 2021-01-01 RX ADMIN — PANTOPRAZOLE SODIUM 40 MG: 40 INJECTION, POWDER, FOR SOLUTION INTRAVENOUS at 08:02

## 2021-01-01 RX ADMIN — POTASSIUM CHLORIDE, DEXTROSE MONOHYDRATE AND SODIUM CHLORIDE: 150; 5; 450 INJECTION, SOLUTION INTRAVENOUS at 05:47

## 2021-01-01 RX ADMIN — PANTOPRAZOLE SODIUM 40 MG: 40 INJECTION, POWDER, FOR SOLUTION INTRAVENOUS at 20:38

## 2021-01-01 RX ADMIN — ONDANSETRON 4 MG: 4 TABLET, ORALLY DISINTEGRATING ORAL at 08:49

## 2021-01-01 RX ADMIN — INSULIN HUMAN 10 UNITS: 100 INJECTION, SUSPENSION SUBCUTANEOUS at 17:41

## 2021-01-01 RX ADMIN — OXYCODONE HYDROCHLORIDE 5 MG: 5 TABLET ORAL at 21:36

## 2021-01-01 RX ADMIN — OXYCODONE HYDROCHLORIDE 5 MG: 5 TABLET ORAL at 06:41

## 2021-01-01 RX ADMIN — ACETAMINOPHEN 975 MG: 325 SOLUTION ORAL at 19:56

## 2021-01-01 RX ADMIN — SODIUM CHLORIDE, SODIUM LACTATE, POTASSIUM CHLORIDE, CALCIUM CHLORIDE AND DEXTROSE MONOHYDRATE: 5; 600; 310; 30; 20 INJECTION, SOLUTION INTRAVENOUS at 19:02

## 2021-01-01 RX ADMIN — Medication 2 PACKET: at 21:00

## 2021-01-01 RX ADMIN — Medication 1 ML: at 23:24

## 2021-01-01 RX ADMIN — PIPERACILLIN AND TAZOBACTAM 3.38 G: 3; .375 INJECTION, POWDER, FOR SOLUTION INTRAVENOUS at 21:16

## 2021-01-01 RX ADMIN — HYDROMORPHONE HYDROCHLORIDE 0.5 MG: 1 INJECTION, SOLUTION INTRAMUSCULAR; INTRAVENOUS; SUBCUTANEOUS at 13:06

## 2021-01-01 RX ADMIN — SODIUM CHLORIDE, POTASSIUM CHLORIDE, SODIUM LACTATE AND CALCIUM CHLORIDE: 600; 310; 30; 20 INJECTION, SOLUTION INTRAVENOUS at 13:17

## 2021-01-01 RX ADMIN — MIDAZOLAM 2 MG: 1 INJECTION INTRAMUSCULAR; INTRAVENOUS at 15:00

## 2021-01-01 RX ADMIN — OXYCODONE HYDROCHLORIDE 5 MG: 5 TABLET ORAL at 23:44

## 2021-01-01 RX ADMIN — Medication 1 ML: at 00:16

## 2021-01-01 RX ADMIN — PANCRELIPASE 2 CAPSULE: 24000; 76000; 120000 CAPSULE, DELAYED RELEASE PELLETS ORAL at 16:59

## 2021-01-01 RX ADMIN — PHENYLEPHRINE HYDROCHLORIDE 100 MCG: 10 INJECTION INTRAVENOUS at 13:43

## 2021-01-01 RX ADMIN — INSULIN ASPART 3 UNITS: 100 INJECTION, SOLUTION INTRAVENOUS; SUBCUTANEOUS at 04:27

## 2021-01-01 RX ADMIN — POTASSIUM & SODIUM PHOSPHATES POWDER PACK 280-160-250 MG 1 PACKET: 280-160-250 PACK at 13:47

## 2021-01-01 RX ADMIN — Medication 0.03 MCG/KG/MIN: at 23:21

## 2021-01-01 RX ADMIN — ACETAMINOPHEN 975 MG: 325 SOLUTION ORAL at 01:10

## 2021-01-01 RX ADMIN — LIDOCAINE HYDROCHLORIDE 30 MG: 20 INJECTION, SOLUTION INFILTRATION; PERINEURAL at 13:23

## 2021-01-01 RX ADMIN — ROCURONIUM BROMIDE 40 MG: 10 INJECTION INTRAVENOUS at 07:36

## 2021-01-01 RX ADMIN — Medication 15 ML: at 08:52

## 2021-01-01 RX ADMIN — OXYCODONE HYDROCHLORIDE 10 MG: 5 SOLUTION ORAL at 20:14

## 2021-01-01 RX ADMIN — OXYCODONE HYDROCHLORIDE 5 MG: 5 TABLET ORAL at 12:37

## 2021-01-01 RX ADMIN — Medication 12.5 MCG: at 05:20

## 2021-01-01 RX ADMIN — ROCURONIUM BROMIDE 20 MG: 10 INJECTION INTRAVENOUS at 13:55

## 2021-01-01 RX ADMIN — OXYCODONE HYDROCHLORIDE 10 MG: 5 SOLUTION ORAL at 15:42

## 2021-01-01 RX ADMIN — ACETAMINOPHEN 975 MG: 325 SOLUTION ORAL at 11:24

## 2021-01-01 RX ADMIN — IOPAMIDOL 91 ML: 755 INJECTION, SOLUTION INTRAVENOUS at 12:01

## 2021-01-01 RX ADMIN — PIPERACILLIN AND TAZOBACTAM 3.38 G: 3; .375 INJECTION, POWDER, FOR SOLUTION INTRAVENOUS at 21:18

## 2021-01-01 RX ADMIN — HYDROMORPHONE HYDROCHLORIDE 0.5 MG: 1 INJECTION, SOLUTION INTRAMUSCULAR; INTRAVENOUS; SUBCUTANEOUS at 05:57

## 2021-01-01 RX ADMIN — PANCRELIPASE 1 CAPSULE: 24000; 76000; 120000 CAPSULE, DELAYED RELEASE PELLETS ORAL at 16:41

## 2021-01-01 RX ADMIN — Medication 12.5 MCG: at 06:20

## 2021-01-01 RX ADMIN — PIPERACILLIN AND TAZOBACTAM 3.38 G: 3; .375 INJECTION, POWDER, LYOPHILIZED, FOR SOLUTION INTRAVENOUS at 20:15

## 2021-01-01 RX ADMIN — OXYCODONE HYDROCHLORIDE 5 MG: 5 TABLET ORAL at 08:44

## 2021-01-01 RX ADMIN — PROCHLORPERAZINE MALEATE 5 MG: 5 TABLET ORAL at 00:09

## 2021-01-01 RX ADMIN — OXYCODONE HYDROCHLORIDE 5 MG: 5 TABLET ORAL at 15:42

## 2021-01-01 RX ADMIN — PHENYLEPHRINE HYDROCHLORIDE 50 MCG: 10 INJECTION INTRAVENOUS at 13:30

## 2021-01-01 RX ADMIN — PANTOPRAZOLE SODIUM 40 MG: 40 TABLET, DELAYED RELEASE ORAL at 09:53

## 2021-01-01 RX ADMIN — PHENYLEPHRINE HYDROCHLORIDE 100 MCG: 10 INJECTION INTRAVENOUS at 10:39

## 2021-01-01 RX ADMIN — PANCRELIPASE 1 CAPSULE: 24000; 76000; 120000 CAPSULE, DELAYED RELEASE PELLETS ORAL at 05:33

## 2021-01-01 RX ADMIN — GABAPENTIN 300 MG: 250 SOLUTION ORAL at 21:00

## 2021-01-01 RX ADMIN — SUGAMMADEX 200 MG: 100 INJECTION, SOLUTION INTRAVENOUS at 01:49

## 2021-01-01 RX ADMIN — INSULIN HUMAN 4 UNITS: 100 INJECTION, SUSPENSION SUBCUTANEOUS at 11:24

## 2021-01-01 RX ADMIN — PANCRELIPASE 2 CAPSULE: 24000; 76000; 120000 CAPSULE, DELAYED RELEASE PELLETS ORAL at 01:29

## 2021-01-01 RX ADMIN — PHENYLEPHRINE HYDROCHLORIDE 100 MCG: 10 INJECTION INTRAVENOUS at 08:17

## 2021-01-01 RX ADMIN — ACETAMINOPHEN 650 MG: 325 TABLET, FILM COATED ORAL at 11:25

## 2021-01-01 RX ADMIN — OXYCODONE HYDROCHLORIDE 5 MG: 5 TABLET ORAL at 07:09

## 2021-01-01 RX ADMIN — SODIUM BICARBONATE 325 MG: 325 TABLET ORAL at 20:31

## 2021-01-01 RX ADMIN — SODIUM BICARBONATE 325 MG: 325 TABLET ORAL at 16:41

## 2021-01-01 RX ADMIN — PROPOFOL 35 MCG/KG/MIN: 10 INJECTION, EMULSION INTRAVENOUS at 20:32

## 2021-01-01 RX ADMIN — CHOLESTYRAMINE 4 G: 4 POWDER, FOR SUSPENSION ORAL at 09:58

## 2021-01-01 RX ADMIN — ENOXAPARIN SODIUM 40 MG: 40 INJECTION SUBCUTANEOUS at 08:44

## 2021-01-01 RX ADMIN — PIPERACILLIN AND TAZOBACTAM 3.38 G: 3; .375 INJECTION, POWDER, LYOPHILIZED, FOR SOLUTION INTRAVENOUS at 20:31

## 2021-01-01 RX ADMIN — HYDROMORPHONE HYDROCHLORIDE 0.5 MG: 1 INJECTION, SOLUTION INTRAMUSCULAR; INTRAVENOUS; SUBCUTANEOUS at 16:30

## 2021-01-01 RX ADMIN — DEXAMETHASONE SODIUM PHOSPHATE 4 MG: 4 INJECTION, SOLUTION INTRA-ARTICULAR; INTRALESIONAL; INTRAMUSCULAR; INTRAVENOUS; SOFT TISSUE at 16:12

## 2021-01-01 RX ADMIN — SODIUM CHLORIDE, POTASSIUM CHLORIDE, SODIUM LACTATE AND CALCIUM CHLORIDE: 600; 310; 30; 20 INJECTION, SOLUTION INTRAVENOUS at 13:35

## 2021-01-01 RX ADMIN — OXYCODONE HYDROCHLORIDE 5 MG: 5 TABLET ORAL at 17:53

## 2021-01-01 RX ADMIN — PROPOFOL 50 MCG/KG/MIN: 10 INJECTION, EMULSION INTRAVENOUS at 14:45

## 2021-01-01 RX ADMIN — INSULIN ASPART 4 UNITS: 100 INJECTION, SOLUTION INTRAVENOUS; SUBCUTANEOUS at 08:44

## 2021-01-01 RX ADMIN — METHOCARBAMOL 500 MG: 100 INJECTION, SOLUTION INTRAMUSCULAR; INTRAVENOUS at 13:24

## 2021-01-01 RX ADMIN — PHENYLEPHRINE HYDROCHLORIDE 100 MCG: 10 INJECTION INTRAVENOUS at 08:26

## 2021-01-01 RX ADMIN — ALBUMIN (HUMAN): 12.5 SOLUTION INTRAVENOUS at 13:06

## 2021-01-01 RX ADMIN — IOPAMIDOL 65 ML: 755 INJECTION, SOLUTION INTRAVENOUS at 11:45

## 2021-01-01 RX ADMIN — PANCRELIPASE 1 CAPSULE: 24000; 76000; 120000 CAPSULE, DELAYED RELEASE PELLETS ORAL at 20:38

## 2021-01-01 RX ADMIN — ENOXAPARIN SODIUM 40 MG: 40 INJECTION SUBCUTANEOUS at 08:40

## 2021-01-01 RX ADMIN — ONDANSETRON 4 MG: 4 TABLET, ORALLY DISINTEGRATING ORAL at 08:32

## 2021-01-01 RX ADMIN — SODIUM BICARBONATE 325 MG: 325 TABLET ORAL at 00:15

## 2021-01-01 RX ADMIN — Medication 2 PACKET: at 08:44

## 2021-01-01 RX ADMIN — LIDOCAINE 1 PATCH: 246 PATCH TOPICAL at 23:15

## 2021-01-01 RX ADMIN — OXYCODONE HYDROCHLORIDE 5 MG: 5 TABLET ORAL at 00:19

## 2021-01-01 RX ADMIN — PANCRELIPASE 2 CAPSULE: 24000; 76000; 120000 CAPSULE, DELAYED RELEASE PELLETS ORAL at 05:38

## 2021-01-01 RX ADMIN — METHOCARBAMOL 500 MG: 500 TABLET ORAL at 20:31

## 2021-01-01 RX ADMIN — Medication 100 MCG/HR: at 23:06

## 2021-01-01 RX ADMIN — HYDROMORPHONE HYDROCHLORIDE 0.5 MG: 1 INJECTION, SOLUTION INTRAMUSCULAR; INTRAVENOUS; SUBCUTANEOUS at 01:31

## 2021-01-01 RX ADMIN — ACETAMINOPHEN 975 MG: 325 SOLUTION ORAL at 04:00

## 2021-01-01 RX ADMIN — GLUCAGON HYDROCHLORIDE 1 MG: 1 INJECTION, POWDER, FOR SOLUTION INTRAMUSCULAR; INTRAVENOUS; SUBCUTANEOUS at 09:09

## 2021-01-01 RX ADMIN — PANTOPRAZOLE SODIUM 40 MG: 40 INJECTION, POWDER, FOR SOLUTION INTRAVENOUS at 08:48

## 2021-01-01 RX ADMIN — INSULIN ASPART 1 UNITS: 100 INJECTION, SOLUTION INTRAVENOUS; SUBCUTANEOUS at 21:33

## 2021-01-01 RX ADMIN — ACETAMINOPHEN 975 MG: 325 SOLUTION ORAL at 21:11

## 2021-01-01 RX ADMIN — INSULIN ASPART 1 UNITS: 100 INJECTION, SOLUTION INTRAVENOUS; SUBCUTANEOUS at 20:33

## 2021-01-01 RX ADMIN — ONDANSETRON 4 MG: 4 TABLET, ORALLY DISINTEGRATING ORAL at 14:51

## 2021-01-01 RX ADMIN — INSULIN ASPART 3 UNITS: 100 INJECTION, SOLUTION INTRAVENOUS; SUBCUTANEOUS at 03:40

## 2021-01-01 RX ADMIN — PANTOPRAZOLE SODIUM 40 MG: 40 INJECTION, POWDER, FOR SOLUTION INTRAVENOUS at 11:16

## 2021-01-01 RX ADMIN — ONDANSETRON 4 MG: 4 TABLET, ORALLY DISINTEGRATING ORAL at 08:22

## 2021-01-01 RX ADMIN — INSULIN ASPART 1 UNITS: 100 INJECTION, SOLUTION INTRAVENOUS; SUBCUTANEOUS at 12:27

## 2021-01-01 RX ADMIN — ROCURONIUM BROMIDE 100 MG: 10 INJECTION INTRAVENOUS at 23:20

## 2021-01-01 RX ADMIN — Medication 2 PACKET: at 11:51

## 2021-01-01 RX ADMIN — INSULIN ASPART 3 UNITS: 100 INJECTION, SOLUTION INTRAVENOUS; SUBCUTANEOUS at 08:50

## 2021-01-01 RX ADMIN — SODIUM BICARBONATE 325 MG: 325 TABLET ORAL at 06:08

## 2021-01-01 RX ADMIN — POLYETHYLENE GLYCOL 3350, SODIUM SULFATE ANHYDROUS, SODIUM BICARBONATE, SODIUM CHLORIDE, POTASSIUM CHLORIDE 2000 ML: 236; 22.74; 6.74; 5.86; 2.97 POWDER, FOR SOLUTION ORAL at 10:04

## 2021-01-01 RX ADMIN — INSULIN ASPART 2 UNITS: 100 INJECTION, SOLUTION INTRAVENOUS; SUBCUTANEOUS at 00:44

## 2021-01-01 RX ADMIN — PANCRELIPASE 2 CAPSULE: 24000; 76000; 120000 CAPSULE, DELAYED RELEASE PELLETS ORAL at 16:39

## 2021-01-01 RX ADMIN — SODIUM BICARBONATE 325 MG: 325 TABLET ORAL at 05:33

## 2021-01-01 RX ADMIN — FENTANYL CITRATE 50 MCG: 50 INJECTION, SOLUTION INTRAMUSCULAR; INTRAVENOUS at 14:40

## 2021-01-01 RX ADMIN — MIDAZOLAM 0.5 MG: 1 INJECTION INTRAMUSCULAR; INTRAVENOUS at 09:23

## 2021-01-01 RX ADMIN — POTASSIUM CHLORIDE: 2 INJECTION, SOLUTION, CONCENTRATE INTRAVENOUS at 20:45

## 2021-01-01 RX ADMIN — POTASSIUM CHLORIDE: 2 INJECTION, SOLUTION, CONCENTRATE INTRAVENOUS at 21:20

## 2021-01-01 RX ADMIN — PANTOPRAZOLE SODIUM 40 MG: 40 TABLET, DELAYED RELEASE ORAL at 08:52

## 2021-01-01 RX ADMIN — HYDROMORPHONE HYDROCHLORIDE 0.5 MG: 1 INJECTION, SOLUTION INTRAMUSCULAR; INTRAVENOUS; SUBCUTANEOUS at 19:56

## 2021-01-01 RX ADMIN — POTASSIUM CHLORIDE 20 MEQ: 1.5 POWDER, FOR SOLUTION ORAL at 23:46

## 2021-01-01 RX ADMIN — INSULIN ASPART 1 UNITS: 100 INJECTION, SOLUTION INTRAVENOUS; SUBCUTANEOUS at 04:17

## 2021-01-01 RX ADMIN — Medication 15 ML: at 10:38

## 2021-01-01 RX ADMIN — HYDROMORPHONE HYDROCHLORIDE 0.5 MG: 1 INJECTION, SOLUTION INTRAMUSCULAR; INTRAVENOUS; SUBCUTANEOUS at 21:19

## 2021-01-01 RX ADMIN — SODIUM BICARBONATE 325 MG: 325 TABLET ORAL at 01:35

## 2021-01-01 RX ADMIN — FENTANYL CITRATE 50 MCG: 50 INJECTION, SOLUTION INTRAMUSCULAR; INTRAVENOUS at 15:32

## 2021-01-01 RX ADMIN — CHOLESTYRAMINE 4 G: 4 POWDER, FOR SUSPENSION ORAL at 17:34

## 2021-01-01 RX ADMIN — METHOCARBAMOL 500 MG: 100 INJECTION, SOLUTION INTRAMUSCULAR; INTRAVENOUS at 08:48

## 2021-01-01 RX ADMIN — FENTANYL CITRATE 50 MCG: 50 INJECTION, SOLUTION INTRAMUSCULAR; INTRAVENOUS at 01:49

## 2021-01-01 RX ADMIN — INSULIN ASPART 3 UNITS: 100 INJECTION, SOLUTION INTRAVENOUS; SUBCUTANEOUS at 04:43

## 2021-01-01 RX ADMIN — ACETAMINOPHEN 975 MG: 325 SOLUTION ORAL at 03:26

## 2021-01-01 RX ADMIN — LIDOCAINE HYDROCHLORIDE 20 MG: 20 INJECTION, SOLUTION INFILTRATION; PERINEURAL at 16:10

## 2021-01-01 RX ADMIN — PROPOFOL 50 MCG/KG/MIN: 10 INJECTION, EMULSION INTRAVENOUS at 16:40

## 2021-01-01 RX ADMIN — HYDROMORPHONE HYDROCHLORIDE 0.5 MG: 1 INJECTION, SOLUTION INTRAMUSCULAR; INTRAVENOUS; SUBCUTANEOUS at 20:25

## 2021-01-01 RX ADMIN — PIPERACILLIN AND TAZOBACTAM 3.38 G: 3; .375 INJECTION, POWDER, FOR SOLUTION INTRAVENOUS at 04:08

## 2021-01-01 RX ADMIN — GABAPENTIN 300 MG: 250 SOLUTION ORAL at 20:31

## 2021-01-01 RX ADMIN — FENTANYL CITRATE 150 MCG: 50 INJECTION, SOLUTION INTRAMUSCULAR; INTRAVENOUS at 09:48

## 2021-01-01 RX ADMIN — HYDROMORPHONE HYDROCHLORIDE 0.5 MG: 1 INJECTION, SOLUTION INTRAMUSCULAR; INTRAVENOUS; SUBCUTANEOUS at 10:42

## 2021-01-01 RX ADMIN — INSULIN ASPART 1 UNITS: 100 INJECTION, SOLUTION INTRAVENOUS; SUBCUTANEOUS at 11:49

## 2021-01-01 RX ADMIN — Medication: at 20:35

## 2021-01-01 RX ADMIN — SODIUM BICARBONATE 325 MG: 325 TABLET ORAL at 09:57

## 2021-01-01 RX ADMIN — PANCRELIPASE 2 CAPSULE: 24000; 76000; 120000 CAPSULE, DELAYED RELEASE PELLETS ORAL at 09:57

## 2021-01-01 RX ADMIN — PROPOFOL 70 MG: 10 INJECTION, EMULSION INTRAVENOUS at 23:20

## 2021-01-01 RX ADMIN — POTASSIUM CHLORIDE 10 MEQ: 7.46 INJECTION, SOLUTION INTRAVENOUS at 23:10

## 2021-01-01 RX ADMIN — KETAMINE HYDROCHLORIDE 10 MG/HR: 100 INJECTION, SOLUTION, CONCENTRATE INTRAMUSCULAR; INTRAVENOUS at 22:41

## 2021-01-01 RX ADMIN — SUGAMMADEX 200 MG: 100 INJECTION, SOLUTION INTRAVENOUS at 08:45

## 2021-01-01 RX ADMIN — Medication 15 ML: at 08:48

## 2021-01-01 RX ADMIN — PANCRELIPASE 2 CAPSULE: 24000; 76000; 120000 CAPSULE, DELAYED RELEASE PELLETS ORAL at 04:59

## 2021-01-01 RX ADMIN — MIDAZOLAM 2 MG: 1 INJECTION INTRAMUSCULAR; INTRAVENOUS at 09:31

## 2021-01-01 RX ADMIN — MICONAZOLE NITRATE: 20 POWDER TOPICAL at 08:21

## 2021-01-01 RX ADMIN — MIDAZOLAM 1 MG: 1 INJECTION INTRAMUSCULAR; INTRAVENOUS at 08:46

## 2021-01-01 RX ADMIN — DEXAMETHASONE SODIUM PHOSPHATE 6 MG: 4 INJECTION, SOLUTION INTRA-ARTICULAR; INTRALESIONAL; INTRAMUSCULAR; INTRAVENOUS; SOFT TISSUE at 15:28

## 2021-01-01 RX ADMIN — METHOCARBAMOL 750 MG: 100 INJECTION, SOLUTION INTRAMUSCULAR; INTRAVENOUS at 18:07

## 2021-01-01 RX ADMIN — OXYCODONE HYDROCHLORIDE 5 MG: 5 TABLET ORAL at 15:46

## 2021-01-01 RX ADMIN — FENTANYL CITRATE 25 MCG: 50 INJECTION, SOLUTION INTRAMUSCULAR; INTRAVENOUS at 23:20

## 2021-01-01 RX ADMIN — POLYETHYLENE GLYCOL 3350, SODIUM SULFATE ANHYDROUS, SODIUM BICARBONATE, SODIUM CHLORIDE, POTASSIUM CHLORIDE 4000 ML: 236; 22.74; 6.74; 5.86; 2.97 POWDER, FOR SOLUTION ORAL at 17:00

## 2021-01-01 RX ADMIN — SODIUM CHLORIDE, POTASSIUM CHLORIDE, SODIUM LACTATE AND CALCIUM CHLORIDE: 600; 310; 30; 20 INJECTION, SOLUTION INTRAVENOUS at 11:32

## 2021-01-01 RX ADMIN — PANCRELIPASE 2 CAPSULE: 24000; 76000; 120000 CAPSULE, DELAYED RELEASE PELLETS ORAL at 17:39

## 2021-01-01 RX ADMIN — POTASSIUM CHLORIDE, DEXTROSE MONOHYDRATE AND SODIUM CHLORIDE: 150; 5; 200 INJECTION, SOLUTION INTRAVENOUS at 16:46

## 2021-01-01 RX ADMIN — METHOCARBAMOL 500 MG: 100 INJECTION, SOLUTION INTRAMUSCULAR; INTRAVENOUS at 11:51

## 2021-01-01 RX ADMIN — GLYCOPYRROLATE 0.2 MG: 0.2 INJECTION, SOLUTION INTRAMUSCULAR; INTRAVENOUS at 15:34

## 2021-01-01 RX ADMIN — METHOCARBAMOL 500 MG: 500 TABLET ORAL at 11:26

## 2021-01-01 RX ADMIN — PIPERACILLIN AND TAZOBACTAM 3.38 G: 3; .375 INJECTION, POWDER, FOR SOLUTION INTRAVENOUS at 21:52

## 2021-01-01 RX ADMIN — ZINC SULFATE 220 MG (50 MG) CAPSULE 220 MG: CAPSULE at 08:24

## 2021-01-01 RX ADMIN — ACETAMINOPHEN 975 MG: 325 SOLUTION ORAL at 04:08

## 2021-01-01 RX ADMIN — Medication 12.5 MCG: at 03:15

## 2021-01-01 RX ADMIN — NOREPINEPHRINE BITARTRATE 12.8 MCG: 1 INJECTION, SOLUTION, CONCENTRATE INTRAVENOUS at 14:35

## 2021-01-01 RX ADMIN — FENTANYL CITRATE 25 MCG: 50 INJECTION, SOLUTION INTRAMUSCULAR; INTRAVENOUS at 08:03

## 2021-01-01 RX ADMIN — HYDROMORPHONE HYDROCHLORIDE 0.3 MG: 1 INJECTION, SOLUTION INTRAMUSCULAR; INTRAVENOUS; SUBCUTANEOUS at 08:44

## 2021-01-01 RX ADMIN — GABAPENTIN 300 MG: 250 SOLUTION ORAL at 20:51

## 2021-01-01 RX ADMIN — Medication 2 PACKET: at 09:53

## 2021-01-01 RX ADMIN — SODIUM CHLORIDE, POTASSIUM CHLORIDE, SODIUM LACTATE AND CALCIUM CHLORIDE: 600; 310; 30; 20 INJECTION, SOLUTION INTRAVENOUS at 16:40

## 2021-01-01 RX ADMIN — I.V. FAT EMULSION 250 ML: 20 EMULSION INTRAVENOUS at 21:21

## 2021-01-01 RX ADMIN — INSULIN ASPART 1 UNITS: 100 INJECTION, SOLUTION INTRAVENOUS; SUBCUTANEOUS at 16:40

## 2021-01-01 RX ADMIN — ACETAMINOPHEN 975 MG: 325 SOLUTION ORAL at 16:39

## 2021-01-01 RX ADMIN — ZINC SULFATE 220 MG (50 MG) CAPSULE 220 MG: CAPSULE at 20:31

## 2021-01-01 RX ADMIN — FENTANYL CITRATE 25 MCG: 50 INJECTION, SOLUTION INTRAMUSCULAR; INTRAVENOUS at 09:23

## 2021-01-01 RX ADMIN — CALCIUM CHLORIDE 250 MG: 100 INJECTION INTRAVENOUS; INTRAVENTRICULAR at 14:26

## 2021-01-01 RX ADMIN — OXYCODONE HYDROCHLORIDE 5 MG: 5 TABLET ORAL at 08:49

## 2021-01-01 RX ADMIN — Medication 2 G: at 15:19

## 2021-01-01 RX ADMIN — OXYCODONE HYDROCHLORIDE 10 MG: 5 SOLUTION ORAL at 11:05

## 2021-01-01 RX ADMIN — DEXAMETHASONE SODIUM PHOSPHATE 8 MG: 4 INJECTION, SOLUTION INTRA-ARTICULAR; INTRALESIONAL; INTRAMUSCULAR; INTRAVENOUS; SOFT TISSUE at 00:11

## 2021-01-01 RX ADMIN — PHENYLEPHRINE HYDROCHLORIDE 50 MCG: 10 INJECTION INTRAVENOUS at 13:43

## 2021-01-01 RX ADMIN — HYDROMORPHONE HYDROCHLORIDE 0.3 MG: 1 INJECTION, SOLUTION INTRAMUSCULAR; INTRAVENOUS; SUBCUTANEOUS at 07:49

## 2021-01-01 RX ADMIN — PHENYLEPHRINE HYDROCHLORIDE 50 MCG: 10 INJECTION INTRAVENOUS at 13:49

## 2021-01-01 RX ADMIN — SODIUM BICARBONATE 325 MG: 325 TABLET ORAL at 05:38

## 2021-01-01 RX ADMIN — SODIUM BICARBONATE 325 MG: 325 TABLET ORAL at 13:24

## 2021-01-01 RX ADMIN — PANTOPRAZOLE SODIUM 40 MG: 40 INJECTION, POWDER, FOR SOLUTION INTRAVENOUS at 07:47

## 2021-01-01 RX ADMIN — PANCRELIPASE 1 CAPSULE: 24000; 76000; 120000 CAPSULE, DELAYED RELEASE PELLETS ORAL at 12:44

## 2021-01-01 RX ADMIN — SODIUM CHLORIDE: 9 INJECTION, SOLUTION INTRAVENOUS at 18:31

## 2021-01-01 RX ADMIN — Medication 2 ML: at 09:15

## 2021-01-01 RX ADMIN — SODIUM BICARBONATE 325 MG: 325 TABLET ORAL at 16:12

## 2021-01-01 RX ADMIN — ENOXAPARIN SODIUM 40 MG: 40 INJECTION SUBCUTANEOUS at 08:07

## 2021-01-01 RX ADMIN — INSULIN ASPART 1 UNITS: 100 INJECTION, SOLUTION INTRAVENOUS; SUBCUTANEOUS at 09:22

## 2021-01-01 RX ADMIN — SODIUM BICARBONATE 325 MG: 325 TABLET ORAL at 17:40

## 2021-01-01 RX ADMIN — GABAPENTIN 300 MG: 250 SOLUTION ORAL at 08:30

## 2021-01-01 RX ADMIN — HYDROMORPHONE HYDROCHLORIDE 0.5 MG: 1 INJECTION, SOLUTION INTRAMUSCULAR; INTRAVENOUS; SUBCUTANEOUS at 17:15

## 2021-01-01 RX ADMIN — ONDANSETRON 4 MG: 4 TABLET, ORALLY DISINTEGRATING ORAL at 19:49

## 2021-01-01 RX ADMIN — KETAMINE HYDROCHLORIDE 10 MG/HR: 100 INJECTION, SOLUTION, CONCENTRATE INTRAMUSCULAR; INTRAVENOUS at 12:17

## 2021-01-01 RX ADMIN — PANCRELIPASE 2 CAPSULE: 24000; 76000; 120000 CAPSULE, DELAYED RELEASE PELLETS ORAL at 01:19

## 2021-01-01 RX ADMIN — PHENYLEPHRINE HYDROCHLORIDE 100 MCG: 10 INJECTION INTRAVENOUS at 08:38

## 2021-01-01 RX ADMIN — METHOCARBAMOL 500 MG: 500 TABLET ORAL at 03:27

## 2021-01-01 RX ADMIN — SODIUM BICARBONATE 325 MG: 325 TABLET ORAL at 13:00

## 2021-01-01 RX ADMIN — SODIUM BICARBONATE 325 MG: 325 TABLET ORAL at 02:29

## 2021-01-01 RX ADMIN — GABAPENTIN 300 MG: 250 SOLUTION ORAL at 21:03

## 2021-01-01 RX ADMIN — SODIUM CHLORIDE, POTASSIUM CHLORIDE, SODIUM LACTATE AND CALCIUM CHLORIDE: 600; 310; 30; 20 INJECTION, SOLUTION INTRAVENOUS at 15:55

## 2021-01-01 RX ADMIN — POTASSIUM & SODIUM PHOSPHATES POWDER PACK 280-160-250 MG 1 PACKET: 280-160-250 PACK at 20:14

## 2021-01-01 RX ADMIN — ONDANSETRON 4 MG: 4 TABLET, ORALLY DISINTEGRATING ORAL at 08:44

## 2021-01-01 RX ADMIN — PANCRELIPASE 3 CAPSULE: 24000; 76000; 120000 CAPSULE, DELAYED RELEASE PELLETS ORAL at 16:00

## 2021-01-01 RX ADMIN — PANCRELIPASE 2 CAPSULE: 24000; 76000; 120000 CAPSULE, DELAYED RELEASE PELLETS ORAL at 21:37

## 2021-01-01 RX ADMIN — FENTANYL CITRATE 100 MCG: 50 INJECTION, SOLUTION INTRAMUSCULAR; INTRAVENOUS at 16:00

## 2021-01-01 RX ADMIN — METHOCARBAMOL 500 MG: 500 TABLET ORAL at 16:17

## 2021-01-01 RX ADMIN — PROPOFOL 30 MG: 10 INJECTION, EMULSION INTRAVENOUS at 13:23

## 2021-01-01 RX ADMIN — SODIUM CHLORIDE, POTASSIUM CHLORIDE, SODIUM LACTATE AND CALCIUM CHLORIDE: 600; 310; 30; 20 INJECTION, SOLUTION INTRAVENOUS at 22:57

## 2021-01-01 RX ADMIN — NOREPINEPHRINE BITARTRATE 6.4 MCG: 1 INJECTION, SOLUTION, CONCENTRATE INTRAVENOUS at 23:21

## 2021-01-01 RX ADMIN — Medication 15 ML: at 07:52

## 2021-01-01 RX ADMIN — KETAMINE HYDROCHLORIDE 10 MG/HR: 100 INJECTION, SOLUTION, CONCENTRATE INTRAMUSCULAR; INTRAVENOUS at 19:44

## 2021-01-01 RX ADMIN — SODIUM BICARBONATE 325 MG: 325 TABLET ORAL at 17:36

## 2021-01-01 RX ADMIN — SODIUM CHLORIDE: 9 INJECTION, SOLUTION INTRAVENOUS at 07:41

## 2021-01-01 RX ADMIN — GABAPENTIN 300 MG: 250 SOLUTION ORAL at 20:03

## 2021-01-01 RX ADMIN — OXYCODONE HYDROCHLORIDE 5 MG: 5 TABLET ORAL at 18:33

## 2021-01-01 RX ADMIN — ROCURONIUM BROMIDE 20 MG: 10 INJECTION INTRAVENOUS at 15:32

## 2021-01-01 RX ADMIN — ONDANSETRON 4 MG: 4 TABLET, ORALLY DISINTEGRATING ORAL at 21:36

## 2021-01-01 RX ADMIN — PANTOPRAZOLE SODIUM 40 MG: 40 INJECTION, POWDER, FOR SOLUTION INTRAVENOUS at 19:49

## 2021-01-01 RX ADMIN — SODIUM BICARBONATE 325 MG: 325 TABLET ORAL at 04:59

## 2021-01-01 RX ADMIN — INSULIN ASPART 2 UNITS: 100 INJECTION, SOLUTION INTRAVENOUS; SUBCUTANEOUS at 21:09

## 2021-01-01 RX ADMIN — Medication 15 ML: at 08:36

## 2021-01-01 RX ADMIN — ONDANSETRON 4 MG: 4 TABLET, ORALLY DISINTEGRATING ORAL at 20:37

## 2021-01-01 RX ADMIN — Medication 12.5 MCG: at 12:53

## 2021-01-01 RX ADMIN — IOPAMIDOL 86 ML: 755 INJECTION, SOLUTION INTRAVENOUS at 18:53

## 2021-01-01 RX ADMIN — OXYCODONE HYDROCHLORIDE 10 MG: 5 SOLUTION ORAL at 18:16

## 2021-01-01 RX ADMIN — Medication 5 MG: at 16:17

## 2021-01-01 RX ADMIN — POTASSIUM CHLORIDE, DEXTROSE MONOHYDRATE AND SODIUM CHLORIDE: 150; 5; 200 INJECTION, SOLUTION INTRAVENOUS at 04:55

## 2021-01-01 RX ADMIN — PROPOFOL 35 MCG/KG/MIN: 10 INJECTION, EMULSION INTRAVENOUS at 18:38

## 2021-01-01 RX ADMIN — PHENYLEPHRINE HYDROCHLORIDE 100 MCG: 10 INJECTION INTRAVENOUS at 10:03

## 2021-01-01 RX ADMIN — PANCRELIPASE 2 CAPSULE: 24000; 76000; 120000 CAPSULE, DELAYED RELEASE PELLETS ORAL at 00:15

## 2021-01-01 RX ADMIN — LORAZEPAM 2 MG: 2 INJECTION INTRAMUSCULAR; INTRAVENOUS at 01:04

## 2021-01-01 RX ADMIN — ACETAMINOPHEN 975 MG: 325 SOLUTION ORAL at 08:39

## 2021-01-01 RX ADMIN — OXYCODONE HYDROCHLORIDE 10 MG: 5 SOLUTION ORAL at 04:17

## 2021-01-01 RX ADMIN — OXYCODONE HYDROCHLORIDE 10 MG: 5 SOLUTION ORAL at 16:30

## 2021-01-01 RX ADMIN — METHOCARBAMOL 500 MG: 100 INJECTION, SOLUTION INTRAMUSCULAR; INTRAVENOUS at 20:51

## 2021-01-01 RX ADMIN — Medication 15 ML: at 12:28

## 2021-01-01 RX ADMIN — PANCRELIPASE 2 CAPSULE: 24000; 76000; 120000 CAPSULE, DELAYED RELEASE PELLETS ORAL at 07:52

## 2021-01-01 RX ADMIN — DEXTROSE MONOHYDRATE 25 ML: 500 INJECTION PARENTERAL at 13:25

## 2021-01-01 RX ADMIN — POTASSIUM CHLORIDE 20 MEQ: 1500 TABLET, EXTENDED RELEASE ORAL at 09:15

## 2021-01-01 RX ADMIN — SODIUM BICARBONATE 325 MG: 325 TABLET ORAL at 07:51

## 2021-01-01 RX ADMIN — PANCRELIPASE 2 CAPSULE: 24000; 76000; 120000 CAPSULE, DELAYED RELEASE PELLETS ORAL at 19:33

## 2021-01-01 RX ADMIN — SODIUM CHLORIDE, POTASSIUM CHLORIDE, SODIUM LACTATE AND CALCIUM CHLORIDE: 600; 310; 30; 20 INJECTION, SOLUTION INTRAVENOUS at 16:22

## 2021-01-01 RX ADMIN — PANCRELIPASE 2 CAPSULE: 24000; 76000; 120000 CAPSULE, DELAYED RELEASE PELLETS ORAL at 00:58

## 2021-01-01 RX ADMIN — HYDROMORPHONE HYDROCHLORIDE 0.5 MG: 1 INJECTION, SOLUTION INTRAMUSCULAR; INTRAVENOUS; SUBCUTANEOUS at 09:33

## 2021-01-01 RX ADMIN — HYDROMORPHONE HYDROCHLORIDE 0.5 MG: 1 INJECTION, SOLUTION INTRAMUSCULAR; INTRAVENOUS; SUBCUTANEOUS at 13:29

## 2021-01-01 RX ADMIN — PROPOFOL 180 MG: 10 INJECTION, EMULSION INTRAVENOUS at 07:36

## 2021-01-01 RX ADMIN — HYDROMORPHONE HYDROCHLORIDE 0.5 MG: 1 INJECTION, SOLUTION INTRAMUSCULAR; INTRAVENOUS; SUBCUTANEOUS at 13:41

## 2021-01-01 RX ADMIN — PHENYLEPHRINE HYDROCHLORIDE 200 MCG: 10 INJECTION INTRAVENOUS at 10:08

## 2021-01-01 RX ADMIN — SODIUM BICARBONATE 325 MG: 325 TABLET ORAL at 04:26

## 2021-01-01 RX ADMIN — PANCRELIPASE 2 CAPSULE: 24000; 76000; 120000 CAPSULE, DELAYED RELEASE PELLETS ORAL at 02:29

## 2021-01-01 RX ADMIN — GABAPENTIN 300 MG: 250 SOLUTION ORAL at 09:14

## 2021-01-01 RX ADMIN — HYDROMORPHONE HYDROCHLORIDE 0.5 MG: 1 INJECTION, SOLUTION INTRAMUSCULAR; INTRAVENOUS; SUBCUTANEOUS at 08:51

## 2021-01-01 RX ADMIN — VANCOMYCIN HYDROCHLORIDE 1500 MG: 100 INJECTION, POWDER, LYOPHILIZED, FOR SOLUTION INTRAVENOUS at 16:14

## 2021-01-01 RX ADMIN — PHENYLEPHRINE HYDROCHLORIDE 200 MCG: 10 INJECTION INTRAVENOUS at 10:29

## 2021-01-01 RX ADMIN — PIPERACILLIN AND TAZOBACTAM 3.38 G: 3; .375 INJECTION, POWDER, FOR SOLUTION INTRAVENOUS at 16:39

## 2021-01-01 RX ADMIN — INSULIN ASPART 2 UNITS: 100 INJECTION, SOLUTION INTRAVENOUS; SUBCUTANEOUS at 08:55

## 2021-01-01 RX ADMIN — SODIUM BICARBONATE 325 MG: 325 TABLET ORAL at 21:31

## 2021-01-01 RX ADMIN — SODIUM CHLORIDE, POTASSIUM CHLORIDE, SODIUM LACTATE AND CALCIUM CHLORIDE: 600; 310; 30; 20 INJECTION, SOLUTION INTRAVENOUS at 07:33

## 2021-01-01 RX ADMIN — SODIUM BICARBONATE 325 MG: 325 TABLET ORAL at 19:33

## 2021-01-01 RX ADMIN — OXYCODONE HYDROCHLORIDE 10 MG: 5 SOLUTION ORAL at 10:53

## 2021-01-01 RX ADMIN — OXYCODONE HYDROCHLORIDE 5 MG: 5 TABLET ORAL at 12:53

## 2021-01-01 RX ADMIN — Medication 5 MG: at 15:34

## 2021-01-01 RX ADMIN — POTASSIUM CHLORIDE, DEXTROSE MONOHYDRATE AND SODIUM CHLORIDE: 150; 5; 200 INJECTION, SOLUTION INTRAVENOUS at 13:26

## 2021-01-01 RX ADMIN — Medication 15 ML: at 08:02

## 2021-01-01 RX ADMIN — PHENYLEPHRINE HYDROCHLORIDE 50 MCG: 10 INJECTION INTRAVENOUS at 15:32

## 2021-01-01 RX ADMIN — GABAPENTIN 300 MG: 250 SOLUTION ORAL at 20:14

## 2021-01-01 RX ADMIN — PANTOPRAZOLE SODIUM 40 MG: 40 INJECTION, POWDER, FOR SOLUTION INTRAVENOUS at 08:36

## 2021-01-01 RX ADMIN — PANCRELIPASE 2 CAPSULE: 24000; 76000; 120000 CAPSULE, DELAYED RELEASE PELLETS ORAL at 09:29

## 2021-01-01 RX ADMIN — HYDROMORPHONE HYDROCHLORIDE 0.5 MG: 1 INJECTION, SOLUTION INTRAMUSCULAR; INTRAVENOUS; SUBCUTANEOUS at 04:12

## 2021-01-01 RX ADMIN — ONDANSETRON 4 MG: 4 TABLET, ORALLY DISINTEGRATING ORAL at 02:04

## 2021-01-01 RX ADMIN — OXYCODONE HYDROCHLORIDE 10 MG: 5 SOLUTION ORAL at 20:40

## 2021-01-01 RX ADMIN — PIPERACILLIN AND TAZOBACTAM 3.38 G: 3; .375 INJECTION, POWDER, FOR SOLUTION INTRAVENOUS at 16:30

## 2021-01-01 RX ADMIN — FENTANYL CITRATE 50 MCG: 50 INJECTION, SOLUTION INTRAMUSCULAR; INTRAVENOUS at 08:46

## 2021-01-01 RX ADMIN — PIPERACILLIN AND TAZOBACTAM 3.38 G: 3; .375 INJECTION, POWDER, LYOPHILIZED, FOR SOLUTION INTRAVENOUS at 11:46

## 2021-01-01 RX ADMIN — OXYCODONE HYDROCHLORIDE 5 MG: 5 TABLET ORAL at 02:04

## 2021-01-01 RX ADMIN — GABAPENTIN 300 MG: 250 SOLUTION ORAL at 08:39

## 2021-01-01 RX ADMIN — Medication 15 ML: at 08:30

## 2021-01-01 RX ADMIN — OXYCODONE HYDROCHLORIDE 5 MG: 5 TABLET ORAL at 00:09

## 2021-01-01 RX ADMIN — Medication: at 17:42

## 2021-01-01 RX ADMIN — Medication 2 PACKET: at 08:55

## 2021-01-01 RX ADMIN — PHENYLEPHRINE HYDROCHLORIDE 200 MCG: 10 INJECTION INTRAVENOUS at 10:14

## 2021-01-01 RX ADMIN — SODIUM CHLORIDE, POTASSIUM CHLORIDE, SODIUM LACTATE AND CALCIUM CHLORIDE: 600; 310; 30; 20 INJECTION, SOLUTION INTRAVENOUS at 15:03

## 2021-01-01 RX ADMIN — PANTOPRAZOLE SODIUM 40 MG: 40 TABLET, DELAYED RELEASE ORAL at 08:44

## 2021-01-01 RX ADMIN — PROPOFOL 140 MG: 10 INJECTION, EMULSION INTRAVENOUS at 09:46

## 2021-01-01 RX ADMIN — PIPERACILLIN AND TAZOBACTAM 3.38 G: 3; .375 INJECTION, POWDER, FOR SOLUTION INTRAVENOUS at 09:55

## 2021-01-01 RX ADMIN — ACETAMINOPHEN 975 MG: 325 TABLET, FILM COATED ORAL at 16:52

## 2021-01-01 RX ADMIN — I.V. FAT EMULSION 250 ML: 20 EMULSION INTRAVENOUS at 20:46

## 2021-01-01 RX ADMIN — PIPERACILLIN AND TAZOBACTAM 3.38 G: 3; .375 INJECTION, POWDER, LYOPHILIZED, FOR SOLUTION INTRAVENOUS at 03:11

## 2021-01-01 RX ADMIN — FENTANYL CITRATE 50 MCG: 50 INJECTION, SOLUTION INTRAMUSCULAR; INTRAVENOUS at 12:07

## 2021-01-01 RX ADMIN — DEXTROSE MONOHYDRATE 25 ML: 500 INJECTION PARENTERAL at 17:03

## 2021-01-01 RX ADMIN — PIPERACILLIN AND TAZOBACTAM 3.38 G: 3; .375 INJECTION, POWDER, FOR SOLUTION INTRAVENOUS at 14:25

## 2021-01-01 RX ADMIN — ETOMIDATE 10 MG: 40 INJECTION, SOLUTION INTRAVENOUS at 23:20

## 2021-01-01 RX ADMIN — Medication 5 MG: at 07:37

## 2021-01-01 RX ADMIN — PIPERACILLIN AND TAZOBACTAM 3.38 G: 3; .375 INJECTION, POWDER, FOR SOLUTION INTRAVENOUS at 15:38

## 2021-01-01 RX ADMIN — HYDROMORPHONE HYDROCHLORIDE 0.5 MG: 1 INJECTION, SOLUTION INTRAMUSCULAR; INTRAVENOUS; SUBCUTANEOUS at 02:18

## 2021-01-01 RX ADMIN — INSULIN ASPART 2 UNITS: 100 INJECTION, SOLUTION INTRAVENOUS; SUBCUTANEOUS at 17:03

## 2021-01-01 RX ADMIN — ACETAMINOPHEN 975 MG: 325 SOLUTION ORAL at 11:05

## 2021-01-01 RX ADMIN — SODIUM BICARBONATE 325 MG: 325 TABLET ORAL at 12:44

## 2021-01-01 RX ADMIN — ONDANSETRON 4 MG: 2 INJECTION INTRAMUSCULAR; INTRAVENOUS at 16:15

## 2021-01-01 RX ADMIN — HYDROMORPHONE HYDROCHLORIDE 0.5 MG: 1 INJECTION, SOLUTION INTRAMUSCULAR; INTRAVENOUS; SUBCUTANEOUS at 13:46

## 2021-01-01 RX ADMIN — Medication 100 MCG/HR: at 03:38

## 2021-01-01 RX ADMIN — PHENYLEPHRINE HYDROCHLORIDE 100 MCG: 10 INJECTION INTRAVENOUS at 15:27

## 2021-01-01 RX ADMIN — HYDROMORPHONE HYDROCHLORIDE 0.5 MG: 1 INJECTION, SOLUTION INTRAMUSCULAR; INTRAVENOUS; SUBCUTANEOUS at 08:38

## 2021-01-01 RX ADMIN — Medication 15 ML: at 07:47

## 2021-01-01 RX ADMIN — INSULIN ASPART 2 UNITS: 100 INJECTION, SOLUTION INTRAVENOUS; SUBCUTANEOUS at 04:36

## 2021-01-01 RX ADMIN — POTASSIUM CHLORIDE, DEXTROSE MONOHYDRATE AND SODIUM CHLORIDE: 150; 5; 200 INJECTION, SOLUTION INTRAVENOUS at 01:14

## 2021-01-01 RX ADMIN — PANTOPRAZOLE SODIUM 40 MG: 40 INJECTION, POWDER, FOR SOLUTION INTRAVENOUS at 07:51

## 2021-01-01 RX ADMIN — Medication 5 MG: at 08:06

## 2021-01-01 RX ADMIN — INSULIN ASPART 2 UNITS: 100 INJECTION, SOLUTION INTRAVENOUS; SUBCUTANEOUS at 12:46

## 2021-01-01 RX ADMIN — FENTANYL CITRATE 50 MCG: 50 INJECTION, SOLUTION INTRAMUSCULAR; INTRAVENOUS at 15:07

## 2021-01-01 RX ADMIN — DEXTROSE MONOHYDRATE 25 ML: 500 INJECTION PARENTERAL at 14:33

## 2021-01-01 RX ADMIN — SODIUM CHLORIDE, POTASSIUM CHLORIDE, SODIUM LACTATE AND CALCIUM CHLORIDE 500 ML: 600; 310; 30; 20 INJECTION, SOLUTION INTRAVENOUS at 05:15

## 2021-01-01 RX ADMIN — SODIUM BICARBONATE 325 MG: 325 TABLET ORAL at 04:55

## 2021-01-01 RX ADMIN — HYDROMORPHONE HYDROCHLORIDE 0.5 MG: 1 INJECTION, SOLUTION INTRAMUSCULAR; INTRAVENOUS; SUBCUTANEOUS at 08:34

## 2021-01-01 RX ADMIN — INSULIN ASPART 1 UNITS: 100 INJECTION, SOLUTION INTRAVENOUS; SUBCUTANEOUS at 01:13

## 2021-01-01 RX ADMIN — PANCRELIPASE 2 CAPSULE: 24000; 76000; 120000 CAPSULE, DELAYED RELEASE PELLETS ORAL at 20:55

## 2021-01-01 RX ADMIN — INSULIN ASPART 3 UNITS: 100 INJECTION, SOLUTION INTRAVENOUS; SUBCUTANEOUS at 16:39

## 2021-01-01 RX ADMIN — INSULIN ASPART 2 UNITS: 100 INJECTION, SOLUTION INTRAVENOUS; SUBCUTANEOUS at 00:50

## 2021-01-01 RX ADMIN — Medication 15 ML: at 09:43

## 2021-01-01 RX ADMIN — PANTOPRAZOLE SODIUM 40 MG: 40 INJECTION, POWDER, FOR SOLUTION INTRAVENOUS at 08:38

## 2021-01-01 RX ADMIN — OXYCODONE HYDROCHLORIDE 10 MG: 5 SOLUTION ORAL at 04:06

## 2021-01-01 RX ADMIN — INSULIN ASPART 1 UNITS: 100 INJECTION, SOLUTION INTRAVENOUS; SUBCUTANEOUS at 20:32

## 2021-01-01 RX ADMIN — POTASSIUM CHLORIDE, DEXTROSE MONOHYDRATE AND SODIUM CHLORIDE: 150; 5; 200 INJECTION, SOLUTION INTRAVENOUS at 02:32

## 2021-01-01 RX ADMIN — ONDANSETRON 4 MG: 2 INJECTION INTRAMUSCULAR; INTRAVENOUS at 14:20

## 2021-01-01 RX ADMIN — PANCRELIPASE 2 CAPSULE: 24000; 76000; 120000 CAPSULE, DELAYED RELEASE PELLETS ORAL at 13:24

## 2021-01-01 RX ADMIN — HYDROMORPHONE HYDROCHLORIDE 1 MG: 1 INJECTION, SOLUTION INTRAMUSCULAR; INTRAVENOUS; SUBCUTANEOUS at 06:58

## 2021-01-01 RX ADMIN — HYDROMORPHONE HYDROCHLORIDE 0.5 MG: 1 INJECTION, SOLUTION INTRAMUSCULAR; INTRAVENOUS; SUBCUTANEOUS at 16:04

## 2021-01-01 RX ADMIN — Medication 5 MG: at 07:42

## 2021-01-01 RX ADMIN — POTASSIUM CHLORIDE 10 MEQ: 7.46 INJECTION, SOLUTION INTRAVENOUS at 11:30

## 2021-01-01 RX ADMIN — PANCRELIPASE 3 CAPSULE: 24000; 76000; 120000 CAPSULE, DELAYED RELEASE PELLETS ORAL at 18:48

## 2021-01-01 RX ADMIN — NOREPINEPHRINE BITARTRATE 0.04 MCG/KG/MIN: 1 INJECTION, SOLUTION, CONCENTRATE INTRAVENOUS at 10:26

## 2021-01-01 RX ADMIN — CEFAZOLIN 2 G: 10 INJECTION, POWDER, FOR SOLUTION INTRAVENOUS at 16:15

## 2021-01-01 RX ADMIN — PHENYLEPHRINE HYDROCHLORIDE 150 MCG: 10 INJECTION INTRAVENOUS at 15:20

## 2021-01-01 RX ADMIN — LIDOCAINE HYDROCHLORIDE 100 MG: 20 INJECTION, SOLUTION INFILTRATION; PERINEURAL at 07:36

## 2021-01-01 RX ADMIN — METHOCARBAMOL 500 MG: 100 INJECTION, SOLUTION INTRAMUSCULAR; INTRAVENOUS at 08:30

## 2021-01-01 RX ADMIN — ROCURONIUM BROMIDE 50 MG: 10 INJECTION INTRAVENOUS at 14:20

## 2021-01-01 RX ADMIN — PIPERACILLIN AND TAZOBACTAM 3.38 G: 3; .375 INJECTION, POWDER, FOR SOLUTION INTRAVENOUS at 04:28

## 2021-01-01 RX ADMIN — ACETAMINOPHEN 975 MG: 325 TABLET, FILM COATED ORAL at 08:44

## 2021-01-01 RX ADMIN — PANTOPRAZOLE SODIUM 40 MG: 40 TABLET, DELAYED RELEASE ORAL at 09:58

## 2021-01-01 RX ADMIN — HYDROMORPHONE HYDROCHLORIDE 0.5 MG: 1 INJECTION, SOLUTION INTRAMUSCULAR; INTRAVENOUS; SUBCUTANEOUS at 13:27

## 2021-01-01 RX ADMIN — CHOLESTYRAMINE 4 G: 4 POWDER, FOR SUSPENSION ORAL at 08:02

## 2021-01-01 RX ADMIN — Medication 50 MCG: at 16:26

## 2021-01-01 RX ADMIN — PIPERACILLIN AND TAZOBACTAM 3.38 G: 3; .375 INJECTION, POWDER, LYOPHILIZED, FOR SOLUTION INTRAVENOUS at 13:09

## 2021-01-01 RX ADMIN — Medication 5 MG: at 13:49

## 2021-01-01 RX ADMIN — INSULIN ASPART 1 UNITS: 100 INJECTION, SOLUTION INTRAVENOUS; SUBCUTANEOUS at 14:21

## 2021-01-01 RX ADMIN — OXYCODONE HYDROCHLORIDE 10 MG: 5 SOLUTION ORAL at 20:27

## 2021-01-01 RX ADMIN — INSULIN ASPART 1 UNITS: 100 INJECTION, SOLUTION INTRAVENOUS; SUBCUTANEOUS at 04:56

## 2021-01-01 RX ADMIN — OXYCODONE HYDROCHLORIDE 10 MG: 5 SOLUTION ORAL at 11:26

## 2021-01-01 RX ADMIN — PANCRELIPASE 2 CAPSULE: 24000; 76000; 120000 CAPSULE, DELAYED RELEASE PELLETS ORAL at 08:00

## 2021-01-01 RX ADMIN — OXYCODONE HYDROCHLORIDE 10 MG: 5 SOLUTION ORAL at 01:07

## 2021-01-01 RX ADMIN — SODIUM BICARBONATE 325 MG: 325 TABLET ORAL at 01:19

## 2021-01-01 RX ADMIN — SODIUM BICARBONATE 325 MG: 325 TABLET ORAL at 16:57

## 2021-01-01 RX ADMIN — OXYCODONE HYDROCHLORIDE 5 MG: 5 TABLET ORAL at 02:54

## 2021-01-01 RX ADMIN — PHENYLEPHRINE HYDROCHLORIDE 100 MCG: 10 INJECTION INTRAVENOUS at 00:54

## 2021-01-01 RX ADMIN — OXYCODONE HYDROCHLORIDE 10 MG: 5 SOLUTION ORAL at 16:23

## 2021-01-01 RX ADMIN — LIDOCAINE HYDROCHLORIDE 5 ML: 10 INJECTION, SOLUTION EPIDURAL; INFILTRATION; INTRACAUDAL; PERINEURAL at 09:06

## 2021-01-01 RX ADMIN — OXYCODONE HYDROCHLORIDE 10 MG: 5 SOLUTION ORAL at 04:07

## 2021-01-01 RX ADMIN — ACETAMINOPHEN 975 MG: 325 SOLUTION ORAL at 12:27

## 2021-01-01 RX ADMIN — PHENYLEPHRINE HYDROCHLORIDE 100 MCG: 10 INJECTION INTRAVENOUS at 16:31

## 2021-01-01 RX ADMIN — METHOCARBAMOL 750 MG: 100 INJECTION, SOLUTION INTRAMUSCULAR; INTRAVENOUS at 23:10

## 2021-01-01 RX ADMIN — OXYCODONE HYDROCHLORIDE 5 MG: 5 TABLET ORAL at 14:55

## 2021-01-01 RX ADMIN — PROCHLORPERAZINE EDISYLATE 5 MG: 5 INJECTION INTRAMUSCULAR; INTRAVENOUS at 11:31

## 2021-01-01 RX ADMIN — PIPERACILLIN AND TAZOBACTAM 3.38 G: 3; .375 INJECTION, POWDER, LYOPHILIZED, FOR SOLUTION INTRAVENOUS at 21:13

## 2021-01-01 RX ADMIN — HYDROMORPHONE HYDROCHLORIDE 0.5 MG: 1 INJECTION, SOLUTION INTRAMUSCULAR; INTRAVENOUS; SUBCUTANEOUS at 20:02

## 2021-01-01 RX ADMIN — SUGAMMADEX 200 MG: 100 INJECTION, SOLUTION INTRAVENOUS at 16:30

## 2021-01-01 RX ADMIN — PANTOPRAZOLE SODIUM 40 MG: 40 TABLET, DELAYED RELEASE ORAL at 08:48

## 2021-01-01 RX ADMIN — ROCURONIUM BROMIDE 30 MG: 10 INJECTION INTRAVENOUS at 15:12

## 2021-01-01 RX ADMIN — PANTOPRAZOLE SODIUM 40 MG: 40 INJECTION, POWDER, FOR SOLUTION INTRAVENOUS at 20:53

## 2021-01-01 RX ADMIN — MAGNESIUM SULFATE IN WATER 2 G: 40 INJECTION, SOLUTION INTRAVENOUS at 05:08

## 2021-01-01 RX ADMIN — HYDROMORPHONE HYDROCHLORIDE 0.5 MG: 1 INJECTION, SOLUTION INTRAMUSCULAR; INTRAVENOUS; SUBCUTANEOUS at 00:38

## 2021-01-01 RX ADMIN — POTASSIUM CHLORIDE: 2 INJECTION, SOLUTION, CONCENTRATE INTRAVENOUS at 20:46

## 2021-01-01 RX ADMIN — PIPERACILLIN AND TAZOBACTAM 3.38 G: 3; .375 INJECTION, POWDER, FOR SOLUTION INTRAVENOUS at 09:53

## 2021-01-01 RX ADMIN — HYDROMORPHONE HYDROCHLORIDE 0.5 MG: 1 INJECTION, SOLUTION INTRAMUSCULAR; INTRAVENOUS; SUBCUTANEOUS at 23:03

## 2021-01-01 RX ADMIN — OXYCODONE HYDROCHLORIDE 5 MG: 5 TABLET ORAL at 08:32

## 2021-01-01 RX ADMIN — LIDOCAINE 1 PATCH: 246 PATCH TOPICAL at 20:14

## 2021-01-01 RX ADMIN — Medication 12.5 MCG: at 10:38

## 2021-01-01 RX ADMIN — SODIUM BICARBONATE 325 MG: 325 TABLET ORAL at 00:10

## 2021-01-01 RX ADMIN — SODIUM BICARBONATE 325 MG: 325 TABLET ORAL at 19:50

## 2021-01-01 RX ADMIN — Medication 12.5 MCG: at 09:32

## 2021-01-01 RX ADMIN — Medication 150 MCG/HR: at 20:24

## 2021-01-01 RX ADMIN — SODIUM BICARBONATE 325 MG: 325 TABLET ORAL at 13:32

## 2021-01-01 RX ADMIN — INSULIN ASPART 1 UNITS: 100 INJECTION, SOLUTION INTRAVENOUS; SUBCUTANEOUS at 04:02

## 2021-01-01 RX ADMIN — ONDANSETRON 4 MG: 2 INJECTION INTRAMUSCULAR; INTRAVENOUS at 16:24

## 2021-01-01 RX ADMIN — ONDANSETRON 4 MG: 2 INJECTION INTRAMUSCULAR; INTRAVENOUS at 05:28

## 2021-01-01 RX ADMIN — PIPERACILLIN AND TAZOBACTAM 3.38 G: 3; .375 INJECTION, POWDER, FOR SOLUTION INTRAVENOUS at 03:02

## 2021-01-01 RX ADMIN — INSULIN ASPART 3 UNITS: 100 INJECTION, SOLUTION INTRAVENOUS; SUBCUTANEOUS at 04:21

## 2021-01-01 RX ADMIN — SODIUM BICARBONATE 325 MG: 325 TABLET ORAL at 20:54

## 2021-01-01 RX ADMIN — PANCRELIPASE 1 CAPSULE: 24000; 76000; 120000 CAPSULE, DELAYED RELEASE PELLETS ORAL at 04:24

## 2021-01-01 RX ADMIN — DEXAMETHASONE SODIUM PHOSPHATE 6 MG: 4 INJECTION, SOLUTION INTRA-ARTICULAR; INTRALESIONAL; INTRAMUSCULAR; INTRAVENOUS; SOFT TISSUE at 07:36

## 2021-01-01 RX ADMIN — OXYCODONE HYDROCHLORIDE 10 MG: 5 SOLUTION ORAL at 08:23

## 2021-01-01 RX ADMIN — HYDROMORPHONE HYDROCHLORIDE 0.5 MG: 1 INJECTION, SOLUTION INTRAMUSCULAR; INTRAVENOUS; SUBCUTANEOUS at 17:37

## 2021-01-01 RX ADMIN — ONDANSETRON 4 MG: 4 TABLET, ORALLY DISINTEGRATING ORAL at 09:33

## 2021-01-01 RX ADMIN — PHENYLEPHRINE HYDROCHLORIDE 200 MCG: 10 INJECTION INTRAVENOUS at 09:49

## 2021-01-01 RX ADMIN — Medication 2 PACKET: at 20:03

## 2021-01-01 RX ADMIN — Medication 2 ML: at 08:23

## 2021-01-01 RX ADMIN — CALCIUM CHLORIDE 250 MG: 100 INJECTION INTRAVENOUS; INTRAVENTRICULAR at 12:30

## 2021-01-01 RX ADMIN — SODIUM BICARBONATE 325 MG: 325 TABLET ORAL at 16:39

## 2021-01-01 RX ADMIN — POTASSIUM CHLORIDE 20 MEQ: 29.8 INJECTION, SOLUTION INTRAVENOUS at 19:58

## 2021-01-01 RX ADMIN — METHOCARBAMOL 500 MG: 100 INJECTION, SOLUTION INTRAMUSCULAR; INTRAVENOUS at 19:54

## 2021-01-01 RX ADMIN — PIPERACILLIN AND TAZOBACTAM 3.38 G: 3; .375 INJECTION, POWDER, FOR SOLUTION INTRAVENOUS at 05:00

## 2021-01-01 RX ADMIN — PROPOFOL 100 MG: 10 INJECTION, EMULSION INTRAVENOUS at 16:11

## 2021-01-01 RX ADMIN — OXYCODONE HYDROCHLORIDE 5 MG: 5 TABLET ORAL at 20:47

## 2021-01-01 RX ADMIN — PIPERACILLIN AND TAZOBACTAM 3.38 G: 3; .375 INJECTION, POWDER, FOR SOLUTION INTRAVENOUS at 11:11

## 2021-01-01 RX ADMIN — SODIUM BICARBONATE 325 MG: 325 TABLET ORAL at 21:36

## 2021-01-01 RX ADMIN — HYDROMORPHONE HYDROCHLORIDE 0.5 MG: 1 INJECTION, SOLUTION INTRAMUSCULAR; INTRAVENOUS; SUBCUTANEOUS at 05:52

## 2021-01-01 RX ADMIN — SODIUM BICARBONATE 325 MG: 325 TABLET ORAL at 09:29

## 2021-01-01 RX ADMIN — CHOLESTYRAMINE 4 G: 4 POWDER, FOR SUSPENSION ORAL at 08:35

## 2021-01-01 RX ADMIN — DEXTROSE MONOHYDRATE 1000 ML: 100 INJECTION, SOLUTION INTRAVENOUS at 00:48

## 2021-01-01 RX ADMIN — ONDANSETRON 4 MG: 4 TABLET, ORALLY DISINTEGRATING ORAL at 11:40

## 2021-01-01 RX ADMIN — INSULIN ASPART 2 UNITS: 100 INJECTION, SOLUTION INTRAVENOUS; SUBCUTANEOUS at 01:09

## 2021-01-01 RX ADMIN — ACETAMINOPHEN 975 MG: 325 SOLUTION ORAL at 18:41

## 2021-01-01 RX ADMIN — PROPOFOL 125 MCG/KG/MIN: 10 INJECTION, EMULSION INTRAVENOUS at 13:23

## 2021-01-01 RX ADMIN — PANCRELIPASE 1 CAPSULE: 24000; 76000; 120000 CAPSULE, DELAYED RELEASE PELLETS ORAL at 01:35

## 2021-01-01 RX ADMIN — CHOLESTYRAMINE 4 G: 4 POWDER, FOR SUSPENSION ORAL at 08:48

## 2021-01-01 RX ADMIN — OXYCODONE HYDROCHLORIDE 5 MG: 5 TABLET ORAL at 09:33

## 2021-01-01 RX ADMIN — GABAPENTIN 300 MG: 250 SOLUTION ORAL at 08:43

## 2021-01-01 RX ADMIN — POTASSIUM CHLORIDE 10 MEQ: 7.46 INJECTION, SOLUTION INTRAVENOUS at 11:11

## 2021-01-01 RX ADMIN — ENOXAPARIN SODIUM 40 MG: 40 INJECTION SUBCUTANEOUS at 08:31

## 2021-01-01 RX ADMIN — PANCRELIPASE 2 CAPSULE: 24000; 76000; 120000 CAPSULE, DELAYED RELEASE PELLETS ORAL at 21:30

## 2021-01-01 RX ADMIN — ONDANSETRON 4 MG: 4 TABLET, ORALLY DISINTEGRATING ORAL at 02:14

## 2021-01-01 RX ADMIN — ACETAMINOPHEN 975 MG: 325 SOLUTION ORAL at 10:51

## 2021-01-01 RX ADMIN — PANCRELIPASE 3 CAPSULE: 24000; 76000; 120000 CAPSULE, DELAYED RELEASE PELLETS ORAL at 13:00

## 2021-01-01 RX ADMIN — ONDANSETRON 4 MG: 4 TABLET, ORALLY DISINTEGRATING ORAL at 20:47

## 2021-01-01 RX ADMIN — CALCIUM CHLORIDE 250 MG: 100 INJECTION INTRAVENOUS; INTRAVENTRICULAR at 14:20

## 2021-01-01 RX ADMIN — OXYCODONE HYDROCHLORIDE 5 MG: 5 TABLET ORAL at 06:06

## 2021-01-01 RX ADMIN — VANCOMYCIN HYDROCHLORIDE 1250 MG: 100 INJECTION, POWDER, LYOPHILIZED, FOR SOLUTION INTRAVENOUS at 11:50

## 2021-01-01 RX ADMIN — CIPROFLOXACIN 400 MG: 2 INJECTION, SOLUTION INTRAVENOUS at 07:11

## 2021-01-01 RX ADMIN — PANCRELIPASE 2 CAPSULE: 24000; 76000; 120000 CAPSULE, DELAYED RELEASE PELLETS ORAL at 04:06

## 2021-01-01 RX ADMIN — PROPOFOL 15 MCG/KG/MIN: 10 INJECTION, EMULSION INTRAVENOUS at 21:16

## 2021-01-01 RX ADMIN — HYDROMORPHONE HYDROCHLORIDE 0.5 MG: 1 INJECTION, SOLUTION INTRAMUSCULAR; INTRAVENOUS; SUBCUTANEOUS at 22:37

## 2021-01-01 RX ADMIN — OXYCODONE HYDROCHLORIDE 10 MG: 5 SOLUTION ORAL at 11:17

## 2021-01-01 RX ADMIN — PIPERACILLIN AND TAZOBACTAM 3.38 G: 3; .375 INJECTION, POWDER, FOR SOLUTION INTRAVENOUS at 09:15

## 2021-01-01 RX ADMIN — ZINC SULFATE 220 MG (50 MG) CAPSULE 220 MG: CAPSULE at 08:45

## 2021-01-01 RX ADMIN — PANCRELIPASE 1 CAPSULE: 24000; 76000; 120000 CAPSULE, DELAYED RELEASE PELLETS ORAL at 10:39

## 2021-01-01 RX ADMIN — POTASSIUM CHLORIDE 20 MEQ: 20 SOLUTION ORAL at 14:00

## 2021-01-01 RX ADMIN — PANCRELIPASE 2 CAPSULE: 24000; 76000; 120000 CAPSULE, DELAYED RELEASE PELLETS ORAL at 08:35

## 2021-01-01 RX ADMIN — FENTANYL CITRATE 50 MCG: 50 INJECTION, SOLUTION INTRAMUSCULAR; INTRAVENOUS at 14:20

## 2021-01-01 RX ADMIN — INSULIN ASPART 1 UNITS: 100 INJECTION, SOLUTION INTRAVENOUS; SUBCUTANEOUS at 00:24

## 2021-01-01 ASSESSMENT — ACTIVITIES OF DAILY LIVING (ADL)
ADLS_ACUITY_SCORE: 23
ADLS_ACUITY_SCORE: 24
ADLS_ACUITY_SCORE: 23
ADLS_ACUITY_SCORE: 25
ADLS_ACUITY_SCORE: 23
DOING_ERRANDS_INDEPENDENTLY_DIFFICULTY: NO
ADLS_ACUITY_SCORE: 24
ADLS_ACUITY_SCORE: 23
ADLS_ACUITY_SCORE: 23
EQUIPMENT_CURRENTLY_USED_AT_HOME: OTHER (SEE COMMENTS)
ADLS_ACUITY_SCORE: 21
ADLS_ACUITY_SCORE: 24
CONCENTRATING,_REMEMBERING_OR_MAKING_DECISIONS_DIFFICULTY: NO
WALKING_OR_CLIMBING_STAIRS_DIFFICULTY: YES
ADLS_ACUITY_SCORE: 24
ADLS_ACUITY_SCORE: 23
ADLS_ACUITY_SCORE: 16
ADLS_ACUITY_SCORE: 25
ADLS_ACUITY_SCORE: 25
WEAR_GLASSES_OR_BLIND: YES
ADLS_ACUITY_SCORE: 24
ADLS_ACUITY_SCORE: 25
FALL_HISTORY_WITHIN_LAST_SIX_MONTHS: YES
ADLS_ACUITY_SCORE: 24
ADLS_ACUITY_SCORE: 24
ADLS_ACUITY_SCORE: 25
ADLS_ACUITY_SCORE: 23
ADLS_ACUITY_SCORE: 24
DIFFICULTY_EATING/SWALLOWING: NO
ADLS_ACUITY_SCORE: 20
ADLS_ACUITY_SCORE: 16
ADLS_ACUITY_SCORE: 23
ADLS_ACUITY_SCORE: 23
ADLS_ACUITY_SCORE: 25
ADLS_ACUITY_SCORE: 25
ADLS_ACUITY_SCORE: 23
DRESSING/BATHING: BATHING DIFFICULTY, REQUIRES EQUIPMENT
ADLS_ACUITY_SCORE: 23
ADLS_ACUITY_SCORE: 23
VISION_MANAGEMENT: GLASSES
ADLS_ACUITY_SCORE: 25
ADLS_ACUITY_SCORE: 24
ADLS_ACUITY_SCORE: 16
ADLS_ACUITY_SCORE: 15
DRESSING/BATHING_DIFFICULTY: YES
TOILETING_ASSISTANCE: TOILETING DIFFICULTY, DEPENDENT
DRESSING/BATHING_DIFFICULTY: YES
ADLS_ACUITY_SCORE: 25
DIFFICULTY_EATING/SWALLOWING: YES
ADLS_ACUITY_SCORE: 25
EATING/SWALLOWING: SWALLOWING LIQUIDS;SWALLOWING SOLID FOOD;EATING
ADLS_ACUITY_SCORE: 16
ADLS_ACUITY_SCORE: 23
ADLS_ACUITY_SCORE: 24
ADLS_ACUITY_SCORE: 23
ADLS_ACUITY_SCORE: 25
ADLS_ACUITY_SCORE: 23
ADLS_ACUITY_SCORE: 25
ADLS_ACUITY_SCORE: 16
ADLS_ACUITY_SCORE: 23
ADLS_ACUITY_SCORE: 25
ADLS_ACUITY_SCORE: 23
ADLS_ACUITY_SCORE: 21
ADLS_ACUITY_SCORE: 24
ADLS_ACUITY_SCORE: 25
ADLS_ACUITY_SCORE: 24
ADLS_ACUITY_SCORE: 23
TOILETING_ASSISTANCE: TOILETING DIFFICULTY, REQUIRES EQUIPMENT
ADLS_ACUITY_SCORE: 21
ADLS_ACUITY_SCORE: 25
ADLS_ACUITY_SCORE: 23
TOILETING_ISSUES: YES
ADLS_ACUITY_SCORE: 24
ADLS_ACUITY_SCORE: 21
TOILETING_ISSUES: YES
NUMBER_OF_TIMES_PATIENT_HAS_FALLEN_WITHIN_LAST_SIX_MONTHS: 6
ADLS_ACUITY_SCORE: 24
ADLS_ACUITY_SCORE: 23
DIFFICULTY_COMMUNICATING: NO
ADLS_ACUITY_SCORE: 23
ADLS_ACUITY_SCORE: 23
ADLS_ACUITY_SCORE: 25
ADLS_ACUITY_SCORE: 21
ADLS_ACUITY_SCORE: 25
ADLS_ACUITY_SCORE: 24
ADLS_ACUITY_SCORE: 23
ADLS_ACUITY_SCORE: 16
DIFFICULTY_COMMUNICATING: NO
EQUIPMENT_CURRENTLY_USED_AT_HOME: WHEELCHAIR, POWER
ADLS_ACUITY_SCORE: 21
ADLS_ACUITY_SCORE: 24
ADLS_ACUITY_SCORE: 23
DRESSING/BATHING: BATHING DIFFICULTY, ASSISTANCE 1 PERSON
ADLS_ACUITY_SCORE: 23

## 2021-01-01 ASSESSMENT — MIFFLIN-ST. JEOR
SCORE: 1432.25
SCORE: 1436.25
SCORE: 1461
SCORE: 1475.72
SCORE: 1407.68
SCORE: 1366.25
SCORE: 1423
SCORE: 1423
SCORE: 1431
SCORE: 1426.25
SCORE: 1449
SCORE: 1496.58
SCORE: 1376.25
SCORE: 1490.23
SCORE: 1498.4
SCORE: 1461
SCORE: 1421
SCORE: 1424

## 2021-01-01 ASSESSMENT — ENCOUNTER SYMPTOMS
FEVER: 0
NAUSEA: 1
VOMITING: 1
DIARRHEA: 1
CHILLS: 0
RHINORRHEA: 0
COUGH: 0
BLOOD IN STOOL: 1
SHORTNESS OF BREATH: 0
ABDOMINAL PAIN: 1
SORE THROAT: 0

## 2021-01-01 ASSESSMENT — PAIN SCALES - GENERAL
PAINLEVEL: MILD PAIN (3)
PAINLEVEL: NO PAIN (0)

## 2021-01-13 NOTE — OP NOTE
ERCP 01/13/2021  7:01 AM [facility], [address] [place] [address] [phone]     Endoscopy Department   _______________________________________________________________________________   Patient Name: Nick Zamudio           Procedure Date: 1/13/2021 7:01 AM   MRN: [id number]                       Account Number: [id number]   YOB: 1955              Admit Type: Outpatient   Age: 65                               Room: OR   Gender: Male                          Note Status: Finalized   Attending MD: Christo Pepe MD   Total Sedation Time:   _______________________________________________________________________________       Procedure:           ERCP   Indications:         Pancreatic duct stone   Providers:           Christo Pepe MD, Holden Blake MD   Patient Profile:     Mr Zamudio is 64yo gentleman with idiopathic chronic                        calcific pancreatitis complicated by biliary                        stenosisinitially managed by biliary focused ERCP. Mass                        wasexcluded in the head of the pancreas by EUS and cross                        sectional imaging and the pancreatic stones were                        observed as he was without abdominal pain or nausea. He                        more recently however has progressive nausea only                        minimally improved high dose enzyme supplementation. We                        therefore performed an ERCP for biliary reiniterrogation                        and and evaluation of the main duct given imaging with                        upstream dilation. The common duct stenosis had resolved                        and he has been doing well from this standpoint stent                        free. We placed two thin caliber stents just short of an                        ultratight stenosis in the body. He now has minimal                        discomfort, though has  complaints primarily of diarrhea.                        He now returns for further management by ERCP.   Referring MD:        Echo Lopez   Medicines:           General Anesthesia, Indomethacin not given due to                        contraindication   Complications:       No immediate complications.   _______________________________________________________________________________   Procedure:           Pre-Anesthesia Assessment:                        - Prior to the procedure, a History and Physical was                        performed, and patient medications and allergies were                        reviewed. The patient is competent. The risks and                        benefits of the procedure and the sedation options and                        risks were discussed with the patient. All questions                        were answered and informed consent was obtained. Patient                        identification and proposed procedure were verified by                        the nurse in the pre-procedure area. Mental Status                        Examination: alert and oriented. Airway Examination:                        Mallampati Class II (the uvula but not tonsillar pillars                        visualized). Respiratory Examination: clear to                        auscultation. CV Examination: normal. ASA Grade                        Assessment: III - A patient with severe systemic                        disease. After reviewing the risks and benefits, the                        patient was deemed in satisfactory condition to undergo                        the procedure. The anesthesia plan was to use general                        anesthesia. Immediately prior to administration of                        medications, the patient was re-assessed for adequacy to                        receive sedatives. The heart rate, respiratory rate,                        oxygen saturations, blood pressure, adequacy of                         pulmonary ventilation, and response to care were                        monitored throughout the procedure. The physical status                        of the patient was re-assessed after the procedure.                        After obtaining informed consent, the scope was passed                        under direct vision. Throughout the procedure, the                        patient's blood pressure, pulse, and oxygen saturations                        were monitored continuously. The duodenoscope was                        introduced through the mouth, and used to inject                        contrast into and used to inject contrast into the                        ventral pancreatic duct. The ERCP was accomplished                        without difficulty. The patient tolerated the procedure                        well.                                                                                     Findings:        A  film of the right upper quadrant demonstrated one of the two        pancreatic stents in situ. The 180 duodenoscope could not traverse into        the duodenum due to a distorted antrum and therefore the patient had to        be turned supine. This complicated positioning at the ampulla but allow        for access. As suspected, one of the two pancreatic stents was in situ,        the other passed spontnaeously. Both sphincterotimies were patent. The        ventral pancreatic duct was deeply cannulated with the short-nosed        traction sphincterotome (both the 21 and 35 Omni) in concert with        multiple wires (both an 0.025 and an 0.018). Contrast was injected and I        personally interpreted the pancreatic duct images. Ductal flow of        contrast was adequate, image quality was adequate and contrast extended        throughout the main pancreatic duct. As previous, there was diffuse        irregularity of the ventral duct in the head with suspicion of  stone        related stenosis in this region, though contrast suggested the duct was        mostly patent though an ultratight stenosis of the body. Again no wires        or devices could pass this stenosis. The pancreatic stent was then        removed with rat toothed forceps. Aggresive manipulations were deferred.        A stent was not replaced given the chronic nature of disease and        likelihood that the stent would not spontaneously eject. The biliary        system was selectively not interrogated.                                                                                     Impression:          - Supine position required to pass the 180 duodenoscope                        due to distorted antrum                        - Patent dual sphincterotomies                        - Uncomplicated removal of remaining stent (other                        spontaneously ejected)                        - Diffuse irregularity of the main duct in the head and                        genu, including multifocal stenoses of the distal head                        (precluring deep insertion of tools besides wires) and                        of the body (precluding even wire passage)                        - Aggressive interventions of the main duct and                        suspectes main duct stones were deferred given the lack                        of pain and primarily pancreatic insufficiency related                        symptoms   Recommendation:      - General anesthesia recovery with 2h observation while                        receiving a second liter of LR followed by laboratories                        with disposition based on course and results                        - Ensure high dose supplementation (at least 72K units                        lipase, that is 3 24K units with any meal) and a EPI                        appropriate diet (refer back to dietitian Mel Hernandes)                        - Follow up  clinic visit in the coming weeks to discuss                        options; he is likely a poor surgical candidate for a                        drainage procedure and again its unlikely this would                        provide benefit                        - The findings and recommendations were discussed with                        the patient and their family                                                                                       electronically signed by LYDIA Pepe

## 2021-01-13 NOTE — ANESTHESIA CARE TRANSFER NOTE
Patient: Nick Zamudio    Procedure(s):  ENDOSCOPIC RETROGRADE CHOLANGIOPANCREATOGRAPHY WITH PANCREATIC DUCT STENT REMOVAL    Diagnosis: Pancreatic duct stones [K86.89]  Diagnosis Additional Information: No value filed.    Anesthesia Type:   General     Note:  Airway :Nasal Cannula  Patient transferred to:PACU  Comments: To PACU, awake, VSS, patent airway, report to RN.Handoff Report: Identifed the Patient, Identified the Reponsible Provider, Reviewed the pertinent medical history, Discussed the surgical course, Reviewed Intra-OP anesthesia mangement and issues during anesthesia, Set expectations for post-procedure period and Allowed opportunity for questions and acknowledgement of understanding      Vitals: (Last set prior to Anesthesia Care Transfer)    CRNA VITALS  1/13/2021 0825 - 1/13/2021 0902      1/13/2021             EKG:  Sinus rhythm                Electronically Signed By: DANICA Zafar CRNA  January 13, 2021  9:02 AM

## 2021-01-13 NOTE — PROGRESS NOTES
Following our procedure I spoke to Mr Robb' wife who provided a different story that was communicated by him before the procedure. She informed me that he is indeed having pain with eating and not just insufficiency symptoms. Therefore, we will (1) ensure he maximizes his enzymes with meals (2) utilize high protein shakes as he as no issues with liquids (3) organize an upper endoscopy with GJ tube placed (4) have him see Mel Hernandes again (5) organize a follow up visit (5) refer to TPIAT/chronic pancreatitis multidisciplinary group follow tube placement.    Christo Pepe MD PhD FACG VIC LEVI  Director of Endoscopy  Associate Professor of Medicine, Surgery and Pediatrics  Interventional and Therapeutic Endoscopy    [facility]  Division of Gastroenterology and Hepatology  [facility] [address], [place] [address]    New Consultations  [phone]  Procedure Scheduling [phone]  Clinical Nurse Coordinator [phone]  Clinical Fax   [phone]  Administrative   [phone]  Administrative Fax  [phone]

## 2021-01-13 NOTE — TELEPHONE ENCOUNTER
"Post procedure follow up, per Dr. Pepe- Patient feeling fine after ERCP, no concerning symptoms.     (1) ensure he maximizes his enzymes with meals - patient says he's been taking them \"religiously\" - counseled to take with ice cream and any shakes, taking 6-12 per day.     (2) utilize high protein shakes as he as no issues with liquids, pt voiced undertstanding of this recommendation     (3) organize an upper endoscopy with GJ tube placed - tube protocol placed, will follow    (4) have him see Mel Hernandes again - clinic scheduled on 1/19, will ask for nutrition asessment, TF recommendations and reinforcement of enzymes     (5) refer to TPIAT/chronic pancreatitis multidisciplinary group follow tube placement.- pending    6- check for cdiff, bacteria/viruses, O&P- [facility], will fax orders to PCP    7- doppler ultrasound of mesenteric arteries- scheduling number given    8- clinic f/up with Dr. Pepe- clinic scheduled 2/25      Advanced Endoscopy Tube Feed Placement Protocol    Patients recommended/referred to feeding tube placement must satisfy the following requirements prior to OR scheduling:    PCP to manage tube/feed/labs: pt will call pcp, Echo Lopez    Dietitian for TF Recommendations: 1/19/21, visit scheduled    [facility] agengy:[facility]ela, to check benefit, email sent to Estela 1/24    PLC:order placed, they will reach out, orders placed for PLC    Will follow on the above follow up steps.    ML          "

## 2021-01-13 NOTE — ANESTHESIA PROCEDURE NOTES
Airway   Date/Time: 1/13/2021 7:39 AM   Patient location during procedure: OR    Staff -   Resident/Fellow: Kayode Nash MD  Performed By: resident    Consent for Airway   Urgency: elective    Indications and Patient Condition  Indications for airway management: crystal-procedural  Induction type:intravenousMask difficulty assessment: 2 - vent by mask + OA or adjuvant +/- NMBA    Final Airway Details  Final airway type: endotracheal airway  Successful airway:ETT - single  Endotracheal Airway Details   ETT size (mm): 7.5  Cuffed: yes  Cuff volume (mL): 8  Successful intubation technique: direct laryngoscopy  Grade View of Cords: 1  Adjucts: stylet  Measured from: lips  Secured at (cm): 23  Secured with: pink tape  Bite block used: Oral Airway    Post intubation assessment   Placement verified by: capnometry, equal breath sounds and chest rise   Number of attempts at approach: 1  Number of other approaches attempted: 0  Secured with:pink tape  Ease of procedure: easy  Dentition: Intact and UnchangedAdditional Comments  Intubation performed by resident, Mau Nash.

## 2021-01-13 NOTE — ANESTHESIA PREPROCEDURE EVALUATION
Anesthesia Pre-Procedure Evaluation    Patient: Nick Zamudio   MRN:     [id number] Gender:   male   Age:    65 year old :      1955        Preoperative Diagnosis: Pancreatic duct stones [K86.89]   Procedure(s):  ENDOSCOPIC RETROGRADE CHOLANGIOPANCREATOGRAPHY, WITH ELECTROHYDRAULIC LITHOTRIPSY USING SPSubmittable DIRECT VISUALIZATION SYSTEM     LABS:  CBC:   Lab Results   Component Value Date    WBC 5.7 2020    WBC 5.0 2020    HGB 11.0 (L) 2020    HGB 10.7 (L) 2020    HCT 33.9 (L) 2020    HCT 32.8 (L) 2020     2020     2020     BMP:   Lab Results   Component Value Date     2020     2020    POTASSIUM 3.8 2020    POTASSIUM 4.2 2020    CHLORIDE 110 (H) 2020    CHLORIDE 112 (H) 2020    CO2 22 2020    CO2 22 2020    BUN 21 2020    BUN 27 2020    CR 1.00 2020    CR 1.01 2020     (H) 2020     (H) 2020     COAGS:   Lab Results   Component Value Date    INR 1.19 (H) 2020     POC:   Lab Results   Component Value Date     (H) 2020     OTHER:   Lab Results   Component Value Date    ELODIA 8.9 2020    ALBUMIN 2.5 (L) 2020    PROTTOTAL 6.4 (L) 2020    ALT 12 2020    AST 7 2020    ALKPHOS 113 2020    BILITOTAL 0.3 2020    LIPASE 41 (L) 2020    AMYLASE 27 (L) 2020        Preop Vitals    BP Readings from Last 3 Encounters:   20 (!) 142/81   20 (!) 162/78   20 134/75    Pulse Readings from Last 3 Encounters:   20 64   20 61      Resp Readings from Last 3 Encounters:   20 16   20 14   20 20    SpO2 Readings from Last 3 Encounters:   20 99%   20 96%   20 99%      Temp Readings from Last 1 Encounters:   20 36.4  C (97.5  F) (Oral)    Ht Readings from Last 1 Encounters:   20 1.829 m (6')      Wt Readings from Last 1  Encounters:   12/09/20 72.6 kg (160 lb)    Estimated body mass index is 21.7 kg/m  as calculated from the following:    Height as of 12/9/20: 1.829 m (6').    Weight as of 12/9/20: 72.6 kg (160 lb).     LDA:  Peripheral IV 12/09/20 Right Lower forearm (Active)   Number of days: 34        Past Medical History:   Diagnosis Date     Diabetes (H)      Hypertension      Intussusception (H)      Iron deficiency anemia      Lymphedema      Pancreatic disease     calcific pancreas, multiple panc stones.     Paraplegia (H)     Due to radiation treatment testicular cancer     PVD (peripheral vascular disease) (H)      Renal disease     stone disease     Testicular cancer (H)     Radiation treatment      Past Surgical History:   Procedure Laterality Date     ABDOMEN SURGERY      Partial colectomy for damage due to radiation treatment testicular ca     AMPUTATION      left toe     CHOLECYSTECTOMY       COLONOSCOPY      polyp     ENDOSCOPIC RETROGRADE CHOLANGIOPANCREATOGRAM N/A 7/13/2020    Procedure: ENDOSCOPIC RETROGRADE CHOLANGIOPANCREATOGRAPHY, STENT RETRIEVAL, STENT PLACEMENT, BILIARY SPHINCTEROTOMY;  Surgeon: Christo Pepe MD;  Location:  OR     ENDOSCOPIC RETROGRADE CHOLANGIOPANCREATOGRAM N/A 9/29/2020    Procedure: ENDOSCOPIC RETROGRADE CHOLANGIOPANCREATOGRAPHY with balloon sweep of bile ducts, bile ducts stents exchanged;  Surgeon: Christo Pepe MD;  Location:  OR     ENDOSCOPIC RETROGRADE CHOLANGIOPANCREATOGRAM N/A 12/9/2020    Procedure: ENDOSCOPIC RETROGRADE CHOLANGIOPANCREATOGRAPHY, STENT EXCHANGE;  Surgeon: Christo Pepe MD;  Location:  OR     ENDOSCOPIC ULTRASOUND UPPER GASTROINTESTINAL TRACT (GI) N/A 7/13/2020    Procedure: ENDOSCOPIC ULTRASOUND, ESOPHAGOSCOPY / UPPER GASTROINTESTINAL TRACT (GI) WITH FINE NEEDLE BIOPSY;  Surgeon: Christo Pepe MD;  Location:  OR     GI SURGERY      bowel resection     ORTHOPEDIC SURGERY       TONSILLECTOMY        Allergies   Allergen  Reactions     Penicillins Other (See Comments)     Ibuprofen Rash     Tolerates Aleve        Anesthesia Evaluation     .             ROS/MED HX    ENT/Pulmonary:       Neurologic:       Cardiovascular:     (+) hypertension-Peripheral Vascular Disease---. : . . . :. .       METS/Exercise Tolerance:     Hematologic:         Musculoskeletal:         GI/Hepatic:         Renal/Genitourinary:     (+) chronic renal disease,       Endo:     (+) type II DM .      Psychiatric:         Infectious Disease:         Malignancy:         Other:                         PHYSICAL EXAM:   Mental Status/Neuro: A/A/O   Airway: Facies: Feasible  Mallampati: I  Mouth/Opening: Full  TM distance: > 6 cm  Neck ROM: Full   Respiratory: Auscultation: CTAB     Resp. Rate: Normal     Resp. Effort: Normal      CV: Rhythm: Regular  Rate: Age appropriate  Heart: Normal Sounds  Edema: None   Comments:      Dental: Normal Dentition                Assessment:   ASA SCORE: 3    H&P: History and physical reviewed and following examination; no interval change.    NPO Status: NPO Appropriate     Plan:   Anes. Type:  General   Pre-Medication: None   Induction:  IV (Standard)   Airway: ETT; Oral   Access/Monitoring: PIV   Maintenance: Balanced     Postop Plan:   Postop Pain: Opioids  Postop Sedation/Airway: Not planned     PONV Management:   Adult Risk Factors:, Postop Opioids   Prevention: Ondansetron, Dexamethasone     CONSENT: Direct conversation   Plan and risks discussed with: Patient   Blood Products: Consent Deferred (Minimal Blood Loss)                   Christopher J. Behrens, MD

## 2021-01-13 NOTE — OR NURSING
Discharge instructions to pt and responsible adult.-  georgia Chacon over phone           All questions regarding discharge information answered.  Pt and responsible adult verbalized understanding of all discharge instruction information given.  Printed copy of AVS sent with pt upon discharge.  Post procedure lab results reported to MD Mccartney - cleared pt for discharge.  Per MD Pepe - pt to follow up with PCP regarding wound on lower posterior.

## 2021-01-13 NOTE — DISCHARGE INSTRUCTIONS
[facility], [place]  Same-Day Surgery ERCP Procedure   Adult Discharge Orders & Instructions     You had a procedure known as an Endoscopic Retrograde Cholangiopancreatography (ERCP). Your healthcare provider performed the ERCP to look at your bile or pancreatic ducts, and to locate and/or treat blockages (dilation, stenting, removal, etc.) in these ducts. Often biopsies, small samples of tissue, are taken to help diagnose and/or classify stages of disease growth. This procedure is used to diagnose diseases of the pancreas, bile ducts, pancreatic duct, liver, and gallbladder.     After your procedure   1. Make sure to clarify with your healthcare provider any diet restrictions (For example, clear liquid, low fat, no caffeine, etc.)   2. Do NOT take aspirin containing medications or any other blood-thinning medicines (anticoagulants) until your healthcare provider says it's OK.   3. You MAY be prescribed antibiotics, depending on what was done and/or found during your EUS, make sure to take antibiotics as prescribed by your healthcare provider    For 24 hours after surgery  1. Get plenty of rest.  A responsible adult must stay with you for at least 24 hours after you leave the hospital.   2. Do not drive or use heavy equipment.  If you have weakness or tingling, don't drive or use heavy equipment until this feeling goes away.  3. Do not drink alcohol.  4. Avoid strenuous or risky activities (gym, yoga, cycling, etc.).  Ask for help when climbing stairs.   5. You may feel lightheaded.  IF so, sit for a few minutes before standing.  Have someone help you get up.   6. If you have nausea (feel sick to your stomach): Drink only clear liquids such as apple juice, ginger ale, broth or 7-Up.  Rest may also help.  Be sure to drink enough fluids.  Move to a regular diet as you feel able.  7. If you feel bloated or have too much gas, use a heating pad on your belly to help reduce the discomfort.  This should help you feel better.   8. You may have a slight fever. This is normal for the first 24 hours.   9. You may have a dry mouth, a sore throat, muscle aches or trouble sleeping.  These are normal and will go away after 24 hours. A sore throat is most common. Use lozenges or gargle with salt water to ease the discomfort.   10. Do not make important or legal decisions.      Call your doctor for any of the followin. Chest pain, and/or shortness of breath  2. Abdominal  pain, bloating or cramping that has not improved or does not respond to pain reliving medications (Tylenol or narcotics if prescribed)   3. Difficulty swallowing or feeling as though food or liquids are stuck in your throat   4. Sore throat lasting more than 2 days or pain that has worsened over time   5. Black or tarry stools   6. Nausea and/or vomiting that is not resolving or has not responded to anti-nausea medications prescribed to you   7. It has been over 8 to 10 hours since surgery and you are still not able to urinate (pass water)   8. Headache for over 24 hours   9. Fever over 100.5 F (38 C) lasting more than 24 hours after the procedure   10. Signs of jaundice or blockage (fever, chills, abdominal pain, yellowing of the whites of your eyes, yellowing of your skin, and/or passing darker than normal urine)     To contact a doctor, call:   [ ] _____Dr Pepe at [phone] (clinic)  ______ (Monday thru Friday 8:00am to 4:30pm)   [ ] [phone] and ask for the ____GI _________ resident on call (answered 24 hours a day)   [ ] Emergency Department: [facility]: [phone]     Take it easy when you get home.  Remember, same day surgery DOES NOT MEAN SAME DAY RECOVERY!  Healing is a gradual process.  You will need some time to recover - you may be more tired than you realize at first.  Rest and relax for at least the first 24 hours at home.  You'll feel better and heal faster if you take good care of yourself.

## 2021-01-13 NOTE — OR NURSING
Dr Pepe and Dr Blake here to talk with him, questions asked and answered. Wife updated. VSS on room air.

## 2021-01-13 NOTE — ANESTHESIA POSTPROCEDURE EVALUATION
Anesthesia POST Procedure Evaluation    Patient: Nick Zamudio   MRN:     [id number] Gender:   male   Age:    65 year old :      1955        Preoperative Diagnosis: Pancreatic duct stones [K86.89]   Procedure(s):  ENDOSCOPIC RETROGRADE CHOLANGIOPANCREATOGRAPHY WITH PANCREATIC DUCT STENT REMOVAL   Postop Comments: No value filed.     Anesthesia Type: General       Disposition: Outpatient   Postop Pain Control: Uneventful            Sign Out: Well controlled pain   PONV: No   Neuro/Psych: Uneventful            Sign Out: Acceptable/Baseline neuro status   Airway/Respiratory: Uneventful            Sign Out: Acceptable/Baseline resp. status   CV/Hemodynamics: Uneventful            Sign Out: Acceptable CV status   Other NRE: NONE   DID A NON-ROUTINE EVENT OCCUR? No         Last Anesthesia Record Vitals:  CRNA VITALS  2021 0825 - 2021 0925      2021             EKG:  Sinus rhythm          Last PACU Vitals:  Vitals Value Taken Time   /65 21 1030   Temp 36  C (96.8  F) 21 1035   Pulse 79 21 1033   Resp 16 21 1030   SpO2 99 % 21 1034   Temp src     NIBP     Pulse     SpO2     Resp     Temp     Ht Rate     Temp 2     Vitals shown include unvalidated device data.      Electronically Signed By: Christopher J. Behrens, MD, 2021, 11:08 AM

## 2021-01-13 NOTE — OR NURSING
"Pt spoke with Dr Pepe and was a little \"groggy\" where he asked to speak with him again to understand more. Specifically wants to know why he couldn't get to the stones.  "

## 2021-01-14 NOTE — PROGRESS NOTES
Orders (x3, ordered on 1/14/21) for stool studies faxed to [facility] at [phone]     Dez Ferrara LPN

## 2021-01-19 NOTE — PROGRESS NOTES
"Nick Zamudio 65 year old male who is being evaluated via a billable video visit.      The patient has been notified of following:     \"This video visit will be conducted via a call between you and your physician/provider. We have found that certain health care needs can be provided without the need for an in-person physical exam.  This service lets us provide the care you need with a video conversation.  If a prescription is necessary we can send it directly to your pharmacy.  If lab work is needed we can place an order for that and you can then stop by our lab to have the test done at a later time.    Video visits are billed at different rates depending on your insurance coverage.  Please reach out to your insurance provider with any questions.    If during the course of the call the physician/provider feels a video visit is not appropriate, you will not be charged for this service.\"    Patient has given verbal consent for Video visit? Yes  During this virtual visit the patient is located in [place], patient verifies this as the location during the entirety of this visit.     How would you like to obtain your AVS? MyChart  If you are dropped from the video visit, the video invite should be resent to: Other e-mail: my chart  Will anyone else be joining your video visit? His wife was present at video      Video-Visit Details    Type of service:  Video Visit    Video Start Time: 8:32 AM   Video End Time: 9:18 AM    Originating Location (pt. Location): Home    Distant Location (provider location):  [facility]     Platform used for Video Visit: [facility] Outpatient Medical Nutrition Therapy      Time Spent:  46 minutes  Session Type:  Follow up session  Referring Physician:  Dr. Christo Pepe  Reason for RD Visit:   Chronic pancreatitis, will have GJ feeding tube    Nutrition Assessment:  Patient is a 65 year old male with history of chronic calcific pancreatitis, " pancreatic duct stricture and stones, type 2 diabetes, paraplegia to both lower limbs, s/p cholecystectomy, small bowel obstruction with resection, testicular cancer, hypertension, kidney stone.  At initial visit, patient stated that he has had abdominal surgeries over the past couple of years and due to symptoms he was not able to eat much and had decreased appetite and intake so lost weight over the past couple of years.      At visit today, he stated that he has had worsening pancreatitis symptoms with increased and chronic loose, oily stools especially over the past month. He reported having about 4-6 loose, oily stools per day. He has intermittent issues with pain with eating. He stated that sometimes has pain a few minutes after he starts eating and other times does not experience pain. Red meat especially steak triggers symptoms. He also ate 2 slices of pizza last night and did not tolerate at all. He stated since he has been losing weight and taking enzymes, he liberalized his fat intake to try to help regain weight but realizes this was not tolerating and made symptoms worse. He had recent ERCP and from discussion with MD, plan is to have G-J feeding tube placed to help increase nutritional status so he can have surgery for pancreas at a later time. He tests his blood sugars everyday in the morning. He stated when he is having symptoms, then his morning BS are high, but otherwise they are good when not having symptoms. He has been journaling food intake along with blood sugar readings. He has tried some protein drinks including Boost and Ensure in the past but has not found one he likes yet so does not typically drink them. Recently drink some Naked protein juice drinks. Otherwise not regularly drinking any. In general, he is eating 3 small meals + 2-3 small snacks. He is on PERT and reported taking 3 capsules with meals and 1-2 capsules with snacks. He will take 2 caps at the beginning of meals and 1  capsule near the end or just at end of meal.    Height:   Ht Readings from Last 1 Encounters:   01/13/21 1.829 m (6')     Weight:  Used to weight 200 lbs and started losing weight over the past couple of years. At last RD visit in 7/2020 was 165 lbs and now 149 lbs. (has had significant weigh loss of 10% over the past 6 months).    Wt Readings from Last 10 Encounters:   01/13/21 67.9 kg (149 lb 9.6 oz)   12/09/20 72.6 kg (160 lb)   11/12/20 72.6 kg (160 lb)   09/29/20 71.7 kg (158 lb)   07/13/20 74.8 kg (165 lb)   07/02/20 74.8 kg (165 lb)     BMI: 20.2    Diet Recall:  (some recent meals below):   Meal Food    Breakfast Oatmeal with skim milk OR 1-2 english muffin with butter OR eggs OR cereal with milk or mini struedels   Lunch 1/2 turkey/ham/BLT sandwich or salad or rotisserie chicken on bread sandwich or soup   Dinner Last night 2 slices Pizza OR Salad with lettuce, tomato, cucumbers OR recently small amt pork ribs with side pasta and parmesan OR chicken or turkey or burger sometimes with a side of baked or mashed potatoes or sometimes just the meat OR cheese omelet (2 slices cheese)   Snacks 2-3 snacks per day: 2 jody pops or a few peanuts or oatmeal or potato chips   Beverages water, diet soda, juice. black Coffee today    Alcohol Intake Did not discuss at visit today but last visit reported none      Labs:  On 7/13/2020: Lipase 1,051, glucose 128. Others reviewed in EMR.    Pertinent Medications/vitamin and mineral supplements:  Creon 24K, 3 caps with meals, 1-2 with snacks.  Current Outpatient Medications   Medication     ACCU-CHEK GUIDE test strip     Acetaminophen 325 MG CAPS     amylase-lipase-protease (CREON) 60364-42878 units CPEP per EC capsule     atorvastatin (LIPITOR) 20 MG tablet     metFORMIN (GLUCOPHAGE) 500 MG tablet     oxyCODONE (ROXICODONE) 5 MG tablet     No current facility-administered medications for this visit.      Food Allergies:  NKFA  Food intolerances: red meat, especially steak  (burger better tolerated).    Estimated Nutrition Needs based on ideal body weight of 80 k-2400 calories (25-30 kcals/kg), 80-96g protein (1-1.2g/kg), ~1 ml/kcal or total fluids per MD.    MALNUTRITION:  % Weight Loss:  Up to 10% in 6 months (non-severe malnutrition)  % Intake:  </= 75% for >/= 1 month (severe malnutrition)  Subcutaneous Fat Loss:  Orbital region mild depletion  Muscle Loss:  None observed  Fluid Retention:  None noted    Malnutrition Diagnosis: Non-Severe malnutrition  In Context of:  Chronic illness or disease    Nutrition Diagnosis:    Previous and current: Food and nutrition related knowledge deficit related to lack of previous diet education/lack of complete recall of previous diet education as evidenced by pt report and interest in diet education/review. -Ongoing    Current: Unintended weight loss related to pain, loose, oily stools, decrease intake as evidenced by pt report, diet recall and significant weight loss of 10% over the past 6 months.    Nutrition Prescription: Nutrition education. Pancreatitis diet recommendations, initiation of Enteral nutrition  For MD: -Enteral recommendations:   Recommend Peptamen 1.5 formula with initial goal rate of 55 ml/hr continuous for at least 24-48 hours. Once pt tolerating TF at goal rate for 24-48 hrs, then change to cyclic overnight tube feeding to goal of 110 ml per hour x 12 hours.  This will provide 1320 ml/day and 1980 kcals (29 kcal/kg of current wt/day), 90 g PRO (1.3 g/kg current weight/day), 1016 ml free H2O, 74 g Fat (70% from MCTs), 248 g CHO and 0g Fiber daily.    Recommend 120 ml water flush before and then again after cyclic tube feeding and then 4 additional 120 ml flushes throughout the day. This total 720 ml fluids from flushes in additional to 1016 ml free water from formula = 1736 ml fluids per day (+oral fluids as tolerated.)    To initiate of TF:   --Start at 15 mL/hr and increase by 15 mL q 4-6 hours as tolerated to goal  of 55 ml/hr continuous feeding for at least 24-48 hours.      -Once patient tolerates continuous goal volume for at least 24-48 hours, then recommend increasing tube feeding for cyclic feeding.  Day 1, increase to 85 ml/hr x 15.5 hours.  If tolerated, then day 2, increased to 110 ml/hr x 12 hours.     Enzymes with Tube feeding recommendations:  A) Sodium Bicarb tablet (325 mg), 1 tablet q 4 hrs via J tube. Administration Instructions: Crush 1 tablet and mix into 15 ml of warm water and use this solution to mix with Creon pancreatic enzymes. DO NOT administer directly into J tube (to be mixed into TF formula with Creon enzyme - see Creon enzyme order below).    B) When tube feeding running at 55 ml/hr continuously, then Creon 24, 2 capsules every 4 hrs via J tube (provides 288,000 units lipase/day), but then when tube feeding running at 110 ml/hr for 12 hours cyclic overnight feeding, then 3 capsules every 4 hours via J tube (provides 216,000 units lipase/day).     *Note: patient can double up on enzymes and formula to get an 8-hour period of sleep. For example when running at 110 ml for 12 hours, can dissolve 6 capsules with bicarbonate and water and then add dissolved enzymes into 880 ml formula to cover an 8-hour period.    Administration Instructions:     **Open Creon capsule and empty contents into 15 ml sodium bicarb solution (see sodium bicarb order), let dissolve for about 20-30 minutes and then add this solution to the amount of TF formula hung in TF bag every 4 hrs *Note: this enzyme regimen with TF @ goal cycled infusion of 90 ml x 12 hours, then will provide approx 3891 units of lipase/gram of total fat per day when running continuously at 55 ml/hr and provides 2918 units of lipase per gram of total fat when running at 110 ml/hr for 12 hours. Both are approp dosing for pancreatic insufficiency with more elemental TF formula.     Nutrition Intervention:    Nutrition Education/Counseling:  Nutrition  Education: Provided diet education for pancreatitis as follows:    -Can eat 4-6 smaller meals/snacks per day if better tolerated.   -Explained diet lower in fat/moderate amounts of fat may be better tolerated than higher fat diet. Reviewed higher lower fat foods with tips and suggestions for eating lower to moderate fat diet as tolerated and discussed some lower fat substitutions for some of patient's usual higher fat food choices.  -Can have protein drink/low fat oral nutrition supplement drink if unable to eat a meal/instead of skipping meals.   -Discussed adequate hydration and recommended patient drink at least 48 oz-64 oz fluids per day.  -Encouraged patient to continue to avoid all alcoholic beverages.  -Recommended continuing enzymes as ordered and to take with all meals, snacks, protein drinks.    Patient verbalized understanding of education provided. See Goals below.     Goals:    - Until you get your feeding tube placed, aim for eating 6 smaller low fat meals/snacks. (or 3 small lowfat meals + 2-3 lowfat snacks/protein drinks) per day.    - Follow low fat diet:  -Choose lean proteins/lean cuts of meats. Remove skins from chicken and turkey breast, lean cuts of meats, 95% lean ground beef, fish (not fried/baked).  -Use lower fat cooking methods such as baking, broiling, grilling.  -Limit using a lot of fats/oil/high fat sauces/butter when preparing foods.  -Choose skim or 1% based dairy products (such as lowfat yogurts, skim/1% milk, non fat/1% cottage cheese, reduced fat cheese).  -Do not eat large amounts of nuts, seeds, nut butters, avocado.    - Try drinking at least 2 protein drinks. These count towards your 6 smaller meals). I bolded a couple that you may like that you haven't tried yet.  -Some examples include Ensure Max, Ensure high protein, Boost high protein, Premier protein, Pure protein or can make your own using a protein powder and mix with fluids.    -If feeling nausea, a clear protein drink  may be better tolerated such as Protein 2O, Isopure (powder you mixed with water) or Premier Clear.    - Take enzymes as directed by MD with all meals, snacks, protein drinks. If you find that you are eating more smaller meals/drinking more protein drinks and need to take more than 15 capsules per day, reach out to Dr. Pepe or his RN Lorrie Bansal to discuss as he may want to increase your prescription.    - Do not drink alcoholic beverages.    - Drink plenty of fluids, at least 48 oz-64 oz or more water/juice/herbal tea per day.     -Continue to keep foods and beverage journals. If you would like to see how much you are eating, you can use a cell phone jessica or online options such as My Fitness pal or Lose it for example to track you total daily calories. Otherwise ok to continue writing in a notebook as well.    Tube feeding information:  --Once you have your G-J feeding tube placed, you will get more specific instructions on what to do, how much formula you need, rate at which to run your tube feeding and number of enzymes to dissolve into tube feeding.     --The G-J feeding tube is placed in your stomach and will have 2 openings (ports). The J port is the one that you will use for your tube feeding formula. This port will administer the formula into your small intestines which can help give your pancreas a rest. At your education visit on Thursday, they will go over the tube feeding equipment and teach you more about running the tube feeding as well.    --Once you start on tube feeding you may not feel as hungry or be able to eat as much. The tube feeding will meet your estimated nutrition needs and you can eat as able, but initially the tube feeding will be your main source of nutrition, so you don't have to push to eat too much and then not feel well.    Nutrition Monitoring and Evaluation: Will monitor adherence to nutrition recommendations at future RD visits.     Further Medical Nutrition Therapy:  Follow  up 1 month after feeding tube placed.     Patient was encouraged to call/contact RD with any further questions.    Mel Centeno, MS, RD, LD

## 2021-01-19 NOTE — LETTER
"    1/19/2021         RE: Nick Zamudio  [address] [place] [address]        Dear Colleague,    Thank you for referring your patient, Nick Zamudio, to the [facility] [place]. Please see a copy of my visit note below.    Nick Zamudio 65 year old male who is being evaluated via a billable video visit.      The patient has been notified of following:     \"This video visit will be conducted via a call between you and your physician/provider. We have found that certain health care needs can be provided without the need for an in-person physical exam.  This service lets us provide the care you need with a video conversation.  If a prescription is necessary we can send it directly to your pharmacy.  If lab work is needed we can place an order for that and you can then stop by our lab to have the test done at a later time.    Video visits are billed at different rates depending on your insurance coverage.  Please reach out to your insurance provider with any questions.    If during the course of the call the physician/provider feels a video visit is not appropriate, you will not be charged for this service.\"    Patient has given verbal consent for Video visit? Yes  During this virtual visit the patient is located in [place], patient verifies this as the location during the entirety of this visit.     How would you like to obtain your AVS? MyChart  If you are dropped from the video visit, the video invite should be resent to: Other e-mail: my chart  Will anyone else be joining your video visit? His wife was present at video      Video-Visit Details    Type of service:  Video Visit    Video Start Time: 8:32 AM   Video End Time: 9:18 AM    Originating Location (pt. Location): Home    Distant Location (provider location):  [facility] [place]     Platform used for Video Visit: [facility] Outpatient Medical Nutrition Therapy      Time Spent:  46 " minutes  Session Type:  Follow up session  Referring Physician:  Dr. Christo Pepe  Reason for RD Visit:   Chronic pancreatitis, will have GJ feeding tube    Nutrition Assessment:  Patient is a 65 year old male with history of chronic calcific pancreatitis, pancreatic duct stricture and stones, type 2 diabetes, paraplegia to both lower limbs, s/p cholecystectomy, small bowel obstruction with resection, testicular cancer, hypertension, kidney stone.  At initial visit, patient stated that he has had abdominal surgeries over the past couple of years and due to symptoms he was not able to eat much and had decreased appetite and intake so lost weight over the past couple of years.      At visit today, he stated that he has had worsening pancreatitis symptoms with increased and chronic loose, oily stools especially over the past month. He reported having about 4-6 loose, oily stools per day. He has intermittent issues with pain with eating. He stated that sometimes has pain a few minutes after he starts eating and other times does not experience pain. Red meat especially steak triggers symptoms. He also ate 2 slices of pizza last night and did not tolerate at all. He stated since he has been losing weight and taking enzymes, he liberalized his fat intake to try to help regain weight but realizes this was not tolerating and made symptoms worse. He had recent ERCP and from discussion with MD, plan is to have G-J feeding tube placed to help increase nutritional status so he can have surgery for pancreas at a later time. He tests his blood sugars everyday in the morning. He stated when he is having symptoms, then his morning BS are high, but otherwise they are good when not having symptoms. He has been journaling food intake along with blood sugar readings. He has tried some protein drinks including Boost and Ensure in the past but has not found one he likes yet so does not typically drink them. Recently drink some Naked  protein juice drinks. Otherwise not regularly drinking any. In general, he is eating 3 small meals + 2-3 small snacks. He is on PERT and reported taking 3 capsules with meals and 1-2 capsules with snacks. He will take 2 caps at the beginning of meals and 1 capsule near the end or just at end of meal.    Height:   Ht Readings from Last 1 Encounters:   01/13/21 1.829 m (6')     Weight:  Used to weight 200 lbs and started losing weight over the past couple of years. At last RD visit in 7/2020 was 165 lbs and now 149 lbs. (has had significant weigh loss of 10% over the past 6 months).    Wt Readings from Last 10 Encounters:   01/13/21 67.9 kg (149 lb 9.6 oz)   12/09/20 72.6 kg (160 lb)   11/12/20 72.6 kg (160 lb)   09/29/20 71.7 kg (158 lb)   07/13/20 74.8 kg (165 lb)   07/02/20 74.8 kg (165 lb)     BMI: 20.2    Diet Recall:  (some recent meals below):   Meal Food    Breakfast Oatmeal with skim milk OR 1-2 english muffin with butter OR eggs OR cereal with milk or mini struedels   Lunch 1/2 turkey/ham/BLT sandwich or salad or rotisserie chicken on bread sandwich or soup   Dinner Last night 2 slices Pizza OR Salad with lettuce, tomato, cucumbers OR recently small amt pork ribs with side pasta and parmesan OR chicken or turkey or burger sometimes with a side of baked or mashed potatoes or sometimes just the meat OR cheese omelet (2 slices cheese)   Snacks 2-3 snacks per day: 2 jody pops or a few peanuts or oatmeal or potato chips   Beverages water, diet soda, juice. black Coffee today    Alcohol Intake Did not discuss at visit today but last visit reported none      Labs:  On 7/13/2020: Lipase 1,051, glucose 128. Others reviewed in EMR.    Pertinent Medications/vitamin and mineral supplements:  Creon 24K, 3 caps with meals, 1-2 with snacks.  Current Outpatient Medications   Medication     ACCU-CHEK GUIDE test strip     Acetaminophen 325 MG CAPS     amylase-lipase-protease (CREON) 19988-86848 units CPEP per EC capsule      atorvastatin (LIPITOR) 20 MG tablet     metFORMIN (GLUCOPHAGE) 500 MG tablet     oxyCODONE (ROXICODONE) 5 MG tablet     No current facility-administered medications for this visit.      Food Allergies:  NKFA  Food intolerances: red meat, especially steak (burger better tolerated).    Estimated Nutrition Needs based on ideal body weight of 80 k-2400 calories (25-30 kcals/kg), 80-96g protein (1-1.2g/kg), ~1 ml/kcal or total fluids per MD.    MALNUTRITION:  % Weight Loss:  Up to 10% in 6 months (non-severe malnutrition)  % Intake:  </= 75% for >/= 1 month (severe malnutrition)  Subcutaneous Fat Loss:  Orbital region mild depletion  Muscle Loss:  None observed  Fluid Retention:  None noted    Malnutrition Diagnosis: Non-Severe malnutrition  In Context of:  Chronic illness or disease    Nutrition Diagnosis:    Previous and current: Food and nutrition related knowledge deficit related to lack of previous diet education/lack of complete recall of previous diet education as evidenced by pt report and interest in diet education/review. -Ongoing    Current: Unintended weight loss related to pain, loose, oily stools, decrease intake as evidenced by pt report, diet recall and significant weight loss of 10% over the past 6 months.    Nutrition Prescription: Nutrition education. Pancreatitis diet recommendations, initiation of Enteral nutrition  For MD: -Enteral recommendations:   Recommend Peptamen 1.5 formula with initial goal rate of 55 ml/hr continuous for at least 24-48 hours. Once pt tolerating TF at goal rate for 24-48 hrs, then change to cyclic overnight tube feeding to goal of 110 ml per hour x 12 hours.  This will provide 1320 ml/day and 1980 kcals (29 kcal/kg of current wt/day), 90 g PRO (1.3 g/kg current weight/day), 1016 ml free H2O, 74 g Fat (70% from MCTs), 248 g CHO and 0g Fiber daily.    Recommend 120 ml water flush before and then again after cyclic tube feeding and then 4 additional 120 ml flushes  throughout the day. This total 720 ml fluids from flushes in additional to 1016 ml free water from formula = 1736 ml fluids per day (+oral fluids as tolerated.)    To initiate of TF:   --Start at 15 mL/hr and increase by 15 mL q 4-6 hours as tolerated to goal of 55 ml/hr continuous feeding for at least 24-48 hours.      -Once patient tolerates continuous goal volume for at least 24-48 hours, then recommend increasing tube feeding for cyclic feeding.  Day 1, increase to 85 ml/hr x 15.5 hours.  If tolerated, then day 2, increased to 110 ml/hr x 12 hours.     Enzymes with Tube feeding recommendations:  A) Sodium Bicarb tablet (325 mg), 1 tablet q 4 hrs via J tube. Administration Instructions: Crush 1 tablet and mix into 15 ml of warm water and use this solution to mix with Creon pancreatic enzymes. DO NOT administer directly into J tube (to be mixed into TF formula with Creon enzyme - see Creon enzyme order below).    B) When tube feeding running at 55 ml/hr continuously, then Creon 24, 2 capsules every 4 hrs via J tube (provides 288,000 units lipase/day), but then when tube feeding running at 110 ml/hr for 12 hours cyclic overnight feeding, then 3 capsules every 4 hours via J tube (provides 216,000 units lipase/day).     *Note: patient can double up on enzymes and formula to get an 8-hour period of sleep. For example when running at 110 ml for 12 hours, can dissolve 6 capsules with bicarbonate and water and then add dissolved enzymes into 880 ml formula to cover an 8-hour period.    Administration Instructions:     **Open Creon capsule and empty contents into 15 ml sodium bicarb solution (see sodium bicarb order), let dissolve for about 20-30 minutes and then add this solution to the amount of TF formula hung in TF bag every 4 hrs *Note: this enzyme regimen with TF @ goal cycled infusion of 90 ml x 12 hours, then will provide approx 3891 units of lipase/gram of total fat per day when running continuously at 55 ml/hr  and provides 2918 units of lipase per gram of total fat when running at 110 ml/hr for 12 hours. Both are approp dosing for pancreatic insufficiency with more elemental TF formula.     Nutrition Intervention:    Nutrition Education/Counseling:  Nutrition Education: Provided diet education for pancreatitis as follows:    -Can eat 4-6 smaller meals/snacks per day if better tolerated.   -Explained diet lower in fat/moderate amounts of fat may be better tolerated than higher fat diet. Reviewed higher lower fat foods with tips and suggestions for eating lower to moderate fat diet as tolerated and discussed some lower fat substitutions for some of patient's usual higher fat food choices.  -Can have protein drink/low fat oral nutrition supplement drink if unable to eat a meal/instead of skipping meals.   -Discussed adequate hydration and recommended patient drink at least 48 oz-64 oz fluids per day.  -Encouraged patient to continue to avoid all alcoholic beverages.  -Recommended continuing enzymes as ordered and to take with all meals, snacks, protein drinks.    Patient verbalized understanding of education provided. See Goals below.     Goals:    - Until you get your feeding tube placed, aim for eating 6 smaller low fat meals/snacks. (or 3 small lowfat meals + 2-3 lowfat snacks/protein drinks) per day.    - Follow low fat diet:  -Choose lean proteins/lean cuts of meats. Remove skins from chicken and turkey breast, lean cuts of meats, 95% lean ground beef, fish (not fried/baked).  -Use lower fat cooking methods such as baking, broiling, grilling.  -Limit using a lot of fats/oil/high fat sauces/butter when preparing foods.  -Choose skim or 1% based dairy products (such as lowfat yogurts, skim/1% milk, non fat/1% cottage cheese, reduced fat cheese).  -Do not eat large amounts of nuts, seeds, nut butters, avocado.    - Try drinking at least 2 protein drinks. These count towards your 6 smaller meals). I bolded a couple that you  may like that you haven't tried yet.  -Some examples include Ensure Max, Ensure high protein, Boost high protein, Premier protein, Pure protein or can make your own using a protein powder and mix with fluids.    -If feeling nausea, a clear protein drink may be better tolerated such as Protein 2O, Isopure (powder you mixed with water) or Premier Clear.    - Take enzymes as directed by MD with all meals, snacks, protein drinks. If you find that you are eating more smaller meals/drinking more protein drinks and need to take more than 15 capsules per day, reach out to Dr. Pepe or his RN Lorrie Bansal to discuss as he may want to increase your prescription.    - Do not drink alcoholic beverages.    - Drink plenty of fluids, at least 48 oz-64 oz or more water/juice/herbal tea per day.     -Continue to keep foods and beverage journals. If you would like to see how much you are eating, you can use a cell phone jessica or online options such as My Fitness pal or [employer] it for example to track you total daily calories. Otherwise ok to continue writing in a notebook as well.    Tube feeding information:  --Once you have your G-J feeding tube placed, you will get more specific instructions on what to do, how much formula you need, rate at which to run your tube feeding and number of enzymes to dissolve into tube feeding.     --The G-J feeding tube is placed in your stomach and will have 2 openings (ports). The J port is the one that you will use for your tube feeding formula. This port will administer the formula into your small intestines which can help give your pancreas a rest. At your education visit on Thursday, they will go over the tube feeding equipment and teach you more about running the tube feeding as well.    --Once you start on tube feeding you may not feel as hungry or be able to eat as much. The tube feeding will meet your estimated nutrition needs and you can eat as able, but initially the tube feeding will be your  main source of nutrition, so you don't have to push to eat too much and then not feel well.    Nutrition Monitoring and Evaluation: Will monitor adherence to nutrition recommendations at future RD visits.     Further Medical Nutrition Therapy:  Follow up 1 month after feeding tube placed.     Patient was encouraged to call/contact RD with any further questions.    Mel Centeno MS, RD, LD

## 2021-01-20 NOTE — PATIENT INSTRUCTIONS
It was nice speaking with you again today. Below are the nutrition recommendations we discussed at your visit.    Please let me know if you have any additional questions.    Nutrition recommendations/info:    - Until you get your feeding tube placed, aim for eating 6 smaller low fat meals/snacks. (or 3 small lowfat meals + 2-3 lowfat snacks/protein drinks) per day.    - Follow low fat diet:  -Choose lean proteins/lean cuts of meats. Remove skins from chicken and turkey breast, lean cuts of meats, 95% lean ground beef, fish (not fried/baked).  -Use lower fat cooking methods such as baking, broiling, grilling.  -Limit using a lot of fats/oil/high fat sauces/butter when preparing foods.  -Choose skim or 1% based dairy products (such as lowfat yogurts, skim/1% milk, non fat/1% cottage cheese, reduced fat cheese).  -Do not eat large amounts of nuts, seeds, nut butters, avocado.    - Try drinking at least 2 protein drinks. These count towards your 6 smaller meals). I bolded a couple that you may like that you haven't tried yet.  -Some examples include Ensure Max, Ensure high protein, Boost high protein, Premier protein, Pure protein or can make your own using a protein powder and mix with fluids.    -If feeling nausea, a clear protein drink may be better tolerated such as Protein 2O, Isopure (powder you mixed with water) or Premier Clear.    - Take enzymes as directed by MD with all meals, snacks, protein drinks. If you find that you are eating more smaller meals/drinking more protein drinks and need to take more than 15 capsules per day, reach out to Dr. Pepe or his RN Lorrie Bansal to discuss as he may want to increase your prescription.    - Do not drink alcoholic beverages.    - Drink plenty of fluids, at least 48 oz-64 oz or more water/juice/herbal tea per day.     -Continue to keep foods and beverage journals. If you would like to see how much you are eating, you can use a cell phone jessica or online options such as  My Fitness pal or L[employer] it for example to track you total daily calories. Otherwise ok to continue writing in a notebook as well.    Tube feeding information:  --Once you have your G-J feeding tube placed, you will get more specific instructions on what to do, how much formula you need, rate at which to run your tube feeding and number of enzymes to dissolve into tube feeding.     --The G-J feeding tube is placed in your stomach and will have 2 openings (ports). The J port is the one that you will use for your tube feeding formula. This port will administer the formula into your small intestines which can help give your pancreas a rest. At your education visit on Thursday, they will go over the tube feeding equipment and teach you more about running the tube feeding as well.    --Once you start on tube feeding you may not feel as hungry or be able to eat as much. The tube feeding will meet your estimated nutrition needs and you can eat as able, but initially the tube feeding will be your main source of nutrition, so you don't have to push to eat too much and then not feel well.    Follow up 1 month after you have your feeding tube placed.    If you would like to schedule a follow up appointment with Mel Centeno, Registered Dietitian, please call [phone].    Mel Centeno, MS, RD, LD

## 2021-01-27 NOTE — TELEPHONE ENCOUNTER
Called patient to check on progress of steps following last procedure:         Dieitian visit done.   Check for cdiff, bacteria/viruses, O&P-  All negative- said can start to take immodium for symptoms since labs negative, pt voiced understanding.   PLC scheduled 1/28  Doppler ultrasound of mesenteric arteries- scheduling number given, not scheduled yet, patient will schedule had cancel, he will reschedule  Feeds/supplies not covered currently by Medicare, under secondary review prior to giving personal price quote.  Clinic f/up with Dr. Pepe- clinic scheduled 2/25    Patient appreciative of call, will follow.    ML

## 2021-02-02 NOTE — PROGRESS NOTES
Sarah from [place] called to discuss the Medicare review r/t tube feeds. Returned call. They've reviewed multiple times, they do not see evidence of meeting qualifications as patient is still eating. Full bowel rest for at least 3 months, that would qualify. Needs to be reason why detailed in note.     Requesting private pay options for feeds and supplies. Will follow.    ML

## 2021-02-15 NOTE — TELEPHONE ENCOUNTER
Returned call to patient to discuss US from last week and f/up on POC.    Per Dr. Pepe    Yes, the IR doc called me to let me know   Im not sure this is something we will pursue given the complexity of trying to do so BUT this would explain postprandial pain   Nothing to do before the visit     Left message, reminded of clinic visit scheduled for 2/25.    ML

## 2021-02-24 PROBLEM — I73.9 PVD (PERIPHERAL VASCULAR DISEASE) (H): Status: ACTIVE | Noted: 2017-10-23

## 2021-02-24 PROBLEM — K92.2 UPPER GI BLEED: Status: ACTIVE | Noted: 2019-08-28

## 2021-02-24 PROBLEM — I89.0 CHRONIC ACQUIRED LYMPHEDEMA: Status: ACTIVE | Noted: 2017-04-23

## 2021-02-24 PROBLEM — I10 ESSENTIAL HYPERTENSION: Status: ACTIVE | Noted: 2017-10-16

## 2021-02-24 PROBLEM — N20.0 KIDNEY STONES: Status: ACTIVE | Noted: 2018-01-02

## 2021-02-24 PROBLEM — K56.609 SMALL BOWEL OBSTRUCTION (H): Status: ACTIVE | Noted: 2019-11-13

## 2021-02-24 PROBLEM — N18.30 STAGE 3 CHRONIC KIDNEY DISEASE (H): Status: ACTIVE | Noted: 2019-09-18

## 2021-02-24 PROBLEM — R73.9 HYPERGLYCEMIA: Status: ACTIVE | Noted: 2017-04-23

## 2021-02-24 PROBLEM — L03.314 CELLULITIS OF LEFT GROIN: Status: ACTIVE | Noted: 2017-04-23

## 2021-02-24 PROBLEM — D49.2 ABNORMAL SKIN GROWTH: Status: ACTIVE | Noted: 2021-01-01

## 2021-02-24 PROBLEM — Z92.3 HISTORY OF RADIATION THERAPY: Status: ACTIVE | Noted: 2017-05-09

## 2021-02-24 PROBLEM — E11.9 TYPE 2 DIABETES MELLITUS (H): Status: ACTIVE | Noted: 2017-10-27

## 2021-02-24 PROBLEM — J90 BILATERAL PLEURAL EFFUSION: Status: ACTIVE | Noted: 2019-11-13

## 2021-02-24 PROBLEM — Z90.49 HISTORY OF CHOLECYSTECTOMY: Status: ACTIVE | Noted: 2019-12-19

## 2021-02-24 PROBLEM — G82.20 PARAPARESIS OF BOTH LOWER LIMBS (H): Status: ACTIVE | Noted: 2017-04-23

## 2021-02-24 PROBLEM — D64.9 CHRONIC ANEMIA: Status: ACTIVE | Noted: 2017-04-23

## 2021-02-24 PROBLEM — K80.50 BILIARY COLIC: Status: ACTIVE | Noted: 2019-08-28

## 2021-02-24 PROBLEM — K63.5 POLYP OF SIGMOID COLON: Status: ACTIVE | Noted: 2020-01-12

## 2021-02-24 PROBLEM — B07.9 VIRAL WARTS: Status: ACTIVE | Noted: 2021-01-01

## 2021-02-24 PROBLEM — E87.5 HYPERKALEMIA: Status: ACTIVE | Noted: 2019-09-18

## 2021-02-24 PROBLEM — E78.5 HYPERLIPIDEMIA: Status: ACTIVE | Noted: 2019-09-17

## 2021-02-24 PROBLEM — K81.0 ACUTE CHOLECYSTITIS: Status: ACTIVE | Noted: 2019-09-17

## 2021-02-24 PROBLEM — G83.9 PARALYSIS (H): Status: ACTIVE | Noted: 2018-01-02

## 2021-02-24 PROBLEM — M86.8X9: Status: ACTIVE | Noted: 2017-10-29

## 2021-02-24 PROBLEM — Z85.47 H/O TESTICULAR CANCER: Status: ACTIVE | Noted: 2017-04-23

## 2021-02-24 PROBLEM — Z90.49 S/P SMALL BOWEL RESECTION: Status: ACTIVE | Noted: 2020-01-12

## 2021-02-24 PROBLEM — N13.30 HYDRONEPHROSIS OF RIGHT KIDNEY: Status: ACTIVE | Noted: 2019-11-13

## 2021-02-24 PROBLEM — K62.1 RECTAL POLYP: Status: ACTIVE | Noted: 2019-09-18

## 2021-02-24 PROBLEM — K56.1 SMALL BOWEL INTUSSUSCEPTION (H): Status: ACTIVE | Noted: 2019-12-19

## 2021-02-25 NOTE — NURSING NOTE
Chief Complaint   Patient presents with     RECHECK     discuss feeding tube concerns       Vitals:    02/25/21 0719   Weight: 68 kg (150 lb)   Height: 1.829 m (6')       Body mass index is 20.34 kg/m .    Paul Mcgowan, EMT

## 2021-02-25 NOTE — PATIENT INSTRUCTIONS
Follow up:    Dr. 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 They will reach out to schedule.     - GJ placement   1. Go to [facility]- they will reach out to schedule.       -You will not have insurance coverage for feeds and supplies. Depending on the feed selected, out of pocket costs will be:    * Recommended to start with by dietitian* Peptamen 1.5 - $7.86 per carton  ($39 per day for 5 cartons)  Pump and supplies  - $5.50 per day (if patient uses same pump bag for 2 days) - $8.67 per day (if patient uses pump bag eery 24 hours)    * if Peptamen is tolerated, you can try the following formula*Nutren 1.5 -$1.22 per carton ($6.10 per day for 5 cartons)  $5.50-$8.67 per day including pump and feeding supplies     - Start Immodium- this can be purchased of the counter to help with symptoms of diarrhea/loose stool.     - We will both call your PCP to see if they will help manage tube feeds. After we place the tube we will need your PCP to sign orders for feeds as needed by Estela, may need to draw labs as directed by Estela and help us decide if/when its time to take it out based on weight/medical evaluation, etc. This is a team effort between our office, Estela (the home infusion contact) and your PCP.     - We will work towards scheduling you for GJ Placement with Dr. Pepe    - As procedure date gets closer, we will discuss how to give enzymes with a tube in place      Please call with any questions or concerns regarding your clinic visit today.    It is a pleasure being involved in your health care.    Contacts post-consultation depending on your need:    Schedule Clinic Appointments            [phone] # 1   M-F 7:30 - 5 pm    Lorrie Bansal RN Care Coordinator  [phone]    Dez Ferrara LPN    [phone]     OR Procedure Scheduling                             [phone]    My Chart is available 24  hours a day and is a secure way to access your records and communicate with your care team.  I strongly recommend signing up if you haven't already done so, if you are comfortable with computers.  If you would like to inquire about this or are having problems with My Chart access, you may call [phone] or go online at [email or web address].  Please allow at least 24 hours for a response and extra time on weekends and Holidays.

## 2021-02-25 NOTE — LETTER
"    2/25/2021         RE: Nick Zamudio  [address] [place] [address] [place] [address]        Dear Colleague,    Thank you for referring your patient, Nick Zamudio, to the [facility]. Please see a copy of my visit note below.    The patient has been notified of following:     \"This video visit will be conducted via a call between you and your physician/provider. We have found that certain health care needs can be provided without the need for an in-person physical exam.  This service lets us provide the care you need with a video conversation.  If a prescription is necessary we can send it directly to your pharmacy.  If lab work is needed we can place an order for that and you can then stop by our lab to have the test done at a later time.    Video visits are billed at different rates depending on your insurance coverage.  Please reach out to your insurance provider with any questions.    If during the course of the call the physician/provider feels a video visit is not appropriate, you will not be charged for this service.\"    Patient has given verbal consent for Video visit? Yes  How would you like to obtain your AVS? My Chart  If you are dropped from the video visit, the video invite should be resent to: Cell phone  Will anyone else be joining your video visit? No  {If patient encounters technical issues they should call [phone]     Video-Visit Details    Type of service:  Video Visit    Video Start Time: 0745  Video End Time: 0815    Originating Location (pt. 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 He has also been found to have ultra-tight stenoses and main duct stones which have been more difficult to manage due to anatomy and the severity of the main duct disease. We provided high does pancreatic enzymes which resulted in some relief however with ongoing diarrhea and prandial pain we have begun considering placement of a gastrojejunostomy tube. Moreover, we organized abdominal dopplers which revealed an overt stenosis of the SMA thought to be secondary to calcifications.    He notes that his weight has remained stable, at 150lbs at 6ft. though he had lost significant weight previous. The diarrhea has increased, 3-4x daily. He has pain 15-60m after centered at his upper abdomen with radiation to the back. He notes he has chills and fatigue with the pain. He is following a diet as suggested by Mel Hernandes, staying below 30g of fat daily. He now has his blood sugar under control with increasing metformin (under the care of his PCP). He is taking high dose enzymes as instructed with meals.    He has not really attempted to take imodium or other antidiarrheals. He uses low dose oxycodone for pain, perhaps one daily.    Medications  Current Outpatient Medications   Medication     ACCU-CHEK GUIDE test strip     Acetaminophen 325 MG CAPS     amylase-lipase-protease (CREON) 47731-22323 units CPEP per EC capsule     atorvastatin (LIPITOR) 20 MG tablet     metFORMIN (GLUCOPHAGE) 500 MG tablet     oxyCODONE (ROXICODONE) 5 MG tablet     oxyCODONE-acetaminophen (PERCOCET) 5-325 MG tablet     No current facility-administered medications for this visit.      Past Medical  Past Medical History:   Diagnosis Date     Diabetes (H)      Hypertension      Intussusception (H)      Iron deficiency anemia      Lymphedema      Pancreatic disease     calcific pancreas, multiple panc stones.     Paraplegia (H)     Due to radiation treatment testicular cancer     PVD (peripheral vascular disease) (H)       Renal disease     stone disease     Testicular cancer (H)     Radiation treatment     Past Surgical  Past Surgical History:   Procedure Laterality Date     ABDOMEN SURGERY      Partial colectomy for damage due to radiation treatment testicular ca     AMPUTATION      left toe     CHOLECYSTECTOMY       COLONOSCOPY      polyp     ENDOSCOPIC RETROGRADE CHOLANGIOPANCREATOGRAM N/A 7/13/2020    Procedure: ENDOSCOPIC RETROGRADE CHOLANGIOPANCREATOGRAPHY, STENT RETRIEVAL, STENT PLACEMENT, BILIARY SPHINCTEROTOMY;  Surgeon: Christo Pepe MD;  Location:  OR     ENDOSCOPIC RETROGRADE CHOLANGIOPANCREATOGRAM N/A 9/29/2020    Procedure: ENDOSCOPIC RETROGRADE CHOLANGIOPANCREATOGRAPHY with balloon sweep of bile ducts, bile ducts stents exchanged;  Surgeon: Christo Pepe MD;  [facility]: [facility]     ENDOSCOPIC RETROGRADE CHOLANGIOPANCREATOGRAM N/A 12/9/2020    Procedure: ENDOSCOPIC RETROGRADE CHOLANGIOPANCREATOGRAPHY, STENT EXCHANGE;  Surgeon: Christo Pepe MD;  Location:  OR     ENDOSCOPIC RETROGRADE CHOLANGIOPANCREATOGRAM N/A 1/13/2021    Procedure: ENDOSCOPIC RETROGRADE CHOLANGIOPANCREATOGRAPHY WITH PANCREATIC DUCT STENT REMOVAL;  Surgeon: Christo Pepe MD;  Location:  OR     ENDOSCOPIC ULTRASOUND UPPER GASTROINTESTINAL TRACT (GI) N/A 7/13/2020    Procedure: ENDOSCOPIC ULTRASOUND, ESOPHAGOSCOPY / UPPER GASTROINTESTINAL TRACT (GI) WITH FINE NEEDLE BIOPSY;  Surgeon: Christo Pepe MD;  Location:  OR     GI SURGERY      bowel resection     ORTHOPEDIC SURGERY       TONSILLECTOMY       Social History  Social History     Socioeconomic History     Marital status:      Spouse name: Not on file     Number of children: Not on file     Years of education: Not on file     Highest education level: Not on file   Occupational History     Not on file   Social Needs     Financial resource strain: Not on file     Food insecurity     Worry: Not on file     Inability: Not on file     Transportation  needs     Medical: Not on file     Non-medical: Not on file   Tobacco Use     Smoking status: Never Smoker     Smokeless tobacco: Never Used   Substance and Sexual Activity     Alcohol use: Not Currently     Frequency: Never     Drug use: Never     Sexual activity: Not on file   Lifestyle     Physical activity     Days per week: Not on file     Minutes per session: Not on file     Stress: Not on file   Relationships     Social connections     Talks on phone: Not on file     Gets together: Not on file     Attends Roman Catholic service: Not on file     Active member of club or organization: Not on file     Attends meetings of clubs or organizations: Not on file     Relationship status: Not on file     Intimate partner violence     Fear of current or ex partner: Not on file     Emotionally abused: Not on file     Physically abused: Not on file     Forced sexual activity: Not on file   Other Topics Concern     Parent/sibling w/ CABG, MI or angioplasty before 65F 55M? Not Asked   Social History Narrative     Not on file     Family History  Medical History Relation Name Comments   Cancer, Colon Birth Father    at 62, pancreatic carcinoma   Diabetes, Type II Birth Father       Liver Disease Birth Father   cancer   Glaucoma Birth Mother       Inflammatory Bowel Disease Other         Relation Name Status Comments   Birth Father    (Age 62) parcreatic carcinoma   Birth Mother   Alive     Daughter   Alive     Daughter   Alive     Daughter   Alive     Maternal Grandfather    flu epidemic   Maternal Grandmother    cancer   Other         Paternal Grandfather        Paternal Grandmother            Objective:  Reported vitals:  There were no vitals taken for this visit.   GEN: Healthy, alert and no distress  PSYCH: Alert and oriented times 3; coherent speech, normal   rate and volume, able to articulate logical thoughts, able   to abstract reason, no tangential thoughts, no hallucinations    or delusions, affect seems normal  RESP: No cough, no audible wheezing, able to talk in full sentences  Remainder of exam unable to be completed due to virtual visit     Outside Imaging summaries:    Assessment and plan:  Mr Zamudio is a 64yo gentleman with chronic calcific pancreatitis who is struggling with postprandial pain with difficulty eating and probable poor nutrition who is now found to also have 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 He is found on ERCP to have tight stenoses of an irregular main duct, likely involving main duct stones. He may receive some benefit from continued intervention by ERCP, though this approach is complicated by somewhat altered anatomy and difficult access. For now however, we will defer further main duct intervention as our following plan unfolds. That said we may return to further ERCP in the future.    Rather we will refer him to one of our vascular surgeons for comment on his SMA stenosis and if surgical and or endovascular interventions would provide benefit that outweigh the risk of such an approach. He would certainly benefit from placement of a gastrojejunostomy tube to provide post-pancreatic nutrition while we continue to manage his difficult chronic disease. He has seen our dietitian Mel Hernandes and we will continue high dose supplemental pancreatic enzymes.    We discussed our options and agreed that our next step will be placement of a gastrojejunostomy tube by endoscopy. He will first make an appointment with our tube educators and we will lean on Mel Hernandes for a formulation, perhaps elemental, to assist in avoiding diarrhea. We reviewed the risks of tube placement which includes abdominal infection and bowel perforation. In the meanwhile we will also refer him to vascular surgery.    We will also attempt symptomatic imodium and titrate as appropriate.    It was a pleasure to participate in the care of this patient; please  contact us with any further questions.  A total of 45 minutes was spent in evaluation with this patient, >50% of which was counseling regarding the above delineated issues.    Christo Pepe MD PhD FACG VIC LEVI  Director of Endoscopy  Associate Professor of Medicine, Surgery and Pediatrics  Interventional and Therapeutic Endoscopy    [facility]  Division of Gastroenterology and Hepatology  [facility] [address] [place], [place] [address]    New Consultations  [phone]  Procedure Scheduling [phone]  Clinical Nurse Coordinator [phone]  Clinical Fax   [phone]  Administrative   [phone]  Administrative Fax  [phone]

## 2021-02-25 NOTE — PROGRESS NOTES
"The patient has been notified of following:     \"This video visit will be conducted via a call between you and your physician/provider. We have found that certain health care needs can be provided without the need for an in-person physical exam.  This service lets us provide the care you need with a video conversation.  If a prescription is necessary we can send it directly to your pharmacy.  If lab work is needed we can place an order for that and you can then stop by our lab to have the test done at a later time.    Video visits are billed at different rates depending on your insurance coverage.  Please reach out to your insurance provider with any questions.    If during the course of the call the physician/provider feels a video visit is not appropriate, you will not be charged for this service.\"    Patient has given verbal consent for Video visit? Yes  How would you like to obtain your AVS? My Chart  If you are dropped from the video visit, the video invite should be resent to: Cell phone  Will anyone else be joining your video visit? No  {If patient encounters technical issues they should call [phone]     Video-Visit Details    Type of service:  Video Visit    Video Start Time: 0745  Video End Time: 0815    Originating Location (pt. 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 He has also been found to have ultra-tight stenoses and main duct stones which have been more difficult to manage due to anatomy and the severity of the main duct disease. We provided high does pancreatic enzymes which resulted in some " relief however with ongoing diarrhea and prandial pain we have begun considering placement of a gastrojejunostomy tube. Moreover, we organized abdominal dopplers which revealed an overt stenosis of the SMA thought to be secondary to calcifications.    He notes that his weight has remained stable, at 150lbs at 6ft. though he had lost significant weight previous. The diarrhea has increased, 3-4x daily. He has pain 15-60m after centered at his upper abdomen with radiation to the back. He notes he has chills and fatigue with the pain. He is following a diet as suggested by Mel Hernandes, staying below 30g of fat daily. He now has his blood sugar under control with increasing metformin (under the care of his PCP). He is taking high dose enzymes as instructed with meals.    He has not really attempted to take imodium or other antidiarrheals. He uses low dose oxycodone for pain, perhaps one daily.    Medications  Current Outpatient Medications   Medication     ACCU-CHEK GUIDE test strip     Acetaminophen 325 MG CAPS     amylase-lipase-protease (CREON) 34929-37899 units CPEP per EC capsule     atorvastatin (LIPITOR) 20 MG tablet     metFORMIN (GLUCOPHAGE) 500 MG tablet     oxyCODONE (ROXICODONE) 5 MG tablet     oxyCODONE-acetaminophen (PERCOCET) 5-325 MG tablet     No current facility-administered medications for this visit.      Past Medical  Past Medical History:   Diagnosis Date     Diabetes (H)      Hypertension      Intussusception (H)      Iron deficiency anemia      Lymphedema      Pancreatic disease     calcific pancreas, multiple panc stones.     Paraplegia (H)     Due to radiation treatment testicular cancer     PVD (peripheral vascular disease) (H)      Renal disease     stone disease     Testicular cancer (H)     Radiation treatment     Past Surgical  Past Surgical History:   Procedure Laterality Date     ABDOMEN SURGERY      Partial colectomy for damage due to radiation treatment testicular ca     AMPUTATION       left toe     CHOLECYSTECTOMY       COLONOSCOPY      polyp     ENDOSCOPIC RETROGRADE CHOLANGIOPANCREATOGRAM N/A 7/13/2020    Procedure: ENDOSCOPIC RETROGRADE CHOLANGIOPANCREATOGRAPHY, STENT RETRIEVAL, STENT PLACEMENT, BILIARY SPHINCTEROTOMY;  Surgeon: Christo Pepe MD;  Location:  OR     ENDOSCOPIC RETROGRADE CHOLANGIOPANCREATOGRAM N/A 9/29/2020    Procedure: ENDOSCOPIC RETROGRADE CHOLANGIOPANCREATOGRAPHY with balloon sweep of bile ducts, bile ducts stents exchanged;  Surgeon: Christo Pepe MD;  Location:  OR     ENDOSCOPIC RETROGRADE CHOLANGIOPANCREATOGRAM N/A 12/9/2020    Procedure: ENDOSCOPIC RETROGRADE CHOLANGIOPANCREATOGRAPHY, STENT EXCHANGE;  Surgeon: Christo Pepe MD;  Location:  OR     ENDOSCOPIC RETROGRADE CHOLANGIOPANCREATOGRAM N/A 1/13/2021    Procedure: ENDOSCOPIC RETROGRADE CHOLANGIOPANCREATOGRAPHY WITH PANCREATIC DUCT STENT REMOVAL;  Surgeon: Christo Pepe MD;  Location:  OR     ENDOSCOPIC ULTRASOUND UPPER GASTROINTESTINAL TRACT (GI) N/A 7/13/2020    Procedure: ENDOSCOPIC ULTRASOUND, ESOPHAGOSCOPY / UPPER GASTROINTESTINAL TRACT (GI) WITH FINE NEEDLE BIOPSY;  Surgeon: Christo Pepe MD;  Location:  OR     GI SURGERY      bowel resection     ORTHOPEDIC SURGERY       TONSILLECTOMY       Social History  Social History     Socioeconomic History     Marital status:      Spouse name: Not on file     Number of children: Not on file     Years of education: Not on file     Highest education level: Not on file   Occupational History     Not on file   Social Needs     Financial resource strain: Not on file     Food insecurity     Worry: Not on file     Inability: Not on file     Transportation needs     Medical: Not on file     Non-medical: Not on file   Tobacco Use     Smoking status: Never Smoker     Smokeless tobacco: Never Used   Substance and Sexual Activity     Alcohol use: Not Currently     Frequency: Never     Drug use: Never     Sexual activity:  Not on file   Lifestyle     Physical activity     Days per week: Not on file     Minutes per session: Not on file     Stress: Not on file   Relationships     Social connections     Talks on phone: Not on file     Gets together: Not on file     Attends Roman Catholic service: Not on file     Active member of club or organization: Not on file     Attends meetings of clubs or organizations: Not on file     Relationship status: Not on file     Intimate partner violence     Fear of current or ex partner: Not on file     Emotionally abused: Not on file     Physically abused: Not on file     Forced sexual activity: Not on file   Other Topics Concern     Parent/sibling w/ CABG, MI or angioplasty before 65F 55M? Not Asked   Social History Narrative     Not on file     Family History  Medical History Relation Name Comments   Cancer, Colon Birth Father    at 62, pancreatic carcinoma   Diabetes, Type II Birth Father       Liver Disease Birth Father   cancer   Glaucoma Birth Mother       Inflammatory Bowel Disease Other         Relation Name Status Comments   Birth Father    (Age 62) parcreatic carcinoma   Birth Mother   Alive     Daughter   Alive     Daughter   Alive     Daughter   Alive     Maternal Grandfather    flu epidemic   Maternal Grandmother    cancer   Other         Paternal Grandfather        Paternal Grandmother            Objective:  Reported vitals:  There were no vitals taken for this visit.   GEN: Healthy, alert and no distress  PSYCH: Alert and oriented times 3; coherent speech, normal   rate and volume, able to articulate logical thoughts, able   to abstract reason, no tangential thoughts, no hallucinations   or delusions, affect seems normal  RESP: No cough, no audible wheezing, able to talk in full sentences  Remainder of exam unable to be completed due to virtual visit     Outside Imaging summaries:    Assessment and plan:  Mr Zamudio is a 64yo gentleman with chronic  calcific pancreatitis who is struggling with postprandial pain with difficulty eating and probable poor nutrition who is now found to also have 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 He is found on ERCP to have tight stenoses of an irregular main duct, likely involving main duct stones. He may receive some benefit from continued intervention by ERCP, though this approach is complicated by somewhat altered anatomy and difficult access. For now however, we will defer further main duct intervention as our following plan unfolds. That said we may return to further ERCP in the future.    Rather we will refer him to one of our vascular surgeons for comment on his SMA stenosis and if surgical and or endovascular interventions would provide benefit that outweigh the risk of such an approach. He would certainly benefit from placement of a gastrojejunostomy tube to provide post-pancreatic nutrition while we continue to manage his difficult chronic disease. He has seen our dietitian Mel Hernandes and we will continue high dose supplemental pancreatic enzymes.    We discussed our options and agreed that our next step will be placement of a gastrojejunostomy tube by endoscopy. He will first make an appointment with our tube educators and we will lean on Mel Hernandes for a formulation, perhaps elemental, to assist in avoiding diarrhea. We reviewed the risks of tube placement which includes abdominal infection and bowel perforation. In the meanwhile we will also refer him to vascular surgery.    We will also attempt symptomatic imodium and titrate as appropriate.    It was a pleasure to participate in the care of this patient; please contact us with any further questions.  A total of 45 minutes was spent in evaluation with this patient, >50% of which was counseling regarding the above delineated issues.    Christo Pepe MD PhD CHELO LEVI  Director of Endoscopy   of  Medicine, Surgery and Pediatrics  Interventional and Therapeutic Endoscopy    [facility]  Division of Gastroenterology and Hepatology  [facility] [address], [place] [address]    New Consultations  [phone]  Procedure Scheduling [phone]  Clinical Nurse Coordinator [phone]  Clinical Fax   [phone]  Administrative   [phone]  Administrative Fax  [phone]

## 2021-02-26 NOTE — TELEPHONE ENCOUNTER
Patients PCP called to discuss request to manage tube feeds. Questions answered, they will check with PCP and get back to us.    ML

## 2021-03-02 NOTE — TELEPHONE ENCOUNTER
DIAGNOSIS: Discuss SMA stenosis, Dr Pepe referring   DATE RECEIVED: 3.4.21   NOTES STATUS DETAILS   OFFICE NOTE from referring provider Internal 2.25.21  Dr. Christo Pepe  [facility]   OFFICE NOTE from other specialist na    OPERATIVE REPORT na    MEDICATION LIST Internal    PERTINENT LABS Internal    CTA (CT ANGIOGRAPHY) na    CT Internal 8.6.20  CT Abd/Pelvis   MRI na    ULTRASOUND Internal 2.12.21  US Abd Complete

## 2021-03-04 NOTE — NURSING NOTE
Vascular Rooming Note     Nick Zamudio's goals for this visit include:   Chief Complaint   Patient presents with     Consult     Art, is participating in a virtual visit today for a consult regarding SMA stenosis, feeling ok besides GI issues, as reported by patient.     Arabella Salazar LPN

## 2021-03-04 NOTE — LETTER
3/4/2021       RE: Nick Zamudio  [address] [place] [address] [place] [address]     Dear Colleague,    Thank you for referring your patient, Nick Zamudio, to the [facility] [place] at [facility]. Please see a copy of my visit note below.        INTERVENTIONAL RADIOLOGY CONSULTATION    Name: Nick Zamudio  Age: 65 year old   Referring Physician: Dr. Pepe   REASON FOR REFERRAL: Mesenteric ischemia     HPI: Mr Zamudio is a 66yo gentleman with chronic calcific pancreatitis presents with postprandial pain with difficulty eating and poor nutrition. Due to his poor nutritional status GI is also considering a GJ tube.    He is currently having multiple digestive issues with sudden onset diarrhea fatigue, cold chills and pain after eating.  The pain usually starts half an hour to an hour after eating and is localized in the upper abdomen and back, about 4-6/10 in severity.  Pain is independent of what he eats and seems to be more related to the volume of food he eats.  He occasionally takes oxycodone for pain, maybe once a day or maybe once every other day.  Due to his pain his diet has significantly reduced and he has lost about 20 pounds in 6 months.  He has tried multiple diets including a low-fat diet but that had not been very helpful.    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    PAST MEDICAL HISTORY:   Past Medical History:   Diagnosis Date     Diabetes (H)      Hypertension      Intussusception (H)      Iron deficiency anemia      Lymphedema      Pancreatic disease     calcific pancreas, multiple panc stones.     Paraplegia (H)     Due to radiation treatment testicular cancer     PVD (peripheral vascular disease) (H)      Renal disease     stone disease     Testicular  cancer (H)     Radiation treatment       PAST SURGICAL HISTORY:   Past Surgical History:   Procedure Laterality Date     ABDOMEN SURGERY      Partial colectomy for damage due to radiation treatment testicular ca     AMPUTATION      left toe     CHOLECYSTECTOMY       COLONOSCOPY      polyp     ENDOSCOPIC RETROGRADE CHOLANGIOPANCREATOGRAM N/A 7/13/2020    Procedure: ENDOSCOPIC RETROGRADE CHOLANGIOPANCREATOGRAPHY, STENT RETRIEVAL, STENT PLACEMENT, BILIARY SPHINCTEROTOMY;  Surgeon: Christo Pepe MD;  Location:  OR     ENDOSCOPIC RETROGRADE CHOLANGIOPANCREATOGRAM N/A 9/29/2020    Procedure: ENDOSCOPIC RETROGRADE CHOLANGIOPANCREATOGRAPHY with balloon sweep of bile ducts, bile ducts stents exchanged;  Surgeon: Christo Pepe MD;  Location:  OR     ENDOSCOPIC RETROGRADE CHOLANGIOPANCREATOGRAM N/A 12/9/2020    Procedure: ENDOSCOPIC RETROGRADE CHOLANGIOPANCREATOGRAPHY, STENT EXCHANGE;  Surgeon: Christo Pepe MD;  Location:  OR     ENDOSCOPIC RETROGRADE CHOLANGIOPANCREATOGRAM N/A 1/13/2021    Procedure: ENDOSCOPIC RETROGRADE CHOLANGIOPANCREATOGRAPHY WITH PANCREATIC DUCT STENT REMOVAL;  Surgeon: Christo Pepe MD;  Location:  OR     ENDOSCOPIC ULTRASOUND UPPER GASTROINTESTINAL TRACT (GI) N/A 7/13/2020    Procedure: ENDOSCOPIC ULTRASOUND, ESOPHAGOSCOPY / UPPER GASTROINTESTINAL TRACT (GI) WITH FINE NEEDLE BIOPSY;  Surgeon: Christo Pepe MD;  Location:  OR     GI SURGERY      bowel resection     ORTHOPEDIC SURGERY       TONSILLECTOMY         FAMILY HISTORY:   No family history on file.    SOCIAL HISTORY:   Social History     Tobacco Use     Smoking status: Never Smoker     Smokeless tobacco: Never Used   Substance Use Topics     Alcohol use: Not Currently     Frequency: Never       PROBLEM LIST:   Patient Active Problem List    Diagnosis Date Noted     Abnormal skin growth 02/24/2021     Priority: Medium     Viral warts 02/24/2021     Priority: Medium     Chronic pancreatitis  (H) 11/30/2020     Priority: Medium     Added automatically from request for surgery [id number]       Biliary stricture 11/30/2020     Priority: Medium     Added automatically from request for surgery [id number]       Pancreatic mass 08/19/2020     Priority: Medium     Added automatically from request for surgery [id number]       Pancreatic duct stricture 07/02/2020     Priority: Medium     Added automatically from request for surgery [id number]       Pancreatic duct stones 07/02/2020     Priority: Medium     Added automatically from request for surgery [id number]       Polyp of sigmoid colon 01/12/2020     Priority: Medium     S/P small bowel resection 01/12/2020     Priority: Medium     History of cholecystectomy 12/19/2019     Priority: Medium     Small bowel intussusception (H) 12/19/2019     Priority: Medium     Bilateral pleural effusion 11/13/2019     Priority: Medium     Hydronephrosis of right kidney 11/13/2019     Priority: Medium     Small bowel obstruction (H) 11/13/2019     Priority: Medium     Hyperkalemia 09/18/2019     Priority: Medium     Rectal polyp 09/18/2019     Priority: Medium     Stage 3 chronic kidney disease 09/18/2019     Priority: Medium     Acute cholecystitis 09/17/2019     Priority: Medium     Hyperlipidemia 09/17/2019     Priority: Medium     Biliary colic 08/28/2019     Priority: Medium     Upper GI bleed 08/28/2019     Priority: Medium     Kidney stones 01/02/2018     Priority: Medium     Paralysis (H) 01/02/2018     Priority: Medium     Due to radiation damage.       Other osteomyelitis 10/29/2017     Priority: Medium     Type 2 diabetes mellitus (H) 10/27/2017     Priority: Medium     PVD (peripheral vascular disease) (H) 10/23/2017     Priority: Medium     Essential hypertension 10/16/2017     Priority: Medium     History of radiation therapy 05/09/2017     Priority: Medium     History of radiation therapy: Testicular cancer Rx : Post-radiation pareisis, legs       Cellulitis of left groin  04/23/2017     Priority: Medium     Chronic anemia 04/23/2017     Priority: Medium     Chronic acquired lymphedema 04/23/2017     Priority: Medium     H/O testicular cancer 04/23/2017     Priority: Medium     Hyperglycemia 04/23/2017     Priority: Medium     Paraparesis of both lower limbs (H) 04/23/2017     Priority: Medium     Elevated PSA 04/16/2007     Priority: Medium     Elevated PSA, 2005: US/biopsies normal.         MEDICATIONS:   Prescription Medications as of 3/4/2021       Rx Number Disp Refills Start End Last Dispensed Date Next Fill Date Owning Pharmacy    ACCU-CHEK GUIDE test strip    9/30/2020        Sig: USE 1 EACH TO TEST DAILY. CODE E11.9    Class: Historical    Acetaminophen 325 MG CAPS            Sig: Take 325-650 mg by mouth every 4 hours as needed    Class: Historical    Route: Oral    amylase-lipase-protease (CREON) 98891-38167 units CPEP per EC capsule    8/25/2020        Sig: TAKE 3 WITH MEALS / 1 2 WITH SNACKS, UP TO 15 PER DAY.    Class: Historical    atorvastatin (LIPITOR) 20 MG tablet    9/26/2020        Sig: Take 20 mg by mouth daily    Class: Historical    Route: Oral    metFORMIN (GLUCOPHAGE) 500 MG tablet    6/3/2020        Sig: daily (with breakfast) LM to hold metformin am 9-29-20.    Class: Historical    oxyCODONE (ROXICODONE) 5 MG tablet            Sig: Take 5 mg by mouth every 6 hours as needed for severe pain    Class: Historical    Route: Oral    oxyCODONE-acetaminophen (PERCOCET) 5-325 MG tablet    2/6/2021    [facility]/pharmacy #[id number] - [place], [address]    Sig: TAKE 1 TABLET BY MOUTH EVERY 8 HOURS AS NEEDED FOR PAIN    Class: Historical    Earliest Fill Date: 2/6/2021          ALLERGIES:   Penicillins and Ibuprofen    ROS:  As noted in HPI.    Physical Examination: Video visit  VITALS:   There were no vitals taken for this visit.  GENERAL: Healthy, alert and no distress  SKIN: Visible skin clear. No significant rash, abnormal pigmentation or lesions.  PSYCH:  Mentation appears normal, affect normal/bright, judgement and insight intact, normal speech and appearance well-groomed.  EYES: Eyes grossly normal to inspection. No discharge or erythema, or obvious scleral/conjunctival abnormalities.  RESP: Breathing comfortably on room air.  No audible wheeze, cough, or visible cyanosis. No visible retractions or increased work of breathing.   NEURO: Cranial nerves grossly intact. Mentation and speech appropriate for age.      Labs:    BMP RESULTS:  Lab Results   Component Value Date     01/13/2021    POTASSIUM 4.3 01/13/2021    CHLORIDE 111 (H) 01/13/2021    CO2 22 01/13/2021    ANIONGAP 9 01/13/2021     (H) 01/13/2021    BUN 22 01/13/2021    CR 1.11 01/13/2021    GFRESTIMATED 69 01/13/2021    GFRESTBLACK 80 01/13/2021    ELODIA 9.3 01/13/2021        CBC RESULTS:  Lab Results   Component Value Date    WBC 4.4 01/13/2021    RBC 3.87 (L) 01/13/2021    HGB 11.5 (L) 01/13/2021    HCT 35.7 (L) 01/13/2021    MCV 92 01/13/2021    MCH 29.7 01/13/2021    MCHC 32.2 01/13/2021    RDW 14.3 01/13/2021     01/13/2021       INR/PTT:  Lab Results   Component Value Date    INR 1.09 01/13/2021       Diagnostic studies: I personally reviewed and interpreted the following studies:  1.  US abdomen (2/12/2021)- Hemodynamically significant stenosis of the mid superior mesenteric artery by velocity criteria ().  2.  Contrast-enhanced CT abdomen and pelvis (8/6/2020): Diffuse atherosclerotic calcification involving the abdominal aorta and visceral branch vessels.  There is moderate to severe stenosis of the proximal celiac axis.  There is diffuse atherosclerotic calcification involving the superior mesenteric artery with at least moderate stenosis in the proximal to mid segment.  There is likely chronic segmental occlusion of an early jejunal branch mid abdomen. Inferior mesenteric artery is chronically occluded.  Noted is chronic left external iliac artery occlusion with left  axillary to femoral artery bypass.   Extending to the left chronic calcific pancreatitis.    Assessment   65-year-old male with past medical history of chronic calcific pancreatitis, poor nutrition secondary to postprandial abdominal pain.    Per patient his abdominal pain is 4-6/10 in severity and starts about half an hour to 1 hour after eating accompanied by fatigue, cold chills.  He has lost up to 20 pounds in the last 6 months.  He also experiences multiple episodes of diarrhea, this might be secondary to his chronic pancreatitis.    We reviewed the ultrasound and CT imaging findings with the patient.  The CT shows that there is severe atherosclerotic disease involving the abdominal aorta and visceral branches with moderate to severe stenosis of the proximal celiac trunk and proximal to mid SMA main trunk with likely chronic segmental occlusion of an early jejunal branch.  ERIC is chronically occluded.  Duplex ultrasound demonstrates high velocities in mid superior mesenteric artery on ultrasound consistent with hemodynamically significant stenosis.  Patient therefore has significant three-vessel mesenteric artery disease consistent with diagnosis of chronic intestinal angina/mesenteric insufficiency as a cause of his symptoms.    He is concerned about placement of gastrojejunostomy tube.  We feel that considering his chronic mesenteric ischemia once we have improved blood flow to the intestine, the gastrojejunostomy tube will benefit him after that if the need arises.    We discussed the procedure, risks and benefits including but not limited to injury to the vessel, bleeding, pseudoaneurysm, SMA dissection or occlusion. We also discussed in case of SMA dissection or occlusion he might need emergent surgery which would carry significant risk.    We had a thorough discussion with Mr. Zamudio regarding the above findings and proposed treatment plan including a discussion of alternatives, risks and benefits.   Following this discussion, patient does wish to proceed with intervention.    Plan  -We will schedule the patient for mesenteric angiogram and superior mesenteric artery stenting.  - Will d/w Dr Jones regarding the timing of GJ tube.   - After this procedure he will be started on Plavix for atleast 3 months.  We will F/u after procedure with US doppler abdomen in 1 month, then 6 monthly for a few years.    Per Curry MD  Interventional Radiology Fellow      Superior mesenteric artery stenosis (H)  Chronic Mesenteric Ischemia       Review of the result(s) of each unique test - US abdomen and CT abdomen  as above.  Independent interpretation of a test performed by another physician/other qualified health care professional (not separately reported) - US abdomen (2/12/21) and CT abdomen and pelvis (8/6/202) as stated above.      Video-Visit Details     Type of service:  Video Visit    Originating Location (pt. Location): Home     Distant Location (provider location):  [facility]      Platform used for Video Visit: Ry[employer]       Patient Care Team:  Echo Lopez MD as PCP - General (Family Practice)  Christo Jones MD as Assigned Gastroenterology Provider  CHRISTO JONES        Attestation with edits by Devaughn Quinones MD at 3/6/2021  2:39 PM:  Physician Attestation   I, Devaughn Quinones MD, saw this patient with the resident and agree with the resident/fellow's findings and plan of care as documented in the note.      I personally reviewed medications, labs, and imaging.    Key findings: Patient with significant postprandial abdominal pain and 20 pound weight loss in the last 6 months.  Significant three-vessel mesenteric artery disease with significant stenosis of both the celiac and superior mesenteric artery based on prior CT and ultrasound and chronic occlusion of the inferior mesenteric artery.  Symptoms and findings consistent with chronic intestinal  angina/mesenteric insufficiency.  Therefore, superior mesenteric artery intervention with likely stent placement is indicated.  When assessing the risk-benefit ratio of intervention, it is important to recognize findings are superimposed on a background of overall significant atherosclerotic disease with diffusely calcified arteries and chronic left external iliac artery occlusion status post left axillary to femoral artery bypass.  I had a thorough discussion with Mr. Zamudio regarding the above findings, the recommendation for treatment, its alternatives, risks and benefits.  Following this discussion, Mr. Zamudio does wish to proceed with mesenteric arteriogram and intervention.    Devaughn Quinones MD  Interventional Radiology and Vascular Imaging Attending  Department of Radiology  [facility]    Date of Service (when I saw the patient): 3/4/2021

## 2021-03-04 NOTE — PROGRESS NOTES
INTERVENTIONAL RADIOLOGY CONSULTATION    Name: Nick Zamudio  Age: 65 year old   Referring Physician: Dr. Pepe   REASON FOR REFERRAL: Mesenteric ischemia     HPI: Mr Zamudio is a 64yo gentleman with chronic calcific pancreatitis presents with postprandial pain with difficulty eating and poor nutrition. Due to his poor nutritional status GI is also considering a GJ tube.    He is currently having multiple digestive issues with sudden onset diarrhea fatigue, cold chills and pain after eating.  The pain usually starts half an hour to an hour after eating and is localized in the upper abdomen and back, about 4-6/10 in severity.  Pain is independent of what he eats and seems to be more related to the volume of food he eats.  He occasionally takes oxycodone for pain, maybe once a day or maybe once every other day.  Due to his pain his diet has significantly reduced and he has lost about 20 pounds in 6 months.  He has tried multiple diets including a low-fat diet but that had not been very helpful.    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    PAST MEDICAL HISTORY:   Past Medical History:   Diagnosis Date     Diabetes (H)      Hypertension      Intussusception (H)      Iron deficiency anemia      Lymphedema      Pancreatic disease     calcific pancreas, multiple panc stones.     Paraplegia (H)     Due to radiation treatment testicular cancer     PVD (peripheral vascular disease) (H)      Renal disease     stone disease     Testicular cancer (H)     Radiation treatment       PAST SURGICAL HISTORY:   Past Surgical History:   Procedure Laterality Date     ABDOMEN SURGERY      Partial colectomy for damage due to radiation treatment testicular ca     AMPUTATION      left toe     CHOLECYSTECTOMY       COLONOSCOPY      polyp     ENDOSCOPIC  RETROGRADE CHOLANGIOPANCREATOGRAM N/A 7/13/2020    Procedure: ENDOSCOPIC RETROGRADE CHOLANGIOPANCREATOGRAPHY, STENT RETRIEVAL, STENT PLACEMENT, BILIARY SPHINCTEROTOMY;  Surgeon: Christo Pepe MD;  Location:  OR     ENDOSCOPIC RETROGRADE CHOLANGIOPANCREATOGRAM N/A 9/29/2020    Procedure: ENDOSCOPIC RETROGRADE CHOLANGIOPANCREATOGRAPHY with balloon sweep of bile ducts, bile ducts stents exchanged;  Surgeon: Christo Pepe MD;  Location: [facility]     ENDOSCOPIC RETROGRADE CHOLANGIOPANCREATOGRAM N/A 12/9/2020    Procedure: ENDOSCOPIC RETROGRADE CHOLANGIOPANCREATOGRAPHY, STENT EXCHANGE;  Surgeon: Christo Pepe MD;  Location:  OR     ENDOSCOPIC RETROGRADE CHOLANGIOPANCREATOGRAM N/A 1/13/2021    Procedure: ENDOSCOPIC RETROGRADE CHOLANGIOPANCREATOGRAPHY WITH PANCREATIC DUCT STENT REMOVAL;  Surgeon: Christo Pepe MD;  Location:  OR     ENDOSCOPIC ULTRASOUND UPPER GASTROINTESTINAL TRACT (GI) N/A 7/13/2020    Procedure: ENDOSCOPIC ULTRASOUND, ESOPHAGOSCOPY / UPPER GASTROINTESTINAL TRACT (GI) WITH FINE NEEDLE BIOPSY;  Surgeon: Christo Pepe MD;  Location:  OR     GI SURGERY      bowel resection     ORTHOPEDIC SURGERY       TONSILLECTOMY         FAMILY HISTORY:   No family history on file.    SOCIAL HISTORY:   Social History     Tobacco Use     Smoking status: Never Smoker     Smokeless tobacco: Never Used   Substance Use Topics     Alcohol use: Not Currently     Frequency: Never       PROBLEM LIST:   Patient Active Problem List    Diagnosis Date Noted     Abnormal skin growth 02/24/2021     Priority: Medium     Viral warts 02/24/2021     Priority: Medium     Chronic pancreatitis (H) 11/30/2020     Priority: Medium     Added automatically from request for surgery [id number]       Biliary stricture 11/30/2020     Priority: Medium     Added automatically from request for surgery [id number]       Pancreatic mass 08/19/2020     Priority: Medium     Added automatically from request for  surgery [id number]       Pancreatic duct stricture 07/02/2020     Priority: Medium     Added automatically from request for surgery [id number]       Pancreatic duct stones 07/02/2020     Priority: Medium     Added automatically from request for surgery [id number]       Polyp of sigmoid colon 01/12/2020     Priority: Medium     S/P small bowel resection 01/12/2020     Priority: Medium     History of cholecystectomy 12/19/2019     Priority: Medium     Small bowel intussusception (H) 12/19/2019     Priority: Medium     Bilateral pleural effusion 11/13/2019     Priority: Medium     Hydronephrosis of right kidney 11/13/2019     Priority: Medium     Small bowel obstruction (H) 11/13/2019     Priority: Medium     Hyperkalemia 09/18/2019     Priority: Medium     Rectal polyp 09/18/2019     Priority: Medium     Stage 3 chronic kidney disease 09/18/2019     Priority: Medium     Acute cholecystitis 09/17/2019     Priority: Medium     Hyperlipidemia 09/17/2019     Priority: Medium     Biliary colic 08/28/2019     Priority: Medium     Upper GI bleed 08/28/2019     Priority: Medium     Kidney stones 01/02/2018     Priority: Medium     Paralysis (H) 01/02/2018     Priority: Medium     Due to radiation damage.       Other osteomyelitis 10/29/2017     Priority: Medium     Type 2 diabetes mellitus (H) 10/27/2017     Priority: Medium     PVD (peripheral vascular disease) (H) 10/23/2017     Priority: Medium     Essential hypertension 10/16/2017     Priority: Medium     History of radiation therapy 05/09/2017     Priority: Medium     History of radiation therapy: Testicular cancer Rx : Post-radiation pareisis, legs       Cellulitis of left groin 04/23/2017     Priority: Medium     Chronic anemia 04/23/2017     Priority: Medium     Chronic acquired lymphedema 04/23/2017     Priority: Medium     H/O testicular cancer 04/23/2017     Priority: Medium     Hyperglycemia 04/23/2017     Priority: Medium     Paraparesis of both lower limbs (H) 04/23/2017      Priority: Medium     Elevated PSA 04/16/2007     Priority: Medium     Elevated PSA, 2005: US/biopsies normal.         MEDICATIONS:   Prescription Medications as of 3/4/2021       Rx Number Disp Refills Start End Last Dispensed Date Next Fill Date Owning Pharmacy    ACCU-CHEK GUIDE test strip    9/30/2020        Sig: USE 1 EACH TO TEST DAILY. CODE E11.9    Class: Historical    Acetaminophen 325 MG CAPS            Sig: Take 325-650 mg by mouth every 4 hours as needed    Class: Historical    Route: Oral    amylase-lipase-protease (CREON) 28912-49043 units CPEP per EC capsule    8/25/2020        Sig: TAKE 3 WITH MEALS / 1 2 WITH SNACKS, UP TO 15 PER DAY.    Class: Historical    atorvastatin (LIPITOR) 20 MG tablet    9/26/2020        Sig: Take 20 mg by mouth daily    Class: Historical    Route: Oral    metFORMIN (GLUCOPHAGE) 500 MG tablet    6/3/2020        Sig: daily (with breakfast) LM to hold metformin am 9-29-20.    Class: Historical    oxyCODONE (ROXICODONE) 5 MG tablet            Sig: Take 5 mg by mouth every 6 hours as needed for severe pain    Class: Historical    Route: Oral    oxyCODONE-acetaminophen (PERCOCET) 5-325 MG tablet    2/6/2021    [facility]/pharmacy #[id number] - [place], [address]    Sig: TAKE 1 TABLET BY MOUTH EVERY 8 HOURS AS NEEDED FOR PAIN    Class: Historical    Earliest Fill Date: 2/6/2021          ALLERGIES:   Penicillins and Ibuprofen    ROS:  As noted in HPI.    Physical Examination: Video visit  VITALS:   There were no vitals taken for this visit.  GENERAL: Healthy, alert and no distress  SKIN: Visible skin clear. No significant rash, abnormal pigmentation or lesions.  PSYCH: Mentation appears normal, affect normal/bright, judgement and insight intact, normal speech and appearance well-groomed.  EYES: Eyes grossly normal to inspection. No discharge or erythema, or obvious scleral/conjunctival abnormalities.  RESP: Breathing comfortably on room air.  No audible wheeze, cough, or  visible cyanosis. No visible retractions or increased work of breathing.   NEURO: Cranial nerves grossly intact. Mentation and speech appropriate for age.      Labs:    BMP RESULTS:  Lab Results   Component Value Date     01/13/2021    POTASSIUM 4.3 01/13/2021    CHLORIDE 111 (H) 01/13/2021    CO2 22 01/13/2021    ANIONGAP 9 01/13/2021     (H) 01/13/2021    BUN 22 01/13/2021    CR 1.11 01/13/2021    GFRESTIMATED 69 01/13/2021    GFRESTBLACK 80 01/13/2021    ELODIA 9.3 01/13/2021        CBC RESULTS:  Lab Results   Component Value Date    WBC 4.4 01/13/2021    RBC 3.87 (L) 01/13/2021    HGB 11.5 (L) 01/13/2021    HCT 35.7 (L) 01/13/2021    MCV 92 01/13/2021    MCH 29.7 01/13/2021    MCHC 32.2 01/13/2021    RDW 14.3 01/13/2021     01/13/2021       INR/PTT:  Lab Results   Component Value Date    INR 1.09 01/13/2021       Diagnostic studies: I personally reviewed and interpreted the following studies:  1.  US abdomen (2/12/2021)- Hemodynamically significant stenosis of the mid superior mesenteric artery by velocity criteria ().  2.  Contrast-enhanced CT abdomen and pelvis (8/6/2020): Diffuse atherosclerotic calcification involving the abdominal aorta and visceral branch vessels.  There is moderate to severe stenosis of the proximal celiac axis.  There is diffuse atherosclerotic calcification involving the superior mesenteric artery with at least moderate stenosis in the proximal to mid segment.  There is likely chronic segmental occlusion of an early jejunal branch mid abdomen. Inferior mesenteric artery is chronically occluded.  Noted is chronic left external iliac artery occlusion with left axillary to femoral artery bypass.   Extending to the left chronic calcific pancreatitis.    Assessment   65-year-old male with past medical history of chronic calcific pancreatitis, poor nutrition secondary to postprandial abdominal pain.    Per patient his abdominal pain is 4-6/10 in severity and starts  about half an hour to 1 hour after eating accompanied by fatigue, cold chills.  He has lost up to 20 pounds in the last 6 months.  He also experiences multiple episodes of diarrhea, this might be secondary to his chronic pancreatitis.    We reviewed the ultrasound and CT imaging findings with the patient.  The CT shows that there is severe atherosclerotic disease involving the abdominal aorta and visceral branches with moderate to severe stenosis of the proximal celiac trunk and proximal to mid SMA main trunk with likely chronic segmental occlusion of an early jejunal branch.  ERIC is chronically occluded.  Duplex ultrasound demonstrates high velocities in mid superior mesenteric artery on ultrasound consistent with hemodynamically significant stenosis.  Patient therefore has significant three-vessel mesenteric artery disease consistent with diagnosis of chronic intestinal angina/mesenteric insufficiency as a cause of his symptoms.    He is concerned about placement of gastrojejunostomy tube.  We feel that considering his chronic mesenteric ischemia once we have improved blood flow to the intestine, the gastrojejunostomy tube will benefit him after that if the need arises.    We discussed the procedure, risks and benefits including but not limited to injury to the vessel, bleeding, pseudoaneurysm, SMA dissection or occlusion. We also discussed in case of SMA dissection or occlusion he might need emergent surgery which would carry significant risk.    We had a thorough discussion with Mr. Zamudio regarding the above findings and proposed treatment plan including a discussion of alternatives, risks and benefits.  Following this discussion, patient does wish to proceed with intervention.    Plan  -We will schedule the patient for mesenteric angiogram and superior mesenteric artery stenting.  - Will d/w Dr Pepe regarding the timing of GJ tube.   - After this procedure he will be started on Plavix for atleast 3  months.  We will F/u after procedure with US doppler abdomen in 1 month, then 6 monthly for a few years.    Per Curry MD  Interventional Radiology Fellow            Superior mesenteric artery stenosis (H)  Chronic Mesenteric Ischemia       Review of the result(s) of each unique test - US abdomen and CT abdomen  as above.  Independent interpretation of a test performed by another physician/other qualified health care professional (not separately reported) - US abdomen (2/12/21) and CT abdomen and pelvis (8/6/202) as stated above.                Video-Visit Details     Type of service:  Video Visit    Originating Location (pt. Location): Home     Distant Location (provider location):  [facility]ASCULAR [facility]      Platform used for Video Visit: S[employer] Green  Patient Care Team:  Echo Lopez MD as PCP - General (Family Practice)  Juan Jones MD as Assigned Gastroenterology Provider  JUAN JONES

## 2021-03-04 NOTE — PROGRESS NOTES
"Art is a 65 year old who is being evaluated via a billable video visit.      How would you like to obtain your AVS? MyChart  If the video visit is dropped, the invitation should be resent by: Other e-mail: my chart connect  Will anyone else be joining your video visit? No  {If patient encounters technical issues they should call [phone] :620508}    Video Start Time: {video visit start/end time for provider to select:152948}    {PROVIDER CHARTING PREFERENCE:608549}    Subjective   Art is a 65 year old who presents for the following health issues {ACCOMPANIED BY STATEMENT (Optional):506653}    HPI       ***    Review of Systems   {ROS COMP (Optional):963726}      Objective    Vitals - Patient Reported  Pain Score: No Pain (0)(6-+/10 after eating)  Pain Loc: Abdomen        Physical Exam   {video visit exam brief selected:236934::\"GENERAL: Healthy, alert and no distress\",\"EYES: Eyes grossly normal to inspection.  No discharge or erythema, or obvious scleral/conjunctival abnormalities.\",\"RESP: No audible wheeze, cough, or visible cyanosis.  No visible retractions or increased work of breathing.  \",\"SKIN: Visible skin clear. No significant rash, abnormal pigmentation or lesions.\",\"NEURO: Cranial nerves grossly intact.  Mentation and speech appropriate for age.\",\"PSYCH: Mentation appears normal, affect normal/bright, judgement and insight intact, normal speech and appearance well-groomed.\"}    {Diagnostic Test Results (Optional):918542}    {AMBULATORY ATTESTATION (Optional):336902}        Video-Visit Details    Type of service:  Video Visit    Video End Time:{video visit start/end time for provider to select:744148}    Originating Location (pt. Location): {video visit patient location:515524::\"Home\"}    Distant Location (provider location):  [facility] [place]     Platform used for Video Visit: {Virtual Visit Platforms:996665::\"COMS Interactive\"}    "

## 2021-03-05 NOTE — TELEPHONE ENCOUNTER
Per Dr. Pepe, PEG to be placed after IR procedure. Date of  ok for procedure.    Please assist in scheduling:     Procedure/Imaging/Clinic: GJ placement  Physician: Dr. Pepe  Timin21  Procedure length:50 minutes  Anesthesia:general  Dx: FTT  Tier:2  Location: [facility]      Called and talk to Art. Remined he'll need preop and covid test prior to procedure. Pt understands timing of procedures, will call with questions or concerns.    ML

## 2021-03-08 PROBLEM — R62.7 ADULT FAILURE TO THRIVE: Status: ACTIVE | Noted: 2021-01-01

## 2021-03-15 NOTE — IP AVS SNAPSHOT
[facility] [address] [address]                                    After Visit Summary   3/15/2021    Nick Zamudio    MRN: [id number]           After Visit Summary Signature Page    I have received my discharge instructions, and my questions have been answered. I have discussed any challenges I see with this plan with the nurse or doctor.    ..........................................................................................................................................  Patient/Patient Representative Signature      ..........................................................................................................................................  Patient Representative Print Name and Relationship to Patient    ..................................................               ................................................  Date                                   Time    ..........................................................................................................................................  Reviewed by Signature/Title    ...................................................              ..............................................  Date                                               Time          22EPIC Rev 08/18

## 2021-03-15 NOTE — PROGRESS NOTES
Arrived to [address] for visceral angiogram in IR. AFVSS. Denies pain. Labs resulted. BG 128mg/dl. Consent done. Groin prepped. RLE pp & pt pulses doppler, marked. Unable to located LLE pp & pt via doppler. Team aware. Wife, Ines, to drive pt home post procedure [phone].

## 2021-03-15 NOTE — IP AVS SNAPSHOT
MRN:[id number]                      After Visit Summary   3/15/2021    Nick Zamudio    MRN: [id number]           Visit Information        Department      3/15/2021  7:06 AM Formerly [facility] [address] [place]          Review of your medicines      UNREVIEWED medicines. Ask your doctor about these medicines       Dose / Directions   Acetaminophen 325 MG Caps  Indication: Fever, Pain      Dose: 325-650 mg  Take 325-650 mg by mouth every 4 hours as needed  Refills: 0     atorvastatin 20 MG tablet  Commonly known as: LIPITOR      Dose: 20 mg  Take 20 mg by mouth daily  Refills: 0     Creon 56169-84681 units Cpep per EC capsule  Generic drug: amylase-lipase-protease      TAKE 3 WITH MEALS / 1 2 WITH SNACKS, UP TO 15 PER DAY.  Refills: 0     metFORMIN 500 MG tablet  Commonly known as: GLUCOPHAGE      daily (with breakfast) LM to hold metformin am 9-29-20.  Refills: 0     oxyCODONE 5 MG tablet  Commonly known as: ROXICODONE      Dose: 5 mg  Take 5 mg by mouth every 6 hours as needed for severe pain  Refills: 0     oxyCODONE-acetaminophen 5-325 MG tablet  Commonly known as: PERCOCET      TAKE 1 TABLET BY MOUTH EVERY 8 HOURS AS NEEDED FOR PAIN  Refills: 0        CONTINUE these medicines which have NOT CHANGED       Dose / Directions   Accu-Chek Guide test strip  Generic drug: blood glucose      USE 1 EACH TO TEST DAILY. CODE E11.9  Refills: 0              Protect others around you: Learn how to safely use, store and throw away your medicines at www.disposemymeds.org.       Follow-ups after your visit       Your next 10 appointments already scheduled    Apr 06, 2021  Procedure with Christo Pepe MD  [facility] Same Day Surgery (--) [address]  [facility] [place] [address] [phone]      Care Instructions       Further instructions from your care team       [facility]   Interventional Radiology  Discharge Instructions Post Visceral Angiogram     AFTER YOU GO  HOME          Do relax and take it easy for 24 hours.       Do drink plenty of fluids.       Do resume your regular diet, unless otherwise instructed by your Primary Physician.       DO NOT smoke for at least 24 hours, if you were given any sedation.       DO NOT drink alcoholic beverages the day of your procedure.       Do not drive or operate machinery at home or at work for 24 hours.          DO NOT do any strenuous exercise or lifting for at least 2 days following your procedure.       DO NOT take a bath or shower for at least 12 hours following your procedure.       DO NOT make any important or legal decisions for 24 hours following your procedure.    CALL THE PHYSICIAN IF:      - You start bleeding from the procedure site.  If you do start to bleed from the site, lie down flat and hold pressure on the site. A small lump or bruise is common at the         puncture site.Your physician will tell you if you need to return to the hospital.      - You develop numbness, coolness or a change in color of the arm or leg that was punctured.      - You experience increased pain or redness at the puncture site.      - You develop hives or a rash or unexplained itching.      - You develop a temperature of 101 degrees F or greater    Additional Information:         -  Support the puncture site for coughing, sneezing, or moving your bowels for the first 48 hours  -No tub bath, hot tubs, or swimming for 5 days  -No lotion or powder to the puncture site for 3 days      Instructions for closure device: see Mynx closure brochure        [facility] INTERVENTIONAL RADIOLOGY DEPARTMENT         Procedure Physician: Dr. Quinones, Dr. Curry                              Date of Procedure: March 15, 2021         Telephone Numbers:   [phone]......Monday-Friday 8:00 to 4:30 pm                                        [phone].....After 4:30 pm Monday-Friday, Weekends and  Holidays. Ask for the Interventional Radiologist on call. Someone  is on call 24 hrs/day.             Additional Information About Your Visit       [employer] Information    [employer] gives you secure access to your electronic health record. If you see a primary care provider, you can also send messages to your care team and make appointments. If you have questions, please call your primary care clinic.  If you do not have a primary care provider, please call [phone] and they will assist you.       Care EveryWhere ID    This is your Care EveryWhere ID. This could be used by other organizations to access your [place] medical records  [id number]       Your Vitals Were  Most recent update: 3/15/2021 10:02 AM    Blood Pressure   109/60    Pulse   53    Temperature   98.1  F (36.7  C) (Oral)    Respirations   16    Pulse Oximetry   100%          Primary Care Provider Office Phone # Fax #    Echo Lopez [phone]      Equal Access to Services    [facility]: Hadii aad ku hadasho Soomaali, waaxda luqadaha, qaybta kaalmada adeegyada, conchita short . So [facility] [phone].    ATENCIÓN: Si habla español, tiene a burton disposición servicios gratuitos de asistencia lingüística. Llame al [phone].    We comply with applicable federal and state civil rights laws, including the [place] Human Rights Act. We do not discriminate on the basis of race, color, creed, Methodist, national origin, marital status, age, disability, sex, sexual orientation, or gender identity.    If you would like an itemization of your charges they will now be available in [employer] 30 days after discharge. To access the itemized statements in [employer] go to billing/billing summary. From there select view account. There will be multiple tabs showing an overview of your account, detail, payments, and communications. From the communications tab you can see your monthly statements, your itemized statements, and any billing letters generated for your account. If you do not have  a [employer] account and need help getting access please contact [employer] support at [phone].  If you would prefer to have your itemized statements mailed please contact our automated itemized bill request line at [phone] option  2.       Thank you!    Thank you for choosing [facility] for your care. Our goal is always to provide you with excellent care. Hearing back from our patients is one way we can continue to improve our services. Please take a few minutes to complete the written survey that you may receive in the mail after you visit with us. Thank you!            Medication List      Medications          Morning Afternoon Evening Bedtime As Needed    Accu-Chek Guide test strip  INSTRUCTIONS: USE 1 EACH TO TEST DAILY. CODE E11.9  Generic drug: blood glucose                       ASK your doctor about these medications          Morning Afternoon Evening Bedtime As Needed    Acetaminophen 325 MG Caps  INSTRUCTIONS: Take 325-650 mg by mouth every 4 hours as needed  Reason for med: Fever, Pain                     atorvastatin 20 MG tablet  Also known as: LIPITOR  INSTRUCTIONS: Take 20 mg by mouth daily                     Creon 12653-65447 units Cpep per EC capsule  INSTRUCTIONS: TAKE 3 WITH MEALS / 1 2 WITH SNACKS, UP TO 15 PER DAY.  Generic drug: amylase-lipase-protease                     metFORMIN 500 MG tablet  Also known as: GLUCOPHAGE  INSTRUCTIONS: daily (with breakfast) LM to hold metformin am 9-29-20.                     oxyCODONE 5 MG tablet  Also known as: ROXICODONE  INSTRUCTIONS: Take 5 mg by mouth every 6 hours as needed for severe pain                     oxyCODONE-acetaminophen 5-325 MG tablet  Also known as: PERCOCET  INSTRUCTIONS: TAKE 1 TABLET BY MOUTH EVERY 8 HOURS AS NEEDED FOR PAIN

## 2021-03-15 NOTE — PROCEDURES
[facility]    Procedure: Mesenteric angiogram    Date/Time: 3/15/2021 9:47 AM  Performed by: Per Curry MD  Authorized by: Per Curry MD   IR Fellow Physician: Per Curry    UNIVERSAL PROTOCOL   Site Marked: NA  Prior Images Obtained and Reviewed:  Yes  Required items: Required blood products, implants, devices and special equipment available    Patient identity confirmed:  Verbally with patient, arm band, provided demographic data and hospital-assigned identification number  Patient was reevaluated immediately before administering moderate or deep sedation or anesthesia  Confirmation Checklist:  Patient's identity using two indicators, relevant allergies, procedure was appropriate and matched the consent or emergent situation and correct equipment/implants were available  Time out: Immediately prior to the procedure a time out was called    Universal Protocol: the Joint Commission Universal Protocol was followed    Preparation: Patient was prepped and draped in usual sterile fashion    ESBL (mL):  2         ANESTHESIA    Anesthesia: Local infiltration  Local Anesthetic:  Lidocaine 1% without epinephrine  Anesthetic Total (mL):  6      SEDATION    Patient Sedated: Yes    Vital signs: Vital signs monitored during sedation    See dictated procedure note for full details.  Findings: SMA angiogram shows atherosclerotic disease with significant narrowing at the branch points in the distal vessels.   No SMA interventions performed.    Specimens: none    Complications: None    Condition: Stable    Plan: 3 hrs bed rest with right leg strainght    PROCEDURE   Patient Tolerance:  Patient tolerated the procedure well with no immediate complications    Length of time physician/provider present for 1:1 monitoring during sedation: 60

## 2021-03-15 NOTE — PRE-PROCEDURE
GENERAL PRE-PROCEDURE:   Procedure:  Mesenteric angiogram and SMS intervention  Date/Time:  3/15/2021 8:00 AM    Written consent obtained?: Yes    Risks and benefits: Risks, benefits and alternatives were discussed    Consent given by:  Patient  Patient states understanding of procedure being performed: Yes    Patient's understanding of procedure matches consent: Yes    Procedure consent matches procedure scheduled: Yes    Expected level of sedation:  Moderate  Appropriately NPO:  Yes  ASA Class:  Class 2- mild systemic disease, no acute problems, no functional limitations  Mallampati  :  Grade 2- soft palate, base of uvula, tonsillar pillars, and portion of posterior pharyngeal wall visible  Lungs:  Lungs clear with good breath sounds bilaterally  Heart:  Normal heart sounds and rate  History & Physical reviewed:  History and physical reviewed and no updates needed  Statement of review:  I have reviewed the lab findings, diagnostic data, medications, and the plan for sedation

## 2021-03-15 NOTE — DISCHARGE INSTRUCTIONS
[facility]   Interventional Radiology  Discharge Instructions Post Visceral Angiogram     AFTER YOU GO HOME          Do relax and take it easy for 24 hours.       Do drink plenty of fluids.       Do resume your regular diet, unless otherwise instructed by your Primary Physician.       DO NOT smoke for at least 24 hours, if you were given any sedation.       DO NOT drink alcoholic beverages the day of your procedure.       Do not drive or operate machinery at home or at work for 24 hours.          DO NOT do any strenuous exercise or lifting for at least 2 days following your procedure.       DO NOT take a bath or shower for at least 12 hours following your procedure.       DO NOT make any important or legal decisions for 24 hours following your procedure.    CALL THE PHYSICIAN IF:      - You start bleeding from the procedure site.  If you do start to bleed from the site, lie down flat and hold pressure on the site. A small lump or bruise is common at the         puncture site.Your physician will tell you if you need to return to the hospital.      - You develop numbness, coolness or a change in color of the arm or leg that was punctured.      - You experience increased pain or redness at the puncture site.      - You develop hives or a rash or unexplained itching.      - You develop a temperature of 101 degrees F or greater    Additional Information:         -  Support the puncture site for coughing, sneezing, or moving your bowels for the first 48 hours  -No tub bath, hot tubs, or swimming for 5 days  -No lotion or powder to the puncture site for 3 days      Instructions for closure device: see Mynx closure brochure        [facility] INTERVENTIONAL RADIOLOGY DEPARTMENT         Procedure Physician: Dr. Quinones, Dr. Curry                              Date of Procedure: March 15, 2021         Telephone Numbers:   [phone]......Monday-Friday 8:00 to 4:30 pm                                         [phone].....After 4:30 pm Monday-Friday, Weekends and  Holidays. Ask for the Interventional Radiologist on call. Someone is on call 24 hrs/day.

## 2021-03-15 NOTE — PROGRESS NOTES
Tolerated sitting up in bed & repositioning after bedrest. R groin site remained unchanged. Incontinent of stool. Assist of 2 to clean pt & get dressed. Discharge instructions reviewed w/ pt. Questions & concerns addressed. PIV discontinued. Wife on her way to  pt. Tolerating juice. Stable.

## 2021-03-15 NOTE — PROGRESS NOTES
Arrived back to [address] from visceral angiogram procedure in IR. R groin C/D/I, no hematoma noted. RLE pp & pt pulse present via doppler. Tolerating juice. Now sleeping. Stable.

## 2021-03-15 NOTE — IR NOTE
Patient Name: Nick Zamudio  Medical Record Number: [id number]  Today's Date: 3/15/2021    Procedure: mesenteric angiogram  Proceduralist: Dr CEDRICK Quinones, Dr CHLOE Curry    Sedation start time: 0846  Sedation end time: 0943  Sedation medications administered: 2 mg versed, 100 mcg fentanyl  Total sedation time: 57 min  Sedation Notes: none    Procedure start time: 0845  Procedure end time: 0943    Report given to: Kizzy MONDRAGON   : none    Other Notes: Pt arrived to IR room 1 from . Consent reviewed, pt confirmed. Pt denies any questions or concerns regarding procedure. Pt positioned supine and monitored per protocol. Right groin accessed. Mynx closure device deployed at 0940. 3 hours flat bedrest, Ambulation time 1240. Site cleansed and dressed per protocol. Pt tolerated procedure without any noted complications. Pt transferred back to .

## 2021-03-15 NOTE — PROGRESS NOTES
Pt returned from IR via liter accompanied by nurse at 0945.Right groin C/D/I no hematoma and denies.Pt tolerating juice.

## 2021-03-17 NOTE — TELEPHONE ENCOUNTER
"Pt wondering what next steps are, IR was not able to do procedure  Findings: SMA angiogram shows atherosclerotic disease with significant   narrowing at the branch points in the distal vessels.   No SMA interventions performed.     Pt reporting:  Eating lighter in general, finds less food makes symptoms better. Does have fever and chills when he eats more food. Has not had pain flare in a while. Has concerns about GJ tube and ability to manage with it at home. Wondering if GJ will fix everything, \"needs to be sold on it more.\"     Wondering if there is a surgical fix? Do we watch wait, follow with dietician with more specifics.    Will check with Dr. Pepe and get back to patient.    Lorrie Bansal, RN Care Coordinator    "

## 2021-03-19 NOTE — TELEPHONE ENCOUNTER
"Called patient back regarding questions about next steps in POC after recent IR procedure:    I heard   I do not suspect a surgeon will operate, too much risk with unclear outcomes   I definitely dont want to twist his arm, but a GJ would allow appropriate nutrition which can be infused at a slow rate decrease the demand of blood flow; his vascular stenoses of the vessels feeding his bowel is what is causing his pain (I strongly suspect)    Wife says patient is currently admitted at [facility], states \"he's there because he's sick and weak and no one will help him\".     Called patient on cell phone to discuss follow up plan. Left message. MyChart sent.     Lorrie Bansal RN Care Coordinator    "

## 2021-03-31 NOTE — LETTER
"    3/31/2021         RE: Nick Zamudio  [address] [place] [address] [place] [address]        Dear Colleague,    Thank you for referring your patient, Nick Zamudio, to the [facility]. Please see a copy of my visit note below.    Nick Zamudio 65 year old male who is being evaluated via a billable video visit.      The patient has been notified of following:     \"This video visit will be conducted via a call between you and your physician/provider. We have found that certain health care needs can be provided without the need for an in-person physical exam.  This service lets us provide the care you need with a video conversation.  If a prescription is necessary we can send it directly to your pharmacy.  If lab work is needed we can place an order for that and you can then stop by our lab to have the test done at a later time.    Video visits are billed at different rates depending on your insurance coverage.  Please reach out to your insurance provider with any questions.    If during the course of the call the physician/provider feels a video visit is not appropriate, you will not be charged for this service.\"    Patient has given verbal consent for Video visit? Yes  During this virtual visit the patient is located in [place], patient verifies this as the location during the entirety of this visit.     How would you like to obtain your AVS? MyChart  If you are dropped from the video visit, the video invite should be resent to: Other e-mail: my chart  Will anyone else be joining your video visit? NO      Video-Visit Details    Type of service:  Video Visit    Video Start Time: 3:43 PM   Video End Time: 4:08 PM    Originating Location (pt. Location): Home    Distant Location (provider location):  [facility]     Platform used for Video Visit: [employer] network    During this virtual visit the patient is located in [place], patient verifies this as the location " during the entirety of this visit.     [facility] Outpatient Medical Nutrition Therapy      Time Spent:  25 minutes  Session Type:  Follow-up   Referring Physician:  Dr. Christo Pepe    Reason for RD Visit:   Chronic pancreatitis and new finding of superior mesenteric artery stenosis, will start enteral nutrition     Nutrition Assessment:  Patient is a 65 year old male with history that includes type 2 diabetes, paraplegia to both lower limbs s/p cholecystectomy, small bowel obstruction with resection, testicular cancer, hypertension, kidney stone, chronic pancreatitis, biliary stenosis, s/p ERCP, ultra tight stenosis and main duct stones which MD noted has been difficult to manage d/t the severity of the main duct disease. He is on high dose enzymes. 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 MD is planning to place G-J feeding tube. 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 His most recent weight in his rehab was 145 lbs. He continues to try to eat smaller/lighter low fat meals. He tolerates dairy well, so will eat some yogurt and ice cream and sherbet. He is also drinking 1 or less ensure drink per day while in rehab. Some days when having a lot of pain/unable to tolerate meals will eat more broth/clear broth. He stated that he continue to have ongoing issues with oily, greasy, loose stools. He is taking his enzymes as instructed and ordered at beginning of meals, snacks and protein drinks.    Height:   Ht Readings from Last 1 Encounters:   02/25/21 1.829 m (6')     Weight: pt stated that current weight is 145 lbs. He has had significant weight loss of 9% over the past 3 months.  Wt Readings from Last 10 Encounters:   02/25/21 68 kg (150 lb)   01/13/21 67.9 kg (149 lb 9.6 oz)   12/09/20 72.6 kg (160 lb)   11/12/20 72.6 kg  (160 lb)   09/29/20 71.7 kg (158 lb)   07/13/20 74.8 kg (165 lb)   07/02/20 74.8 kg (165 lb)        BMI: Estimated body mass index is 20.34 kg/m  as calculated from the following:    Height as of 2/25/21: 1.829 m (6').    Weight as of 2/25/21: 68 kg (150 lb).    Diet Recall:  (some usual/recent meals/snacks/beverages):  Meal Food    Breakfast Eggs or cereal or oatmeal or pastry or toast   Lunch PBJ OR chicken noodle soup or if having a lot of pain/symtoms just broth   Dinner Small amount of skinless lean meat and small amount mashed potatoes   Snacks Yogurt or popsicle or sherbet or ice cream   Beverages 2 c skim milk, 16 oz vitamin water, 16 oz water, about 1 ensure or less per day          Labs:    Last Comprehensive Metabolic Panel:  Sodium   Date Value Ref Range Status   01/13/2021 142 133 - 144 mmol/L Final     Potassium   Date Value Ref Range Status   01/13/2021 4.3 3.4 - 5.3 mmol/L Final     Chloride   Date Value Ref Range Status   01/13/2021 111 (H) 94 - 109 mmol/L Final     Carbon Dioxide   Date Value Ref Range Status   01/13/2021 22 20 - 32 mmol/L Final     Anion Gap   Date Value Ref Range Status   01/13/2021 9 3 - 14 mmol/L Final     Glucose   Date Value Ref Range Status   03/15/2021 128 (H) 70 - 99 mg/dL Final     Urea Nitrogen   Date Value Ref Range Status   01/13/2021 22 7 - 30 mg/dL Final     Creatinine   Date Value Ref Range Status   03/15/2021 1.13 0.66 - 1.25 mg/dL Final     GFR Estimate   Date Value Ref Range Status   03/15/2021 67 >60 mL/min/[1.73_m2] Final     Comment:     Non  GFR Calc  Starting 12/18/2018, serum creatinine based estimated GFR (eGFR) will be   calculated using the Chronic Kidney Disease Epidemiology Collaboration   (CKD-EPI) equation.       Calcium   Date Value Ref Range Status   01/13/2021 9.3 8.5 - 10.1 mg/dL Final     CBC RESULTS:   Recent Labs   Lab Test 03/15/21  0730   WBC 5.1   RBC 3.92*   HGB 11.9*   HCT 36.9*   MCV 94   MCH 30.4   MCHC 32.2   RDW 13.9           Pertinent Medications/vitamin and mineral supplements:      Current Outpatient Medications   Medication     ACCU-CHEK GUIDE test strip     Acetaminophen 325 MG CAPS     amylase-lipase-protease (CREON) 64630-51447 units CPEP per EC capsule     atorvastatin (LIPITOR) 20 MG tablet     metFORMIN (GLUCOPHAGE) 500 MG tablet     oxyCODONE (ROXICODONE) 5 MG tablet     oxyCODONE-acetaminophen (PERCOCET) 5-325 MG tablet     No current facility-administered medications for this visit.        Food Allergies:  NKFA    Estimated Nutrition Needs based on current body weight of 68 k-2040 calories (25-30 kcals/kg), 68-82g protein (1-1.2g/kg), ~1 ml/kcal or total fluids per MD.     MALNUTRITION:  % Weight Loss:  > 7.5% in 3 months (severe malnutrition)  % Intake:  </= 75% for >/= 1 month (severe malnutrition)  Subcutaneous Fat Loss:  None observed  Muscle Loss:  None observed  Fluid Retention:  None noted    Malnutrition Diagnosis: Severe malnutrition  In Context of:  Chronic illness or disease    Nutrition Diagnosis:    Unintended weight loss related to inability to tolerate adequate po, post prandial pain, chronic diarrhea, new Dx SMAs as evidenced by significant weight loss of 9% over the past 3 months as well as pt's medical hx and report.    Nutrition Prescription: Enteral diet and po diet as tolerated (or po diet recs per MD). Since patient with new dx of superior mesenteric artery stenosis, pt will highly benefit from starting enteral nutrition via G-J feeding tube as MD strongly suspects his pain is being caused by the vascular stenoses of the vessels feeding his bowel. Additionally due to pt with pancreatic insufficiency, chronic oily, greasy stool and ongoing need for PERT, pt will benefit from semi-elemental tube feeding formula.    Tube recommendations for MD:  Peptamen 1.5 @ goal 50 ml/hr continuous for at least 24-48 hours to assess for tolerance. Once pt is tolerating TF at goal rate for at  least 24-48 hours, then recommend switching to cyclic overnight tube feeding of 100 ml x 12 hours overnight.   This will provide (1200 ml/day) and provide 1800 kcals, 82 g PRO, 924 ml free H2O, 67 g Fat (70% from MCTs), 226 g CHO and no Fiber daily.   --This meets 100% of estimated calorie and protein needs.     --Start at 15 mL/hr and increase by 15-20 mL q 6-8 hours to goal of 50 ml/hr.      -When patient tolerates continuous goal volume for at least 24-48 hours, then recommend increasing tube feeding by 20 ml/hr until goal rate of 100 ml/hr for 12 hours per day for cycled schedule. When ready to increase tube feeding rate, recommend:  Day 1, increase to 70 ml/hr x 17 hours. If tolerated at 70 ml/hr then   Day 2, increased to 90 ml/hr x 13 hours. If tolerated at 90 ml/hr then  Day 3, increase to 100 ml x 12 hours per day.   OR cycle recommendations per home infusion RD.     --Tube feeding enzymes:   When tube feeding running continuously at 50 ml/hr, then 1 capsule every 4 hours of Creon 24,000 to provide 144,000 units lipase/day.   When tube feeding running at  ml/hr x 12 hours, then 2 capsules every 4 hours of Creon 24,000. At goal rate of 100 ml/hr, 2 caps creon every 4 hours provides 144,000 units lipase/day.    To administer Enzymes with TF:     A) Sodium Bicarb tablet (325 mg), 1 tablet q 4 hrs via Jtube. Administration Instructions: Crush 1 tablet and mix into 15 ml of warm water and use this solution to mix with Creon pancreatic enzymes. DO NOT administer directly into Jtube (to be mixed into TF formula with Creon enzyme - see Creon enzyme order below).    B) Creon 24, with continuous tube feeding at 50 ml/hr for 24 hours, then 1 capsule q 4 hrs via Jtube OR when tube feeding running at 100 ml/hr for 12 hours, then 2 capsules of Creon 24,000 every 4 hours via Jtube.     Administration Instructions:   **Open capsule/s and empty contents into 15 ml sodium bicarb solution (see sodium bicarb order), let  dissolve for about 20-30 minutes and then add this solution to the amount of TF formula hung in TF bag every 4 hrs *Note: this enzyme regimen with TF @ goal infusion will provide approx 2,507 units of lipase/gram of total Fat daily and approp dosing initially for pancreatic insufficiency with more semi elemental TF formula.     --Continue Oral Enzyme Intake as prescribed    --Recommend TF adjustments per home infusion RD recommendations/ follow up assessments.    Nutrition Intervention:    Nutrition Education/Counseling:  Recommended pt continue trying to eat smaller meals per day and continue daily ensure drink and adequate fluids to drink at least 6-8 cups per day. Discussed process for dissolving enzymes and adding into TF formula. Explained that once pt has feeding tube placed, he will get specific instruction for tf amounts and enzymes with TF. Patient verbalized understanding of education provided. See Goals below.     Goals:  1. Continue eating smaller more frequent meals as tolerated.    2. Continue drinking at least 1 Ensure drink or more per day as tolerated.  --On days when you are having more pain, unable to eat much and have not started tube feeding yet, then try to drink some additional Ensure drinks.    3. Continue drinking at least 6-8 cups of fluids per day (skim milk, water, vitamin water, ensure drinks).    4. Once you start tube feeding, if you are having a lot of pain and intolerance to eating and drinking on a given day, then you do not have to force yourself to eat when experiencing increased symptoms/pain. On days when you are feeling better than okay to eat and drink as able, unless Dr. Pepe advises you not to.    5. Once you start tube feeding, you will open Creon capsules and dissolve the beads with warm water and sodium bicarbonate for about 20-30 minutes. Once the beads are completely dissolved, then you will add this solution to your tube feeding formula every 4 hours. You will get  the specific amounts and instructions once you get your feeding tube placed.    6. Continue to take your enzymes at the beginning of meals, snacks and nutrition drinks. This will be the same recommendations after you start tube feeding. Although you will add dissolved enzymes to your tube feeding, you still need to take them orally with any foods/protein drinks you consume.    7. Additionally, you will have a home infusion Dietitian at [place] who will assist with tube feeding adjustments as well.    Nutrition Monitoring and Evaluation: Will monitor adherence to nutrition recommendations at future RD visits.     Further Medical Nutrition Therapy:  Follow-up 1 month after tube placed and /or follow with [place] home infusion RD.    Patient was encouraged to call/contact RD with any further questions.    Mel Centeno, MS, RD, LD

## 2021-04-01 NOTE — PATIENT INSTRUCTIONS
It was nice speaking with you again today. Below are the nutrition recommendations we discussed at your visit.    Please let me know if you have any additional questions.    Nutrition Recommendations    1. Continue eating smaller more frequent meals as tolerated.    2. Continue drinking at least 1 Ensure drink or more per day as tolerated.  --On days when you are having more pain, unable to eat much and have not started tube feeding yet, then try to drink some additional Ensure drinks.    3. Continue drinking at least 6-8 cups of fluids per day (skim milk, water, vitamin water, ensure drinks).    4. Once you start tube feeding, if you are having a lot of pain and intolerance to eating and drinking on a given day, then you do not have to force yourself to eat when experiencing increased symptoms/pain. On days when you are feeling better than okay to eat and drink as able, unless Dr. Pepe advises you not to.    5. Once you start tube feeding, you will open Creon capsules and dissolve the beads with warm water and sodium bicarbonate for about 20-30 minutes. Once the beads are completely dissolved, then you will add this solution to your tube feeding formula every 4 hours. You will get the specific amounts and instructions once you get your feeding tube placed.    6. Continue to take your enzymes at the beginning of meals, snacks and nutrition drinks. This will be the same recommendations after you start tube feeding. Although you will add dissolved enzymes to your tube feeding, you still need to take them orally with any foods/protein drinks you consume.    7. Additionally, you will have a home infusion Dietitian at [facility] who will assist with tube feeding adjustments as well.    Can follow-up 1 month after your feeding tube is placed and /or follow with home infusion/[facility] Dietitian.    For follow-up appointments with Mel Centeno, Registered Dietitian, please call [phone].    Mel Centeno, MS, RD, LD

## 2021-04-01 NOTE — PROGRESS NOTES
"Nick Zamudio 65 year old male who is being evaluated via a billable video visit.      The patient has been notified of following:     \"This video visit will be conducted via a call between you and your physician/provider. We have found that certain health care needs can be provided without the need for an in-person physical exam.  This service lets us provide the care you need with a video conversation.  If a prescription is necessary we can send it directly to your pharmacy.  If lab work is needed we can place an order for that and you can then stop by our lab to have the test done at a later time.    Video visits are billed at different rates depending on your insurance coverage.  Please reach out to your insurance provider with any questions.    If during the course of the call the physician/provider feels a video visit is not appropriate, you will not be charged for this service.\"    Patient has given verbal consent for Video visit? Yes  During this virtual visit the patient is located in [place], patient verifies this as the location during the entirety of this visit.     How would you like to obtain your AVS? MyChart  If you are dropped from the video visit, the video invite should be resent to: Other e-mail: my chart  Will anyone else be joining your video visit? NO      Video-Visit Details    Type of service:  Video Visit    Video Start Time: 3:43 PM   Video End Time: 4:08 PM    Originating Location (pt. Location): Home    Distant Location (provider location):  [facility] [place]     Platform used for Video Visit: [employer]    During this virtual visit the patient is located in [place], patient verifies this as the location during the entirety of this visit.     [facility] Outpatient Medical Nutrition Therapy      Time Spent:  25 minutes  Session Type:  Follow-up   Referring Physician:  Dr. 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 He is on high dose enzymes. 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 MD is planning to place G-J feeding tube. 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 His most recent weight in his rehab was 145 lbs. He continues to try to eat smaller/lighter low fat meals. He tolerates dairy well, so will eat some yogurt and ice cream and sherbet. He is also drinking 1 or less ensure drink per day while in rehab. Some days when having a lot of pain/unable to tolerate meals will eat more broth/clear broth. He stated that he continue to have ongoing issues with oily, greasy, loose stools. He is taking his enzymes as instructed and ordered at beginning of meals, snacks and protein drinks.    Height:   Ht Readings from Last 1 Encounters:   02/25/21 1.829 m (6')     Weight: pt stated that current weight is 145 lbs. He has had significant weight loss of 9% over the past 3 months.  Wt Readings from Last 10 Encounters:   02/25/21 68 kg (150 lb)   01/13/21 67.9 kg (149 lb 9.6 oz)   12/09/20 72.6 kg (160 lb)   11/12/20 72.6 kg (160 lb)   09/29/20 71.7 kg (158 lb)   07/13/20 74.8 kg (165 lb)   07/02/20 74.8 kg (165 lb)        BMI: Estimated body mass index is 20.34 kg/m  as calculated from the following:    Height as of 2/25/21: 1.829 m (6').    Weight as of 2/25/21: 68 kg (150  lb).    Diet Recall:  (some usual/recent meals/snacks/beverages):  Meal Food    Breakfast Eggs or cereal or oatmeal or pastry or toast   Lunch PBJ OR chicken noodle soup or if having a lot of pain/symtoms just broth   Dinner Small amount of skinless lean meat and small amount mashed potatoes   Snacks Yogurt or popsicle or sherbet or ice cream   Beverages 2 c skim milk, 16 oz vitamin water, 16 oz water, about 1 ensure or less per day          Labs:    Last Comprehensive Metabolic Panel:  Sodium   Date Value Ref Range Status   01/13/2021 142 133 - 144 mmol/L Final     Potassium   Date Value Ref Range Status   01/13/2021 4.3 3.4 - 5.3 mmol/L Final     Chloride   Date Value Ref Range Status   01/13/2021 111 (H) 94 - 109 mmol/L Final     Carbon Dioxide   Date Value Ref Range Status   01/13/2021 22 20 - 32 mmol/L Final     Anion Gap   Date Value Ref Range Status   01/13/2021 9 3 - 14 mmol/L Final     Glucose   Date Value Ref Range Status   03/15/2021 128 (H) 70 - 99 mg/dL Final     Urea Nitrogen   Date Value Ref Range Status   01/13/2021 22 7 - 30 mg/dL Final     Creatinine   Date Value Ref Range Status   03/15/2021 1.13 0.66 - 1.25 mg/dL Final     GFR Estimate   Date Value Ref Range Status   03/15/2021 67 >60 mL/min/[1.73_m2] Final     Comment:     Non  GFR Calc  Starting 12/18/2018, serum creatinine based estimated GFR (eGFR) will be   calculated using the Chronic Kidney Disease Epidemiology Collaboration   (CKD-EPI) equation.       Calcium   Date Value Ref Range Status   01/13/2021 9.3 8.5 - 10.1 mg/dL Final     CBC RESULTS:   Recent Labs   Lab Test 03/15/21  0730   WBC 5.1   RBC 3.92*   HGB 11.9*   HCT 36.9*   MCV 94   MCH 30.4   MCHC 32.2   RDW 13.9          Pertinent Medications/vitamin and mineral supplements:      Current Outpatient Medications   Medication     ACCU-CHEK GUIDE test strip     Acetaminophen 325 MG CAPS     amylase-lipase-protease (CREON) 18806-44638 units CPEP per EC capsule      atorvastatin (LIPITOR) 20 MG tablet     metFORMIN (GLUCOPHAGE) 500 MG tablet     oxyCODONE (ROXICODONE) 5 MG tablet     oxyCODONE-acetaminophen (PERCOCET) 5-325 MG tablet     No current facility-administered medications for this visit.        Food Allergies:  NKFA    Estimated Nutrition Needs based on current body weight of 68 k-2040 calories (25-30 kcals/kg), 68-82g protein (1-1.2g/kg), ~1 ml/kcal or total fluids per MD.     MALNUTRITION:  % Weight Loss:  > 7.5% in 3 months (severe malnutrition)  % Intake:  </= 75% for >/= 1 month (severe malnutrition)  Subcutaneous Fat Loss:  None observed  Muscle Loss:  None observed  Fluid Retention:  None noted    Malnutrition Diagnosis: Severe malnutrition  In Context of:  Chronic illness or disease    Nutrition Diagnosis:    Unintended weight loss related to inability to tolerate adequate po, post prandial pain, chronic diarrhea, new Dx SMAs as evidenced by significant weight loss of 9% over the past 3 months as well as pt's medical hx and report.    Nutrition Prescription: Enteral diet and po diet as tolerated (or po diet recs per MD). Since patient with new dx of superior mesenteric artery stenosis, pt will highly benefit from starting enteral nutrition via G-J feeding tube as MD strongly suspects his pain is being caused by the vascular stenoses of the vessels feeding his bowel. Additionally due to pt with pancreatic insufficiency, chronic oily, greasy stool and ongoing need for PERT, pt will benefit from semi-elemental tube feeding formula.    Tube recommendations for MD:  Peptamen 1.5 @ goal 50 ml/hr continuous for at least 24-48 hours to assess for tolerance. Once pt is tolerating TF at goal rate for at least 24-48 hours, then recommend switching to cyclic overnight tube feeding of 100 ml x 12 hours overnight.   This will provide (1200 ml/day) and provide 1800 kcals, 82 g PRO, 924 ml free H2O, 67 g Fat (70% from MCTs), 226 g CHO and no Fiber daily.   --This  meets 100% of estimated calorie and protein needs.     --Start at 15 mL/hr and increase by 15-20 mL q 6-8 hours to goal of 50 ml/hr.      -When patient tolerates continuous goal volume for at least 24-48 hours, then recommend increasing tube feeding by 20 ml/hr until goal rate of 100 ml/hr for 12 hours per day for cycled schedule. When ready to increase tube feeding rate, recommend:  Day 1, increase to 70 ml/hr x 17 hours. If tolerated at 70 ml/hr then   Day 2, increased to 90 ml/hr x 13 hours. If tolerated at 90 ml/hr then  Day 3, increase to 100 ml x 12 hours per day.   OR cycle recommendations per home infusion RD.     --Tube feeding enzymes:   When tube feeding running continuously at 50 ml/hr, then 1 capsule every 4 hours of Creon 24,000 to provide 144,000 units lipase/day.   When tube feeding running at  ml/hr x 12 hours, then 2 capsules every 4 hours of Creon 24,000. At goal rate of 100 ml/hr, 2 caps creon every 4 hours provides 144,000 units lipase/day.    To administer Enzymes with TF:     A) Sodium Bicarb tablet (325 mg), 1 tablet q 4 hrs via Jtube. Administration Instructions: Crush 1 tablet and mix into 15 ml of warm water and use this solution to mix with Creon pancreatic enzymes. DO NOT administer directly into Jtube (to be mixed into TF formula with Creon enzyme - see Creon enzyme order below).    B) Creon 24, with continuous tube feeding at 50 ml/hr for 24 hours, then 1 capsule q 4 hrs via Jtube OR when tube feeding running at 100 ml/hr for 12 hours, then 2 capsules of Creon 24,000 every 4 hours via Jtube.     Administration Instructions:   **Open capsule/s and empty contents into 15 ml sodium bicarb solution (see sodium bicarb order), let dissolve for about 20-30 minutes and then add this solution to the amount of TF formula hung in TF bag every 4 hrs *Note: this enzyme regimen with TF @ goal infusion will provide approx 2,507 units of lipase/gram of total Fat daily and approp dosing initially  for pancreatic insufficiency with more semi elemental TF formula.     --Continue Oral Enzyme Intake as prescribed    --Recommend TF adjustments per home infusion RD recommendations/ follow up assessments.    Nutrition Intervention:    Nutrition Education/Counseling:  Recommended pt continue trying to eat smaller meals per day and continue daily ensure drink and adequate fluids to drink at least 6-8 cups per day. Discussed process for dissolving enzymes and adding into TF formula. Explained that once pt has feeding tube placed, he will get specific instruction for tf amounts and enzymes with TF. Patient verbalized understanding of education provided. See Goals below.     Goals:  1. Continue eating smaller more frequent meals as tolerated.    2. Continue drinking at least 1 Ensure drink or more per day as tolerated.  --On days when you are having more pain, unable to eat much and have not started tube feeding yet, then try to drink some additional Ensure drinks.    3. Continue drinking at least 6-8 cups of fluids per day (skim milk, water, vitamin water, ensure drinks).    4. Once you start tube feeding, if you are having a lot of pain and intolerance to eating and drinking on a given day, then you do not have to force yourself to eat when experiencing increased symptoms/pain. On days when you are feeling better than okay to eat and drink as able, unless Dr. Pepe advises you not to.    5. Once you start tube feeding, you will open Creon capsules and dissolve the beads with warm water and sodium bicarbonate for about 20-30 minutes. Once the beads are completely dissolved, then you will add this solution to your tube feeding formula every 4 hours. You will get the specific amounts and instructions once you get your feeding tube placed.    6. Continue to take your enzymes at the beginning of meals, snacks and nutrition drinks. This will be the same recommendations after you start tube feeding. Although you will add  dissolved enzymes to your tube feeding, you still need to take them orally with any foods/protein drinks you consume.    7. Additionally, you will have a home infusion Dietitian at [facility] who will assist with tube feeding adjustments as well.    Nutrition Monitoring and Evaluation: Will monitor adherence to nutrition recommendations at future RD visits.     Further Medical Nutrition Therapy:  Follow-up 1 month after tube placed and /or follow with [facility] home infusion RD.    Patient was encouraged to call/contact RD with any further questions.    Mel Centeno MS, RD, LD

## 2021-04-02 NOTE — OR NURSING
Follow-up per team:    RE: Question: COVID test/procedure plan?  Received: Today  Message Contents   Lorrie Bansal, Jesenia Franks RN; Christo Pepe MD; Rebecca Kaur RN             Thanks Irma     He's definitely planning for procedure next week, followed up with our dietitian  yesterday.   We can use his admit from 3/15 has H&P as he was just d/c'd from TCU, he will call to get covid test over the weekend.     Let us know if more questions.     Lorrie Bansal, GIANCARLO Care Coordinator

## 2021-04-02 NOTE — OR NURSING
Question: COVID test/procedure plan?  Received: Today  Message Contents   Jesenia Fraire RN Lavaty, Megan, RN; Christo Pepe MD; Rebecca Kaur RN Hi Megan,     On chart review, it appears this patient is being discharged home from TCU today. I do not see in your notes that you have confirmed with pt/wife about the planned procedure on 4/6/21 with Dr. Pepe. Could you please follow-up regarding whether surgery is moving forward as planned on 4/6, and also what the pt's plan is for a PreOp physical and a  COVID test prior to surgery?     PAS Notification: Your patient is scheduled for surgery in 2 days. Critical chart components are missing. If the required components are not completed in EPIC by noon the day prior to surgery your case may be rescheduled. Thank you in advance for completing the record and improving [facility]'s quality of care.     Surgeon's Name: Dr. Pepe   Date of Surgery: 4/6/21   Procedure: INSERTION, GASTROSTOMY TUBE, PERCUTANEOUS     Missing Components:   H&P within 30 days   H&P signature   Labs, consults, diagnostic procedures specified by Pre-Anesthesia Screening guidelines       Thank you,     Irma Fraire RN   PreAdmission Screening   [facility]

## 2021-04-02 NOTE — TELEPHONE ENCOUNTER
Called patient to confirm procedure with Dr. Pepe on 4/6. Pt planning on it. Will use admit from 3/15 as H&P. Was just released from TCU. Patient will call to schedule covid test.      Lorrie Bansal RN Care Coordinator

## 2021-04-02 NOTE — OR NURSING
Clarification re: PreOp H&P:    RE: Question: COVID test/procedure plan?  Received: Today  Message Contents   Jesenia Fraire RN Lavaty, Megan, RN; Christo Pepe MD; Rebecca Kaur RN Hi Megan,     Unfortunately, I cannot find a complete H&P done on 3/15 - I only see Procedure notes without any physical exam. There is a medical history in Dr. Curry's note from a virtual visit on 3/4/21, but no physical exam since it was by video. I was able to find a full physical exam in the Ed[facility]admit note from [facility] - I will have that uploaded into Epic.     Dr. Pepe, we may need you to update the physical exam/cardiovascular risk evaluation DOS.     Thanks for letting me know about the COVID test plan - I will update the chart to reflect this.     Irma MONDRAGON

## 2021-04-06 NOTE — ANESTHESIA PROCEDURE NOTES
Airway       Patient location during procedure: OR  Staff -        Anesthesiologist:  Staci Thakur MD       CRNA: Marcelle Albarado APRN CRNA       Performed By: CRNA  Consent for Airway        Urgency: elective  Indications and Patient Condition       Indications for airway management: crystal-procedural       Induction type:intravenous       Mask difficulty assessment: 1 - vent by mask    Final Airway Details       Final airway type: endotracheal airway       Successful airway: ETT - single and Oral  Endotracheal Airway Details        ETT size (mm): 7.5       Cuffed: yes       Successful intubation technique: direct laryngoscopy       DL Blade Type: Cho 2       Grade View of Cords: 2 (clear and open)       Adjucts: stylet       Position: Right       Measured from: gums/teeth       Secured at (cm): 22       Bite Block used: endo  bite block.    Post intubation assessment        Placement verified by: capnometry, equal breath sounds and chest rise        Number of attempts at approach: 1       Number of other approaches attempted: 0       Secured with: pink tape       Ease of procedure: easy       Dentition: Intact and Unchanged    Medication(s) Administered   Medication Administration Time: 4/6/2021 4:14 PM

## 2021-04-06 NOTE — DISCHARGE INSTRUCTIONS
[facility]  Same-Day Surgery   Adult Discharge Orders & Instructions     For 24 hours after surgery    1. Get plenty of rest.  A responsible adult must stay with you for at least 24 hours after you leave the hospital.   2. Do not drive or use heavy equipment.  If you have weakness or tingling, don't drive or use heavy equipment until this feeling goes away.  3. Do not drink alcohol.  4. Avoid strenuous or risky activities.  Ask for help when climbing stairs.   5. You may feel lightheaded.  IF so, sit for a few minutes before standing.  Have someone help you get up.   6. If you have nausea (feel sick to your stomach): Drink only clear liquids such as apple juice, ginger ale, broth or 7-Up.  Rest may also help.  Be sure to drink enough fluids.  Move to a regular diet as you feel able.  7. You may have a slight fever. Call the doctor if your fever is over 100 F (37.7 C) (taken under the tongue) or lasts longer than 24 hours.  8. You may have a dry mouth, a sore throat, muscle aches or trouble sleeping.  These should go away after 24 hours.  9. Do not make important or legal decisions.   Call your doctor for any of the followin.  Signs of infection (fever, growing tenderness at the surgery site, a large amount of drainage or bleeding, severe pain, foul-smelling drainage, redness, swelling).    2. It has been over 8 to 10 hours since surgery and you are still not able to urinate (pass water).    3.  Headache for over 24 hours.    To contact a doctor, call Dr Pepe at [phone] (clinic) or:        [phone] and ask for the resident on call for gastroenterology  (answered 24 hours a day)      Emergency Department:[facility]: [phone]     (TTY for hearing impaired: [phone])

## 2021-04-06 NOTE — ANESTHESIA PREPROCEDURE EVALUATION
Anesthesia Pre-Procedure Evaluation    Patient: Nick Zamudio   MRN: [id number] : 1955        Preoperative Diagnosis: Adult failure to thrive [R62.7]   Procedure : Procedure(s):  enteroscopy with dilation     Past Medical History:   Diagnosis Date     Diabetes (H)      Hypertension      Intussusception (H)      Iron deficiency anemia      Lymphedema      Pancreatic disease     calcific pancreas, multiple panc stones.     Paraplegia (H)     Due to radiation treatment testicular cancer     PVD (peripheral vascular disease) (H)      Renal disease     stone disease     Testicular cancer (H)     Radiation treatment      Past Surgical History:   Procedure Laterality Date     ABDOMEN SURGERY      Partial colectomy for damage due to radiation treatment testicular ca     AMPUTATION      left toe     CHOLECYSTECTOMY       COLONOSCOPY      polyp     ENDOSCOPIC RETROGRADE CHOLANGIOPANCREATOGRAM N/A 2020    Procedure: ENDOSCOPIC RETROGRADE CHOLANGIOPANCREATOGRAPHY, STENT RETRIEVAL, STENT PLACEMENT, BILIARY SPHINCTEROTOMY;  Surgeon: Christo Pepe MD;  Location:  OR     ENDOSCOPIC RETROGRADE CHOLANGIOPANCREATOGRAM N/A 2020    Procedure: ENDOSCOPIC RETROGRADE CHOLANGIOPANCREATOGRAPHY with balloon sweep of bile ducts, bile ducts stents exchanged;  Surgeon: Christo Pepe MD;  Location:  OR     ENDOSCOPIC RETROGRADE CHOLANGIOPANCREATOGRAM N/A 2020    Procedure: ENDOSCOPIC RETROGRADE CHOLANGIOPANCREATOGRAPHY, STENT EXCHANGE;  Surgeon: Christo Pepe MD;  Location:  OR     ENDOSCOPIC RETROGRADE CHOLANGIOPANCREATOGRAM N/A 2021    Procedure: ENDOSCOPIC RETROGRADE CHOLANGIOPANCREATOGRAPHY WITH PANCREATIC DUCT STENT REMOVAL;  Surgeon: Christo Pepe MD;  Location:  OR     ENDOSCOPIC ULTRASOUND UPPER GASTROINTESTINAL TRACT (GI) N/A 2020    Procedure: ENDOSCOPIC ULTRASOUND, ESOPHAGOSCOPY / UPPER GASTROINTESTINAL TRACT (GI) WITH FINE NEEDLE BIOPSY;  Surgeon: Afshin  Christo Seo MD;  Location: [facility] OR     GI SURGERY      bowel resection     IR VISCERAL ANGIOGRAM  3/15/2021     ORTHOPEDIC SURGERY       TONSILLECTOMY        Allergies   Allergen Reactions     Penicillins Other (See Comments)     Ibuprofen Rash     Tolerates Aleve      Social History     Tobacco Use     Smoking status: Never Smoker     Smokeless tobacco: Never Used   Substance Use Topics     Alcohol use: Not Currently     Frequency: Never      Wt Readings from Last 1 Encounters:   04/06/21 65.8 kg (145 lb)        Anesthesia Evaluation            ROS/MED HX  ENT/Pulmonary:  - neg pulmonary ROS     Neurologic:     (+) Spinal cord injury, level of injury: Partial paraplegic d/t RT for testicular CA, without autonomic hyperflexia symptoms,     Cardiovascular:  - neg cardiovascular ROS     METS/Exercise Tolerance:     Hematologic:     (+) anemia,     Musculoskeletal:       GI/Hepatic: Comment: Chronic pancreatitis; FTT for G-tube   (-) GERD   Renal/Genitourinary:       Endo:     (+) type II DM, Not using insulin,     Psychiatric/Substance Use:       Infectious Disease: Comment: COVID neg 4/4 - neg infectious disease ROS     Malignancy:       Other:            Physical Exam    Airway        Mallampati: I   TM distance: > 3 FB   Neck ROM: full   Mouth opening: > 3 cm    Respiratory Devices and Support         Dental  no notable dental history         Cardiovascular   cardiovascular exam normal          Pulmonary           breath sounds clear to auscultation           OUTSIDE LABS:  CBC:   Lab Results   Component Value Date    WBC 5.1 03/15/2021    WBC 4.4 01/13/2021    HGB 11.9 (L) 03/15/2021    HGB 11.5 (L) 01/13/2021    HCT 36.9 (L) 03/15/2021    HCT 35.7 (L) 01/13/2021     03/15/2021     01/13/2021     BMP:   Lab Results   Component Value Date     01/13/2021     12/09/2020    POTASSIUM 5.4 (H) 04/06/2021    POTASSIUM 4.3 01/13/2021    CHLORIDE 111 (H) 01/13/2021    CHLORIDE 110 (H) 12/09/2020     CO2 22 01/13/2021    CO2 22 12/09/2020    BUN 22 01/13/2021    BUN 21 12/09/2020    CR 1.13 03/15/2021    CR 1.11 01/13/2021     (H) 03/15/2021     (H) 01/13/2021     COAGS:   Lab Results   Component Value Date    INR 1.28 (H) 03/15/2021     POC:   Lab Results   Component Value Date     (H) 04/06/2021     HEPATIC:   Lab Results   Component Value Date    ALBUMIN 2.7 (L) 01/13/2021    PROTTOTAL 6.0 (L) 01/13/2021    ALT 17 01/13/2021    AST 11 01/13/2021    ALKPHOS 114 01/13/2021    BILITOTAL 0.8 01/13/2021     OTHER:   Lab Results   Component Value Date    ELODIA 9.3 01/13/2021    LIPASE 28 (L) 01/13/2021    AMYLASE 26 (L) 01/13/2021       Anesthesia Plan    ASA Status:  3      Anesthesia Type: General.     - Airway: ETT   Induction: Intravenous, Propofol.   Maintenance: Balanced.        Consents    Anesthesia Plan(s) and associated risks, benefits, and realistic alternatives discussed. Questions answered and patient/representative(s) expressed understanding.     - Discussed with:  Patient      - Extended Intubation/Ventilatory Support Discussed: No.      - Patient is DNR/DNI Status: No    Use of blood products discussed: No .     Postoperative Care    Pain management: IV analgesics.   PONV prophylaxis: Ondansetron (or other 5HT-3)     Comments:    Patient seen and examined by me and available records reviewed. Details as above.   Anesthetic options and risks discussed with patient who agrees to proceed.      Staci Thakur MD  4/6/2021              Staci Thakur MD

## 2021-04-06 NOTE — ANESTHESIA CARE TRANSFER NOTE
Patient: Nick Zamudio    Procedure(s):  enteroscopy with dilation    Diagnosis: Adult failure to thrive [R62.7]  Diagnosis Additional Information: No value filed.    Anesthesia Type:   No value filed.     Note:    Oropharynx: oropharynx clear of all foreign objects and spontaneously breathing  Level of Consciousness: awake and drowsy  Oxygen Supplementation: nasal cannula  Level of Supplemental Oxygen (L/min / FiO2): 2  Independent Airway: airway patency satisfactory and stable  Dentition: dentition unchanged  Vital Signs Stable: post-procedure vital signs reviewed and stable  Report to RN Given: handoff report given  Patient transferred to: PACU    Handoff Report: Identifed the Patient, Identified the Reponsible Provider, Reviewed the pertinent medical history, Discussed the surgical course, Reviewed Intra-OP anesthesia mangement and issues during anesthesia, Set expectations for post-procedure period and Allowed opportunity for questions and acknowledgement of understanding      Vitals: (Last set prior to Anesthesia Care Transfer)  CRNA VITALS  4/6/2021 1633 - 4/6/2021 1707      4/6/2021             Pulse:  90    SpO2:  100 %        Electronically Signed By: DANICA Chauhan CRNA  April 6, 2021  5:07 PM

## 2021-04-06 NOTE — ANESTHESIA POSTPROCEDURE EVALUATION
Patient: Nick Zamudio    Procedure(s):  enteroscopy with dilation    Diagnosis:Adult failure to thrive [R62.7]  Diagnosis Additional Information: No value filed.    Anesthesia Type:  General    Note:  Disposition: Outpatient   Postop Pain Control: Uneventful            Sign Out: Well controlled pain   PONV: No   Neuro/Psych: Uneventful            Sign Out: Acceptable/Baseline neuro status   Airway/Respiratory: Uneventful            Sign Out: Acceptable/Baseline resp. status   CV/Hemodynamics: Uneventful            Sign Out: Acceptable CV status   Other NRE: NONE   DID A NON-ROUTINE EVENT OCCUR?     Event details/Postop Comments:  Awake, comfortable; satisfactory recovery from GA.    Staci Thakur MD  4/6/2021  6:22 PM         Last vitals:  Vitals:    04/06/21 1730 04/06/21 1745 04/06/21 1755   BP: (!) 142/75 (!) 148/81 (!) 148/77   Pulse: 71 78 71   Resp: 15 12 12   Temp: 35.4  C (95.7  F) 35.4  C (95.7  F) 36.4  C (97.5  F)   SpO2: 100% 100% 100%       Last vitals prior to Anesthesia Care Transfer:  CRNA VITALS  4/6/2021 1633 - 4/6/2021 1733      4/6/2021             Resp Rate (observed):  14          Electronically Signed By: Staci Thakur MD  April 6, 2021  6:22 PM

## 2021-04-09 NOTE — TELEPHONE ENCOUNTER
Post EGD (4/6/21) with Dr. Pepe: Follow-up    Post procedure recommendations:   CT enterography to confirm stenosis is limited to  distal duodenum; in this scenario we will discuss    endoscopic gastrojejunostomy as above     - The findings and recommendations were discussed with the patient and their family     Orders placed: CT Enterography    Patient states: feeling ok after procedure, no concerning symptoms. Reviewed plan for imaging, scheduling number provided.     Clinic contact and scheduling numbers verified for future questions/concerns.    Lorrie Bansal RN Care Coordinator

## 2021-04-23 NOTE — TELEPHONE ENCOUNTER
"Pt called in, having imaging on 4/26. Also has had UTI x2, having GI symptoms r/t antibiotics. Per patient: was having diarrhea, diarrhea got worse then better when on antibiotics, antibiotics changed x1 due to diarrhea by prescribing clinic. \"Feeling worse than I ever have, not able to eat much\" Feeling queasy, no vomiting, diarrhea. Pt likely dehydrated from lack of intake and GI losses, encouraged to push fluids and follow up with local clinic to discuss antibiotic plan and other symptom mgmt. Will check with Dr. Pepe to see if Zofran ok to help through current symptoms.    ML  "

## 2021-04-23 NOTE — TELEPHONE ENCOUNTER
Per Dr. Afshin Izaguirre for zofran, 4mg q6hrs x30    Called to alert patient, pharmacy verified. Pt aware of plan, encouraged him to push fluids as much as possible.     ML

## 2021-05-04 PROBLEM — R93.3 ABNORMAL FINDING ON GI TRACT IMAGING: Status: ACTIVE | Noted: 2021-01-01

## 2021-05-04 NOTE — TELEPHONE ENCOUNTER
Art called in with an update:    We should move forward with an EUS guided gastrojejunostomy (I will connect his stomach and small bowel bypassing his duodenum); this has real risks and is difficult but is the right thing for this gentleman; if I have to abort this I will plan for a GJ tube     Imaging also suggested inflammation or mass in the rectum; can we also plan for a sigmoidoscopy at the same time; since prep will be difficult lets plan enema the night before and the morning of     Pt on TPN currently, PICC Line in place. Will transition to TCU.     Please assist in scheduling:     Procedure/Imaging/Clinic: EUS, sigmoidoscopy +/- GJ placement  Physician: Dr. Pepe   Timin21  Procedure length:60 min  Anesthesia:general  Dx: FTT, abnormal finding on GI imaging  Tier:3  Location: [facility]       Called to discuss with patient. Explained they can expect a call from  for date and time of procedure, will need a , someone to stay with them for 24 hours and should stay in town for 24 hours (within 45 min of Hospital) post procedure    Patient needs to get pre-op physical completed. If outside [facility] system will need physical faxed to number [phone]   If you do not get a preop physical, your procedure could be cancelled, patient voiced understanding*    Preop Plan: pt pending placement at [facility], they can do preop as needed.    Med Review    Blood thinner -  no  ASA - no  Diabetic - no    COVID test discussed:will get 4 days prior    Patient Education r/t procedure:done    Does patient have any history of gastric bypass/gastric surgery/altered panc/bili anatomy?no    A pre-op nurse will call 1-2 days prior to the procedure. Is advised to be NPO/no solid food 8 hours before the procedure. Ok to drink clear liquids (Water, Apple Juice or Gatorade) up to 2 hours prior to procedure.     Other specific details/comments:no     Verbalized understanding of all instructions. All questions  answered.

## 2021-05-14 NOTE — TELEPHONE ENCOUNTER
Called to check in with patient, he's currently at TCU, on TPN via PICC, scheduled for procedure on 6/1.     Per patient, TPN going well, is gaining weight. Still having symptoms of pancreatitis, triggered while eating. Confirmed is taking creon. Almost all food is causing him enough pain to take pain meds, usually twice daily.     Reviewed will need covid test on 5/28 prior to procedure on 6/1. Pt will likely return to TCU after tube placement for continued therapies.    ML

## 2021-05-25 NOTE — TELEPHONE ENCOUNTER
Returned call to patient regarding details for procedure next week. Preop done at SNF, will get covid test on Friday. Reviewed timing for procedure, he will get medical transport from rehab facility.    DAMARIS

## 2021-05-27 NOTE — TELEPHONE ENCOUNTER
Returned call to [facility] at [facility] r/t patients prep for next weeks procedure. [phone]    Per Dr. Pepe  Lets have him go clears only for the day before   Then prep the day before (Enema)  And he can continue water through 2h before the procedure   Enema in the morning before heading in     Called to communicate above plan, pt on TPN currently.     ML

## 2021-06-01 NOTE — ANESTHESIA POSTPROCEDURE EVALUATION
Patient: Nick Zamudio    Procedure(s):  SIGMOIDOSCOPY, FLEXIBLE  INSERTION, PEG-jejunostomy TUBE    Diagnosis:Adult failure to thrive [R62.7]  Abnormal finding on GI tract imaging [R93.3]  Diagnosis Additional Information: No value filed.    Anesthesia Type:  General    Note:  Disposition: Outpatient   Postop Pain Control: Uneventful            Sign Out: Well controlled pain   PONV: No   Neuro/Psych: Uneventful            Sign Out: Acceptable/Baseline neuro status   Airway/Respiratory: Uneventful            Sign Out: Acceptable/Baseline resp. status   CV/Hemodynamics: Uneventful            Sign Out: Acceptable CV status; No obvious hypovolemia; No obvious fluid overload   Other NRE: NONE   DID A NON-ROUTINE EVENT OCCUR? No           Last vitals:  Vitals:    06/01/21 1636 06/01/21 1645 06/01/21 1700   BP: 130/72 116/74 137/72   Pulse: 86  81   Resp: 17 15 15   Temp: 35.6  C (96.1  F)  36.1  C (97  F)   SpO2: 100%  100%       Last vitals prior to Anesthesia Care Transfer:  CRNA VITALS  6/1/2021 1604 - 6/1/2021 1704      6/1/2021             SpO2:  100 %    Resp Rate (observed):  16    EKG:  Sinus rhythm          Electronically Signed By: Samy Velasquez MD  June 1, 2021  5:16 PM   Sounds like great plan.   Will they  namenda?

## 2021-06-01 NOTE — OP NOTE
Upper GI Endoscopy 06/01/2021  3:09 PM [facility], [address] [place] [address] [phone]     Endoscopy Department   _______________________________________________________________________________   Patient Name: Nick Zamudio           Procedure Date: 6/1/2021 3:09 PM   MRN: [id number]                       Account Number: [id number]   YOB: 1955              Admit Type: Outpatient   Age: 65                               Room: OR   Gender: Male                          Note Status: Finalized   Attending MD: Christo Pepe MD   Total Sedation Time:   _______________________________________________________________________________       Procedure:           Upper GI endoscopy   Indications:         Malnutrition   Providers:           Christo Pepe MD   Patient Profile:     Mr Zamudio is a 66yo gentleman with a history of                        testicular cancer and related radiation therapy to the                        abdomen complicated by bowel perforation who has?been                        struggling with postprandial abdominal pain and                        diarrhea. 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 An attempt at direct                        jejunostomy was not successful due to an indeterminate                        stricture of the duodenum. He now returns for attempt at                        repeat direct J, placement of a gastrojejunostomy tube                        or EUS guided gastrojejunostomy stent placement. He is                        also noted to have a mass in the sigmoid on CT. Review                        of his outside records demonstrate that a                        gastroenterologist found a mass near 2y back and plan                         has been for evaluation with a colorectal surgeon. This                        appears to have been deferred. Tandem sigmnoidoscopy is                        planned for further evaluation.   Referring MD:        Echo Lopez   Medicines:           General Anesthesia, Ancef 2000 mg IV   Complications:       No immediate complications.   _______________________________________________________________________________   Procedure:           Pre-Anesthesia Assessment:                        - Prior to the procedure, a History and Physical was                        performed, and patient medications and allergies were                        reviewed. The patient is competent. The risks and                        benefits of the procedure and the sedation options and                        risks were discussed with the patient. All questions                        were answered and informed consent was obtained. Patient                        identification and proposed procedure were verified by                        the nurse in the pre-procedure area. Mental Status                        Examination: alert and oriented. Airway Examination:                        Mallampati Class II (the uvula but not tonsillar pillars                        visualized). Respiratory Examination: clear to                        auscultation. CV Examination: systolic murmur. ASA Grade                        Assessment: III - A patient with severe systemic                        disease. After reviewing the risks and benefits, the                        patient was deemed in satisfactory condition to undergo                        the procedure. The anesthesia plan was to use general                        anesthesia. Immediately prior to administration of                        medications, the patient was re-assessed for adequacy to                        receive sedatives. The heart rate, respiratory rate,                         oxygen saturations, blood pressure, adequacy of                        pulmonary ventilation, and response to care were                        monitored throughout the procedure. The physical status                        of the patient was re-assessed after the procedure.                        After obtaining informed consent, the endoscope was                        passed under direct vision. Throughout the procedure,                        the patient's blood pressure, pulse, and oxygen                        saturations were monitored continuously. The enteroscope                        and later a gastroscope was introduced through the                        mouth, and advanced to the third part of duodenum. The                        upper GI endoscopy was accomplished without difficulty.                        The patient tolerated the procedure well. We then                        ompleted a flexible sigmoidoscopy with a gastroscope.                                                                                     Findings:        The patient was supine throughout.  films demonstrated numerous        surgical staples/clips. Abdominal exam demonstrated surgical scars and        probable underlying lower abdominal/pelvic mesh. An enteroscope was        first passsed to the third poriton where a stenosis was demonstrated.        The esophagus and stomach were grossly unremarkable as was the bulb and        proximal duodenum. Using fluoroscopic guidance a the duodenal stenosis        was dilated over the wire to 15mm, however the enterosocpe could not        pass following dilation. We then swapped to a gastroscope for GJ tube        placement. The stomach was insufflated to appose gastric and abdominal        walls. A site was located in the antrum of the stomach with excellent        transillumination, manual external pressure and fluoroscopy for        placement. The abdominal wall was  "marked and prepped in a sterile        manner. The area was anesthetized with 1 mL of 1% lidocaine. The trocar        needle was introduced through the abdominal wall and into the stomach        under both fluoroscopic and direct endoscopic view. A snare was        introduced through the endoscope and opened in the gastric lumen. The        guide wire was passed through the trocar and into the open snare. The        snare was closed around the guide wire. The endoscope and snare were        removed, pulling the wire out through the mouth. A skin incision was        made at the site of needle insertion. The externally removable 24 Fr        gastrostomy tube was lubricated. The G-tube was tied to the guide wire        and pulled through the mouth and into the stomach. The trocar needle was        removed, and the gastrostomy tube was pulled out from the stomach        through the skin. We then created a gastropexy using three T tags        triangulated around the fresh gastrostomy using endoscopic guidance. We        then exchanged the gastrostomy tube for a 22F sheath dilator over an        0.025\" Glidewire. Over this wire and through the sheath an 18F one pice        45cm gastrojejunostomy tube was plased with the tip in the jejunum and        no gastric curl. The external bumper was attached to the gastrostomy        tube, and the tube was cut to remove the guide wire. The final position        of the gastrojejunostomy tube was confirmed by fluoroscopy, and skin        marking noted to be 2 cm at the external bumper. The final tension and        compression of the abdominal wall by the PEGJ tube and external bumper        were checked and revealed that the PEGJ balloon was moderately tight and        mildly compressing the stomach. The feeding tube was capped, and the        tube site cleaned and dressed.        We then turned the patient and frog-legged to allow passage of the        gastroscope per rectum. Bowel " preparation was poor though adequate to        locate the mass seen on previous endoscopy and CT. Digital exam        demonstrated intact tone with nonbleeding hemorrhoids. A large broad        based polyp was demonstrated spanning 18-15cm from the rectum, occupying        1/4 of the mucosal circumference and 50% of the lumen. There were no        ulcerations and the mucosa was smooth. There were smaller adjacent        polyps suspected to be tubular adenomas. The lumen narrowed upstream and        did not allow passage beyond 20cm. Biopsies were deferred given previous        biopsies demonstrating at least tubular adenoma.                                                                                     Impression:          - Uncomplicated dilation of the indeterminatre distal                        duodenal stenosis to 15mm, however the enteroscope could                        not advanced to the jejunum                        - Submucosal lesion of the proximal 4th portion just                        beyond the stenosis and out of range for endoscopic                        evaluation                        - Uncomplicated placement of an 18F one piece                        gastrojejunostomy with adjunct T tag gastropexy                        - Probable malignant mass (given size of over 3cm in                        length and near 50% luminal obstruction) involving the                        distal sigmoid just upstream of the rectum, with                        adjacent small probable tubular adenomas; biopsies                        deferred given previous biopsies demonstrating at least                        tubular adenoma                        - Indeterminate stenosis of the sigmoid upstream of the                        mass   Recommendation:      - General anesthesia recovery with disposition based on                        recovery course; low threshold to register to                         observation given his complex medical issues                        - Without signs of complication such as                        hematoma/bleeding or increasing abdominal pain, the                        jejunostomy tube may be used for tube feeds as per                        direction of dietitians                        - Gastrostomy tube may be used for medications and                        decompression without delay                        - Referral to Dr Connor Crenshaw for consideration of                        left hemicolectomy, TAMIS, vs conservative observation                        given overall comorbidities; we will also have a                        discussion regarding the submucosal lesion of the distal                        duodenum which is not amenable to endoscopic                        surveillance or management                        - Patients facility to cut the underlying blue strings                        of the white buttoned T tags in 2 weeks                        - The findings and recommendations were discussed with                        the patient and their family                                                                                       electronically signed by LYDIA Pepe

## 2021-06-01 NOTE — ANESTHESIA CARE TRANSFER NOTE
Patient: Nick Zamudio    Procedure(s):  ENDOSCOPIC ULTRASOUND, ESOPHAGOSCOPY / UPPER GASTROINTESTINAL TRACT (GI)  SIGMOIDOSCOPY, FLEXIBLE  INSERTION, PEG TUBE    Diagnosis: Adult failure to thrive [R62.7]  Abnormal finding on GI tract imaging [R93.3]  Diagnosis Additional Information: No value filed.    Anesthesia Type:   General     Note:    Oropharynx: oropharynx clear of all foreign objects and spontaneously breathing  Level of Consciousness: awake  Oxygen Supplementation: nasal cannula  Level of Supplemental Oxygen (L/min / FiO2): 3  Independent Airway: airway patency satisfactory and stable  Dentition: dentition unchanged  Vital Signs Stable: post-procedure vital signs reviewed and stable  Report to RN Given: handoff report given  Patient transferred to: PACU    Handoff Report: Identifed the Patient, Identified the Reponsible Provider, Reviewed the pertinent medical history, Discussed the surgical course, Reviewed Intra-OP anesthesia mangement and issues during anesthesia, Set expectations for post-procedure period and Allowed opportunity for questions and acknowledgement of understanding      Vitals: (Last set prior to Anesthesia Care Transfer)  CRNA VITALS  6/1/2021 1604 - 6/1/2021 1639      6/1/2021             SpO2:  100 %    Resp Rate (observed):  16    EKG:  Sinus rhythm        Electronically Signed By: DANICA Neely CRNA  June 1, 2021  4:39 PM

## 2021-06-01 NOTE — ANESTHESIA PROCEDURE NOTES
Airway       Patient location during procedure: OR       Procedure Start/Stop Times: 6/1/2021 3:12 PM and 6/1/2021 3:14 PM  Staff -        CRNA: Stanton Keller APRN CRNA       Performed By: CRNA  Consent for Airway        Urgency: elective  Indications and Patient Condition       Indications for airway management: crystal-procedural       Induction type:intravenous       Mask difficulty assessment: 1 - vent by mask    Final Airway Details       Final airway type: endotracheal airway       Successful airway: ETT - single  Endotracheal Airway Details        ETT size (mm): 8.0       Cuffed: yes       Successful intubation technique: direct laryngoscopy       DL Blade Type: Cho 2       Grade View of Cords: 1       Adjucts: stylet       Position: Right       Measured from: gums/teeth       Secured at (cm): 23       Bite block used: None    Post intubation assessment        Placement verified by: capnometry, equal breath sounds and chest rise        Number of attempts at approach: 1       Number of other approaches attempted: 0       Secured with: pink tape       Ease of procedure: easy       Dentition: Intact    Medication(s) Administered   Medication Administration Time: 6/1/2021 3:14 PM

## 2021-06-01 NOTE — DISCHARGE INSTRUCTIONS
Feeding Tube Insertion  A gastrostomy is a direct opening through your abdominal wall into your stomach. A gastrostomy tube (sometimes called PEG tube) is passed through this opening. This allows you to get food and medicine without having to swallow. It's more comfortable and easier to use than a tube from the nose to the stomach. Gastrostomy tubes are used in temporary or permanent conditions where there is difficulty swallowing. This includes stroke, cancer of the mouth, throat, or esophagus, or radiation to the chest.   The most common way this type of feeding tube is inserted is during an endoscopy. This is a test where a flexible tube with video camera is placed through your mouth into your stomach. You are usually sedated with IV medicine for comfort. Once the feeding tube is placed, the scope is removed.   Feeding tubes generally last 6 to 12 months before they need to be replaced. Replacement is usually easier than the initial insertion, since the opening is already there.   Home care   The following will help you care for yourself at home:   During the first weeks after insertion, do the following:    Clean the area around the wound daily, .apply a topical antibiotic ointment only if recommended by your provider. Cover the tube at the insertion site with gauze. Make sure the gauze is over the skin and tube, not between.    Watch for redness, swelling, or pus coming from the opening in the skin.    Watch for leakage around the tube and report this to your healthcare provider.    If the tube falls out, it must be put back in within 24 hours or the wound will close up.  Start feedings as instructed:     Wash your hands with soap and water before preparing the formula or touching the feeding tube.    The tube is narrow, so use only commercial feeding formulas. This will help make sure the tube will not get clogged. These formulas are designed to provide all the protein, carbohydrates, vitamins, and minerals  needed. Follow your healthcare provider s advice (or the registered dietitian or nurse who is working with you) about the number of feedings to give each day.    Flush the tube with clear water before and after feedings or after medicine has been given through the tube. This is very important. Otherwise the tube can become blocked.    When feeding, you should be upright or reclining at not less than 30 degrees to reduce the risk of the feeding solution draining improperly and causing aspiration into the lungs. Stay in this position for at least 30 minutes after the feeding.    Infuse food slowly. It may take more than 1 hour for 1 feeding session.  Other guidelines:    If the tube becomes blocked, flush with a syringe full of water.    If you feel bloated after feeding, remove the cap from the end of the tube so that extra air in the stomach can flow out. Cough to help remove the extra air.    Oral and dental care is needed, even if you are not taking any food or liquids by mouth. Brush your teeth and gums daily. Keep the lips moist with a lip balm or petroleum jelly.  Follow-up care   Follow up with your healthcare provider, or as advised.  Call 911  Call 911 if any of the following occur:     Chest pain    Trouble breathing    When to seek medical advice   Call your healthcare provider right away if any of these occur:     Feeding tube falls out    Feeding tube becomes blocked and you can't clear it    Fever of 100.4 F (38 C) or higher, or as directed by your healthcare provider    Leakage of stomach contents around the tube onto the abdomen    Pain or swelling in your abdomen that gets worse    Redness, pus, or bleeding at the insertion site    Vomiting of tube feeds  Delivered last reviewed this educational content on 8/1/2019 2000-2021 [employer]. All rights reserved. This information is not intended as a substitute for professional medical care. Always follow your healthcare professional's  instructions.      [facility]   Adult Discharge Orders & Instructions     For 24 hours after surgery    1. Get plenty of rest.  A responsible adult must stay with you for at least 24 hours after you leave the hospital.   2. Do not drive or use heavy equipment.  If you have weakness or tingling, don't drive or use heavy equipment until this feeling goes away.  3. Do not drink alcohol.  4. Avoid strenuous or risky activities.  Ask for help when climbing stairs.   5. You may feel lightheaded.  IF so, sit for a few minutes before standing.  Have someone help you get up.   6. If you have nausea (feel sick to your stomach): Drink only clear liquids such as apple juice, ginger ale, broth or 7-Up.  Rest may also help.  Be sure to drink enough fluids.  Move to a regular diet as you feel able.  7. You may have a slight fever. Call the doctor if your fever is over 100 F (37.7 C) (taken under the tongue) or lasts longer than 24 hours.  8. You may have a dry mouth, a sore throat, muscle aches or trouble sleeping.  These should go away after 24 hours.  9. Do not make important or legal decisions.   Call your doctor for any of the followin.  Signs of infection (fever, growing tenderness at the surgery site, a large amount of drainage or bleeding, severe pain, foul-smelling drainage, redness, swelling).    2. It has been over 8 to 10 hours since surgery and you are still not able to urinate (pass water).    3.  Headache for over 24 hours.    4.  Numbness, tingling or weakness the day after surgery (if you had spinal anesthesia).  To contact a doctor, call ________________________________________

## 2021-06-01 NOTE — OR NURSING
Discharge instruction called over to [facility] and [facility], all question answered.

## 2021-06-01 NOTE — ANESTHESIA PREPROCEDURE EVALUATION
Anesthesia Pre-Procedure Evaluation    Patient: Nick Zamudio   MRN: [id number] : 1955        Preoperative Diagnosis: Adult failure to thrive [R62.7]  Abnormal finding on GI tract imaging [R93.3]   Procedure : Procedure(s):  ENDOSCOPIC ULTRASOUND, ESOPHAGOSCOPY / UPPER GASTROINTESTINAL TRACT (GI)  SIGMOIDOSCOPY, FLEXIBLE  INSERTION, PEG TUBE     Past Medical History:   Diagnosis Date     Diabetes (H)      Hypertension      Intussusception (H)      Iron deficiency anemia      Lymphedema      Pancreatic disease     calcific pancreas, multiple panc stones.     Paraplegia (H)     Due to radiation treatment testicular cancer     PVD (peripheral vascular disease) (H)      Renal disease     stone disease     Testicular cancer (H)     Radiation treatment      Past Surgical History:   Procedure Laterality Date     ABDOMEN SURGERY      Partial colectomy for damage due to radiation treatment testicular ca     AMPUTATION      left toe     CHOLECYSTECTOMY       COLONOSCOPY      polyp     ENDOSCOPIC RETROGRADE CHOLANGIOPANCREATOGRAM N/A 2020    Procedure: ENDOSCOPIC RETROGRADE CHOLANGIOPANCREATOGRAPHY, STENT RETRIEVAL, STENT PLACEMENT, BILIARY SPHINCTEROTOMY;  Surgeon: Christo Pepe MD;  Location:  OR     ENDOSCOPIC RETROGRADE CHOLANGIOPANCREATOGRAM N/A 2020    Procedure: ENDOSCOPIC RETROGRADE CHOLANGIOPANCREATOGRAPHY with balloon sweep of bile ducts, bile ducts stents exchanged;  Surgeon: Christo Pepe MD;  Location:  OR     ENDOSCOPIC RETROGRADE CHOLANGIOPANCREATOGRAM N/A 2020    Procedure: ENDOSCOPIC RETROGRADE CHOLANGIOPANCREATOGRAPHY, STENT EXCHANGE;  Surgeon: Christo Pepe MD;  Location:  OR     ENDOSCOPIC RETROGRADE CHOLANGIOPANCREATOGRAM N/A 2021    Procedure: ENDOSCOPIC RETROGRADE CHOLANGIOPANCREATOGRAPHY WITH PANCREATIC DUCT STENT REMOVAL;  Surgeon: Christo Pepe MD;  Location: [facility] OR     ENDOSCOPIC ULTRASOUND UPPER GASTROINTESTINAL TRACT (GI) N/A  7/13/2020    Procedure: ENDOSCOPIC ULTRASOUND, ESOPHAGOSCOPY / UPPER GASTROINTESTINAL TRACT (GI) WITH FINE NEEDLE BIOPSY;  Surgeon: Christo Pepe MD;  Location: [facility] OR     GI SURGERY      bowel resection     IR VISCERAL ANGIOGRAM  3/15/2021     ORTHOPEDIC SURGERY       TONSILLECTOMY        Allergies   Allergen Reactions     Penicillins Other (See Comments)     Ibuprofen Rash     Tolerates Aleve      Social History     Tobacco Use     Smoking status: Never Smoker     Smokeless tobacco: Never Used   Substance Use Topics     Alcohol use: Not Currently     Frequency: Never      Wt Readings from Last 1 Encounters:   04/06/21 65.8 kg (145 lb)        Anesthesia Evaluation   Pt has had prior anesthetic. Type: General and MAC.        ROS/MED HX  ENT/Pulmonary:  - neg pulmonary ROS     Neurologic:     (+) Spinal cord injury,     Cardiovascular:       METS/Exercise Tolerance:     Hematologic:  - neg hematologic  ROS     Musculoskeletal:   (+) arthritis,     GI/Hepatic:     (+) GERD,     Renal/Genitourinary:       Endo:       Psychiatric/Substance Use:  - neg psychiatric ROS     Infectious Disease:  - neg infectious disease ROS     Malignancy:       Other:            Physical Exam    Airway        Mallampati: II   TM distance: > 3 FB   Neck ROM: full   Mouth opening: > 3 cm    Respiratory Devices and Support         Dental  no notable dental history         Cardiovascular   cardiovascular exam normal       Rhythm and rate: regular and normal     Pulmonary   pulmonary exam normal        breath sounds clear to auscultation           OUTSIDE LABS:  CBC:   Lab Results   Component Value Date    WBC 5.1 03/15/2021    WBC 4.4 01/13/2021    HGB 11.9 (L) 03/15/2021    HGB 11.5 (L) 01/13/2021    HCT 36.9 (L) 03/15/2021    HCT 35.7 (L) 01/13/2021     03/15/2021     01/13/2021     BMP:   Lab Results   Component Value Date     01/13/2021     12/09/2020    POTASSIUM 5.4 (H) 04/06/2021    POTASSIUM 4.3  01/13/2021    CHLORIDE 111 (H) 01/13/2021    CHLORIDE 110 (H) 12/09/2020    CO2 22 01/13/2021    CO2 22 12/09/2020    BUN 22 01/13/2021    BUN 21 12/09/2020    CR 1.13 03/15/2021    CR 1.11 01/13/2021     (H) 03/15/2021     (H) 01/13/2021     COAGS:   Lab Results   Component Value Date    INR 1.28 (H) 03/15/2021     POC:   Lab Results   Component Value Date    BGM 75 06/01/2021     HEPATIC:   Lab Results   Component Value Date    ALBUMIN 2.7 (L) 01/13/2021    PROTTOTAL 6.0 (L) 01/13/2021    ALT 17 01/13/2021    AST 11 01/13/2021    ALKPHOS 114 01/13/2021    BILITOTAL 0.8 01/13/2021     OTHER:   Lab Results   Component Value Date    ELODIA 9.3 01/13/2021    LIPASE 28 (L) 01/13/2021    AMYLASE 26 (L) 01/13/2021       Anesthesia Plan    ASA Status:  3   NPO Status:  NPO Appropriate    Anesthesia Type: General.     - Airway: ETT   Induction: Propofol.   Maintenance: Balanced.        Consents    Anesthesia Plan(s) and associated risks, benefits, and realistic alternatives discussed. Questions answered and patient/representative(s) expressed understanding.     - Discussed with:  Patient      - Extended Intubation/Ventilatory Support Discussed: No.      - Patient is DNR/DNI Status: No    Use of blood products discussed: No .     Postoperative Care    Pain management: IV analgesics.   PONV prophylaxis: Ondansetron (or other 5HT-3), Dexamethasone or Solumedrol     Comments:                Indio Martin MD

## 2021-06-03 PROBLEM — K92.2 GASTROINTESTINAL HEMORRHAGE, UNSPECIFIED GASTROINTESTINAL HEMORRHAGE TYPE: Status: ACTIVE | Noted: 2021-01-01

## 2021-06-03 NOTE — LETTER
Health Information Management Services               Recipient: [facility]          Sender:           Date: June 12, 2021  Patient Name:  Nick Zamudio  Routing Message:            The documents accompanying this notice contain confidential information belonging to the sender.  This information is intended only for the use of the individual or entity named above.  The authorized recipient of this information is prohibited from disclosing this information to any other party and is required to destroy the information after its stated need has been fulfilled, unless otherwise required by state law.      If you are not the intended recipient, you are hereby notified that any disclosure, copy, distribution or action taken in reliance on the contents of these documents is strictly prohibited.  If you have received this document in error, please return it by fax to [phone] with a note on the cover sheet explaining why you are returning it (e.g. not your patient, not your provider, etc.).  If you need further assistance, please call [facility] Centralized Transcription at [phone].  Documents may also be returned by mail to [employer], , [facility] [address], [place], [place] [address].

## 2021-06-03 NOTE — PLAN OF CARE
PT ED: Per RN, unknown baseline except w/c bound.  Pt not able to work with PT currently 2/2 fatigue but does report at baseline, able to transfer and amb ~10 ft with FWW. Otherwise in motorized w/c.  Does have PT needs. Will reschedule tomorrow.

## 2021-06-03 NOTE — LETTER
Health Information Management Services               [facility]          Sender: HELENA Tom, LGSW- WKND SW                  Pager [phone]          Date: June 12, 2021  Patient Name:  Nick Zamudio  Routing Message:    Discharge orders enclosed.          The documents accompanying this notice contain confidential information belonging to the sender.  This information is intended only for the use of the individual or entity named above.  The authorized recipient of this information is prohibited from disclosing this information to any other party and is required to destroy the information after its stated need has been fulfilled, unless otherwise required by state law.      If you are not the intended recipient, you are hereby notified that any disclosure, copy, distribution or action taken in reliance on the contents of these documents is strictly prohibited.  If you have received this document in error, please return it by fax to [phone] with a note on the cover sheet explaining why you are returning it (e.g. not your patient, not your provider, etc.).  If you need further assistance, please call [facility] Centralized Transcription at [phone].  Documents may also be returned by mail to [facility] [employer], , [address], [place], [place] [address].

## 2021-06-03 NOTE — ED PROVIDER NOTES
[place] EMERGENCY DEPARTMENT ([facility])  6/03/21  History     Chief Complaint   Patient presents with     GI Bleeding     The history is provided by the patient and medical records.     Nick Zamudio is a 65 year old male with a history notable for chronic pancreatitis, small bowel obstruction, bilateral paraparesis secondary to radiation from testicular cancer, peripheral vascular disease, HTN, and s/p EUS and PEG-tube placement (6/1/2021) who presents to the ED via EMS from skilled nursing facility for evaluation of nausea, vomiting and melena.  Patient underwent an EUS on 6/1 which found no signs of hematoma and required no intervention.  That night the patient began developing nausea, vomiting black substances, diarrhea with black stool and epigastric abdominal pain.  Patient has resided at a skilled nursing facility and has reportedly been unable to keep down oral medications. The patient was transferred to the ED an en route was administered 300 mL IV fluids and 4 mg IV Zofran.  Here in the emergency department, the patient had an episode of hematemesis and endorses persistent leg rash strict abdominal pain.  He denies recent fever, chills, chest pain or shortness of breath.  No cough, rhinorrhea, sore throat or congestion.    I have reviewed the Medications, Allergies, Past Medical and Surgical History, and Social History in the [facility] system.  PAST MEDICAL HISTORY:   Past Medical History:   Diagnosis Date     Diabetes (H)      Hypertension      Intussusception (H)      Iron deficiency anemia      Lymphedema      Pancreatic disease     calcific pancreas, multiple panc stones.     Paraplegia (H)     Due to radiation treatment testicular cancer     PVD (peripheral vascular disease) (H)      Renal disease     stone disease     Testicular cancer (H)     Radiation treatment       PAST SURGICAL HISTORY:   Past Surgical History:   Procedure Laterality Date     ABDOMEN SURGERY      Partial colectomy  for damage due to radiation treatment testicular ca     AMPUTATION      left toe     CHOLECYSTECTOMY       COLONOSCOPY      polyp     ENDOSCOPIC INSERTION TUBE GASTROSTOMY N/A 6/1/2021    Procedure: INSERTION, PEG-jejunostomy TUBE;  Surgeon: Christo Pepe MD;  [facility]: [facility]     ENDOSCOPIC RETROGRADE CHOLANGIOPANCREATOGRAM N/A 7/13/2020    Procedure: ENDOSCOPIC RETROGRADE CHOLANGIOPANCREATOGRAPHY, STENT RETRIEVAL, STENT PLACEMENT, BILIARY SPHINCTEROTOMY;  Surgeon: Christo Pepe MD;  Location:  OR     ENDOSCOPIC RETROGRADE CHOLANGIOPANCREATOGRAM N/A 9/29/2020    Procedure: ENDOSCOPIC RETROGRADE CHOLANGIOPANCREATOGRAPHY with balloon sweep of bile ducts, bile ducts stents exchanged;  Surgeon: Christo Pepe MD;  Location:  OR     ENDOSCOPIC RETROGRADE CHOLANGIOPANCREATOGRAM N/A 12/9/2020    Procedure: ENDOSCOPIC RETROGRADE CHOLANGIOPANCREATOGRAPHY, STENT EXCHANGE;  Surgeon: Christo Pepe MD;  Location:  OR     ENDOSCOPIC RETROGRADE CHOLANGIOPANCREATOGRAM N/A 1/13/2021    Procedure: ENDOSCOPIC RETROGRADE CHOLANGIOPANCREATOGRAPHY WITH PANCREATIC DUCT STENT REMOVAL;  Surgeon: Christo Pepe MD;  Location:  OR     ENDOSCOPIC ULTRASOUND UPPER GASTROINTESTINAL TRACT (GI) N/A 7/13/2020    Procedure: ENDOSCOPIC ULTRASOUND, ESOPHAGOSCOPY / UPPER GASTROINTESTINAL TRACT (GI) WITH FINE NEEDLE BIOPSY;  Surgeon: Christo Pepe MD;  Location:  OR     GI SURGERY      bowel resection     IR VISCERAL ANGIOGRAM  3/15/2021     ORTHOPEDIC SURGERY       SIGMOIDOSCOPY FLEXIBLE N/A 6/1/2021    Procedure: SIGMOIDOSCOPY, FLEXIBLE;  Surgeon: Christo Pepe MD;  Location: [facility]     TONSILLECTOMY         Past medical history, past surgical history, medications, and allergies were reviewed with the patient. Additional pertinent items: None    FAMILY HISTORY: History reviewed. No pertinent family history.    SOCIAL HISTORY:   Social History     Tobacco Use     Smoking status:  Never Smoker     Smokeless tobacco: Never Used   Substance Use Topics     Alcohol use: Not Currently     Frequency: Never     Social history was reviewed with the patient. Additional pertinent items: None      Patient's Medications   New Prescriptions    No medications on file   Previous Medications    ACCU-CHEK GUIDE TEST STRIP    USE 1 EACH TO TEST DAILY. CODE E11.9    AMYLASE-LIPASE-PROTEASE (CREON) 35421-14631 UNITS CPEP PER EC CAPSULE    TAKE 3 WITH MEALS / 1 2 WITH SNACKS, UP TO 15 PER DAY.    ATORVASTATIN (LIPITOR) 20 MG TABLET    Take 20 mg by mouth daily    INSULIN ASPART (NOVOLOG VIAL) 100 UNITS/ML VIAL    Inject Subcutaneous 3 times daily (with meals) Sliding scale 0-12 units TID    INSULIN GLARGINE (LANTUS VIAL) 100 UNIT/ML VIAL    Inject 20 Units Subcutaneous At Bedtime    METFORMIN (GLUCOPHAGE) 500 MG TABLET    1,000 mg 2 times daily (with meals) Take 2 tablets in the morning and 2 tablets in the evening before bed.currently not taking    ONDANSETRON (ZOFRAN) 4 MG TABLET    Take 1 tablet (4 mg) by mouth every 6 hours as needed for nausea    OXYCODONE-ACETAMINOPHEN (PERCOCET) 5-325 MG TABLET    TAKE 1 TABLET BY MOUTH EVERY 8 HOURS AS NEEDED FOR PAIN   Modified Medications    No medications on file   Discontinued Medications    No medications on file          Allergies   Allergen Reactions     Penicillins Other (See Comments)     Ibuprofen Rash     Tolerates Aleve        Review of Systems   Constitutional: Negative for chills and fever.   HENT: Negative for congestion, rhinorrhea and sore throat.    Respiratory: Negative for cough and shortness of breath.    Cardiovascular: Negative for chest pain.   Gastrointestinal: Positive for abdominal pain, blood in stool, diarrhea, nausea and vomiting.     A complete review of systems was performed with pertinent positives and negatives noted in the HPI, and all other systems negative.    Physical Exam   BP: (!) 142/70  Pulse: 79  Temp: 98.2  F (36.8  C)  Resp:  16  Height: 182.9 cm (6')  SpO2: 97 %      Physical Exam  Vitals signs and nursing note reviewed.   Constitutional:       General: He is not in acute distress.     Appearance: Normal appearance. He is not diaphoretic.   HENT:      Head: Atraumatic.   Eyes:      General: No scleral icterus.     Pupils: Pupils are equal, round, and reactive to light.   Cardiovascular:      Rate and Rhythm: Normal rate and regular rhythm.      Heart sounds: Normal heart sounds.   Pulmonary:      Effort: No respiratory distress.      Breath sounds: Normal breath sounds.   Abdominal:      General: Bowel sounds are normal.      Palpations: Abdomen is soft.      Tenderness: There is no abdominal tenderness.   Musculoskeletal:         General: No tenderness.   Skin:     General: Skin is warm.      Findings: No rash.   Neurological:      General: No focal deficit present.      Mental Status: He is alert and oriented to person, place, and time.         ED Course        Procedures             EKG Interpretation:      Interpreted by Sri Koo MD  Time reviewed: perEpic  Symptoms at time of EKG: hematemesis   Rhythm: normal sinus   Rate: normal  Axis: normal  Ectopy: none  Conduction: normal  ST Segments/ T Waves: No ST-T wave changes  Q Waves: none  Comparison to prior: Unchanged    Clinical Impression: normal EKG          Critical Care Addendum    My initial assessment, based on my review of prehospital provider report, review of nursing observations, review of vital signs, focused history, physical exam and review of cardiac rhythm monitor, established that Nick Zamudio has severe hemorrhage, which requires immediate intervention, and therefore he is critically ill.     After the initial assessment, the care team initiated multiple lab tests, initiated IV fluid administration, initiated medication therapy with protonix 80 mg, PRBC x 1 U, NSS IVF bolus and consulted with GI to provide stabilization care. Due to the critical nature of  this patient, I reassessed nursing observations, vital signs, physical exam, review of cardiac rhythm monitor and 12 lead ECG analysis multiple times prior to his disposition.     Time also spent performing documentation and discussion with consultants.     Critical care time (excluding teaching time and procedures): 40 minutes.     Results for orders placed or performed during the hospital encounter of 06/03/21 (from the past 24 hour(s))   CBC with platelets differential   Result Value Ref Range    WBC 7.6 4.0 - 11.0 10e9/L    RBC Count 2.46 (L) 4.4 - 5.9 10e12/L    Hemoglobin 7.4 (L) 13.3 - 17.7 g/dL    Hematocrit 23.1 (L) 40.0 - 53.0 %    MCV 94 78 - 100 fl    MCH 30.1 26.5 - 33.0 pg    MCHC 32.0 31.5 - 36.5 g/dL    RDW 13.8 10.0 - 15.0 %    Platelet Count 200 150 - 450 10e9/L    Diff Method Automated Method     % Neutrophils 91.5 %    % Lymphocytes 3.2 %    % Monocytes 4.5 %    % Eosinophils 0.0 %    % Basophils 0.1 %    % Immature Granulocytes 0.7 %    Nucleated RBCs 0 0 /100    Absolute Neutrophil 7.0 1.6 - 8.3 10e9/L    Absolute Lymphocytes 0.2 (L) 0.8 - 5.3 10e9/L    Absolute Monocytes 0.3 0.0 - 1.3 10e9/L    Absolute Eosinophils 0.0 0.0 - 0.7 10e9/L    Absolute Basophils 0.0 0.0 - 0.2 10e9/L    Abs Immature Granulocytes 0.1 0 - 0.4 10e9/L    Absolute Nucleated RBC 0.0    Comprehensive metabolic panel   Result Value Ref Range    Sodium 141 133 - 144 mmol/L    Potassium 3.7 3.4 - 5.3 mmol/L    Chloride 104 94 - 109 mmol/L    Carbon Dioxide 31 20 - 32 mmol/L    Anion Gap 6 3 - 14 mmol/L    Glucose 267 (H) 70 - 99 mg/dL    Urea Nitrogen 65 (H) 7 - 30 mg/dL    Creatinine 0.89 0.66 - 1.25 mg/dL    GFR Estimate 88 >60 mL/min/[1.73_m2]    GFR Estimate If Black >90 >60 mL/min/[1.73_m2]    Calcium 8.3 (L) 8.5 - 10.1 mg/dL    Bilirubin Total 0.2 0.2 - 1.3 mg/dL    Albumin 1.9 (L) 3.4 - 5.0 g/dL    Protein Total 5.3 (L) 6.8 - 8.8 g/dL    Alkaline Phosphatase 62 40 - 150 U/L    ALT 8 0 - 70 U/L    AST 8 0 - 45 U/L    Lactic acid whole blood   Result Value Ref Range    Lactic Acid 3.0 (H) 0.7 - 2.0 mmol/L   ABO/Rh type and screen   Result Value Ref Range    ABO PENDING     Antibody Screen PENDING     Test Valid Only At          [facility]    Specimen Expires 06/06/2021      Medications   lactated ringers BOLUS 1,000 mL (1,000 mLs Intravenous New Bag 6/3/21 0746)   fentaNYL (PF) (SUBLIMAZE) injection 25 mcg (25 mcg Intravenous Given 6/3/21 0803)   lactated ringers BOLUS 1,000 mL (0 mLs Intravenous Stopped 6/3/21 0745)   ondansetron (ZOFRAN) injection 4 mg (4 mg Intravenous Given 6/3/21 0748)   pantoprazole (PROTONIX) IV push injection 80 mg (80 mg Intravenous Given 6/3/21 0752)             Assessments & Plan (with Medical Decision Making)     65 year old male with a history notable for chronic pancreatitis, small bowel obstruction, bilateral paraparesis secondary to radiation from testicular cancer, peripheral vascular disease, HTN, and s/p EUS and PEG-tube placement (6/1/2021) who presents to the ED via EMS from [facility] for evaluation of nausea, vomiting and melena.  Patient at risk for significant upper GI bleed.  Placed on cardiac monitor which revealed stable vital signs with a pulse of 79, blood pressure initially mildly elevated 142/70 and then normalized spontaneously, respirations normal at 16, pulse ox normal at 97% on room air.    IV established, labs are drawn sent reviewed by me in epic remarkable for hemoglobin of 7.4, BUN 65, glucose 267, COVID-19 swab negative.  X-ray of the chest obtained which revealed no acute process.  CT abdomen pelvis obtained which revealed bilateral hydronephrosis with urothelial thickening concerning for possible UTI but otherwise no acute process.  UA ordered but pending at the time of this dictation.    Patient given fentanyl for his epigastric pain along with Protonix 80 mg IV and LR 2 L IV fluid bolus and Zofran 4 mg IV.   Upon repeat assessment patient's pain and nausea vomiting improved.  Case discussed with GI luminal service with formal consult, please refer to their note for further details.  Patient also discussed with medicine hospital service with arrangements made to admit accordingly.    I have reviewed the nursing notes.    I have reviewed the findings, diagnosis, plan and need for follow up with the patient.    New Prescriptions    No medications on file       Final diagnoses:   Gastrointestinal hemorrhage, unspecified gastrointestinal hemorrhage type     I, Elias Harris, am serving as a trained medical scribe to document services personally performed by Sri Koo MD, based on the provider's statements to me.      I, Sri Koo MD, was physically present and have reviewed and verified the accuracy of this note documented by Elias Harris.     6/3/2021   [facility] EMERGENCY DEPARTMENT     Sri Koo MD  06/03/21 7696

## 2021-06-03 NOTE — CONSULTS
GASTROENTEROLOGY CONSULTATION      Date of Admission:  6/3/2021          ASSESSMENT AND RECOMMENDATIONS:   Assessment:  Nick Zamudio is a 65 year old male with a history of testicular cancer and radiation therapy c/b bowel perforation, chronic calcific pancreatitis (hx ERCP with pancreatic stents), mesenteric stenosis (not amenable to vascular stent placement) presenting to ED following PEGJ placement, and sigmoidoscopy 6/1 with acutely worse nausea, vomiting and abdominal pain for which GI is consulted.    Acute on chronic epigastric pain  Persistent nausea and vomiting   Chronic calcific pancreatitis  Lactic acidosis in setting of persistent vomiting of dark contents, no overtly bloody but hgb does appear down 4 g/dL from 6 weeks ago. Given recent PEGJ placement and duodenal dilation with acute increase in pain and vomiting, will opt for CT to assess for intra abdominal pathology (perforation, abscess, leak). He does also have known issues with biliary stenosis, but ALTs reassuring.      Recommendations  -Anti emetics per primary team, recommend scheduling  -CT ab pelvis with IV contrast  -Drain GT to gravity, monitor output (quantity, color)  -Continue NPO  -Transfuse hgb < 7 from GI standpoint  -Continue IVF resuscitation and correcting electrolytes      GI will continue to follow. Thank you for involving us in this patient's care. Please do not hesitate to contact the GI service with any questions or concerns.     Pt care plan discussed with Dr. Manzano, GI staff physician.    DANICA Farnsworth CNP  Text page  -------------------------------------------------------------------------------------------------------------------          Chief Complaint:   We were asked by Dr. Mckeon of medicine to evaluate this patient with coffee ground emesis, acute anemia    History is obtained from the patient and the medical record.          History of Present Illness:   Nick Zamudio is a 65 year old male with a  history of testicular cancer and radiation therapy c/b bowel perforation, chronic calcific pancreatitis, mesenteric stenosis (not amenable to vascular stent placement) presenting to ED following PEGJ placement, and sigmoidoscopy 6/1 with acutely worse nausea, vomiting and abdominal pain for which GI is consulted.    6/1 with PEGJ placed do address progressive weight loss, malnutrition in setting of more frequent episodes of nausea and vomiting (about once every 3-7 days over past few months). At that time he had a known duodenal stricture dilated which allowed for passage of endoscope. A ~3 cm lesion obstructing 50% of lumen was seen in jejunum, unable to reach to sample. He also underwent sigmoidoscopy to further assess sigmoid mass which was originally discovered ~2 years ago. Not biopsied by Dr. 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    Since he returned to SNF 6/1 he has had persistent abdominal pain (severe, epigastric, feels like his normal 'pancreatic pain' but more intense and unrelenting). Also vomiting about every two hours with more fatigue. Emesis has been dark, possibly containing blood, he is unsure. Denies fever, chills, dizziness, and chest pain.    His chronic calcific pancreatitis (c/b biliary stenosis) has been managed by ERCP in the past, most recently 1/13/21 with removal of two pancreatic stents. Additional stents were not placed at the time as he was not having acutely worse symptoms at the time.                Past Medical History:   Reviewed and edited as appropriate  Past Medical History:   Diagnosis Date     Diabetes (H)      Hypertension      Intussusception (H)      Iron deficiency anemia      Lymphedema      Pancreatic disease     calcific pancreas, multiple panc stones.     Paraplegia (H)     Due to radiation treatment testicular cancer     PVD (peripheral vascular disease) (H)      Renal disease     stone disease     Testicular cancer (H)      Radiation treatment            Past Surgical History:   Reviewed and edited as appropriate   Past Surgical History:   Procedure Laterality Date     ABDOMEN SURGERY      Partial colectomy for damage due to radiation treatment testicular ca     AMPUTATION      left toe     CHOLECYSTECTOMY       COLONOSCOPY      polyp     ENDOSCOPIC INSERTION TUBE GASTROSTOMY N/A 6/1/2021    Procedure: INSERTION, PEG-jejunostomy TUBE;  Surgeon: Christo Pepe MD;  Location:  OR     ENDOSCOPIC RETROGRADE CHOLANGIOPANCREATOGRAM N/A 7/13/2020    Procedure: ENDOSCOPIC RETROGRADE CHOLANGIOPANCREATOGRAPHY, STENT RETRIEVAL, STENT PLACEMENT, BILIARY SPHINCTEROTOMY;  Surgeon: Christo Pepe MD;  Location:  OR     ENDOSCOPIC RETROGRADE CHOLANGIOPANCREATOGRAM N/A 9/29/2020    Procedure: ENDOSCOPIC RETROGRADE CHOLANGIOPANCREATOGRAPHY with balloon sweep of bile ducts, bile ducts stents exchanged;  Surgeon: Christo Pepe MD;  Location:  OR     ENDOSCOPIC RETROGRADE CHOLANGIOPANCREATOGRAM N/A 12/9/2020    Procedure: ENDOSCOPIC RETROGRADE CHOLANGIOPANCREATOGRAPHY, STENT EXCHANGE;  Surgeon: Christo Pepe MD;  Location:  OR     ENDOSCOPIC RETROGRADE CHOLANGIOPANCREATOGRAM N/A 1/13/2021    Procedure: ENDOSCOPIC RETROGRADE CHOLANGIOPANCREATOGRAPHY WITH PANCREATIC DUCT STENT REMOVAL;  Surgeon: Christo Pepe MD;  Location:  OR     ENDOSCOPIC ULTRASOUND UPPER GASTROINTESTINAL TRACT (GI) N/A 7/13/2020    Procedure: ENDOSCOPIC ULTRASOUND, ESOPHAGOSCOPY / UPPER GASTROINTESTINAL TRACT (GI) WITH FINE NEEDLE BIOPSY;  Surgeon: Christo Pepe MD;  Location:  OR     GI SURGERY      bowel resection     IR VISCERAL ANGIOGRAM  3/15/2021     ORTHOPEDIC SURGERY       SIGMOIDOSCOPY FLEXIBLE N/A 6/1/2021    Procedure: SIGMOIDOSCOPY, FLEXIBLE;  Surgeon: Christo Pepe MD;  Location:  OR     TONSILLECTOMY              Previous Endoscopy:   No results found for this or any previous visit.     See HPI          Social History:   Reviewed and edited as appropriate  Social History     Socioeconomic History     Marital status:      Spouse name: Not on file     Number of children: Not on file     Years of education: Not on file     Highest education level: Not on file   Occupational History     Not on file   Social Needs     Financial resource strain: Not on file     Food insecurity     Worry: Not on file     Inability: Not on file     Transportation needs     Medical: Not on file     Non-medical: Not on file   Tobacco Use     Smoking status: Never Smoker     Smokeless tobacco: Never Used   Substance and Sexual Activity     Alcohol use: Not Currently     Frequency: Never     Drug use: Never     Sexual activity: Not on file   Lifestyle     Physical activity     Days per week: Not on file     Minutes per session: Not on file     Stress: Not on file   Relationships     Social connections     Talks on phone: Not on file     Gets together: Not on file     Attends Hinduism service: Not on file     Active member of club or organization: Not on file     Attends meetings of clubs or organizations: Not on file     Relationship status: Not on file     Intimate partner violence     Fear of current or ex partner: Not on file     Emotionally abused: Not on file     Physically abused: Not on file     Forced sexual activity: Not on file   Other Topics Concern     Parent/sibling w/ CABG, MI or angioplasty before 65F 55M? Not Asked   Social History Narrative     Not on file            Family History:   Reviewed and edited as appropriate  History reviewed. No pertinent family history.    The patients family history was reviewed today and is non-contributory.          Allergies:   Reviewed and edited as appropriate     Allergies   Allergen Reactions     Penicillins Other (See Comments)     Ibuprofen Rash     Tolerates Aleve            Medications:     Current Facility-Administered Medications   Medication     fentaNYL (PF) (SUBLIMAZE)  injection 25 mcg     pantoprazole (PROTONIX) IV push injection 40 mg     Current Outpatient Medications   Medication Sig     ACCU-CHEK GUIDE test strip USE 1 EACH TO TEST DAILY. CODE E11.9     amylase-lipase-protease (CREON) 86417-05049 units CPEP per EC capsule TAKE 3 WITH MEALS / 1 2 WITH SNACKS, UP TO 15 PER DAY.     atorvastatin (LIPITOR) 20 MG tablet Take 20 mg by mouth daily     insulin aspart (NOVOLOG VIAL) 100 UNITS/ML vial Inject Subcutaneous 3 times daily (with meals) Sliding scale 0-12 units TID     insulin glargine (LANTUS VIAL) 100 UNIT/ML vial Inject 20 Units Subcutaneous At Bedtime     metFORMIN (GLUCOPHAGE) 500 MG tablet 1,000 mg 2 times daily (with meals) Take 2 tablets in the morning and 2 tablets in the evening before bed.currently not taking     ondansetron (ZOFRAN) 4 MG tablet Take 1 tablet (4 mg) by mouth every 6 hours as needed for nausea     oxyCODONE-acetaminophen (PERCOCET) 5-325 MG tablet TAKE 1 TABLET BY MOUTH EVERY 8 HOURS AS NEEDED FOR PAIN             Review of Systems:     A complete review of systems was performed and is negative except as noted in the HPI           Physical Exam:   /56   Pulse 78   Temp 98.1  F (36.7  C) (Oral)   Resp 16   Ht 1.829 m (6')   Wt 67.2 kg (148 lb 3.2 oz)   SpO2 96%   BMI 20.10 kg/m    Wt:   Wt Readings from Last 2 Encounters:   06/03/21 67.2 kg (148 lb 3.2 oz)   04/06/21 65.8 kg (145 lb)      Constitutional: cooperative, pleasant, not dyspneic/diaphoretic, no acute distress  Eyes: Sclera anicteric/injected  Ears/nose/mouth/throat: Normal oropharynx without ulcers or exudate, mucus membranes moist, hearing intact  Neck: supple without obvious mass  CV: No edema  Respiratory: Unlabored breathing  Lymph: No submandibular, supraclavicular or inguinal lymphadenopathy  Abd: Nondistended, +bs, no hepatosplenomegaly, mildly tender, no peritoneal signs  Skin: warm, perfused, no jaundice  Neuro: AAO x 3, No asterixis  Psych: Normal affect  MSK: No  gross deformities         Data:   Labs and imaging below were independently reviewed and interpreted    BMP  Recent Labs   Lab 21      POTASSIUM 3.7   CHLORIDE 104   ELODIA 8.3*   CO2 31   BUN 65*   CR 0.89   *     CBC  Recent Labs   Lab 21   WBC 7.6   RBC 2.46*   HGB 7.4*   HCT 23.1*   MCV 94   MCH 30.1   MCHC 32.0   RDW 13.8        INR  Recent Labs   Lab 21   INR 1.09     LFTs  Recent Labs   Lab 21   ALKPHOS 62   AST 8   ALT 8   BILITOTAL 0.2   PROTTOTAL 5.3*   ALBUMIN 1.9*      PANCNo lab results found in last 7 days.    Imagin2021 CTE    IMPRESSION:   1. Mild mucosal/wall redundancy at the second portion of the duodenum  and tortuosity of the third/fourth portion of the duodenum without  evidence of an extrinsic lesion.   2. Focal eccentric wall thickening and mild hyperenhancement at the  rectosigmoid junction, which could represent inflammation, mass, or  redundant/friable mucosa. Recommend direct visualization.  3. Changes of chronic pancreatitis with increased parenchymal and  ductal calcifications since 2020. Stable mild main pancreatic  ductal dilation and irregularity.  4. Mild intra and extrahepatic biliary dilation without clear evidence  of biliary obstruction.  5. Resolved (since 2020) rim-enhancing hypodensity in the hepatic  dome (likely an inflamed cyst at the time) and decreased size of 2  additional hepatic cysts in hepatic segment 4.  6. Other chronic findings, as detailed above.     EMMANUEL BARRIGA, DO

## 2021-06-03 NOTE — ED NOTES
Bed: ED11  Expected date: 6/3/21  Expected time: 6:11 AM  Means of arrival:   Comments:  H424  66M  GI bleed  yellow

## 2021-06-03 NOTE — ED TRIAGE NOTES
Pt BIBA from skilled nursing facility due to possible GI bleed. Pt was here on Tuesday and had a PEG tube placed. Since then pt has been having blood in vomit and dark tarry stools per nursing facility staff. Pt unable to keep oral meds down. En route pt received 300mL IV fluids and 4mg IV zofran. Alert and oriented x4.

## 2021-06-03 NOTE — ED NOTES
Spoke with vascular access staff. Since PICC was not placed here, we will obtain 1 view portable x-ray to verify PICC placement prior to using it here.

## 2021-06-03 NOTE — PROGRESS NOTES
"CLINICAL NUTRITION SERVICES - ASSESSMENT NOTE     Nutrition Prescription    RECOMMENDATIONS FOR MDs/PROVIDERS TO ORDER:  --Please place the following consult when okay to start TF via J-tube: Nutrition Services Adult IP Consult with drop down reason \"Registered Dietitian to Order TF per Medical Nutrition Therapy Guidelines.\"    Malnutrition Status:    Non-severe malnutrition in the context of chronic illness    Recommendations already ordered by Registered Dietitian (RD):  TPN change request sent to PharmD:  --Use dosing weight of 67 kg  --Pt cycled TPN overnight x 12 hrs. GOAL: volume per PharmD (pt was on 1680 ml per TCU RD), 265 g Dex  100g AA + 250 ml 20% IV lipids 5x/week, provides 1801 kcals (27 kcal/kg), 1.5 g PRO/kg, with 28% kcals from Fat. Micronutrient supplementation per PharmD.   --Per Chart review, appears pt received insulin during TPN.   --Note this TPN provides additional kcal and protein than PTA Clinimix to better meet needs.     --Ordered fecal elastase, vitamin A/D/E/K, and zinc levels due to chronic pancreatitis and chronic diarrhea.     Future/Additional Recommendations:  When okay to start TF via J-tube:   --GOAL: Vital 1.5 Elvis @ goal of 60ml/hr (1440ml/day) will provide: 2160 kcals (32 kcal/kg), 97 g PRO (1.4 g/kg), 1100 ml free H20, 269 g CHO, 82 g Fat, and 8 g fiber daily.  --Start TF @ 10 ml/hr and advance by 10 ml q 8 hrs as tolerated until goal rate.  --Minimum 30 ml q 4 hrs water flushes for tube patency.  --MVI/minerals supplement if not already ordered (AquADEKs pending fat soluble vitamin levels).  Once begin TFs, begin the following pancreatic enzyme regimen and recommend order each of the following:   (please copy and paste administration instructions into each order)  A) Sodium Bicarb tablet (325 mg), 1 tablet q 4 hrs via Jtube. Administration Instructions: Crush 1 tablet and mix into 15 ml of warm water and use this solution to mix with Creon pancreatic enzymes. DO NOT " "administer directly into Jtube (to be mixed into TF formula with Creon enzyme - see Creon enzyme order)   B) Creon 24, 1-2 capsules q 4 hrs via Jtube. Administration Instructions:   --If TF rate is running @ 10-45 ml/hr, administer 1 capsule q 4 hrs;   --If TF rate is running @ 50-60 ml/hr, increase to 2 capsules q 4 hrs.    **Open capsule and empty contents into 15 ml sodium bicarb solution (see sodium bicarb order), let dissolve for about 20-30 minutes and then add this solution to the amount of TF formula hung in TF bag every 4 hrs (i.e., once TF @ goal infusion 60 ml/hr will mix 2 capsules into 240 ml of TF formula every 4 hrs).   *Note: this enzyme regimen with TF @ goal infusion will provide approx 3512 units of lipase/gram of total Fat daily and approp dosing initially for pancreatic insufficiency with more elemental TF formula.        REASON FOR ASSESSMENT  Nick Zamudio is a/an 65 year old male assessed by the dietitian for Pharmacy/Nutrition to Start and Manage PN - continuation of home TPN    NUTRITION HISTORY  PMH testicular cancer and radiation therapy c/b bowel perforation, bilateral lower extremity paraparesis, mesenteric stenosis (not amenable to vascular stent placement), Type II DM, HTN, h/o SBO, failure to thrive and severe malnutrition on TPN, biliary stenosis and chronic pancreatitis s/p ERCP and stent placement, and chronic diarrhea presenting to ED following PEGJ placement, and sigmoidoscopy 6/1 with acutely worse nausea, vomiting, hematemesis, melena, and abdominal pain.    Per RD note from OSH on 4/28:  \"Per notes pt reported he has lost about 60 lbs over the past couple of years and is in the process of trying to get a feeding tube at the [facility] but this has been delayed because of significant radiation scarring of his abdomen. States he had an MRI on Monday to determine the extent of this scarring and when he could get his G tube placed - reports has not heard results of this yet. " " appetite for >6 months. Pt reports he has gradually lost weight over the past 3 years. States at home he normally eats 3 very small meals a day (example day: small bowl cereal, soup, and small scoop mashed potatoes). Will occassionally also eat a small amount of chicken. Has tried protein shake - Ensure in the past and reports he does not tolerate this well - slightly better tolerance with addition of Creon.\"     Spoke with RD at TCU regarding TPN regimen. Of note, pt has been on same TPN regimen as during OSH admission. TPN prescription obtained from TCU RD from RD note on :   Clinimix E D15AA5% @ 70 ml/hr with 500 ml 20% lipids twice/week to provide 1481 kcal (22 kcal/kg), 84 g protein (1.2 g/kg), 252 g CHO, 1680 ml fluid    Per TCU RD, pt was on a 12 hr overnight cycle for TPN. Pt had PEGJ placed  and had plans to start slow TF @ 10 ml/hr (unsure which formula), but pt was experiencing nausea/vomiting, so this was held and not started. Per incomplete notes, pt to remain NPO and G-tube to gravity.     CURRENT NUTRITION ORDERS  Diet: NPO    LABS  BUN 65 (H)  Cr 0.89 (WNL)  LFTs - WNL  BG     MEDICATIONS  Medium sliding scale insulin  LR @ 100 ml/hr    ANTHROPOMETRICS  Height: 182.9 cm (6' 0\")  Most Recent Weight: 67.2 kg (148 lb 3.2 oz)    IBW: 81 kg  BMI: Normal BMI  Weight History: 9% wt loss x 6 months  Wt Readings from Last 30 Encounters:   21 67.2 kg (148 lb 3.2 oz)   21 65.8 kg (145 lb)   21 68 kg (150 lb)   21 67.9 kg (149 lb 9.6 oz)   20 72.6 kg (160 lb)   20 72.6 kg (160 lb)   20 71.7 kg (158 lb)   20 74.8 kg (165 lb)   20 74.8 kg (165 lb)     Dosing Weight: 67 kg admission weight    ASSESSED NUTRITION NEEDS  Estimated Energy Needs: 8174-1638+ kcals/day (25 - 30 kcals/kg)  Justification: Maintenance and Repletion  Estimated Protein Needs: + grams protein/day (1.2 - 1.5 grams of pro/kg)  Justification: Increased needs and " Repletion  Estimated Fluid Needs: (1 mL/kcal)   Justification: Maintenance and Per provider pending fluid status    PHYSICAL FINDINGS  See malnutrition section below.  CT abdomen pelvis 6/3: Sequelae of chronic pancreatitis with marked pancreatic parenchymal atrophy and numerous parenchymal and ductal calcifications. There is mild peripancreatic fat stranding tracking along the common bile duct.    PEGJ placed 6/1    MALNUTRITION  % Intake: < 75% for >/= 1 month (non-severe)  % Weight Loss: Up to 10% in 6 months (non-severe)  Subcutaneous Fat Loss: Unable to assess  Muscle Loss: Unable to assess  Fluid Accumulation/Edema: None noted  Malnutrition Diagnosis: Non-severe malnutrition in the context of chronic illness  *Attempted to see pt while in the ED, however door and curtain were closed with lights off. Deferred waking pt.     NUTRITION DIAGNOSIS  Inadequate protein-energy intake related to inadequate parenteral nutrition infusion, poor appetite, and suspected malabsorption of nutrients as evidenced by up to 10% weight loss x 6 months, TPN dependent with TPN PTA providing <75% of needs.       INTERVENTIONS  Implementation  Collaboration with other providers - TCU RD  Enteral Nutrition - recs  Parenteral Nutrition/IV Fluids - Initiate, TPN change request sent to PharmD    Goals  Total avg nutritional intake to meet a minimum of 25 kcal/kg and 1.2 g PRO/kg daily (per dosing wt 67 kg).     Monitoring/Evaluation  Progress toward goals will be monitored and evaluated per protocol.    Crissy Reese, MS, RD, LD, CNSC

## 2021-06-03 NOTE — LETTER
Transition Communication Hand-off for Care Transitions to Next Level of Care Provider    Name: Nick Zamudio  : 1955  MRN #: [id number]  Primary Care Provider: Echo Lopez     Primary Clinic: [facility] [address] [place] [address]     Reason for Hospitalization:  Gastrointestinal hemorrhage, unspecified gastrointestinal hemorrhage type [K92.2]  Admit Date/Time: 6/3/2021  6:18 AM  Discharge Date: 21  Payor Source: Payor: MEDICARE / Plan: MEDICARE / Product Type: Medicare /     Reason for Communication Hand-off Referral: Other discharge to TCU    Discharge Plan: Pt will be discharging on  back to TCU.      Concern for non-adherence with plan of care:   Y/N No  Discharge Needs Assessment:  Needs      Most Recent Value   Equipment Currently Used at Home  wheelchair, power, tub bench, walker, rolling, grab bar, toilet, grab bar, tub/shower   # of Referrals Placed by CTS  External Care Coordination, Internal Clinic Care Coordination, Communication hand-offs to next level of Care Providers, Transportation          Already enrolled in Tele-monitoring program and name of program:  No  Follow-up specialty is recommended: No    Follow-up plan:    Future Appointments   Date Time Provider Department Center   2021  4:00 AM [facility]       Any outstanding tests or procedures:        Referrals     Future Labs/Procedures    Home infusion referral     Comments:    Your provider has referred you to: OTHER PROVIDERS: Estela    Local Address (if different from home address): Other (specify full address and phone number): TCU TBD    Anticipated Length of Therapy: TBD    Home Infusion Pharmacist to adjust therapy based on labs and clinical assessments: No (Home Infusion will call for order)    Labs:  May draw labs from Venous Catheter: Yes  Home Infusion Pharmacist to order labs based on therapy type and clinical assessments: No   Labs to be drawn: TBD  Frequency: TBD  Date of  1st lab draw: TBD  Call/Fax Lab Results to: TBD    Agency Staff to assess nursing needs for Infusion Therapy.    Access Device Management:  IV Access Type: PICC  Flush with Heparin and Normal Saline IVP PRN and routine site care (per agency protocol) to maintain access device? No (MD to specify):     [place]  Enteral feedings            Key Recommendations:      Dennis Ferrara MSW    AVS/Discharge Summary is the source of truth; this is a helpful guide for improved communication of patient story

## 2021-06-03 NOTE — H&P
[facility]    History and Physical - Hospitalist Service, Gold 13       Date of Admission:  6/3/2021    Assessment & Plan   Nick Zamudio is a 65 year old male admitted on 6/3/2021. 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     Acute blood loss anemia 2/2 to UGIB  Hematemesis, melena   Most concerning for bleeding following recent PEG placement procedure. Currently hemodynamically stable.   - S/p 1 unit pRBC 6/3  - Follow Hgb Q6H, space once stable   - IV PPI BID   - NPO on IV fluids for possible EGD  - CT abdomen/pelvis ordered with contrast per GI to evaluate for any acute pathology (abscess, perforation, etc).   - G tube to gravity drainage per GI; monitor output for bleeding  - Hold any TF or meds through Gtube for now    Severe malnutrition   Failure to thrive, deconditioning   - Pharm/RD consult for TPN   - When cleared to use G tube, start TF  - Restart PTA Creon once able to start diet   - PT/OT consults     Lactic acidosis  Likely due to dehydration and acute blood loss.   - Received 2L IV fluids and 1 unit pRBC  - Recheck this afternoon     Chronic pancreatitis  Chronic abdominal pain, diarrhea   - Continue IV fluids  - Oxycodone 5 mg Q6H PRN, Tylenol Q6H PRN (PTA takes percocet)  - Zofran, compazine PRN  - Restart Creon as above when able to have diet/in tube feeds    Type II DM   - Hold PTA Lantus 20 mg at bedtime  - Continue medium dose sliding scale insulin Q4H   - Hold PTA metformin (although looks to have been stopped after most recent admission to [facility] in April).      HLD  - Hold statin as non-essential while NPO        Diet:  NPO  DVT Prophylaxis: Pneumatic Compression Devices  Cummins Catheter: not present  Code Status:   FULL         Disposition Plan   Expected discharge: 2 - 3 days, recommended to transitional care unit once adequate pain management/ tolerating PO medications, hemoglobin stable and safe disposition plan/ TCU bed available.  Entered: Ashlie Mckeon MD 06/03/2021, 9:27 AM     The patient's care was discussed with the Bedside Nurse and Patient.    Ashlie Mckeon MD  [facility]  Contact information available via [facility] Paging/Directory  Please see sign in/sign out for up to date coverage information    ______________________________________________________________________    Chief Complaint   Vomiting, blood in vomit and stool     History is obtained from the patient    History of Present Illness   Nick Zamudio is a 65 year old male admitted on 6/3/2021. 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     He was admitted in April to [facility] for SBO, weakness, and FTT. PICC placed and he was started on TPN, with plans for PEG placement 6/1. He was discharged to TCU given generalized weakness and deconditioning. He had PEG placement on 6/1 which was uncomplicated. Art says for the day after the procedure he felt nauseous, and then began having dark, coffee colored emesis and dark stools. He denies any lightheadedness, chest pain, palpitations, vision change, or shortness of breath. He was brought to the ED from TCU due to concerns for  bleeding and need for further evaluation.     Review of Systems    The 10 point Review of Systems is negative other than noted in the HPI or here.     Past Medical History    I have reviewed this patient's medical history and updated it with pertinent information if needed.   Past Medical History:   Diagnosis Date     Diabetes (H)      Hypertension      Intussusception (H)      Iron deficiency anemia      Lymphedema      Pancreatic disease     calcific pancreas, multiple panc stones.     Paraplegia (H)     Due to radiation treatment testicular cancer     PVD (peripheral vascular disease) (H)      Renal disease     stone disease     Testicular cancer (H)     Radiation treatment       Past Surgical History   I have reviewed this patient's surgical history and updated it with pertinent information if needed.  Past Surgical History:   Procedure Laterality Date     ABDOMEN SURGERY      Partial colectomy for damage due to radiation treatment testicular ca     AMPUTATION      left toe     CHOLECYSTECTOMY       COLONOSCOPY      polyp     ENDOSCOPIC INSERTION TUBE GASTROSTOMY N/A 6/1/2021    Procedure: INSERTION, PEG-jejunostomy TUBE;  Surgeon: Christo Pepe MD;  Location:  OR     ENDOSCOPIC RETROGRADE CHOLANGIOPANCREATOGRAM N/A 7/13/2020    Procedure: ENDOSCOPIC RETROGRADE CHOLANGIOPANCREATOGRAPHY, STENT RETRIEVAL, STENT PLACEMENT, BILIARY SPHINCTEROTOMY;  Surgeon: Christo ePpe MD;  Location:  OR     ENDOSCOPIC RETROGRADE CHOLANGIOPANCREATOGRAM N/A 9/29/2020    Procedure: ENDOSCOPIC RETROGRADE CHOLANGIOPANCREATOGRAPHY with balloon sweep of bile ducts, bile ducts stents exchanged;  Surgeon: Christo Pepe MD;  Location:  OR     ENDOSCOPIC RETROGRADE CHOLANGIOPANCREATOGRAM N/A 12/9/2020    Procedure: ENDOSCOPIC RETROGRADE CHOLANGIOPANCREATOGRAPHY, STENT EXCHANGE;  Surgeon: Christo Pepe MD;  Location:  OR     ENDOSCOPIC RETROGRADE CHOLANGIOPANCREATOGRAM N/A 1/13/2021    Procedure:  ENDOSCOPIC RETROGRADE CHOLANGIOPANCREATOGRAPHY WITH PANCREATIC DUCT STENT REMOVAL;  Surgeon: Christo Pepe MD;  Location: [facility] OR     ENDOSCOPIC ULTRASOUND UPPER GASTROINTESTINAL TRACT (GI) N/A 2020    Procedure: ENDOSCOPIC ULTRASOUND, ESOPHAGOSCOPY / UPPER GASTROINTESTINAL TRACT (GI) WITH FINE NEEDLE BIOPSY;  Surgeon: Christo Pepe MD;  Location: [facility] OR     GI SURGERY      bowel resection     IR VISCERAL ANGIOGRAM  3/15/2021     ORTHOPEDIC SURGERY       SIGMOIDOSCOPY FLEXIBLE N/A 2021    Procedure: SIGMOIDOSCOPY, FLEXIBLE;  Surgeon: Christo Pepe MD;  Location: [facility] OR     TONSILLECTOMY         Social History   I have reviewed this patient's social history and updated it with pertinent information if needed.  Social History     Tobacco Use     Smoking status: Never Smoker     Smokeless tobacco: Never Used   Substance Use Topics     Alcohol use: Not Currently     Frequency: Never     Drug use: Never       Family History   I have reviewed this patient's family history and updated it with pertinent information if needed.  Family History   Problem Relation Age of Onset     No Known Problems Mother      No Known Problems Father          Prior to Admission Medications   Prior to Admission Medications   Prescriptions Last Dose Informant Patient Reported? Taking?   ACCU-CHEK GUIDE test strip   Yes No   Sig: USE 1 EACH TO TEST DAILY. CODE E11.9   amylase-lipase-protease (CREON) 05554-86073 units CPEP per EC capsule   Yes No   Sig: TAKE 3 WITH MEALS / 1 2 WITH SNACKS, UP TO 15 PER DAY.   atorvastatin (LIPITOR) 20 MG tablet   Yes No   Sig: Take 20 mg by mouth daily   insulin aspart (NOVOLOG VIAL) 100 UNITS/ML vial   Yes No   Sig: Inject Subcutaneous 3 times daily (with meals) Sliding scale 0-12 units TID   insulin glargine (LANTUS VIAL) 100 UNIT/ML vial   Yes No   Sig: Inject 20 Units Subcutaneous At Bedtime   metFORMIN (GLUCOPHAGE) 500 MG tablet   Yes No   Si,000 mg 2 times daily (with  meals) Take 2 tablets in the morning and 2 tablets in the evening before bed.currently not taking   ondansetron (ZOFRAN) 4 MG tablet   No No   Sig: Take 1 tablet (4 mg) by mouth every 6 hours as needed for nausea   oxyCODONE-acetaminophen (PERCOCET) 5-325 MG tablet   Yes No   Sig: TAKE 1 TABLET BY MOUTH EVERY 8 HOURS AS NEEDED FOR PAIN      Facility-Administered Medications: None     Allergies   Allergies   Allergen Reactions     Penicillins Other (See Comments)     Ibuprofen Rash     Tolerates Aleve       Physical Exam   Vital Signs: Temp: 98.2  F (36.8  C) Temp src: Oral BP: 132/88 Pulse: 116   Resp: 16 SpO2: 99 % O2 Device: None (Room air)    Weight: 0 lbs 0 oz    General: Awake, alert, no distress. Chronically ill, pale appearing.  Eyes: Sclera anicteric. No conjunctival injection. PERRLA.   CV: Normal rate and rhythm, normal S1 and S2. No murmurs, rubs, gallops. Radial pulses 2+ and symmetric.  Respiratory: Lungs clear to auscultation bilaterally. No wheezing, rhonchi, or rales.  Abdomen: Soft, non-distended. Mild tenderness to palpation diffusely. No rebound tenderness or guarding. +BS. G tube in place with no surrounding erythema or swelling.  Extremities: Bilateral lower extremity edema   Skin: Warm, dry. No rash on visible skin.   Neuro: Alert, oriented to conversation and situation. Face symmetric, speech intact. Moves all extremities.   Psych: Normal affect.       Data   Data reviewed today: I reviewed all medications, new labs and imaging results over the last 24 hours. I personally reviewed no images or EKG's today.    Recent Labs   Lab 06/03/21  0624   WBC 7.6   HGB 7.4*   MCV 94      INR 1.09      POTASSIUM 3.7   CHLORIDE 104   CO2 31   BUN 65*   CR 0.89   ANIONGAP 6   ELODIA 8.3*   *   ALBUMIN 1.9*   PROTTOTAL 5.3*   BILITOTAL 0.2   ALKPHOS 62   ALT 8   AST 8

## 2021-06-04 NOTE — CONSULTS
Care Management Initial Consult    General Information  Patient is a 65 year old male admitted on 6/3/2021. 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    Assessment completed with: VM-chart review,    Type of CM/SW Visit: Offer D/C Planning    Primary Care Provider verified and updated as needed: Yes     Readmission within the last 30 days: previous discharge plan unsuccessful      Reason for Consult: care coordination/care conference  Advance Care Planning:     No ACP docs       Communication Assessment  Patient's communication style: spoken language (English or Bilingual)    Hearing Difficulty or Deaf: no   Wear Glasses or Blind: yes    Cognitive  Cognitive/Neuro/Behavioral: WDL                      Living Environment:   People in home: spouse     Current living Arrangements: house      Able to return to prior arrangements: yes     Family/Social Support:  Care provided by: spouse/significant other, Patient is a total/max assist.  Provides care for: no one, unable/limited ability to care for self              Description of Support System:    Involved     Current Resources:   Patient receiving home care services:  No recently discharged from [facility] ([facility] and was at [facility] prior to that.     Community Resources:  no  Equipment currently used at home: wheelchair, power, tub bench, walker, rolling, grab bar, toilet, grab bar, tub/shower  Supplies currently used at home:  Motor scooter,     Employment/Financial:  Employment Status:     Not employed  Financial Concerns:   TBD     Lifestyle & Psychosocial Needs:         Socioeconomic History     Marital status:      Spouse name: Not on file     Number of children: Not on file     Years of education: Not on file     Highest education level: Not on file     Tobacco Use     Smoking status: Never Smoker     Smokeless tobacco: Never Used   Substance and Sexual Activity     Alcohol use: Not Currently     Frequency: Never     Drug use: Never       Functional Status:  Prior to admission patient needed assistance:    Yes patient has been non ambulatory for 12 years. Increasing weakness has led to patient not being able to care for himself anymore which led to hospitalization r/t weakness and falls to [facility] 4/27-5/7 then onto [facility] TCU. Patient then had a surgical procedure for PEG placement at [facility] by Dr Pepe.  Patient is a total/max assist.     Mental Health Status:    A&Ox4      Chemical Dependency Status:      No record of dependency         Values/Beliefs:  Spiritual, Cultural Beliefs, Episcopal Practices, Values that affect care:    Description of Beliefs that Will Affect Care: requests Amish            Additional Information:  Patient has current therapy and medicine recs for TCU. Hx of recommendation and use of TPN.    RNCC to follow ans assist as needed.       GIANCARLO Mack RN, BSN  5B RN Care Coordinator  [phone] phone  [phone] pager    Weekend or Holiday Care Coordinator [phone] pager  Job Code [phone]

## 2021-06-04 NOTE — PLAN OF CARE
Assumed cares [id number]  Patient was rounded on hourly     /50 (BP Location: Right arm)   Pulse 61   Temp 100.2  F (37.9  C) (Oral)   Resp 16   Ht 1.829 m (6')   Wt 64.3 kg (141 lb 12.1 oz)   SpO2 100%   BMI 19.23 kg/m      Pain: Endorses 2/10 pain, improved with repositioning   Neuro: A&Ox4  Respiratory: Infrequent cough   Cardiac/Neurovascular: murmur  GI/: G/J in place, clamped, incontinent of bowel and bladder. Orders for urine sample however pt is frequently incontinent  Nutrition: NPO, all medications through tube per patient  Activity: Paralyzed from hips down, turn q2h and PRN. Pt can slightly wiggle toes   Skin: Blanchable redness on coccyx, redness and scar tissue in lower abdomen/perineal area  Access: L PICC infusing TPN. Pt needs PIV for blood transfusions, however vascular access was unable to place. Will attempt again.     Events this shift: Hgb recheck at 7.0, asked CC if wanted to transfuse, no response. Morning labs will also include hgb. Pt turned q2h, pt appeared to be resting comfortably between cares.     Plan: Continue with plan of care. Update provider about any changes in patient status.

## 2021-06-04 NOTE — PLAN OF CARE
Assumed Cares: [id number]  Status: n/v, abd pain following PEGJ placement     VS: /43 (BP Location: Right arm)   Pulse 61   Temp 98.5  F (36.9  C) (Oral)   Resp 18   Ht 1.829 m (6')   Wt 64.3 kg (141 lb 12.1 oz)   SpO2 100%   BMI 19.23 kg/m    Neuro & Behavior: A&Ox4; pleasant.   Pain: Pt c/o pain in throat when swallowing. Oxy given with good results.   LDAs: Single Lumen PICC SL   Resp: WDL, no dyspnea reported, infrequent coughing  Cardiac: Murmur upon auscultation.   GI/: PEGJ in place; good to use for meds per GI team. Incontinent of bladder throughout shift. Endorses continuing of nausea intermittently, however zofran has helped greatly. No emesis.   Nutrition: advance as tolerated. Has done well with apple juice today.   Skin: Redness and scar tissue in lower abdomen/perineum and coccyx.   Activity: paralyzed from hips down. Can move self around in bed, however does require assist at times.     Events: Diet changed from npo to advance as tolerated. Has had some apple juice. Nausea continues-zofran given x 2. Oxy givenx2 for swallowing pain - has improved. Hg went up to 7.3 this afternoon, continue to monitor it. Creon not given- pt wants to take it only with solid food, but says he is not ready to advance to that.   Plan: Continue to monitor and follow POC.

## 2021-06-04 NOTE — SIGNIFICANT EVENT
SPIRITUAL HEALTH SERVICES Significant Event  [employer]  [facility] 5b    Pt anointed by Father Ainsley Corrigan   Pager [phone]

## 2021-06-04 NOTE — PROGRESS NOTES
GASTROENTEROLOGY PROGRESS NOTE          ASSESSMENT AND RECOMMENDATIONS:   Assessment:  Nick Zamudio is a 65 year old male with a history of testicular cancer and radiation therapy c/b bowel perforation, chronic calcific pancreatitis (hx ERCP with pancreatic stents), mesenteric stenosis (not amenable to vascular stent placement) presenting to ED following PEGJ placement, and sigmoidoscopy 6/1 with acutely worse nausea, vomiting and abdominal pain for which GI is consulted.     Coffee grounds emesis  Hgb remains stable 7-8 g/dL without further coffee ground emesis since last evening. This likely represented mild residual bleeding 2/2 PEGJ placement that appears resolved. Will defer repeat endoscopy as likely no therapeutic role and would risk disrupting PEJ placement.     Acute on chronic epigastric pain  Persistent nausea and vomiting   Chronic calcific pancreatitis  Diarrhea, chronic  Mesenteric stenosis  Regarding his more frequent epigastric pain, nausea and vomiting, this is likely 2/2 his chronic pancreatitis. His transaminases and bilirubin remain normal. He is also post cholecystectomy with persistent diarrhea. This is also likely 2/2 his pancreatitis, but could also include a component of bile acid diarrhea.    Sigmoid mass  CRS involved and ordered pelvic MRI to further evaluate. Given consideration for hemicolectomy with known jejunal mass as well, would further evaluate this in the context of potential bowel resection.     Recommendations  -ADAT, continue low residue diet and PTA Creon (22971-55062 capsule, 3 with meals, 2 with snack)   -If diarrhea not improving 6/5 start cholestyramine at 4 g daily, increase to BID if not improving 6/6)  -No need for EGD at this time given stable hgb, resolution of coffee ground emesis  -Use G for venting and J for nutrition, trial tolerance over coming weeks    GI will signoff. Thank you for involving us in this patient's care. Please do not hesitate to contact the  GI service with any questions or concerns.     Pt care plan discussed with Dr. Leon, GI staff physician.    Johnny Ledezma [facility]  GI [place]  Text page          Interval History:   Nausea much improved, still intermittently bothersome. Last emesis 5 pm last night. Hgb from 7.0 >>> 7.2 this am. Stools continue to be brown. Abdominal pain also improved at this time.          Medications:     Current Facility-Administered Medications   Medication     acetaminophen (TYLENOL) tablet 650 mg     dextrose 10% infusion     glucose gel 15-30 g    Or     dextrose 50 % injection 25-50 mL    Or     glucagon injection 1 mg     insulin aspart (NovoLOG) injection (RAPID ACTING)     lidocaine (LMX4) cream     lidocaine 1 % 0.1-1 mL     lipids (INTRALIPID) 20 % infusion 250 mL     magic mouthwash suspension (diphenhydramine, lidocaine, aluminum-magnesium & simethicone)     melatonin tablet 1 mg     ondansetron (ZOFRAN-ODT) ODT tab 4 mg    Or     ondansetron (ZOFRAN) injection 4 mg     oxyCODONE (ROXICODONE) tablet 5 mg     pantoprazole (PROTONIX) IV push injection 40 mg     parenteral nutrition - ADULT compounded formula CYCLE     prochlorperazine (COMPAZINE) injection 5 mg    Or     prochlorperazine (COMPAZINE) tablet 5 mg    Or     prochlorperazine (COMPAZINE) suppository 12.5 mg     sodium chloride (PF) 0.9% PF flush 3 mL     sodium chloride (PF) 0.9% PF flush 3 mL        Physical Exam   Blood pressure 130/44, pulse 62, temperature 98.3  F (36.8  C), temperature source Oral, resp. rate 16, height 1.829 m (6'), weight 64.3 kg (141 lb 12.1 oz), SpO2 99 %.  Constitutional: cooperative, pleasant, not dyspneic/diaphoretic, no acute distress  Eyes: Sclera anicteric/injected  Ears/nose/mouth/throat: Normal oropharynx without ulcers or exudate, mucus membranes moist  Neck: supple  CV: No edema  Respiratory: Unlabored breathing  Abd: Nondistended, +bs, no hepatosplenomegaly, nontender, no peritoneal signs, PEGJ clamped  Skin: warm, perfused,  no jaundice  Neuro: AAO x 3  Psych: Normal affect  MSK: No gross deformities    Data   Current Labs  CBC  Recent Labs   Lab 06/04/21  0736 06/04/21  0332 06/03/21  2204 06/03/21  1331 06/03/21  0624   WBC 3.9*  --   --   --  7.6   RBC 2.41*  --   --   --  2.46*   HGB 7.2* 7.0* 7.8* 7.9* 7.4*   HCT 22.9*  --   --   --  23.1*   MCV 95  --   --   --  94   MCH 29.9  --   --   --  30.1   MCHC 31.4*  --   --   --  32.0   RDW 14.2  --   --   --  13.8     --   --   --  200     BMP  Recent Labs   Lab 06/04/21  0736 06/03/21  0624    141   POTASSIUM 3.6 3.7   CHLORIDE 109 104   CO2 26 31   ANIONGAP 5 6   * 267*   BUN 53* 65*   CR 0.98 0.89   GFRESTIMATED 80 88   GFRESTBLACK >90 >90   ELODIA 7.9* 8.3*   MAG 2.1 2.2   PHOS 3.4 4.2      INR  Recent Labs   Lab 06/04/21  0736 06/03/21  0624   INR 1.09 1.09     Liver panel  Recent Labs   Lab 06/04/21  0736 06/03/21  0624   PROTTOTAL 4.7* 5.3*   ALBUMIN 1.6* 1.9*   BILITOTAL 0.2 0.2   ALKPHOS 50 62   AST 6 8   ALT 9 8       Imaging/procedures:  Reviewed in EMR

## 2021-06-04 NOTE — PLAN OF CARE
A:   patient denies pain, nausea.  Small incontinent stool.  Plus 2 edema lower extremities.  TPN infusing to single lumen PICC on left arm.  Blood sugar at 2030  62. Half amp d50 given and recheck 111.  Patient states does not feel low blood sugars.  Assist of two to turn.  Unable to move lower half.   R:  continue to monitor and treat per plan of care.

## 2021-06-04 NOTE — UTILIZATION REVIEW
Admission Status; Secondary Review Determination    Under the authority of the Utilization Management Committee, the utilization review process indicated a secondary review on the above patient. The review outcome is based on review of the medical records, discussions with staff, and applying clinical experience noted on the date of the review.    (x) Inpatient Status Appropriate - This patient's medical care is consistent with medical management for inpatient care and reasonable inpatient medical practice.    RATIONALE FOR DETERMINATION:  65-year-old male who presented to the hospital with nausea, vomiting and melena.  Patient has significant history of testicular cancer and radiation therapy complicated by bowel perforation, chronic calcific pancreatitis with history of ERCP with pancreatic stents, mesenteric stenosis not amenable to vascular stent placement with resultant severe malnutrition on TPN.  Patient underwent EUS and PEG tube placement on 6/1/2021.  With ER presentation patient found to have a hemoglobin 7.4 greater than 4 g lower than mid March 2021, received a unit of packed red blood cells with resultant hemoglobin dropping again to 7.2.  Patient's albumin was 2.7 in January and now has dropped to 1.6.  With the severity of patient's acute on chronic malnutrition complicated by significant GI bleed, inpatient management is appropriate.    At the time of admission with the information available to the attending physician more than 2 nights Hospital complex care was anticipated, based on patient risk of adverse outcome if treated as outpatient and complex care required. Inpatient admission is appropriate based on the Medicare guidelines.    This document was produced using voice recognition software    The information on this document is developed by the utilization review team in order for the business office to ensure compliance. This only denotes the appropriateness of proper admission status and  does not reflect the quality of care rendered.    The definitions of Inpatient Status and Observation Status used in making the determination above are those provided in the CMS Coverage Manual, Chapter 1 and Chapter 6, section 70.4.    Sincerely,    Gregory Arteaga MD  Utilization Review  Physician Advisor  [facility].

## 2021-06-04 NOTE — PROGRESS NOTES
06/04/21 0900   Quick Adds   Type of Visit Initial PT Evaluation   Living Environment   People in home spouse   Current Living Arrangements house   Home Accessibility no concerns   Transportation Anticipated family or friend will provide   Living Environment Comments pt lives in house with ramp access and all needs met on main floor, has tub/shower with bench and adaptive bathroom set up.   Self-Care   Usual Activity Tolerance moderate   Current Activity Tolerance poor   Regular Exercise No   Equipment Currently Used at Home wheelchair, power;tub bench;walker, rolling;grab bar, toilet;grab bar, tub/shower   Activity/Exercise/Self-Care Comment pt with limited mobility at baseline, has been in/out of hospital and TCU since March causing signficant funcitonal decline due to persistent pancreatitis. Prior to this, pt's baseline is [place] short distance ambulation with FWW ~10' and electric scooter, IND/[place] with transfers and ADLs. Most recently came from TCU where he had been working on standing tolerance (using lift for transfers, not ambulatory).   Disability/Function   Hearing Difficulty or Deaf no   Wear Glasses or Blind yes   Vision Management glasses   Concentrating, Remembering or Making Decisions Difficulty no   Difficulty Communicating no   Walking or Climbing Stairs Difficulty yes   Walking or Climbing Stairs ambulation difficulty, requires equipment;transferring difficulty, requires equipment;stair climbing difficulty, dependent   Mobility Management short distance [place] with FWW, electric scooter long distances   Dressing/Bathing Difficulty yes   Dressing/Bathing bathing difficulty, requires equipment;dressing difficulty, requires equipment   Dressing/Bathing Management [place] with tub bench   Toileting issues yes   Toileting Management adaptive equipment needed   Doing Errands Independently Difficulty (such as shopping) yes   Errands Management wife completes   Fall history within last six months yes  "  Number of times patient has fallen within last six months 6   Change in Functional Status Since Onset of Current Illness/Injury yes   General Information   Onset of Illness/Injury or Date of Surgery 06/03/21   Referring Physician Ashlie Mckeon MD   Patient/Family Therapy Goals Statement (PT) \"get stronger and get back home and get over all these pancreas attacks\"   Pertinent History of Current Problem (include personal factors and/or comorbidities that impact the POC) per chart review, \"Nick Zamudio is a 65 year old male admitted on 6/3/2021. He has history of testicular cancer with bilateral lower extremity paraparesis and lymphedema from radiation therapy, Type II DM, HTN, h/o SBO, failure to thrive and severe malnutrition on TPN, biliary stenosis and chronic pancreatitis s/p ERCP and stent placement, and chronic diarrhea who presents with hematemesis and melena following PEG placement on 6/1/21 with Hgb drop from 11.9 (in March 2021) to 7.4 on admission.\"   Existing Precautions/Restrictions fall   General Observations activity: up with assist   Cognition   Orientation Status (Cognition) oriented x 4   Affect/Mental Status (Cognition) WNL   Follows Commands (Cognition) WNL   Cognitive Status Comments pleasant, calm, cooperative   Pain Assessment   Patient Currently in Pain Yes, see Vital Sign flowsheet   Integumentary/Edema   Integumentary/Edema Comments chronic lymphedema issues, pt reports appears baseline: severe non-pittng in B feet, 2+ pitting at shins. Pt typically wears velcro wraps that are at SNF.   Posture    Posture Comments unable to assess as supine in bed   Range of Motion (ROM)   ROM Comment heelcord tightness B but able to reach neutral   Strength   Strength Comments extremely weak B LEs. Absent at ankle, trace HS activation, absent quad activation on L. On R 2/5 DF, able to complete full SAQ. MMT NT formally due to supine positioning.   Bed Mobility   Comment (Bed Mobility) NT, pt refusal "   Transfers   Transfer Safety Comments NT, pt refusal   Gait/Stairs (Locomotion)   Comment (Gait/Stairs) NT, not functionally ambulatory   Balance   Balance Comments NT, pt refusal   Sensory Examination   Sensory Perception patient reports no sensory changes   Muscle Tone   Muscle Tone Comments low tone in B feet/ankles   Clinical Impression   Criteria for Skilled Therapeutic Intervention yes, treatment indicated   PT Diagnosis (PT) impaired functional mobility   Influenced by the following impairments LE weakness, reduced activity tolerance, anticipated impaired balance   Functional limitations due to impairments bed mobility, transfers, gait, ADLs   Clinical Presentation Stable/Uncomplicated   Clinical Presentation Rationale PMH, clinician impression   Clinical Decision Making (Complexity) low complexity   Therapy Frequency (PT) 4x/week   Predicted Duration of Therapy Intervention (days/wks) 2-3 days   Planned Therapy Interventions (PT) balance training;bed mobility training;gait training;home exercise program;neuromuscular re-education;ROM (range of motion);strengthening;transfer training   Risk & Benefits of therapy have been explained evaluation/treatment results reviewed;care plan/treatment goals reviewed;risks/benefits reviewed;current/potential barriers reviewed;participants voiced agreement with care plan;participants included;patient   Clinical Impression Comments see DC PT planner below   PT Discharge Planning    PT Discharge Recommendation (DC Rec) Transitional Care Facility   PT Rationale for DC Rec pt with signficant functional decline 2/2 prolonged hospitilziations/rehab stays since March. He is very deconditioned, but near where he was at recent SNF. Pt would benefit from returning to SNF to continue progressing strength and IND with mobiity  needed for eventual return home. Would benefit from IP PT during hospital addmision to address above deficits and prevent functional decline before returning to  SNF   PT Brief overview of current status  Lift for OOB activity   Total Evaluation Time   Total Evaluation Time (Minutes) 10

## 2021-06-05 NOTE — PROGRESS NOTES
"I discussed briefly with Mr. Zamudio what his wishes would be if an emergency happened and he stopped breathing or his heart stopped for any reason. He responded with \"full resuscitation\" and acknowledges that his family could help make medical decisions in the event that he was not able to make them for himself. I have placed a FULL CODE order in his chart to reflect this conversation.     BERTHA Rodriguez    "

## 2021-06-05 NOTE — CONSULTS
Inpatient Diabetes Consult    Chief Complaint blood in vomiting  Consult requested by: Marcelle Ambrose MD      Assessment and plan:  Nick Zamudio is a 65 year old male with PMHx of hypertension, type II diabetes, history of testicular cancer with bilateral lower extremity paraparesis, bilateral lower extremity lymphedema due to complication of radiation therapy, history of small bowel obstruction, failure to thrive, severe malnutrition, biliary stenosis, chronic pancreatitis    1. Type II diabetes, A1c not reliable due to anemia  2. TPN induced hyperglycemia (TPN will be discontinued from tomorrow)  3. TF induced hyperglycemia (started on 6/5): Will be at goal in 2 days    Regular diet has also been resumed starting today    Plan:    22 units of regular insulin to TPN yesterday    TPN discontinued today    Started on insulin infusion (non-DKA protocol) yesterday.     Will be at goal rate around 11 AM tomorrow morning    Will be switched to subcutaneous insulin tomorrow    Start NovoLog 1 unit per 12 gm CHO with meals and snacks    Glucose checks per protocol    Hypoglycemia protocol    Patient is seen, examined and discussed with Dr. Juliana Ravi MD  Endocrinology Fellow  Pager Number: [phone]      =====================================================================      History of Present Illness  Nick Zamudio is a 65 year old male with PMHx of hypertension, type II diabetes, testicular cancer. He developed bilateral lower extremity paraparesis and lymphedema from radiation therapy, chronic pancreatitis    He had a prolonged hospitalization at [facility] in April for small bowel obstruction which was complicated by severe malnutrition, failure to thrive. He had to be started on TPN to make sure he gets adequate nutrition. He was subsequently discharged in May to TCU as he had generalized weakness and had become significantly deconditioned. He had a PEG tube placed on 6/1 at [facility], which  was uncomplicated.  A day after the procedure the patient developed abdominal discomfort and nausea. He started having vomiting, which was coffee ground and colored and also started developing dark colored stools. He was then brought to the ED from TCU for concerns of bleeding    He is getting pancreatic enzymes supplements  has been on TPN since this admission, yesterday was TPN.  He was started on tube feeding yesterday, at 10 ml/hr, will be increased every eight hours the goal rate.    He has been started on regular diet, was able to eat later this morning without any nausea, vomiting or any abdominal discomfort. He is hopeful that he will be able to have a small lunch as well    Recent Labs   Lab 06/05/21  1155 06/05/21  0736 06/05/21  0654 06/05/21  0357 06/05/21  0011 06/04/21  2200 06/04/21  2104 06/04/21  0736 06/04/21  0736 06/03/21  0624 06/03/21  0624   GLC  --   --  345*  --   --   --   --   --  322*  --  267*   * 321*  --  272* 263* 185* 120*   < >  --    < >  --     < > = values in this interval not displayed.         Diabetes Type:  Type 2 Diabetes  Diabetes Duration: >5 years  Usual Diabetes Regimen:   BG monitoring frequency: 1-2 times/d  Medications:   Lantus 20 units daily  NovoLog 2 per 50>150    Diabetes Control:   Lab Results   Component units  Value Date    A1C 6.8 06/03/2021     Diabetes Complications: unclear if has retinopathy, has peripheral neuropathy, has nephropathy  History of DKA: No  Able to Detect Hypoglycemia: Present  Usual Diabetes Care Provider: PCP  Factors Impacting Glucose Control:     Current Inpatient DM regimen:  Insulin gtt    Review of Systems  10 point ROS completed with pertinent positives and negatives noted in the HPI    Past medical, family and social histories are reviewed and updated.    Past Medical History  Past Medical History:   Diagnosis Date     Diabetes (H)      Hypertension      Intussusception (H)      Iron deficiency anemia      Lymphedema       Pancreatic disease     calcific pancreas, multiple panc stones.     Paraplegia (H)     Due to radiation treatment testicular cancer     PVD (peripheral vascular disease) (H)      Renal disease     stone disease     Testicular cancer (H)     Radiation treatment       Family History  Family History   Problem Relation Age of Onset     No Known Problems Mother      No Known Problems Father        Social History  Social History     Socioeconomic History     Marital status:      Spouse name: None     Number of children: None     Years of education: None     Highest education level: None   Occupational History     None   Social Needs     Financial resource strain: None     Food insecurity     Worry: None     Inability: None     Transportation needs     Medical: None     Non-medical: None   Tobacco Use     Smoking status: Never Smoker     Smokeless tobacco: Never Used   Substance and Sexual Activity     Alcohol use: Not Currently     Frequency: Never     Drug use: Never     Sexual activity: None   Lifestyle     Physical activity     Days per week: None     Minutes per session: None     Stress: None   Relationships     Social connections     Talks on phone: None     Gets together: None     Attends Church service: None     Active member of club or organization: None     Attends meetings of clubs or organizations: None     Relationship status: None     Intimate partner violence     Fear of current or ex partner: None     Emotionally abused: None     Physically abused: None     Forced sexual activity: None   Other Topics Concern     Parent/sibling w/ CABG, MI or angioplasty before 65F 55M? Not Asked   Social History Narrative     None         Physical Examination:  Vital signs:  Temp: 98.3  F (36.8  C) Temp src: Oral BP: 130/50 Pulse: 63   Resp: 18 SpO2: 99 % O2 Device: None (Room air)   Height: 182.9 cm (6') Weight: 64.3 kg (141 lb 12.1 oz)  Estimated body mass index is 19.23 kg/m  as calculated from the following:     Height as of this encounter: 1.829 m (6').    Weight as of this encounter: 64.3 kg (141 lb 12.1 oz).  Gen. appearance: lying comfortably in bed during assessment. Is not available in acute distress.  HEENT: PEERLA, oral cavity moist  Lungs: clear to auscultation bilaterally  Heart: S1S2 normal. Pulse: regular rate and rhythm, good volume  Abdomen: soft, mild tenderness noted in the epigastric region. Nondistended  Neurological: conscious and oriented, speech: normal, moving all four extremities equally  Extremities: no edema noted over lower extremities    Laboratory  Recent Labs   Lab Test 06/05/21  0654 06/04/21  0736    140   POTASSIUM 3.8 3.6   CHLORIDE 107 109   CO2 26 26   ANIONGAP 6 5   * 322*   BUN 50* 53*   CR 0.94 0.98   ELODIA 7.6* 7.9*     CBC RESULTS:   Recent Labs   Lab Test 06/04/21  1911 06/04/21  1322   WBC  --  4.2   RBC  --  2.46*   HGB 7.4* 7.3*   HCT  --  22.7*   MCV  --  92   MCH  --  29.7   MCHC  --  32.2   RDW  --  14.2   PLT  --  127*       Liver Function Studies -   Recent Labs   Lab Test 06/04/21  0736   PROTTOTAL 4.7*   ALBUMIN 1.6*   BILITOTAL 0.2   ALKPHOS 50   AST 6   ALT 9       Active Medications  Current Facility-Administered Medications   Medication     acetaminophen (TYLENOL) tablet 650 mg     amylase-lipase-protease (CREON 24) 11693-36881 units per EC capsule 1-2 capsule    And     sodium bicarbonate tablet 325 mg     amylase-lipase-protease (CREON 24) 87274-03096 units per EC capsule 2 capsule     amylase-lipase-protease (CREON 24) 29066-31479 units per EC capsule 3 capsule     dextrose 10% infusion     dextrose 10% infusion     dextrose 10% infusion     glucose gel 15-30 g    Or     dextrose 50 % injection 25-50 mL    Or     glucagon injection 1 mg     glucose gel 15-30 g    Or     dextrose 50 % injection 25-50 mL    Or     glucagon injection 1 mg     insulin 1 unit/mL in saline (NovoLIN, HumuLIN Regular) drip - ADULT IV Infusion     insulin aspart (NovoLOG)  injection (RAPID ACTING)     lidocaine (LMX4) cream     lidocaine 1 % 0.1-1 mL     lipids (INTRALIPID) 20 % infusion 250 mL     magic mouthwash suspension (diphenhydramine, lidocaine, aluminum-magnesium & simethicone)     melatonin tablet 1 mg     multivitamins w/minerals (CERTAVITE) liquid 15 mL     ondansetron (ZOFRAN-ODT) ODT tab 4 mg    Or     ondansetron (ZOFRAN) injection 4 mg     oxyCODONE (ROXICODONE) tablet 5 mg     pantoprazole (PROTONIX) IV push injection 40 mg     parenteral nutrition - ADULT compounded formula CYCLE     parenteral nutrition - ADULT compounded formula CYCLE     prochlorperazine (COMPAZINE) injection 5 mg    Or     prochlorperazine (COMPAZINE) tablet 5 mg    Or     prochlorperazine (COMPAZINE) suppository 12.5 mg     sodium chloride (PF) 0.9% PF flush 3 mL     sodium chloride (PF) 0.9% PF flush 3 mL     No current outpatient medications on file.       Current Diet  Orders Placed This Encounter      Advance Diet as Tolerated: Clear Liquid Diet; Low Fat Diet

## 2021-06-05 NOTE — PLAN OF CARE
Assumed cares [id number]  Patient was rounded on hourly     /49 (BP Location: Right arm)   Pulse 63   Temp 98.5  F (36.9  C) (Oral)   Resp 18   Ht 1.829 m (6')   Wt 64.3 kg (141 lb 12.1 oz)   SpO2 99%   BMI 19.23 kg/m      Pain: Abdominal pain treated well with current regimen  Neuro: A&Ox4  Respiratory: WDL  Cardiac/Neurovascular: Murmur  GI/: Smear BM, adequate urine. Incontinent of bowel and bladder   Nutrition: Clear liquids, tolerated well. Pt interesting in advancing diet this AM if able  Activity: Turn q2h. Pt able to roll in bed to assist with turns   Skin: Bruising, perineal area very reddened and scarred   Access: L PICC infusing TPN    Events this shift: No acute events this shift. Pt appeared to be resting comfortably between cares.     Plan: Continue with plan of care. Update provider about any changes in patient status.

## 2021-06-05 NOTE — PROGRESS NOTES
[facility]    Medicine Progress Note - Hospitalist Service, Gold 8       Date of Admission:  6/3/2021  Assessment & Plan             Nick Zamudio is a 65 year old male admitted on 6/3/2021. 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     6/5:   -started clears and ADAT, continue low residue diet and PTA Creon (72316-86387 capsule, 3 with meals, 2 with snack)  -pain has some improved, if continues will start cholestyramine at 4 g daily 6/6, increase to BID if not improving 6/7  -reorder TPN   - appreciate nutrition involvement with starting tube feeds  - Re-assess [employer] VTE ppx pending AM Hb trending.    Acute blood loss anemia 2/2 to UGIB  Hematemesis, melena   Most concerning for bleeding following recent PEG placement procedure. Currently hemodynamically stable.  S/p 1 unit pRBC 6/3  - hgb daily  - IV PPI BID to transition to oral closer to discharge  Who is okay like I know I know that voice  Nonsevere malnutrition   Failure to thrive, deconditioning   - Pharm/RD consult for TPN   - started tube feeds 6/5  - Restaredt PTA Creon 6/5  - PT/OT consults      Lactic acidosis resolved  Likely due to dehydration and acute blood loss. Received 2L IV fluids and 1 unit pRBC     Chronic pancreatitis  Chronic abdominal pain, diarrhea   - Continue IV fluids  - Oxycodone 5 mg Q6H PRN, Tylenol Q6H PRN (PTA takes percocet)  - Zofran, compazine PRN  - Restart Creon as above when able to have diet/in tube feeds  - restarting tube feeds and ADAT -low fat diet     Type II DM   With TPN and transitioning to  tube feeds consulted Endo for assistant with higher blood sugars  - Continue medium dose sliding scale insulin Q4H   - Hold PTA metformin (although looks to have been stopped after most recent admission to [facility] in April).      HLD  - Hold statin as non-essential while NPO        Diet: parenteral nutrition - ADULT compounded formula CYCLE  Adult Formula Drip Feeding: Continuous Vital 1.5; Jejunostomy; Goal Rate: 60; mL/hr; Medication - Feeding Tube Flush Frequency: At least 15-30 mL water before and after medication administration and with tube clogging; Amount to Send (Nutrition us...  Advance Diet as Tolerated: Clear Liquid Diet; Low Fat Diet  parenteral nutrition - ADULT compounded formula CYCLE    DVT Prophylaxis: VTE Prophylaxis contraindicated due to GI bleed  Cummins Catheter: not present  Code Status:  full         Disposition Plan   Expected discharge: 2-5 days, recommended to transitional care unit once safe disposition plan/ TCU bed available and and appropriate nutrition plan and hgb stable.  Entered: Marcelle Ambrose MD 06/05/2021, 4:33 PM       The patient's care was discussed with the Bedside Nurse, Patient and Endo Consultant.    Marcelle Ambrose MD  Hospitalist Service, [address]  Contact information available via [facility] Paging/Directory  Please see sign in/sign out for up to date coverage information  ______________________________________________________________________    Interval History   No acute events overnight.  No shortness of breath or chest pain.  Abdominal pain will start coming and going does seem to be significantly improved and is manageable at this point in time.  He is hungry and interested in eating which is different than his previous.      Data reviewed today: I reviewed all medications, new labs and imaging results over the last 24 hours. I personally reviewed no images or EKG's today.    Physical Exam    Vital Signs: Temp: 98.3  F (36.8  C) Temp src: Oral BP: 130/50 Pulse: 63   Resp: 18 SpO2: 99 % O2 Device: None (Room air)    Weight: 141 lbs 12.09 oz  General Appearance: Very pleasant gentleman no acute distress  Respiratory: Clear to auscultation bilaterally  Cardiovascular: Regular rate and rhythm, S1, S2 no murmurs rubs or gallops  GI: Soft nontender, bowel sounds normal active feeding tube in place  Skin: No clear bruising  Other: Alert and oriented    Data   Recent Labs   Lab 06/05/21  0654 06/04/21  1911 06/04/21  1322 06/04/21  0736 06/03/21  1331 06/03/21  1331 06/03/21  0624   WBC  --   --  4.2 3.9*  --   --  7.6   HGB  --  7.4* 7.3* 7.2*   < > 7.9* 7.4*   MCV  --   --  92 95  --   --  94   PLT  --   --  127* 151  --   --  200   INR  --   --   --  1.09  --   --  1.09     --   --  140  --   --  141   POTASSIUM 3.8  --   --  3.6  --   --  3.7   CHLORIDE 107  --   --  109  --   --  104   CO2 26  --   --  26  --   --  31   BUN 50*  --   --  53*  --   --  65*   CR 0.94  --   --  0.98  --   --  0.89   ANIONGAP 6  --   --  5  --   --  6   ELODIA 7.6*  --   --  7.9*  --   --  8.3*   *  --   --  322*  --   --  267*   ALBUMIN  --   --   --  1.6*  --   --  1.9*   PROTTOTAL  --   --   --  4.7*  --   --  5.3*   BILITOTAL  --   --   --  0.2  --   --  0.2   ALKPHOS  --   --   --  50  --   --  62   ALT  --   --   --  9  --   --  8   AST  --   --   --  6  --   --  8   LIPASE  --   --   --  <10*  --  <10*  --     < > = values in this interval not displayed.     No results found for this or any previous visit (from the past 24 hour(s)).

## 2021-06-05 NOTE — PLAN OF CARE
Discharged to: Home  Transportation: Family  Time: 115 am  Prescriptions: discharge pharmacy  Belongings: returned to pt   PIV/Access: Port was deaccessed  Paperwork: discussed with pt and wife.

## 2021-06-05 NOTE — PLAN OF CARE
Assumed Cares: [id number]  Status: PEGJ tube feeds. Here initially for N/V     VS: /50 (BP Location: Right arm)   Pulse 63   Temp 98.3  F (36.8  C) (Oral)   Resp 18   Ht 1.829 m (6')   Wt 63.1 kg (139 lb 1.8 oz)   SpO2 99%   BMI 18.87 kg/m    Neuro & Behavior: A&Ox4; pleasant  Pain: Pt c/o pain when swallowing. Oxy given  LDAs: Single Lumen PICC infusing saline TKO w/ insulin drip. Last check at Algorithm 2.   Resp: WDL, no dyspnea reported.   Cardiac: Murmur upon auscultation.   GI/:PEGJ in place. Site CDI. Dressing changed. Incontinent of urine. Endorses nausea intermittently. Zofran given with good results.   Nutrition: Now tolerating reg diet. Had some bites of oatmeal and is going to try to eat dinner. Has had apple juice throughout day as well. Tube feed going at 20 ml/hr with goal rate of 60. Up rate q8hrs, next rate increase at 0230   Skin: Redness and scar tissue in lower abdomen/perineum and coccyx.   Activity: 2 assist for lift. 1 assist for cares.     Events: Diet was advanced to regular food. Blood glucose has been stable. 187, 198, 177 and 127. On insulin drip 2ml/hr just put to alg 2. Check at 1930. Eating apple juice, oatmeal, has ordered dinner. J tube feed started at 1030 with rate of 10 ml/hr. Increased rate at 1830 to 20ml/hr. Has tolerated fine.   Plan: Continue to monitor and follow POC.

## 2021-06-05 NOTE — PROGRESS NOTES
CLINICAL NUTRITION SERVICES - BRIEF NOTE     Nutrition Prescription    RECOMMENDATIONS FOR MDs/PROVIDERS TO ORDER:  - Continue TPN as written overnight 6/5-6/6, then discontinue  - Do note administer oral Creon until diet advances to solids.  - Recommend Endo consult to better manage BG with transition to TF  - Add Vit D supplements for low lab value.     Recommendations already ordered by Registered Dietitian (RD):  Initiate TF via J-tube:   --GOAL: Vital 1.5 Elvis @ goal of 60ml/hr (1440ml/day) will provide: 2160 kcals (34 kcal/kg), 97 g PRO (1.5 g/kg), 1100 ml free H20, 269 g CHO, 82 g Fat, and 8 g fiber daily.  --Start TF @ 10 ml/hr and advance by 10 ml q 8 hrs as tolerated until goal rate.  --Minimum 30 ml q 4 hrs water flushes for tube patency.  --MVI/minerals supplement if not already ordered Once begin TFs, begin the following pancreatic enzyme regimen and recommend order each of the following:   (please copy and paste administration instructions into each order)  A) Sodium Bicarb tablet (325 mg), 1 tablet q 4 hrs via Jtube. Administration Instructions: Crush 1 tablet and mix into 15 ml of warm water and use this solution to mix with Creon pancreatic enzymes. DO NOT administer directly into Jtube (to be mixed into TF formula with Creon enzyme - see Creon enzyme order)   B) Creon 24, 1-2 capsules q 4 hrs via Jtube. Administration Instructions:   --If TF rate is running @ 10-45 ml/hr, administer 1 capsule q 4 hrs;   --If TF rate is running @ 50-60 ml/hr, increase to 2 capsules q 4 hrs.    **Open capsule and empty contents into 15 ml sodium bicarb solution (see sodium bicarb order), let dissolve for about 20-30 minutes and then add this solution to the amount of TF formula hung in TF bag every 4 hrs (i.e., once TF @ goal infusion 60 ml/hr will mix 2 capsules into 240 ml of TF formula every 4 hrs).   *Note: this enzyme regimen with TF @ goal infusion will provide approx 3512 units of lipase/gram of total Fat daily  and approp dosing initially for pancreatic insufficiency with more elemental TF formula.     Future/Additional Recommendations:  Monitor labs ordered 6/3 for fecal elastase, vitamin A/D/E/K, and zinc levels due to chronic pancreatitis and chronic diarrhea. Consider AquADEKs pending fat soluble vitamin levels.       (See RD note on 6/3 for full assessment)     Reason for RD note: Provider Order - Registered Dietitian to order TF per Medical Nutrition Therapy Guidelines      New Findings/Chart Review:  Weight:   64.3 kg (141 lb 12.1 oz)    Labs:     Vit D 5 (L)  Lipase <10 (L)  A1c 6.8 - 6/3  -322    Medications Reviewed    GI:  4 BM's noted yesterday    Dosing Weight: 64 kg admission weight - 6/3 lowest wt since admit     ASSESSED NUTRITION NEEDS  Estimated Energy Needs: 8970-5092+ kcals/day (25 - 30 kcals/kg)  Justification: Maintenance and Repletion  Estimated Protein Needs: + grams protein/day (1.2 - 1.5 grams of pro/kg)  Justification: Increased needs and Repletion  Estimated Fluid Needs: (1 mL/kcal)   Justification: Maintenance and Per provider pending fluid status    Interventions:  Enteral Nutrition - Initiate     Nutrition will continue to follow per protocol.     Serenity Lal RDN, LD  Weekend pager [phone]

## 2021-06-06 NOTE — PROGRESS NOTES
[facility]    Medicine Progress Note - Hospitalist Service, Gold 8       Date of Admission:  6/3/2021  Assessment & Plan   Nick Zamudio is a 65 year old male admitted on 6/3/2021. 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      6/6:   -Ct ADAT, continue low residue diet and PTA Creon (20738-92273 capsule, 3 with meals, 2 with snack)  -pain has not much improved (still frequent opioids) will start cholestyramine at 4 g daily 6/6, increase to BID if not improving 6/7  -TPN   - tube feeds  - Re-assess [employer] VTE ppx  tomorrow     Acute blood loss anemia 2/2 to UGIB  Hematemesis, melena   Most concerning for bleeding following recent PEG placement procedure. Currently hemodynamically stable.  S/p 1 unit pRBC 6/3  - hgb daily  - IV PPI BID to transition to oral closer to discharge    Nonsevere malnutrition   Failure to thrive, deconditioning   - Pharm/RD consult for TPN   - started tube feeds 6/5  - Restaredt PTA Creon 6/5  - PT/OT consults      Lactic acidosis resolved  Likely due to dehydration and acute blood loss. Received 2L IV fluids and 1 unit pRBC     Chronic pancreatitis  Chronic abdominal pain, diarrhea   - Continue IV fluids  - Oxycodone 5 mg Q6H PRN, Tylenol Q6H PRN (PTA takes percocet)  - Zofran, compazine PRN  - Restart Creon as above when able to have diet/in tube feeds  - restarted tube feeds and ADAT -low fat diet     Type II DM   With TPN and transitioning to tube feeds consulted Endo for assistant with higher blood sugars  (See there not for details- likely transition from IV to  s/c today)   - Hold PTA metformin (although looks to have been stopped after most recent admission to [facility] in April).      HLD  - Hold statin as non-essential while NPO       Diet: Adult Formula Drip Feeding: Continuous Vital 1.5; Jejunostomy; Goal Rate: 60; mL/hr; Medication - Feeding Tube Flush Frequency: At least 15-30 mL water before and after medication administration and with tube clogging; Amount to Send (Nutrition us...  Advance Diet as Tolerated: Regular Diet Adult; Low Fat Diet    DVT Prophylaxis: Pneumatic Compression Devices  Cummins Catheter: not present  Code Status: Full Code         Disposition Plan   Expected discharge: 1-2 days, recommended to prior living arrangement once adequate pain management/ tolerating PO medications and blood glucose controlled.  Entered: Domenic Walden MD, MD 06/06/2021, 2:35 PM     The patient's care was discussed with the Bedside Nurse and Patient.    Domenic Walden MD, MD  Hospitalist Service, [address]  Contact information available via [facility] Paging/Directory  Please see sign in/sign out for up to date coverage information  ______________________________________________________________________    Interval History   C/o continued pain. Taking frequent oxycodone.     4 point ROS done and neg unless mentioned    Data reviewed today: I reviewed all medications, new labs and imaging results over the last 24 hours. I personally reviewed no images or EKG's today.    Physical Exam   /47 (BP Location: Right arm)   Pulse 71   Temp 97.5  F (36.4  C) (Oral)   Resp 18   Ht 1.829 m (6')   Wt 63.1 kg (139 lb 1.8 oz)   SpO2 99%   BMI 18.87 kg/m    Gen- pleasant lying in bed  HEENT- NAD, JOHANNY  Neck- supple, no JVD elevation, no thyromegaly  CVS- I+II+ no m/r/g  RS- CTAB  Abdo- soft,  tenderness in epigastrium. No g/r/r   Ext- no edema       Data    BMP  Recent Labs   Lab 06/06/21  0617 06/05/21  0654  06/04/21  0736 06/03/21  0624    139 140 141   POTASSIUM 4.1 3.8 3.6 3.7   CHLORIDE 108 107 109 104   ELODIA 8.0* 7.6* 7.9* 8.3*   CO2 25 26 26 31   BUN 52* 50* 53* 65*   CR 0.91 0.94 0.98 0.89   * 345* 322* 267*     CBC  Recent Labs   Lab 06/06/21  0617 06/05/21  0654 06/04/21  1322 06/04/21  1322 06/04/21  0736   WBC 4.4 4.0  --  4.2 3.9*   RBC 2.49* 2.36*  --  2.46* 2.41*   HGB 7.3* 7.1*   < > 7.3* 7.2*   HCT 24.3* 22.9*  --  22.7* 22.9*   MCV 98 97  --  92 95   MCH 29.3 30.1  --  29.7 29.9   MCHC 30.0* 31.0*  --  32.2 31.4*   RDW 13.7 14.1  --  14.2 14.2    150  --  127* 151    < > = values in this interval not displayed.     INR  Recent Labs   Lab 06/04/21  0736 06/03/21  0624   INR 1.09 1.09     LFTs  Recent Labs   Lab 06/04/21  0736 06/03/21  0624   ALKPHOS 50 62   AST 6 8   ALT 9 8   BILITOTAL 0.2 0.2   PROTTOTAL 4.7* 5.3*   ALBUMIN 1.6* 1.9*      PANC  Recent Labs   Lab 06/04/21  0736 06/03/21  1331   LIPASE <10* <10*       No results found for this or any previous visit (from the past 24 hour(s)).

## 2021-06-06 NOTE — PLAN OF CARE
5B PT Cx: Patient greeted in bed adamantly refusing therapy this date stating that  he doesn't feel ready. Upon further discussion patient indicates that he anticipates  not being able to work with PT tomorrow either. Encourage continued bed exercises and will re-schedule for 6/8.

## 2021-06-06 NOTE — PLAN OF CARE
Assumed Cares: [id number]  Status: PEGJ tube feeds. Here initially for N/V      VS: /47 (BP Location: Right arm)   Pulse 71   Temp 97.5  F (36.4  C) (Oral)   Resp 18   Ht 1.829 m (6')   Wt 63.1 kg (139 lb 1.8 oz)   SpO2 99%   BMI 18.87 kg/m      Neuro & Behavior: A&Ox4; pleasant  Pain: Pt c/o pain when swallowing and upper abdominal pain. Oxy given  LDAs: Single Lumen PICC infusing saline TKO w/ insulin drip. Insulin drip has had consistently stable glucoses. Discontinue?   Resp: WDL, no dyspnea reported.   Cardiac: Murmur upon auscultation. Lymphedema and poor circulation post radiation.   GI/:PEGJ in place infusing tube feed. Incontinent of urine but male external cath in place and works well. Endorses nausea intermittently. Zofran given with good results.   Nutrition: Reg diet, poor intake due to nausea. Tried to order late breakfast but ate nothing. Has had some sips of apple juice. TPN currently infusing at 50 hr. Next rate increase at 0230.   Skin: Redness and scar tissue in lower abdomen/perineum and coccyx. Pulsate mattress.   Activity: 2 assist for lift. 1 assist for cares.      Events: Poor appetite this shift. Pt very tired today due to overnight disturbances due to insulin drip and hourly checks. Glucose has been very stable and managed well. Tube feed running at 50 hr. Next rate increase to goal rate of 60 at 0230. Some nausea and pain in abdomen but pt contributes this to pancreatitis symptoms. This afternoon was very uncomfortable but greatly releived with oxy and Zofran administration.   Plan: Continue to monitor and follow POC.

## 2021-06-06 NOTE — PLAN OF CARE
/46 (BP Location: Right arm)   Pulse 71   Temp 97.9  F (36.6  C) (Oral)   Resp 18   Ht 1.829 m (6')   Wt 63.1 kg (139 lb 1.8 oz)   SpO2 98%   BMI 18.87 kg/m      Assumed Cares: [id number]  Neuro: A&O x 4  Respiratory: LS diminished at bases; denies SOB  Cardiac: Murmur heard; denies chest pain   GI/: TF rate increased to 30ml/hr at 0230; next rate increase at to 40ml/hr at 1030 with goal of 60ml/hr; intermittent nausea alleviated with zofran x 2; cycled TPN started at 2045; incontinent of unine; no BM overnight  Skin: Mepilex over coccyx for protection; radiation injuries to groin and lower abdomen from hx of testicular cancer; generalized bruising throughout body  Lines: L single lumen PICC running cycled TPN; R PIV placed and running insulin gtt  Pain: Swallowing pain and upper abdominal pain managed with oxycodone x 2  Diet: Regular diet; able to eat a little bit of dinner along with some pudding; tolerated thin liquids with small sips; BG checks completed hourly; insulin gtt paused from 4571-0289 for BG < 100; last  at 0630; pt currently on algorithm 2 with insulin at 2 units/hr  Activity Level: 2 assist with a lift; able to turn well in bed with assistance from 1 staff member  Significant Events: Pt continued on insulin gtt throughout the night  Plan: Continue with POC; notify provider with changes in pt condition

## 2021-06-07 NOTE — TELEPHONE ENCOUNTER
Post jtube/flex sig (6/1/21) with Dr. Pepe: Follow-up    Post procedure recommendations:   Referral to Dr Connor Crenshaw for consideration of  left hemicolectomy, TAMIS, vs conservative observation   given overall comorbidities; we will also have a   discussion regarding the submucosal lesion of the distal    duodenum which is not amenable to endoscopic surveillance or management  - Patients facility to cut the underlying blue strings   of the white buttoned T tags in 2 weeks     Orders placed: none    Patient states: pt called 6/7, has been admitted r/t GIB, knows he will follow up with [facility].    Clinic contact and scheduling numbers verified for future questions/concerns.    Lorrie Bansal, RN Care Coordinator

## 2021-06-07 NOTE — PROGRESS NOTES
Diabetes Consult Daily  Progress Note          Assessment/Plan:     Nick Zamudio is a 65 year old male with PMHx of hypertension, type II diabetes, history of testicular cancer with bilateral lower extremity paraparesis, bilateral lower extremity lymphedema due to complication of radiation therapy, history of small bowel obstruction, failure to thrive, severe malnutrition, biliary stenosis, chronic pancreatitis     1. Type II diabetes, A1c not reliable due to anemia  2. TPN induced hyperglycemia (now resolved)  3. TF induced hyperglycemia (started on 6/5)     Glucose stable at goal rate TF     Plan:       transition from insulin infusion:  ? NPH 10 units twice per day, 1800 and 0600 to start (user specified sched)  ? Stop IV insulin 2 hours after first dose  ? Start aspart medium resistance correction q4h once off IV insulin --at 2200        Continue  aspart 1 unit per 12 grams carb     glucose checks per protocol, q4 once off IV    Hypoglycemia protocol    PRN D10W in case of TF interruption       Outpatient diabetes follow up: TBD  Plan discussed with patient, bedside RN, and page to dietician           Interval History:     The last 24 hours progress and nursing notes reviewed.    TPN discontinued yesterday.  Tube feed to goal 60 ml/h at 0230  0-2 units/h IV insulin, since 9886-4531, steady mostly 1 units/h into afternoon    --GOAL: Vital 1.5 Elvis @ goal of 60ml/hr (1440ml/day) will provide: 2160 kcals (34 kcal/kg), 97 g PRO (1.5 g/kg), 1100 ml free H20, 269 g CHO, 82 g Fat, and 8 g fiber daily.  --Start TF @ 10 ml/hr and advance by 10 ml q 8 hrs as tolerated until goal rate.      Art says nausea is controlled to manageable level.  TF advancement did not increase his nausea.  Allowed PO, but not taking much.  He is uncertain what future TF schedule might be like.  He is working during the days, but would able to manage having TF connected while working.  He has had both glargine and  NPH int he past, feels familiar with this.  Does not have insulin cost concerns.      Recent Labs   Lab 06/07/21  1132 06/07/21  1036 06/07/21  0935 06/07/21  0837 06/07/21  0730 06/07/21  0637 06/07/21  0540 06/06/21  0617 06/06/21  0617 06/05/21  0654 06/05/21  0654 06/04/21  0736 06/04/21  0736 06/03/21  0624 06/03/21  0624   GLC  --   --   --   --   --   --  96  --  135*  --  345*  --  322*  --  267*   * 136* 140* 116* 124* 129*  --    < >  --    < >  --    < >  --    < >  --     < > = values in this interval not displayed.           Nutrition:     Orders Placed This Encounter      Advance Diet as Tolerated: Regular Diet Adult; Low Fat Diet            PTA Regimen:     BG monitoring frequency: 1-2 times/d  Medications:   Lantus 20 units daily  NovoLog 2 per 50>150          Review of Systems:   See interval hx          Medications:   No steroid         Physical Exam:     Gen: Alert, in NAD   HEENT: NC/AT hearing intact to conversational volume  Resp: Unlabored  Neuro: oriented x3, communicating clearly  Psych: calm, even mood easily engaged  /57 (BP Location: Right arm)   Pulse 81   Temp 98  F (36.7  C) (Oral)   Resp 18   Ht 1.829 m (6')   Wt 61.3 kg (135 lb 2.3 oz)   SpO2 96%   BMI 18.33 kg/m               Data:     Lab Results   Component Value Date    A1C 6.8 06/03/2021                CBC RESULTS:   Recent Labs   Lab Test 06/06/21 0617   WBC 4.4   RBC 2.49*   HGB 7.3*   HCT 24.3*   MCV 98   MCH 29.3   MCHC 30.0*   RDW 13.7        Recent Labs   Lab Test 06/07/21  0540 06/06/21  0617    139   POTASSIUM 4.5 4.1   CHLORIDE 109 108   CO2 27 25   ANIONGAP 2* 5   GLC 96 135*   BUN 47* 52*   CR 0.96 0.91   ELODIA 7.9* 8.0*     Liver Function Studies -   Recent Labs   Lab Test 06/07/21  0540   PROTTOTAL 4.9*   ALBUMIN 1.7*   BILITOTAL 0.5   ALKPHOS 69   AST 7   ALT 7     Lab Results   Component Value Date    INR 1.12 06/07/2021    INR 1.09 06/04/2021    INR 1.09 06/03/2021    INR 1.28  03/15/2021    INR 1.09 01/13/2021    INR 1.19 12/09/2020    INR 1.12 09/29/2020       I spent a total of 35 minutes bedside and on the inpatient unit managing the glycemic care of Nick Zamudio. Over 50% of my time on the unit was spent counseling the patient  and/or coordinating care regarding acute BG management, options dependent on nutrition.  See note for details.      Arabella Berry APRN -[id number]  To contact Endocrine Diabetes service:   From 8AM-4PM: page inpatient diabetes provider that is following the patient that day (see filed or incomplete progress notes/consult notes).  If uncertain of provider assignment: page job code [phone].  For questions or updates from 4PM-8AM: page the diabetes job code for on call fellow: [phone]    Please notify inpatient diabetes service if changes are planned that will impact glycemia, such as to enteral feeding,dextrose fluids or procedures requiring prolonged NPO status.

## 2021-06-07 NOTE — PLAN OF CARE
/57 (BP Location: Right arm)   Pulse 81   Temp 98  F (36.7  C) (Oral)   Resp 18   Ht 1.829 m (6')   Wt 61.3 kg (135 lb 2.3 oz)   SpO2 96%   BMI 18.33 kg/m      Assumed Cares: 9324-6338  Neuro: A&O x 4  Respiratory: LS diminished at bases; denies SOB  Cardiac: Murmur heard; denies chest pain   GI/: TF through J tube; rate increased to goal rate of 60ml/hr at 0230 and tolerating it well; G tube clamped; intermittent nausea alleviated with zofran x 2; primofit for urinary incontinence with good output; no BM overnight  Skin: Mepilex over coccyx for protection; radiation injuries to groin and lower abdomen from hx of testicular cancer; generalized bruising throughout body; PCDs on overnight  Lines: L single lumen PICC SL; R PIV running insulin gtt  Pain: Swallowing pain and upper abdominal pain managed with oxycodone x 2  Diet: Regular diet with minimal oral intake this shift; pt states he ate earlier in the day but felt really nauseous after; tolerates apple juice well  Activity Level: 2 assist with a lift; able to turn well in bed with assistance from 1 staff member  Significant Events: Pt continued on insulin gtt throughout the night; last  with insulin gtt algorithm 2 at 2 units/hr; next check at 0730   Plan: Continue with POC; notify provider with changes in pt condition

## 2021-06-07 NOTE — PROVIDER NOTIFICATION
Provider notified (name): Jhon Chapman   Reason for notification: AM labs only included CMP, no CBC. Pt hgb has been low 7s the last few days.  Recommendation/request given to provider: Do you want to recheck hgb today?

## 2021-06-07 NOTE — PLAN OF CARE
RN assumed cares: [id number]     Vitals: VSS on RA  Neuro: AOx4  Pain: C/o abdominal pain controlled w/ PRN oxy x1  Activity: Tuning in bed Ax1-2. Ax2 w/ lift for transfers.   Cardiac: Murmur detected, denies chest pain   Respiratory: WDL  GI/: Tolerating continuous TF via J tube running at goal of 60mL/hr & 30mL FWF q4h. G tube clamped. Regular diet but declined any meals, takes sips of apple juice. C/o nausea controlled with PRN zofran x1. External male catheter in place w/ good output. No BM this shift.    Skin: Significant scarring on abdomen down to groin from previous radiation treatments per pt. Sacral mepilex removed and barrier cream applied to bottom and groin for redness and breakdown. Small scabbed ulcer noted on bottom of left foot ROSALBA. Significant BLE edema; PCDs on. Encouraging frequent turning and repositioning   LDAs: L single lumen PICC SL, CHG bath completed. R PIV w/ insulin gtt. PEG-J tube dressing changed.   Labs: BGs 124,116, 140, 136, 121, 112 & 109. INR 1.12.      Events & Plan: Pt remains on Algorithm 2 for insulin gtt receiving between 1-2 units/hr for BGs ranging from 100s-140. Groin and bottom very red w/ some skin breakdown; sacral mepilex removed, barrier cream applied to reddened areas and holly perineal lotion spray with soft cloth wipes used for incontinence. Call light within reach, able to make needs known. Will continue to monitor and follow POC.

## 2021-06-08 NOTE — PROGRESS NOTES
Care Management Follow Up    Length of Stay (days): 5    Expected Discharge Date: 06/07/21  Expected Time of Departure: TBD  Concerns to be Addressed: care coordination/care conferences, discharge planning     Patient plan of care discussed at interdisciplinary rounds: Yes    Anticipated Discharge Disposition: Transitional Care     Anticipated Discharge Services: None  Anticipated Discharge DME: None    Patient/family educated on Medicare website which has current facility and service quality ratings: yes  Education Provided on the Discharge Plan: Yes  Patient/Family in Agreement with the Plan: yes    Referrals Placed by CM/SW: External Care Coordination, Internal Clinic Care Coordination, Communication hand-offs to next level of Care Providers, Transportation  Private pay costs discussed: Not applicable    Additional Information:  SW spoke with pt in his room to discuss TCU recommendation. Pt shared that he was admitted from the hospital from [facility] (ph: [phone]). Pt plans to return there upon discharge and confirmed he has held his bed there. SW will follow for discharge needs, pt reports he will need a stretcher ride set up.     HELENA Haynes, LICSW  [address]   [facility]  Phone: [phone]  Pager: [phone]

## 2021-06-08 NOTE — PLAN OF CARE
PT: Attempted to work with pt for PT scheduled session this am, pt reports a few reasons does not feel able to initiate OOB today, therapeutic listening provided and encouragement with a few different approaches also take, but pt continued to decline. Pt states will begin working with PT tomorrow, gently reminded pt he has said that a couple times over the last 4 days and this writer kindly asked what will be different tomorrow. Pt reports he will just work with PT whether feeling up to it or not. Pt does report pm times are better as he is usually feeling better. Will reschedule.

## 2021-06-08 NOTE — PLAN OF CARE
Blood pressure 119/65, pulse 81, temperature 97.6  F (36.4  C), temperature source Oral, resp. rate 18, height 1.829 m (6'), weight 60.5 kg (133 lb 6.1 oz), SpO2 99 %. RA.      Patient A & O X 4 VSS no respiratory distress noted. C/o abdominal and back pain. Received scheduled medications and Oxycodone PRN  X 1. C/o intermittent Nausea given Zofran and scheduled medications via PEG tube na flushed with water before and after. Continue on tube feeding @ 60 ml/hr and tolerated well. Patient did not eat for this shift.. no bowel movement on this shift. Has extermal male catheter with adequate urine output . Patient refusing repositioning q2hr. Writer instruct the importance of repositioning to prevent pressure sore.  Skin   redness and scar tissue in lower abdomen/perineum and coccyx. applied barrier cream on.  BLE Lymphedema , elevated on pillow as tolerated . Call light with in reach, bed alarm on and hourly round completed.  Continue monitor closely and update MD with change.

## 2021-06-08 NOTE — CONSULTS
"Colorectal Surgery Consult Note   Colorectal Surgery Staff: Cornelius   Requesting Staff: Whit  Date and Time: 06/08/21 6:11 PM     Fellow addendum:  Patient seen and examined. Please see note below for details. In brief, 65M with complex past medical and surgical history including abdominal radiation. He has a sigmoid mass, potentially associated with a sigmoid polyp seen a few years ago. He will require a full colonoscopy, and biospies of this lesion to gain a definitive diagnosis. Once this is obtained, planning around any surgical intervention will have to focus on his risk profile, which is substantial.    Discussed with Dr. Shields.    Devaughn Valles MD PhD  CRS Fellow    CC: \"I'd like to get this taken care of.\"    HPI:   66 yo man with complex medical history who is hospitalized for upper GI bleeding and anemia (Hgb 7.4 from 11.9 over 4 months).  He has a history of testicular cancer as a teenager, for which he underwent radiation therapy. This was complicated by post-radiation syndrome with colitis requiring partial colectomy (uncertain which segment), paraplegia of bilateral lower extremities, chronic lymphedema of bilateral lower extremities, and possibly a perforation of the small bowel with small bowel resection (records and patient recollection are unclear). He also has idiopathic chronic calcific pancreatitis and superior mesenteric artery stenosis with failure to thrive and TPN dependence. For this, he has had many ERCPs with stent placements, and most recently underwent PEG-J placement on 06/01. Post-procedure, he had hematemesis with melena. He was admitted to [facility] for this and has recovered with supportive care.   Colorectal surgery is consulted for rectosigmoid mass noted on colonoscopy on 06/01, at the same time as his PEG-J placement. The findings at that time were notable for non-bleeding hemorrhoids and a large, broad based polyp spanning from 15-18 cm from the rectum, occupying 1/4 of " the mucosal circumference and 50% of the lumen. Biopsies were not performed. He also did not do a prep prior to this scope. He was noted to have a recotsigmoid mass on colonoscopy in August of 2019. Pathology at that time was consistent with tubulovillous adenoma. He was lost to follow up for this.  He denies hematochezia, or change in stool caliber. Normally he has chronic diarrhea. He did have melena following PEG-J placement.    Medical Hx:   Testicular cancer  Type II diabetes   HTN  Small bowel obstruction   Chronic pancreatitis   GI bleed  Post-radiation syndrome  C. Diff (2017)  Left thigh abscess  Osteomyelitis left foot  Peripheral vascular disease  SMA stenosis  CKD stage III  Chronic anemia  HLD  T2DM  Lymphedema, bilateral lower extremities  Intussusception small bowel     Surgical Hx:   Cholecystectomy  Partial colectomy  Small bowel resection, for intussusception 2/2 submucosal lipima  Tonsillectomy   Left pinky toe amputation  PEG-J  ERCP x multiple   Femoral bypass  Left orchiectomy    Family Hx:   No family history of bleeding / bruising / clotting disorders / adverse reactions to anesthesia    Medications:   Creon  Atorvastatin  Metformin  Zofran  Percocet  Senna  Insulin    Allergies:   PCN - hives  Ibuprofen - rash    Social Hx:   Lives at home in [place] with wife   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 None for a couple years  No tobacco use    Physical Exam:   Vital signs:  Temp: 97.6  F (36.4  C) Temp src: Oral BP: 119/65 Pulse: 81   Resp: 18 SpO2: 99 % O2 Device: None (Room air)   Height: 182.9 cm (6') Weight: 60.5 kg (133 lb 6.1 oz)  Estimated body mass index is 18.09 kg/m  as calculated from the following:    Height as of this encounter: 1.829 m (6').    Weight as of this encounter: 60.5 kg (133 lb 6.1 oz).    Thin adult male, resting in bed, NAD. Wife at bedside  Respirations  non-labored on room air  Abdomen flat, somewhat hard with ?heterotopic ossifications?. Multiple well healed surgical scars. G tube in place with no erythema, induration, or drainage surrounding. Infusing tube feeds  Extremities thin with pitting edema. Warm. Left pinky toe absent.     Labs:     BMP: K 5.1, Cr 0.97    Proteins: albumin 1.7, pre-albumin 14, total protein 4.9    CBC: WBC 5.4, Hgb 7.9, Plt 169    LFT: T bili 0.5, AST/ALT 7/7, alk phos 69, INR 1.12    Imaging:   CT A/P 06/03/2021:   IMPRESSION:   1. Mild to moderate bilateral hydronephrosis with urothelial  thickening and enhancement, right greater than left. Findings are  suspicious for urinary tract infection . Recommend correlation with  urinalysis. Of note the left hydronephrosis is new from comparison  exam on 4/26/2021.  2. New small left pleural effusion with overlying atelectasis.  3. Sequelae of chronic pancreatitis with marked pancreatic parenchymal  atrophy and numerous parenchymal and ductal calcifications. There is  mild peripancreatic fat stranding tracking along the common bile duct.  Consider correlation with laboratory values to evaluate for acute on  chronic pancreatitis.  4. Stable intrahepatic and extrahepatic biliary dilatation, status  post cholecystectomy. No evidence of biliary obstruction.    CT eneterography 04/26/2021:   IMPRESSION:   1. Mild mucosal/wall redundancy at the second portion of the duodenum  and tortuosity of the third/fourth portion of the duodenum without  evidence of an extrinsic lesion.   2. Focal eccentric wall thickening and mild hyperenhancement at the  rectosigmoid junction, which could represent inflammation, mass, or  redundant/friable mucosa. Recommend direct visualization.  3. Changes of chronic pancreatitis with increased parenchymal and  ductal calcifications since 8/6/2020. Stable mild main pancreatic  ductal dilation and irregularity.  4. Mild intra and extrahepatic biliary dilation without clear  evidence  of biliary obstruction.  5. Resolved (since 8/6/2020) rim-enhancing hypodensity in the hepatic  dome (likely an inflamed cyst at the time) and decreased size of 2  additional hepatic cysts in hepatic segment 4.    IR Visceral angiography 03/15/2021:   IMPRESSION:  Superior mesenteric artery angiogram showing extensive atherosclerotic  vascular disease. Disease within the proximal superior mesenteric  artery is not hemodynamically significant and estimated to be in the  range of no more than 30-40%. No intervention is indicated on these  areas. There is a focal subtotal occlusion of the distal main superior  mesenteric artery as it extends into the ileocolic branch with  additional areas of focal severe stenosis stenosis of distal jejunal  and proximal ileal branches. Suspect that these multi focal areas of  stenosis are related to patient's history of abdominal radiation  treatment for testicular cancer at age 17 and are now a cause of  chronic intestinal ischemia/angina. However, unfortunately, the risk  of catheter based intervention on these small branches would far  outweigh potential gain and would not be expected to provide any  durable benefit.    Assessment:   Art is a 66 yo man with medical history notable for testicular cancer s/p radiation, complicated by bowel injury requiring resection (partial colectomy, possibly partial small bowel resection), chronic lymphedema and paraplegia of lower extremities. Also with chronic idiopathic calcific pancreatitis, small bowel intussusception, and extensive atherosclerosis of the SMA and branches. He has a rectosigmoid mass which has been present since at least 2019, at which time pathology demonstrated tubulovillous adenoma. Colorectal surgery is consulted for assistance with management.    Plan:    - Needs complete colonoscopy with prep and biopsies. Will ask GI to perform  - Pending pathology from repeat colonoscopy, will also need CT chest  - CEA level  pending pathology    Discussed with fellow, Dr. Reena Hackett MD  Surgery Resident PGY3

## 2021-06-08 NOTE — PROGRESS NOTES
[facility]  Hospitalist Progress Note  George Sherman MD  06/08/2021    Assessment & Plan     Nick Zamudio is a 65 year old male admitted on 6/3/2021. 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      6/7:   - continue low residue diet and PTA Creon (80725-80641 capsule, 3 with meals, 2 with snack)  -pain has not much improved (still frequent opioids) will start cholestyramine at 4 g daily 6/6, increase to BID if not improving  - CRS consult  - increased NPH dosing    - pain improving  - stools normalizing, had BM     Acute blood loss anemia 2/2 to UGIB  Hematemesis, melena   Most concerning for bleeding following recent PEG placement procedure. Currently hemodynamically stable.  S/p 1 unit pRBC 6/3  - hgb stable to improved  - IV PPI BID to transition to oral closer to discharge    Nonsevere malnutrition   Failure to thrive, deconditioning   - Pharm/RD consult for TPN   - started tube feeds 6/5, plan to see if his LTC able to accommodate tube feedings.  - Restaredt PTA Creon 6/5  - PT/OT consults, back to [facility] where he has a bed hold     Malignant colonic mass  -- noted on recent endoscopy  -- CRS consult    Lactic acidosis resolved  Likely due to dehydration and acute blood loss. Received 2L IV fluids and 1 unit pRBC     Chronic pancreatitis  Chronic abdominal pain, diarrhea   - Oxycodone 5 mg Q6H PRN, Tylenol Q6H PRN (PTA takes percocet)  - Zofran, compazine PRN  - Restart Creon  - restarted tube feeds and ADAT -low fat diet  - no new pain     Type II DM   With TPN and  transitioning to tube feeds consulted Endo for assistant with higher blood sugars  (See there not for details- likely transition from IV to s/c today)   - Hold PTA metformin (although looks to have been stopped after most recent admission to [facility] in April).  - appreciate endocrine note and care. Transitioning to NPH      HLD  - Hold statin as non-essential while NPO      Diet: Adult Formula Drip Feeding: Continuous Vital 1.5; Jejunostomy; Goal Rate: 60; mL/hr; Medication - Feeding Tube Flush Frequency: At least 15-30 mL water before and after medication administration and with tube clogging; Amount to Send (Nutrition us...  Advance Diet as Tolerated: Regular Diet Adult; Low Fat Diet    DVT Prophylaxis: Pneumatic Compression Devices  Cummins Catheter: not present  Code Status: Full Code         Disposition Plan     Expected discharge:   -- await CRS consult  -- anticipate discharge back to [facility]    Interval History   -- chart reviewed  -- pain controlled  -- had BM  -- discussed his large polyp noted by colonoscopy  -- now on tube feeds at goal rate    -Data reviewed today: I reviewed all new labs and imaging over the last 24 hours. I personally reviewed no images or EKG's today.    Physical Exam    , Blood pressure 119/65, pulse 81, temperature 97.6  F (36.4  C), temperature source Oral, resp. rate 18, height 1.829 m (6'), weight 60.5 kg (133 lb 6.1 oz), SpO2 99 %.  Vitals:    06/05/21 1635 06/06/21 1700 06/07/21 2100   Weight: 63.1 kg (139 lb 1.8 oz) 61.3 kg (135 lb 2.3 oz) 60.5 kg (133 lb 6.1 oz)     Vital Signs with Ranges  Temp:  [97.6  F (36.4  C)-98.9  F (37.2  C)] 97.6  F (36.4  C)  Pulse:  [76-81] 81  Resp:  [16-18] 18  BP: (113-130)/(48-65) 119/65  SpO2:  [97 %-100 %] 99 %  I/O's Last 24 hours  I/O last 3 completed shifts:  In: 930 [I.V.:30; NG/GT:180]  Out: 2100 [Urine:2100]    Constitutional: Awake, alert, cooperative, no apparent distress  Respiratory: Clear to auscultation bilaterally, no  crackles or wheezing  Cardiovascular: Regular rate and rhythm, normal S1 and S2, and no murmur noted  GI: Normal bowel sounds, soft, non-distended, non-tender  Skin/Integumen: No rashes, no cyanosis, +++ edema  Other:  LE paresis    Medications        dextrose         amylase-lipase-protease  1-2 capsule Per J Tube Q4H    And     sodium bicarbonate  325 mg Per Feeding Tube Q4H     amylase-lipase-protease  3 capsule Oral TID w/meals     cholestyramine  1 packet Oral Daily     insulin aspart  1-6 Units Subcutaneous Q4H     insulin aspart   Subcutaneous TID AC     insulin NPH  18 Units Subcutaneous 2 times per day     multivitamins w/minerals  15 mL Per Feeding Tube Daily     pantoprazole (PROTONIX) IV  40 mg Intravenous BID     sodium chloride (PF)  3 mL Intracatheter Q8H        Data   Recent Labs   Lab 06/08/21  1101 06/08/21  0814 06/07/21  0540 06/06/21  0617 06/04/21  0736 06/04/21  0736 06/03/21  1331 06/03/21  1331 06/03/21  0624   WBC 5.4 Canceled, Test credited  --  4.4   < > 3.9*  --   --  7.6   HGB 7.9* Canceled, Test credited  --  7.3*   < > 7.2*   < > 7.9* 7.4*   MCV 98 Canceled, Test credited  --  98   < > 95  --   --  94    Canceled, Test credited  --  160   < > 151  --   --  200   INR  --   --  1.12  --   --  1.09  --   --  1.09   NA  --  137 137 139   < > 140  --   --  141   POTASSIUM  --  5.1 4.5 4.1   < > 3.6  --   --  3.7   CHLORIDE  --  108 109 108   < > 109  --   --  104   CO2  --  26 27 25   < > 26  --   --  31   BUN  --  41* 47* 52*   < > 53*  --   --  65*   CR  --  0.97 0.96 0.91   < > 0.98  --   --  0.89   ANIONGAP  --  3 2* 5   < > 5  --   --  6   ELODIA  --  7.8* 7.9* 8.0*   < > 7.9*  --   --  8.3*   GLC  --  211* 96 135*   < > 322*  --   --  267*   ALBUMIN  --   --  1.7*  --   --  1.6*  --   --  1.9*   PROTTOTAL  --   --  4.9*  --   --  4.7*  --   --  5.3*   BILITOTAL  --   --  0.5  --   --  0.2  --   --  0.2   ALKPHOS  --   --  69  --   --  50  --   --  62   ALT  --   --  7  --   --  9   --   --  8   AST  --   --  7  --   --  6  --   --  8   LIPASE  --   --   --   --   --  <10*  --  <10*  --     < > = values in this interval not displayed.       No results found for this or any previous visit (from the past 24 hour(s)).    George Sherman MD  Text Page  (7am to 6pm)

## 2021-06-08 NOTE — PLAN OF CARE
Assumed Cares: [id number]  Status: PEGJ tube feeds. Here initially for N/V      VS:/54 (BP Location: Right arm)   Pulse 83   Temp 99  F (37.2  C) (Oral)   Resp 16   Ht 1.829 m (6')   Wt 61.3 kg (135 lb 2.3 oz)   SpO2 97%   BMI 18.33 kg/m       Neuro & Behavior: A&Ox4; pleasant  Pain: Pt c/o pain when swallowing and upper abdominal pain. Oxy given  LDAs: Single Lumen PICC and PIV. Both SL.   Resp: WDL, no dyspnea reported.   Cardiac: Murmur upon auscultation. Lymphedema and poor circulation post radiation.   GI/:PEGJ in place infusing tube feed 60mlhr is at goal rate. Incontinent of urine but male external cath in place and works well. Endorses nausea intermittently. Zofran given with good results.   Nutrition: Reg diet, poor intake due to nausea. Ordered dinner only consuming minimal carbs. Some apple juice sips throughout shift. Tube feeds.   Skin: Redness and scar tissue in lower abdomen/perineum and coccyx. Pulsate mattress.   Activity: 2 assist for lift. 1 assist for cares.      Events: Glucoses stable. Transitioned from insulin gtt to subcutaneous insulin. Tube feed still running at goal rate of 60 ml/hr.   Plan: Continue to monitor and follow POC.

## 2021-06-08 NOTE — PLAN OF CARE
Assumed Cares: [id number]  Vital signs:  VSS on RA  Neuro: A&Ox4.  Behavior: Calm/cooperative.   GI: Fecal incontincence. Intermittent nausea. Zofran administered.   : External male catheter in place with adequate urine output.   Respiratory: Denies SOB  Cardiac: Denies chest pain. Lymphedema in BLE.   Skin: Redness on sacrum/coccyx. Barrier cream applied.   Lines/Drains: PEGJ tube infusing @ 60mL/hr.    Mobility: Assist of 2 with lift.   Pain: Reported pain in upper abdomen. Received PRN oxy.  Events: 0200 . 0600 . Received insulin per sliding scale.   Plan of care: Continue with current plan of care and update provider as needed.       Referred by Charbel Herrera MD      Concerns: Patient is here for vasectomy discussion.      Patient denies dysuria or hematuria. Patient looking to discuss vasectomy today. Patient currently has two children and him and his wife are fine with two children.  According to ROS patient does have urinary frequency but drinks a lot of water and is not concerned.      No results found for: PSA  BPH SX's IN PAST MONTH 1/11/2018   Incomplete Emptying (1-5) 0   Frequency (1-5) 3   Intermittency (1-5) 0   Urgency (1-5) 0   Stream (1-5) 0   Straining (1-5) 0   Nocturia (1-5) 0   Total Score 3   Quality of Life 1=PLEASED       Past medical, surgical, family and social history was reviewed from the last visit.    Urine sample requested by Dr. Serrano for evaluation for presenting urinary issues.     PMD- Charbel Herrera MD     Medications verified, and updated.  Tobacco history verified.    REVIEW OF SYSTEMS:  GENERAL:  Patient denies fever, chills, tiredness, malaise.  EYES:  Patient denies blurred vision, double vision, pain, burning and itching.   IMMUNOLOGIC:  Patient denies hayfever, drug allergies.  NEUROLOGIC:  Patient denies tremors, dizzy spells, numbness, tingling, vision change, loss of balance.  ENDOCRINE:  Patient denies excessive thirst, heat intolerance, lymphnode enlargement.  GASTROINTESTINAL:  Patient denies abdominal pain, nausea/vomiting, indigestion/heartburn, diarrhea, constipation.  CARDIOVASCULAR:  Patient denies chest pain, varicose veins, high blood pressure.  SKIN:  Patient denies skin rashes, boils, persistent itching, acne.  MUSCULOSKELETAL:  Patient denies joint pain, neck pain, back pain, leg swelling.  ENT/MOUTH:  Patient denies ear infections, sore throats, sinus problems, hearing loss.  :  Patient denies urine retention, painful urination, blood in urine, nocturia, but complains of urinary frequency.  RESPIRATORY:  Patient denies wheezing, frequent cough, shortness of breath.

## 2021-06-08 NOTE — PROGRESS NOTES
[facility]    Medicine Progress Note - Hospitalist Service, Gold 8       Date of Admission:  6/3/2021  Assessment & Plan   Nick Zamudio is a 65 year old male admitted on 6/3/2021. 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      6/7:   - continue low residue diet and PTA Creon (90295-02848 capsule, 3 with meals, 2 with snack)  -pain has not much improved (still frequent opioids) will start cholestyramine at 4 g daily 6/6, increase to BID if not improving  - TPN stopped   - tube feeds at goal  - endocrine team input appreciated. Transitioning from insulin gtt to BID NPH  - Re-assess [employer] VTE ppx  Tomorrow  - pain improving  - stools normalizing     Acute blood loss anemia 2/2 to UGIB  Hematemesis, melena   Most concerning for bleeding following recent PEG placement procedure. Currently hemodynamically stable.  S/p 1 unit pRBC 6/3  - hgb daily  - IV PPI BID to transition to oral closer to discharge    Nonsevere malnutrition   Failure to thrive, deconditioning   - Pharm/RD consult for TPN   - started tube feeds 6/5  - Restaredt PTA Creon 6/5  - PT/OT consults      Lactic acidosis resolved  Likely due to dehydration and acute blood loss. Received 2L IV fluids and 1 unit pRBC     Chronic pancreatitis  Chronic abdominal pain, diarrhea   - hold IV fluids  - Oxycodone 5 mg Q6H PRN, Tylenol Q6H PRN (PTA takes percocet)  - Zofran, compazine PRN  - Restart Creon  - restarted tube feeds and ADAT -low fat diet     Type II DM   With TPN and transitioning to tube feeds consulted Endo for assistant with  higher blood sugars  (See there not for details- likely transition from IV to s/c today)   - Hold PTA metformin (although looks to have been stopped after most recent admission to [facility] in April).  - appreciate endocrine note and care. Transitioning to NPH      HLD  - Hold statin as non-essential while NPO       Diet: Adult Formula Drip Feeding: Continuous Vital 1.5; Jejunostomy; Goal Rate: 60; mL/hr; Medication - Feeding Tube Flush Frequency: At least 15-30 mL water before and after medication administration and with tube clogging; Amount to Send (Nutrition us...  Advance Diet as Tolerated: Regular Diet Adult; Low Fat Diet    DVT Prophylaxis: Pneumatic Compression Devices  Cummins Catheter: not present  Code Status: Full Code         Disposition Plan   Expected discharge: 1-2 days, recommended to prior living arrangement once adequate pain management/ tolerating PO medications and blood glucose controlled. Wants to go back to [facility] later this week if tolerated. However, if stable could go in 1-2d.  Entered: Jhon Chapman MD 06/07/2021, 8:16 PM     The patient's care was discussed with the Bedside Nurse and Patient.    Jhon Chapman MD  Hospitalist Service, [address]  Contact information available via [facility] Paging/Directory  Please see sign in/sign out for up to date coverage information  ______________________________________________________________________    Interval History   Pain improved, tolerating TF. TPN stopped. On insulin gtt in AM, but stopped later in day.    4 point ROS done and neg unless mentioned    Data reviewed today: I reviewed all medications, new labs and imaging results over the last 24 hours. I personally reviewed no images or EKG's today.    Physical Exam   /54 (BP Location: Right arm)   Pulse 83   Temp 99  F (37.2  C) (Oral)   Resp 16   Ht 1.829 m (6')   Wt 61.3 kg (135 lb 2.3 oz)    SpO2 97%   BMI 18.33 kg/m    Gen- pleasant lying in bed  HEENT- NAD, JOHANNY  Neck- supple, no JVD elevation, no thyromegaly  CVS- I+II+ no m/r/g  RS- CTAB  Abdo- soft,  tenderness in epigastrium. No g/r/r   Ext- no edema       Data    BMP  Recent Labs   Lab 06/07/21  0540 06/06/21  0617 06/05/21  0654 06/04/21  0736    139 139 140   POTASSIUM 4.5 4.1 3.8 3.6   CHLORIDE 109 108 107 109   ELODIA 7.9* 8.0* 7.6* 7.9*   CO2 27 25 26 26   BUN 47* 52* 50* 53*   CR 0.96 0.91 0.94 0.98   GLC 96 135* 345* 322*     CBC  Recent Labs   Lab 06/06/21 0617 06/05/21  0654 06/04/21  1322 06/04/21  1322 06/04/21  0736   WBC 4.4 4.0  --  4.2 3.9*   RBC 2.49* 2.36*  --  2.46* 2.41*   HGB 7.3* 7.1*   < > 7.3* 7.2*   HCT 24.3* 22.9*  --  22.7* 22.9*   MCV 98 97  --  92 95   MCH 29.3 30.1  --  29.7 29.9   MCHC 30.0* 31.0*  --  32.2 31.4*   RDW 13.7 14.1  --  14.2 14.2    150  --  127* 151    < > = values in this interval not displayed.     INR  Recent Labs   Lab 06/07/21 0540 06/04/21  0736 06/03/21  0624   INR 1.12 1.09 1.09     LFTs  Recent Labs   Lab 06/07/21  0540 06/04/21  0736 06/03/21  0624   ALKPHOS 69 50 62   AST 7 6 8   ALT 7 9 8   BILITOTAL 0.5 0.2 0.2   PROTTOTAL 4.9* 4.7* 5.3*   ALBUMIN 1.7* 1.6* 1.9*      PANC  Recent Labs   Lab 06/04/21  0736 06/03/21  1331   LIPASE <10* <10*       No results found for this or any previous visit (from the past 24 hour(s)).

## 2021-06-08 NOTE — PROGRESS NOTES
Diabetes Consult Daily  Progress Note          Assessment/Plan:     Nick Zamudio is a 65 year old male with PMHx of hypertension, type II diabetes, history of testicular cancer with bilateral lower extremity paraparesis, bilateral lower extremity lymphedema due to complication of radiation therapy, history of small bowel obstruction, failure to thrive, severe malnutrition, biliary stenosis, chronic pancreatitis     1. Type II diabetes, A1c not reliable due to anemia  2. TPN induced hyperglycemia (now resolved)  3. TF induced hyperglycemia (started on 6/5)     Glucose high in past 24 hours on goal rate TF     Plan:      Increase NPH to 14 units twice per day, 1800 and 0600    Continue  aspart 1 unit per 12 grams carb    Continue aspart medium correction     glucose checks per protocol, q4 hours    Hypoglycemia protocol    PRN D10W in case of TF interruption    Outpatient diabetes follow up: TBD    Plan discussed with patient at bedside    Our service will continue to follow.       Evelin Saldana MD  Endocrinology and Diabetes   [phone]             Interval History:     The last 24 hours progress and nursing notes reviewed.  this morning. No hypoglycemia.    TPN discontinued   Tube feed to goal 60 ml/h   --GOAL: Vital 1.5 Elvis @ goal of 60ml/hr (1440ml/day) will provide: 2160 kcals (34 kcal/kg), 97 g PRO (1.5 g/kg), 1100 ml free H20, 269 g CHO, 82 g Fat, and 8 g fiber daily.  --Start TF @ 10 ml/hr and advance by 10 ml q 8 hrs as tolerated until goal rate.      Recent Labs   Lab 06/08/21  1149 06/08/21  0814 06/08/21  0754 06/08/21  0612 06/08/21  0202 06/07/21  2147 06/07/21  1932 06/07/21  0540 06/07/21  0540 06/06/21  0617 06/06/21  0617 06/05/21  0654 06/05/21  0654 06/04/21  0736 06/04/21  0736 06/03/21  0624 06/03/21  0624   GLC  --  211*  --   --   --   --   --   --  96  --  135*  --  345*  --  322*  --  267*   *  --  219* 243* 148* 106* 125*   < >  --    < >  --    <  >  --    < >  --    < >  --     < > = values in this interval not displayed.           Nutrition:     Orders Placed This Encounter      Advance Diet as Tolerated: Regular Diet Adult; Low Fat Diet            PTA Regimen:     BG monitoring frequency: 1-2 times/d  Medications:   Lantus 20 units daily  NovoLog 2 per 50>150          Review of Systems:   See interval hx          Medications:   No steroid         Physical Exam:     Gen: Alert, in NAD   HEENT: NC/AT hearing intact to conversational volume  Resp: Unlabored  Neuro: oriented x3, communicating clearly  Psych: calm, even mood easily engaged  /62 (BP Location: Left arm)   Pulse 78   Temp 98.1  F (36.7  C) (Oral)   Resp 16   Ht 1.829 m (6')   Wt 60.5 kg (133 lb 6.1 oz)   SpO2 97%   BMI 18.09 kg/m               Data:     Lab Results   Component Value Date    A1C 6.8 06/03/2021

## 2021-06-09 NOTE — PLAN OF CARE
Blood pressure 115/60, pulse 83, temperature 99.7  F (37.6  C), temperature source Oral, resp. rate 18, height 1.829 m (6'), weight 60.3 kg (132 lb 15 oz), SpO2 98 %. RA .       Patient A & O X 4 VSS no respiratory distress noted . Received scheduled medications via PEG tube. Patient C/o abdominal pain and generalized pain received Oxycodone  X 2 PRN . Patient had abdominal CT done this morning . Has order for NPO after MN . Tube feeding is on hold  start prep Golytely via tube feeding . Plan possible procedure for colonoscopy. Patient blood sugar dropped to 67, apple juice given recheck 80 and patient declined to be rechecked . Incontinence of stool  x 2 coccyx very red , inct care done and applied barrier cream. Patient did not like to be repositioned, educated the importance of repositions and to prevent pressure sure . BLE edema patient declined to elevate on pillow . Has order for Lymphedema therapy to evaluate. Call light within reach patient use call light appropriately. Bed alarm on and hourly round completed . Continue monitor closely and update MD with change.

## 2021-06-09 NOTE — PROGRESS NOTES
Diabetes Consult Daily  Progress Note          Assessment/Plan:     Nick Zamudio is a 65 year old male with PMHx of hypertension, type II diabetes, history of testicular cancer with bilateral lower extremity paraparesis, bilateral lower extremity lymphedema due to complication of radiation therapy, history of small bowel obstruction, failure to thrive, severe malnutrition, biliary stenosis, chronic pancreatitis     1. Type II diabetes, A1c not reliable due to anemia  2. TPN induced hyperglycemia (now resolved)  3. TF induced hyperglycemia (started on 6/5)    Patient was having abdominal pain this morning and TF was on hold. BG dropped to 67 and improved with juice. NPH was put on hold today. Patient has NPO after MN ordered.      Plan:  Agree with holding NPH while not on TF.    Continue  aspart 1 unit per 12 grams carb    Continue aspart medium correction     glucose checks per protocol, q4 hours    Hypoglycemia protocol    PRN D10W in case of TF interruption    Outpatient diabetes follow up: TBD                   Interval History:     The last 24 hours progress and nursing notes reviewed.     TPN discontinued   Tube feed to goal 60 ml/h   --GOAL: Vital 1.5 Elvis @ goal of 60ml/hr (1440ml/day) will provide: 2160 kcals (34 kcal/kg), 97 g PRO (1.5 g/kg), 1100 ml free H20, 269 g CHO, 82 g Fat, and 8 g fiber daily.  --Start TF @ 10 ml/hr and advance by 10 ml q 8 hrs as tolerated until goal rate.      Recent Labs   Lab 06/09/21  0607 06/09/21  0538 06/09/21  0213 06/08/21  2230 06/08/21  1615 06/08/21  1149 06/08/21  0814 06/08/21  0754 06/07/21  0540 06/07/21  0540 06/06/21  0617 06/06/21  0617 06/05/21  0654 06/05/21  0654 06/04/21  0736 06/04/21  0736   GLC  --  201*  --   --   --   --  211*  --   --  96  --  135*  --  345*  --  322*   *  --  201* 170* 177* 218*  --  219*   < >  --    < >  --    < >  --    < >  --     < > = values in this interval not displayed.           Nutrition:      Orders Placed This Encounter      Advance Diet as Tolerated: Regular Diet Adult; Low Fat Diet            PTA Regimen:     BG monitoring frequency: 1-2 times/d  Medications:   Lantus 20 units daily  NovoLog 2 per 50>150          Review of Systems:   See interval hx          Medications:   No steroid         Physical Exam:       /64 (BP Location: Right arm)   Pulse 87   Temp 98.9  F (37.2  C) (Oral)   Resp 18   Ht 1.829 m (6')   Wt 60.3 kg (132 lb 15 oz)   SpO2 98%   BMI 18.03 kg/m             Data:     Lab Results   Component Value Date    A1C 6.8 06/03/2021            Mariana Sanchez   Endocrinology Fellow PGY-5  Discussed with staff endocrinologist Dr Saldana

## 2021-06-09 NOTE — CONSULTS
GASTROENTEROLOGY PROGRESS NOTE          ASSESSMENT AND RECOMMENDATIONS:   Assessment:  Nick Zamudio is a 65 year old male with a history of testicular cancer and radiation therapy c/b bowel perforation, chronic calcific pancreatitis (hx ERCP with pancreatic stents), mesenteric stenosis (not amenable to vascular stent placement) presenting to ED following PEGJ placement, and sigmoidoscopy 6/1 with acutely worse nausea, vomiting and abdominal pain. GI now contact for consideration of colonoscopy.    Sigmoid mass  Colonoscopy with biopsies of sigmoid mass and further evaluation tomorrow.     Recommendations  -Colonosocpy tomorrow with MAC  -CLD today/tube feeds off  -Golytely 4 L tonight at 1600 with JT. Nursing to administer in 200 ml boluses every ~15-20 minutes with goal of completing prep within ~3 hours  -If stools not clear by midnight administer another 2 L Goytely  -NPO midnight besides prep/meds  -Anti emetics per primary team  -CBC tomorrow morning    GI will continue to follow. Thank you for involving us in this patient's care. Please do not hesitate to contact the GI service with any questions or concerns.     Pt care plan discussed with Dr. Leon, GI staff physician.    Johnny Ledezma CNP  [facility]  Text page          Interval History:   Consulted regarding colonoscopy with biopsies. Hgb stable. VSS. Still having nausea, poor PO, JT feeds. Denies significant abdominal pain, SOB, chest pain at this time.         Medications:     Current Facility-Administered Medications   Medication     acetaminophen (TYLENOL) tablet 650 mg     amylase-lipase-protease (CREON 24) 78614-48411 units per EC capsule 1-2 capsule    And     sodium bicarbonate tablet 325 mg     amylase-lipase-protease (CREON 24) 62235-86166 units per EC capsule 2 capsule     amylase-lipase-protease (CREON 24) 70583-37068 units per EC capsule 3 capsule     cholestyramine (QUESTRAN) Packet 4 g     dextrose 10% infusion     glucose gel 15-30 g     Or     dextrose 50 % injection 25-50 mL    Or     glucagon injection 1 mg     insulin aspart (NovoLOG) injection (RAPID ACTING)     insulin aspart (NovoLOG) injection (RAPID ACTING)     insulin NPH injection 18 Units     lidocaine (LMX4) cream     lidocaine 1 % 0.1-1 mL     magic mouthwash suspension (diphenhydramine, lidocaine, aluminum-magnesium & simethicone)     melatonin tablet 1 mg     multivitamins w/minerals (CERTAVITE) liquid 15 mL     ondansetron (ZOFRAN-ODT) ODT tab 4 mg    Or     ondansetron (ZOFRAN) injection 4 mg     oxyCODONE (ROXICODONE) tablet 5 mg     pantoprazole (PROTONIX) IV push injection 40 mg     prochlorperazine (COMPAZINE) injection 5 mg    Or     prochlorperazine (COMPAZINE) tablet 5 mg    Or     prochlorperazine (COMPAZINE) suppository 12.5 mg     sodium chloride (PF) 0.9% PF flush 3 mL     sodium chloride (PF) 0.9% PF flush 3 mL        Physical Exam   Blood pressure 127/64, pulse 87, temperature 98.9  F (37.2  C), temperature source Oral, resp. rate 18, height 1.829 m (6'), weight 60.3 kg (132 lb 15 oz), SpO2 98 %.  Constitutional: cooperative, pleasant, not dyspneic/diaphoretic, no acute distress  Eyes: Sclera anicteric/injected  Ears/nose/mouth/throat: Normal oropharynx without ulcers or exudate, mucus membranes moist  Neck: supple  CV: +3 pitting edema of bilateral feet  Respiratory: Unlabored breathing  Abd: Nondistended, PEGJ clamped  Skin: warm, perfused, no jaundice  Neuro: AAO x 3  Psych: Normal affect  MSK: No gross deformities    Data   Current Labs  CBC  Recent Labs   Lab 06/09/21  0538 06/08/21  1101 06/08/21  0814 06/06/21  0617   WBC 5.5 5.4 Canceled, Test credited 4.4   RBC 2.59* 2.71* Canceled, Test credited 2.49*   HGB 7.7* 7.9* Canceled, Test credited 7.3*   HCT 25.1* 26.6* Canceled, Test credited 24.3*   MCV 97 98 Canceled, Test credited 98   MCH 29.7 29.2 Canceled, Test credited 29.3   MCHC 30.7* 29.7* Canceled, Test credited 30.0*   RDW 13.9 14.1 Canceled, Test  credited 13.7    169 Canceled, Test credited 160     BMP  Recent Labs   Lab 06/09/21  0538 06/08/21  0814 06/07/21  0540 06/06/21  0617 06/05/21  0654    137 137 139 139   POTASSIUM 4.8 5.1 4.5 4.1 3.8   CHLORIDE 105 108 109 108 107   CO2 28 26 27 25 26   ANIONGAP 2* 3 2* 5 6   * 211* 96 135* 345*   BUN 33* 41* 47* 52* 50*   CR 0.96 0.97 0.96 0.91 0.94   GFRESTIMATED 82 80 81 87 83   GFRESTBLACK >90 >90 >90 >90 >90   ELODIA 7.9* 7.8* 7.9* 8.0* 7.6*   MAG 1.8  --  2.0 2.0 2.1   PHOS 2.6  --  2.7 3.4 3.6      INR  Recent Labs   Lab 06/07/21  0540 06/04/21  0736 06/03/21  0624   INR 1.12 1.09 1.09     Liver panel  Recent Labs   Lab 06/09/21  0538 06/07/21  0540 06/04/21  0736 06/03/21  0624   PROTTOTAL 5.1* 4.9* 4.7* 5.3*   ALBUMIN 1.2* 1.7* 1.6* 1.9*   BILITOTAL 0.2 0.5 0.2 0.2   ALKPHOS 65 69 50 62   AST <3 7 6 8   ALT 7 7 9 8       Imaging/procedures:  Reviewed in EMR

## 2021-06-09 NOTE — PROGRESS NOTES
[facility]  Hospitalist Progress Note  George Sherman MD  06/09/2021    Assessment & Plan     Nick Zamudio is a 65 year old male admitted on 6/3/2021. 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      6/7:    - CRS and GI consult noted  - tube feedings off   - colon prep tonight     Acute blood loss anemia 2/2 to UGIB  Hematemesis, melena   Most concerning for bleeding following recent PEG placement procedure. Currently hemodynamically stable.  S/p 1 unit pRBC 6/3  - hgb stable to improved  - IV PPI BID to transition to oral closer to discharge    Nonsevere malnutrition   Failure to thrive, deconditioning   - started tube feeds 6/5, NPO for colonoscopy  - Restaredt PTA Creon 6/5  - PT/OT consults, back to [facility] where he has a bed hold     Malignant colonic mass  -- noted on recent endoscopy  -- CRS consult appreciated  -- colonoscopy tomorrow and possible surgery pending results    Lactic acidosis resolved  Likely due to dehydration and acute blood loss. Received 2L IV fluids and 1 unit pRBC     Chronic pancreatitis  Chronic abdominal pain, diarrhea   - Oxycodone 5 mg Q6H PRN, Tylenol Q6H PRN (PTA takes percocet)  - Zofran, compazine PRN  - Restart Creon  - restarted tube feeds and ADAT -low fat diet  - no new pain     Type II DM   With TPN and transitioning to tube feeds consulted Endo for assistant with higher blood sugars  (See there not for details- likely transition from IV to s/c today)   - Hold PTA metformin (although looks to have been stopped after most recent admission to  [facility] in April).  - appreciate endocrine note and care. Transitioning to NPH, hold starting tonight     HLD  - Hold statin as non-essential while NPO      Diet: Adult Formula Drip Feeding: Continuous Vital 1.5; Jejunostomy; Goal Rate: 60; mL/hr; Medication - Feeding Tube Flush Frequency: At least 15-30 mL water before and after medication administration and with tube clogging; Amount to Send (Nutrition us...  Advance Diet as Tolerated: Regular Diet Adult; Low Fat Diet    - on hold pending colonoscopy    DVT Prophylaxis: Pneumatic Compression Devices  Cummins Catheter: not present  Code Status: Full Code         Disposition Plan     Expected discharge:   -- > 3-5 days, pending possible partial colon resection  -- anticipate discharge back to [facility] History   -- GI consult noted, plan forcolonoscopy   -- lymphedema consulted    -Data reviewed today: I reviewed all new labs and imaging over the last 24 hours. I personally reviewed no images or EKG's today.    Physical Exam    , Blood pressure 115/60, pulse 83, temperature 99.7  F (37.6  C), temperature source Oral, resp. rate 18, height 1.829 m (6'), weight 60.3 kg (132 lb 15 oz), SpO2 98 %.  Vitals:    06/06/21 1700 06/07/21 2100 06/08/21 1700   Weight: 61.3 kg (135 lb 2.3 oz) 60.5 kg (133 lb 6.1 oz) 60.3 kg (132 lb 15 oz)     Vital Signs with Ranges  Temp:  [98.9  F (37.2  C)-99.7  F (37.6  C)] 99.7  F (37.6  C)  Pulse:  [83-91] 83  Resp:  [16-18] 18  BP: (115-140)/(60-67) 115/60  SpO2:  [98 %-99 %] 98 %  I/O's Last 24 hours  I/O last 3 completed shifts:  In: 930 [I.V.:30; NG/GT:240]  Out: 2075 [Urine:2075]    Constitutional: Awake, alert, cooperative, no apparent distress  Respiratory: Clear to auscultation bilaterally, no crackles or wheezing  Cardiovascular: Regular rate and rhythm, normal S1 and S2, and no murmur noted  GI: Normal bowel sounds, soft, non-distended, non-tender  Skin/Integumen: No rashes, no cyanosis, +++ edema  Other:  LE  paresis    Medications        dextrose         amylase-lipase-protease  1-2 capsule Per J Tube Q4H    And     sodium bicarbonate  325 mg Per Feeding Tube Q4H     amylase-lipase-protease  3 capsule Oral TID w/meals     cholestyramine  1 packet Oral Daily     insulin aspart  1-6 Units Subcutaneous Q4H     insulin aspart   Subcutaneous TID AC     insulin NPH  18 Units Subcutaneous 2 times per day     multivitamins w/minerals  15 mL Per Feeding Tube Daily     pantoprazole (PROTONIX) IV  40 mg Intravenous BID     polyethylene glycol  2,000 mL Oral Once     polyethylene glycol  4,000 mL Oral Once     sodium chloride (PF)  3 mL Intracatheter Q8H        Data   Recent Labs   Lab 06/09/21  0538 06/08/21  1101 06/08/21  0814 06/07/21  0540 06/04/21  0736 06/04/21  0736 06/03/21  1331 06/03/21  1331 06/03/21  0624   WBC 5.5 5.4 Canceled, Test credited  --    < > 3.9*  --   --  7.6   HGB 7.7* 7.9* Canceled, Test credited  --    < > 7.2*   < > 7.9* 7.4*   MCV 97 98 Canceled, Test credited  --    < > 95  --   --  94    169 Canceled, Test credited  --    < > 151  --   --  200   INR  --   --   --  1.12  --  1.09  --   --  1.09     --  137 137   < > 140  --   --  141   POTASSIUM 4.8  --  5.1 4.5   < > 3.6  --   --  3.7   CHLORIDE 105  --  108 109   < > 109  --   --  104   CO2 28  --  26 27   < > 26  --   --  31   BUN 33*  --  41* 47*   < > 53*  --   --  65*   CR 0.96  --  0.97 0.96   < > 0.98  --   --  0.89   ANIONGAP 2*  --  3 2*   < > 5  --   --  6   ELODIA 7.9*  --  7.8* 7.9*   < > 7.9*  --   --  8.3*   *  --  211* 96   < > 322*  --   --  267*   ALBUMIN 1.2*  --   --  1.7*  --  1.6*  --   --  1.9*   PROTTOTAL 5.1*  --   --  4.9*  --  4.7*  --   --  5.3*   BILITOTAL 0.2  --   --  0.5  --  0.2  --   --  0.2   ALKPHOS 65  --   --  69  --  50  --   --  62   ALT 7  --   --  7  --  9  --   --  8   AST <3  --   --  7  --  6  --   --  8   LIPASE  --   --   --   --   --  <10*  --  <10*  --     < > = values in this interval  not displayed.       Recent Results (from the past 24 hour(s))   CT Chest w Contrast    Narrative    Examination: CT CHEST W CONTRAST, 6/9/2021 11:54 AM     History: Colon cancer, initial workup    Comparison: None.    Technique: Helical acquisition of CT images from the lung apices to  the kidneys after the uncomplicated administration of 65 cc of isovue  370. Coronal and axial MIP images were reconstructed from the source  data.    Findings:    Lungs: The central tracheobronchial tree is patent. Small left greater  than right bilateral pleural effusions and adjacent compressive  atelectasis. Linear atelectasis in the right lower lobe. No  consolidative airspace opacity. Tiny nodular opacities along the left  major fissure for example on series 6 image 132, favored to represent  small crystal-fissural lymph nodes. No suspicious mass or pulmonary  nodule.    Chest: Thyroid is unremarkable. Normal caliber and configuration of  the thoracic great vessels. Heart size is normal. Trace pericardial  effusion. Moderate coronary artery and aortic valve annulus and  leaflet calcifications. Main pulmonary artery and thoracic aorta are  normal in caliber. No enlarged axillary, mediastinal or hilar lymph  nodes. Unopacified vascular bypass graft coursing from the left axilla  along the left chest wall.    Abdomen: Limited evaluation of the upper abdomen percutaneous  gastrojejunostomy tube balloon within the stomach. . Stable  hypodensity in hepatic segment 4A. Similar appearance of mild  intrahepatic biliary ductal dilation. Cholecystectomy. Similar  appearance of bilateral hydronephrosis. Left renal cyst. Severe  pancreatic atrophy and pancreatic calcifications. Adrenal glands are  normal. Air dilated colon visualized in the upper abdomen. Abdominal  aortic and extensive celiac axis and SMA atherosclerosis.  Approximately 25-30% narrowing of the perirenal abdominal aorta.    Bones: No acute osseus abnormality or suspicious bony  lesion. Diffuse  osteopenia. Multilevel degenerative changes of the spine.       Impression    Impression: In this patient with colon cancer:  1. No suspicious mass or lymphadenopathy within the chest to suggest  metastatic disease.  2. Small bilateral pleural effusions and adjacent compressive  atelectasis.  3. Air dilated colon visualized in the upper abdomen, which may  represent a large bowel obstruction/ileus.  4. Additional findings as detailed in the body of report.  5. Aortic annular and valvular leaflet calcifications.  6. Hydronephrosis and left renal cyst.    I have personally reviewed the examination and initial interpretation  and I agree with the findings.    MD George DAVILA MD  Text Page  (7am to 6pm)

## 2021-06-10 NOTE — PROGRESS NOTES
Diabetes Consult Daily  Progress Note          Assessment/Plan:     HPI:   Nick Zamudio is a 65 year old male with PMHx of hypertension, type II diabetes, history of testicular cancer with bilateral lower extremity paraparesis, bilateral lower extremity lymphedema due to complication of radiation therapy, history of small bowel obstruction, failure to thrive, severe malnutrition, biliary stenosis, chronic pancreatitis    Assessment:     1. Type II diabetes, A1c not reliable due to anemia  2. TF induced hyperglycemia (started on 6/5)        Plan:         NPH BID - to resume with TF (0500 and 1700) will work towards 08 and 2000 hrs    Resume aspart 1 unit per 12 grams carb    Continue aspart medium correction q4h    glucose checks per protocol, q4 hours    Hypoglycemia protocol    PRN D10W in case of TF interruption       Outpatient diabetes follow up: TBD  Plan discussed with patient, bedside RN, and primary team via this note.           Interval History:     The last 24 hours progress and nursing notes reviewed.  Insulin and TF held for colonoscopy today at 1300 hrs      No low trends today with TF off   Will resume NPH when TF re-started  Patient has no new questions/complaints today      Recent Labs   Lab 06/10/21  0715 06/10/21  0437 06/10/21  0054 06/09/21  2218 06/09/21  1808 06/09/21  1724 06/09/21  1407 06/09/21  0538 06/09/21  0538 06/08/21  0814 06/08/21  0814 06/07/21  0540 06/07/21  0540 06/06/21  0617 06/06/21  0617 06/05/21  0654 06/05/21  0654   *  --   --   --   --   --   --   --  201*  --  211*  --  96  --  135*  --  345*   BGM  --  152* 121* 107* 80 67* 89   < >  --    < >  --    < >  --    < >  --    < >  --     < > = values in this interval not displayed.           Nutrition:     Orders Placed This Encounter      NPO per Anesthesia Guidelines for Procedure/Surgery Except for: Meds, Other; Specify: prep          PTA Regimen:     BG monitoring frequency: 1-2  times/d  Medications:   Lantus 20 units daily  NovoLog 2 per 50>150          Review of Systems:   CC: denies   Constitutional:   No fever/chills  ENT/Mouth:   No hearing changes, no ear pain, no sore throat, no rhinorrhea, no difficulty swallowing  Eyes:  No eye pain, no discharge, no vision changes  CV:   No CP/SOB, no new edema  Resp:  No cough, no wheezing  GI:   No N/V, no constipation, +++ diarrhea (scope prep)  :  No dysuria, frequency, or hematuria - external cath  Musk:  No new joint swelling/pain, denies back pain  Skin/heme:   No new rashes/bruises/open areas.  No pruritis  Neuro:   No new weakness, no  numbness/tingling, no headache  Psych:   No new anxiety, denies depression  Endocrine:  No polyuria or polydipsia         Medications:   Steroid planning:  no       Physical Exam:   BP (!) 140/56   Pulse 78   Temp 97.8  F (36.6  C) (Oral)   Resp 18   Ht 1.829 m (6')   Wt 60.5 kg (133 lb 6.1 oz)   SpO2 100%   BMI 18.09 kg/m      General:   NAD, pleasant,  resting comfortably in bed.   HEENT:  NC/AT. MMM, sclera not injected  Lungs:  unremarkable, no new cough, no SOB  ABD:   soft  Skin:  warm and dry, no obvious lesions/rash  MSK:   moving all extremities  Lymp:   + LE edema - dorsum of feet   Mental Status:  Alert and oriented x3  Psych:   Cooperative, good eye contact, full affect          Data:     Lab Results   Component Value Date    A1C 6.8 06/03/2021            CBC RESULTS:   Recent Labs   Lab Test 06/10/21  0715   WBC 4.8   RBC 2.52*   HGB 7.5*   HCT 24.2*   MCV 96   MCH 29.8   MCHC 31.0*   RDW 13.9   *     Recent Labs   Lab Test 06/10/21  0715 06/09/21  0538    135   POTASSIUM 4.3 4.8   CHLORIDE 104 105   CO2 29 28   ANIONGAP 4 2*   * 201*   BUN 26 33*   CR 1.00 0.96   ELODIA 8.0* 7.9*     Liver Function Studies -   Recent Labs   Lab Test 06/09/21  0538   PROTTOTAL 5.1*   ALBUMIN 1.2*   BILITOTAL 0.2   ALKPHOS 65   AST <3   ALT 7     Lab Results   Component Value Date     INR 1.12 06/07/2021    INR 1.09 06/04/2021    INR 1.09 06/03/2021    INR 1.28 03/15/2021    INR 1.09 01/13/2021    INR 1.19 12/09/2020    INR 1.12 09/29/2020    INR 1.14 07/13/2020         DANICA Dickinson CNP   Inpatient Diabetes Management Service  Pager - [phone]  Friday - Monday 0800 - 1600 hrs  After hours above - Diabetes Management Team job code: [phone]    I spent a total of 35 minutes bedside and on the inpatient unit managing glycemic care.  Over 50% of my time on the unit was spent counseling the patient and/or coordinating care regarding acute hyperglycemic management.  See note for details.

## 2021-06-10 NOTE — PLAN OF CARE
Edema/7B: Lymphedema evaluation orders received and appreciated. Patient preparing for colonoscopy at time of check-in. Unable to check back at the end of the day due to scheduling demands. Will reschedule evaluation for tomorrow.

## 2021-06-10 NOTE — PROGRESS NOTES
GI PROGRESS NOTE  -Colonoscopy complete, though prep in adequate to fully rule out additional lesions or polyps  -Will defer decision for repeat colonoscopy with multiple days of prep to colorectal surgery (will reach out)  -Ok to due clear liquids and TF this evening, will reevaluate tomorrow    Johnny Ledezma, DANICA CNP on 6/10/2021 at 3:19 PM

## 2021-06-10 NOTE — PLAN OF CARE
/51 (BP Location: Right arm)   Pulse 65   Temp 96.6  F (35.9  C) (Oral)   Resp 18   Ht 1.829 m (6')   Wt 60.5 kg (133 lb 6.1 oz)   SpO2 100%   BMI 18.09 kg/m      Neuro: A&Ox4.   Cardiac: AVSS.   Respiratory: Sating 100% on RA.  GI/: Adequate urine output via pure wick. No BM   Diet/appetite: TF started at 4:45pm at 60ml/hr with water flushes 30ml q 4hrs. Stop D10 infusion at 4:45pm when TF started   Activity: Turns and repositions q 2 hrs.  Pain: Denies   Skin: Pressure ulcer on coccyx (pre-existing). Groins reddened. Barrier cream applied  LDA's: PIV, PICC SL    Plan: Continue with POC. Notify primary team with changes.

## 2021-06-10 NOTE — PROGRESS NOTES
Admitted/transferred from:   2 RN full   skin assessment completed by Nika Salazra, RN and Boom Marmolejo, RN.  Skin assessment finding: issues found Old healed abdominal midline, G-tube, LLE +4 pitting edema, RLE +3 pitting edema with old shin wound. Abrasion on L forearm, L PICC. Also has a very red non-blanchable area on coccyx- had a ton of barrier cream which was starting to tear skin.   Interventions/actions: WOC consult ordered     Will continue to monitor.        Neuros: A&O. Has baseline numbness and tingling in LE due to lymphedema.   Cardiac: murmur detected. Denies any chest pain.   Respiratory: LS diminished, denies any SOB.   GI/Gu: Continuous incontinent liquid diarrhea due to bowel prep. May need additional prep due to not clear stool. Voiding, but incontinent has external male catheter on. Tried to give a rectal tube a try, but pt says he has polyps therefore unable.   Skin/Incisions: Multiple skin issues, see 2 person skin check above.   Pain: Abdominal discomfort- prn zofran and oxy given.   Diet: NPO since midnight, colonoscopy today no time yet.   Activity: 2 assist. Lift if needed, does turn fairly well besides legs. Paraplegic.   Plan: Continue with POC.

## 2021-06-10 NOTE — PLAN OF CARE
Pt transferred to  at 0530    Assumed Cares: [id number]  Neuro: A&O x 4  Respiratory: LS diminished at bases; denies SOB  Cardiac: Denies chest pain   GI/: Golytely prep running through J-tube majority of the night; G tube clamped; intermittent nausea managed with zofran x 1; stools remained liquid and brown  Skin: Bottom very red; incontinence cares completed with barrier cream applied; pt refusing to be turned and repositioned q 2hr despite education; radiation injuries to groin and lower abdomen from hx of testicular cancer; generalized bruising throughout body; lymphedema to BLE; PCDs on  Lines: L single lumen PICC running D10 60ml/hr for TF substitute; R PIV SL  Pain: Upper abdominal pain managed with oxycodone x 1  Diet: NPO since midnight for colonoscopy scheduled for today  Activity Level: 2 assist with a lift and for cares in bed  Significant Events: No significant events this shift  Plan: Continue with POC; notify provider with changes in pt condition

## 2021-06-10 NOTE — PROGRESS NOTES
Colorectal Surgery Progress Note  [facility]  POD#7      Subjective:  Doing ok.  Did the bowel prep.  Awaiting scope.     Vitals:  Vitals:    06/09/21 0501 06/09/21 1405 06/09/21 2221 06/10/21 0708   BP: 127/64 115/60 122/59 120/48   BP Location: Right arm Left arm Left arm Right arm   Pulse: 87 83 84 73   Resp: 18 18 16 17   Temp: 98.9  F (37.2  C) 99.7  F (37.6  C) 99.4  F (37.4  C) 98  F (36.7  C)   TempSrc: Oral Oral Oral Oral   SpO2: 98% 98% 99% 100%   Weight:   60.5 kg (133 lb 6.1 oz)    Height:         I/O:  I/O last 3 completed shifts:  In: 1238 [P.O.:518; I.V.:30; NG/GT:210]  Out: 1900 [Urine:1900]    Physical Exam:  Gen: AAOx3, NAD  Pulm: Non-labored breathing  Abd: Soft, non-distended, appropriately tender, no guarding/rebound  Ext:  Warm and well-perfused    BMP  Recent Labs   Lab 06/09/21  0538 06/08/21  0814 06/07/21  0540 06/06/21  0617 06/05/21  0654    137 137 139 139   POTASSIUM 4.8 5.1 4.5 4.1 3.8   CHLORIDE 105 108 109 108 107   CO2 28 26 27 25 26   BUN 33* 41* 47* 52* 50*   CR 0.96 0.97 0.96 0.91 0.94   * 211* 96 135* 345*   MAG 1.8  --  2.0 2.0 2.1   PHOS 2.6  --  2.7 3.4 3.6     CBC  Recent Labs   Lab 06/10/21  0715 06/09/21  0538 06/08/21  1101 06/08/21  0814   WBC 4.8 5.5 5.4 Canceled, Test credited   HGB 7.5* 7.7* 7.9* Canceled, Test credited   HCT 24.2* 25.1* 26.6* Canceled, Test credited   * 173 169 Canceled, Test credited         ASSESSMENT: This is a 65 year old male with PMH testicular cancer s/p radiation c/b bowel injury requiring resection (possible partial colectomy/possible partial SBR), chronic lymphedema & paraplegia of lower extremities, chronic idiopathic pancreatitis & biliary stenosis, small bowel intussusception, extensive atherosclerosis of SMA, chronic malnutrition on TPN/tube feeds.  Pt admitted with hematemesis & melena following PEG placement 6/1.  Found to have a rectosigmoid mass during endoscopic evaluation on 6/1 -  over 3cm in length, near 50% luminal obstruction w/ adjacent small probably tubular adenomas.  However mass has been present since 2019, path at that time demonstrated tubulovillous adenoma.       CEA 1.4  Chest CT negative for metastatic disease    - apprec GI for endoscopic evaluation and biopsies  - we will continue to follow    Dee Dee Horne PA-C   Colon and Rectal Surgery  3233     Patient was seen and discussed with Dr. Valles

## 2021-06-10 NOTE — PROVIDER NOTIFICATION
PROVIDER NOTIFICATION:    Time of notification: 0837  Provider notified: Johnny Ledezma NP at [phone]  Patient status: How do you want the GoLytely given? Order says 2L all at once but I'm assuming we're not bolusing with that volume. Procedure time is ~1200.  Orders received: Provider called back at 0837 and said to try to give ~250 ml every 10-15 min and get as much in as possible as pt tolerates until 1000. Writer informed him GoLytely hasn't arrived yet, but will start bolusing as soon as it does.    Time of notification: 0935  Provider notified: Johnny Ledezma NP at [phone]  Patient status: Pharmacy still has not brought the GoLytely, they've been paged and called and say they're on their way now. Just wanted to let you know given the time. I'll start it as soon as it gets here!  Orders received: Provider called back at 0948, said pt can actually get prep until 1100, so start it as soon as possible and give it until then.    Time of notification: 1441  Provider notified: George Núñez MD at [phone]  Patient status: Pt back from colonoscopy. Do you want his TF resumed or diet order placed?  Orders received: Provider called back, stated to restart TF at prior tolerated rate (60 ml/hr) now. Orders changed accordingly. Johnny Ledezma NP also called at 1522, resuming CLD.    Time of notification: 1446  Provider notified: Margarette Woo NP at [phone]  Patient status: Pt back from colonoscopy. TF are going to be restarted shortly. Do you want insulin ordered? Thanks!  Orders received: Insulin orders unheld.    Will continue to monitor and notify provider of any changes.

## 2021-06-10 NOTE — ANESTHESIA CARE TRANSFER NOTE
Patient: Nick Zamudio    Procedure(s):  COLONOSCOPY, WITH POLYPECTOMY AND BIOPSY    Diagnosis: Intestinal mass [K63.89]  Diagnosis Additional Information: No value filed.    Anesthesia Type:   MAC     Note:    Oropharynx: oropharynx clear of all foreign objects and spontaneously breathing  Level of Consciousness: awake  Oxygen Supplementation: room air    Independent Airway: airway patency satisfactory and stable  Dentition: dentition unchanged  Vital Signs Stable: post-procedure vital signs reviewed and stable  Report to RN Given: handoff report given  Patient transferred to: PACU    Handoff Report: Identifed the Patient, Identified the Reponsible Provider, Reviewed the pertinent medical history, Discussed the surgical course, Reviewed Intra-OP anesthesia mangement and issues during anesthesia, Set expectations for post-procedure period and Allowed opportunity for questions and acknowledgement of understanding      Vitals: (Last set prior to Anesthesia Care Transfer)  CRNA VITALS  6/10/2021 1331 - 6/10/2021 1412      6/10/2021             SpO2:  100 %    EKG:  Sinus rhythm        Electronically Signed By: DANICA Neely CRNA  Erlinda 10, 2021  2:12 PM

## 2021-06-10 NOTE — PROGRESS NOTES
[facility]  Hospitalist Progress Note  George Sherman MD  06/10/2021    Assessment & Plan     Nick Zamudio is a 65 year old male admitted on 6/3/2021. 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      6/7:    - CRS and GI consult noted  - had colonoscopy today  - no new issues identified  - restart tube feedings today     Acute blood loss anemia 2/2 to UGIB  Hematemesis, melena   Most concerning for bleeding following recent PEG placement procedure. Currently hemodynamically stable.  S/p 1 unit pRBC 6/3  - hgb stable to improved  -  PPI BID     Nonsevere malnutrition   Failure to thrive, deconditioning   - started tube feeds 6/5, NPO for colonoscopy  - Restaredt PTA Creon 6/5  - PT/OT consults, back to [facility] where he has a bed hold     Malignant colonic mass  -- noted on recent endoscopy  -- CRS consult appreciated  -- colonoscopy tomorrow and possible surgery pending results    Lactic acidosis resolved  Likely due to dehydration and acute blood loss. Received 2L IV fluids and 1 unit pRBC     Chronic pancreatitis  Chronic abdominal pain, diarrhea   - Oxycodone 5 mg Q6H PRN, Tylenol Q6H PRN (PTA takes percocet)  - Zofran, compazine PRN  - Restart Creon  - restarted tube feeds and ADAT -low fat diet  - no new pain     Type II DM   With TPN and transitioning to tube feeds consulted Endo for assistant with higher blood sugars  (See there not for details- likely transition from IV to s/c today)   - Hold PTA metformin (although looks to have been stopped after most recent admission to  [facility] in April).  - appreciate endocrine note and care. Transitioning to NPH, hold starting tonight     HLD  - Hold statin as non-essential while NPO      Diet: Adult Formula Drip Feeding: Continuous Vital 1.5; Jejunostomy; Goal Rate: 60; mL/hr; Medication - Feeding Tube Flush Frequency: At least 15-30 mL water before and after medication administration and with tube clogging; Amount to Send (Nutrition us...  Advance Diet as Tolerated: Regular Diet Adult; Low Fat Diet    - on hold pending colonoscopy    DVT Prophylaxis: Pneumatic Compression Devices  Cummins Catheter: not present  Code Status: Full Code         Disposition Plan     Expected discharge:   -- > 3-5 days, pending possible partial colon resection  -- anticipate discharge back to [facility]    Interval History   -- s/p colonoscopy  -- lymphedema consulted    -Data reviewed today: I reviewed all new labs and imaging over the last 24 hours. I personally reviewed no images or EKG's today.    Physical Exam    , Blood pressure 125/51, pulse 65, temperature 96.6  F (35.9  C), temperature source Oral, resp. rate 18, height 1.829 m (6'), weight 60.5 kg (133 lb 6.1 oz), SpO2 100 %.  Vitals:    06/07/21 2100 06/08/21 1700 06/09/21 2221   Weight: 60.5 kg (133 lb 6.1 oz) 60.3 kg (132 lb 15 oz) 60.5 kg (133 lb 6.1 oz)     Vital Signs with Ranges  Temp:  [96.6  F (35.9  C)-99.4  F (37.4  C)] 96.6  F (35.9  C)  Pulse:  [65-84] 65  Resp:  [16-18] 18  BP: (120-140)/(48-59) 125/51  SpO2:  [99 %-100 %] 100 %  I/O's Last 24 hours  I/O last 3 completed shifts:  In: 2468 [P.O.:518; I.V.:690; NG/GT:1260]  Out: 2900 [Urine:1900; Stool:1000]    Constitutional: Awake, alert, cooperative, no apparent distress  Respiratory: Clear to auscultation bilaterally, no crackles or wheezing  Cardiovascular: Regular rate and rhythm, normal S1 and S2, and no murmur noted  GI: Normal bowel sounds, soft, non-distended, non-tender  Skin/Integumen: No rashes, no cyanosis, +++ edema  Other:   LE paresis    Medications        dextrose Stopped (06/10/21 1646)       amylase-lipase-protease  1-2 capsule Per J Tube Q4H    And     sodium bicarbonate  325 mg Per Feeding Tube Q4H     amylase-lipase-protease  3 capsule Oral TID w/meals     cholestyramine  1 packet Oral Daily     insulin aspart   Subcutaneous TID AC     insulin NPH  18 Units Subcutaneous 2 times per day     multivitamins w/minerals  15 mL Per Feeding Tube Daily     pantoprazole (PROTONIX) IV  40 mg Intravenous BID     polyethylene glycol  2,000 mL Oral Once     sodium chloride (PF)  3 mL Intracatheter Q8H        Data   Recent Labs   Lab 06/10/21  0715 06/09/21  0538 06/08/21  1101 06/08/21  0814 06/07/21  0540 06/04/21  0736 06/04/21  0736   WBC 4.8 5.5 5.4 Canceled, Test credited  --    < > 3.9*   HGB 7.5* 7.7* 7.9* Canceled, Test credited  --    < > 7.2*   MCV 96 97 98 Canceled, Test credited  --    < > 95   * 173 169 Canceled, Test credited  --    < > 151   INR  --   --   --   --  1.12  --  1.09    135  --  137 137   < > 140   POTASSIUM 4.3 4.8  --  5.1 4.5   < > 3.6   CHLORIDE 104 105  --  108 109   < > 109   CO2 29 28  --  26 27   < > 26   BUN 26 33*  --  41* 47*   < > 53*   CR 1.00 0.96  --  0.97 0.96   < > 0.98   ANIONGAP 4 2*  --  3 2*   < > 5   ELODIA 8.0* 7.9*  --  7.8* 7.9*   < > 7.9*   * 201*  --  211* 96   < > 322*   ALBUMIN  --  1.2*  --   --  1.7*  --  1.6*   PROTTOTAL  --  5.1*  --   --  4.9*  --  4.7*   BILITOTAL  --  0.2  --   --  0.5  --  0.2   ALKPHOS  --  65  --   --  69  --  50   ALT  --  7  --   --  7  --  9   AST  --  <3  --   --  7  --  6   LIPASE  --   --   --   --   --   --  <10*    < > = values in this interval not displayed.       No results found for this or any previous visit (from the past 24 hour(s)).    George Sherman MD  Text Page  (7am to 6pm)

## 2021-06-10 NOTE — PROVIDER NOTIFICATION
On call provider contacted inquiring use of rectal tube. Copius amounts of liquid stool/incontinent with bowel prep. Has significant skin breakdown on sacrum/coccyx already. WOC consult placed for potential HAPI upon arrival to  from another unit. Primary RN at bedside.

## 2021-06-10 NOTE — PLAN OF CARE
BP (!) 140/56   Pulse 78   Temp 97.8  F (36.6  C) (Oral)   Resp 18   Ht 1.829 m (6')   Wt 60.5 kg (133 lb 6.1 oz)   SpO2 100%   BMI 18.09 kg/m      [id number]: Pt admitted for hematemesis & melena follow PEG-J placement on 6/1. Had colonoscopy at ~1330 today with bx of known rectal mass - path pending. Returned to floor at ~1430. AVSS on RA.  Neuro: WDL.  Cardiac: WDL ex BLE lymphedema - lymphedema consult in.  Resp: WDL on RA.  GI/: External male catheter in place with >700 ml of clear yellow OP. Had colon prep throughout this AM, significant amount of OP d/t that but has been clean since returning to floor.  Diet/Appetite: Pt is NPO with cont TF via PEG-J at 60 ml/hr + Q4 30 ml H2O flushes, need to be restarted when formula is available. C/o nausea after receiving >1L of GoLytely, given IV Compazine x1   Endocrine: Q4 BG checks (161 & 132), no insulin currently ordered but Endocrine following and will restart with TF.  Skin: WOC consult done today d/t pressure ulcer on coccyx (pre-existing) and skin breakdown in groin area. Incont protocol followed, thin layer of barrier cream removed and reapplied when changing. No changes.  Access: SL L PICC SLd, good blood return and flushes well. PIV x1 with D10 at 60 ml/hr - can be stopped when TF is resumed.  Drains: External male catheter patent. JT for meds and TF. GT can be opened to gravity prn for pt comfort but has been clamped all day.  Activity: TQ2, lift for OOB activity.  Pain: Denied.  Plan: Await colon bx path results; plan will be established at that point for resection vs other. Team also just placed order for routine covid swab - needs to be collected. Writer spoke with [facility] via telephone as well for update. Will continue with plan of care and notify team of any changes.    Addendum: Johnny Ledezma NP called writer at 1522 stating there was still a fair amount of stool in the colon, thus provider was unable to see clearly throughout colonoscopy.  "Johnny talking with CRS to see if they want another scope done, if so they would likely do several days of prep over the weekend instead, but pt can resume CLD for now. Asked Johnny if they do decide to do several more days of prep, if they could encourage and educate pt on utilizing a rectal tube d/t significant skin breakdown already present. Pt previously declined rectal tube d/t \"the rectal polyp,\" so Johnny agreed they would also educate him that the polyp is not a contraindication for a rectal tube.    "

## 2021-06-10 NOTE — ANESTHESIA POSTPROCEDURE EVALUATION
Patient: Nick Zamudio    Procedure(s):  COLONOSCOPY, WITH POLYPECTOMY AND BIOPSY    Diagnosis:Intestinal mass [K63.89]  Diagnosis Additional Information: No value filed.    Anesthesia Type:  MAC    Note:  Disposition: Inpatient   Postop Pain Control: Uneventful            Sign Out: Well controlled pain   PONV: No   Neuro/Psych: Uneventful            Sign Out: Acceptable/Baseline neuro status   Airway/Respiratory: Uneventful            Sign Out: Acceptable/Baseline resp. status   CV/Hemodynamics: Uneventful            Sign Out: Acceptable CV status; No obvious hypovolemia; No obvious fluid overload   Other NRE: NONE   DID A NON-ROUTINE EVENT OCCUR? No           Last vitals:  Vitals:    06/09/21 2221 06/10/21 0708 06/10/21 1310   BP: 122/59 120/48 122/53   Pulse: 84 73 72   Resp: 16 17 16   Temp: 37.4  C (99.4  F) 36.7  C (98  F)    SpO2: 99% 100% 99%       Last vitals prior to Anesthesia Care Transfer:  CRNA VITALS  6/10/2021 1331 - 6/10/2021 1416      6/10/2021             SpO2:  100 %    EKG:  Sinus rhythm          Electronically Signed By: MARCUS FELICIANO MD  Erlinda 10, 2021  2:16 PM

## 2021-06-10 NOTE — OR NURSING
Pt had colonoscopy under MAC, biopsies of rectal mass. Pt tolerated procedure well. Pt stable at time of transfer to PACU by CHASE Durand. Report given to chel Hall RN on 7B.

## 2021-06-10 NOTE — ANESTHESIA PREPROCEDURE EVALUATION
Anesthesia Pre-Procedure Evaluation    Patient: Nick Zamudio   MRN: [id number] : 1955        Preoperative Diagnosis: Intestinal mass [K63.89]   Procedure : Procedure(s):  COLONOSCOPY, WITH POLYPECTOMY AND BIOPSY     Past Medical History:   Diagnosis Date     Diabetes (H)      Hypertension      Intussusception (H)      Iron deficiency anemia      Lymphedema      Pancreatic disease     calcific pancreas, multiple panc stones.     Paraplegia (H)     Due to radiation treatment testicular cancer     PVD (peripheral vascular disease) (H)      Renal disease     stone disease     Testicular cancer (H)     Radiation treatment      Past Surgical History:   Procedure Laterality Date     ABDOMEN SURGERY      Partial colectomy for damage due to radiation treatment testicular ca     AMPUTATION      left toe     CHOLECYSTECTOMY       COLONOSCOPY      polyp     ENDOSCOPIC INSERTION TUBE GASTROSTOMY N/A 2021    Procedure: INSERTION, PEG-jejunostomy TUBE;  Surgeon: Christo Pepe MD;  Location:  OR     ENDOSCOPIC RETROGRADE CHOLANGIOPANCREATOGRAM N/A 2020    Procedure: ENDOSCOPIC RETROGRADE CHOLANGIOPANCREATOGRAPHY, STENT RETRIEVAL, STENT PLACEMENT, BILIARY SPHINCTEROTOMY;  Surgeon: Christo Pepe MD;  Location:  OR     ENDOSCOPIC RETROGRADE CHOLANGIOPANCREATOGRAM N/A 2020    Procedure: ENDOSCOPIC RETROGRADE CHOLANGIOPANCREATOGRAPHY with balloon sweep of bile ducts, bile ducts stents exchanged;  Surgeon: Christo Pepe MD;  Location:  OR     ENDOSCOPIC RETROGRADE CHOLANGIOPANCREATOGRAM N/A 2020    Procedure: ENDOSCOPIC RETROGRADE CHOLANGIOPANCREATOGRAPHY, STENT EXCHANGE;  Surgeon: Christo Pepe MD;  Location:  OR     ENDOSCOPIC RETROGRADE CHOLANGIOPANCREATOGRAM N/A 2021    Procedure: ENDOSCOPIC RETROGRADE CHOLANGIOPANCREATOGRAPHY WITH PANCREATIC DUCT STENT REMOVAL;  Surgeon: Christo Pepe MD;  Location:  OR     ENDOSCOPIC ULTRASOUND UPPER  GASTROINTESTINAL TRACT (GI) N/A 7/13/2020    Procedure: ENDOSCOPIC ULTRASOUND, ESOPHAGOSCOPY / UPPER GASTROINTESTINAL TRACT (GI) WITH FINE NEEDLE BIOPSY;  Surgeon: Christo Pepe MD;  Location: [facility] OR     GI SURGERY      bowel resection     IR VISCERAL ANGIOGRAM  3/15/2021     ORTHOPEDIC SURGERY       SIGMOIDOSCOPY FLEXIBLE N/A 6/1/2021    Procedure: SIGMOIDOSCOPY, FLEXIBLE;  Surgeon: Christo Pepe MD;  Location: [facility] OR     TONSILLECTOMY        Allergies   Allergen Reactions     Penicillins Other (See Comments)     Ibuprofen Rash     Tolerates Aleve      Social History     Tobacco Use     Smoking status: Never Smoker     Smokeless tobacco: Never Used   Substance Use Topics     Alcohol use: Not Currently     Frequency: Never      Wt Readings from Last 1 Encounters:   06/09/21 60.5 kg (133 lb 6.1 oz)        Anesthesia Evaluation            ROS/MED HX  ENT/Pulmonary:       Neurologic: Comment: History of testicular cancer s/p radiation leading to paraplegia      Cardiovascular:     (+) Dyslipidemia hypertension-Peripheral Vascular Disease----    METS/Exercise Tolerance:     Hematologic:     (+) anemia,     Musculoskeletal:       GI/Hepatic:       Renal/Genitourinary:     (+) renal disease,     Endo:     (+) type II DM, Using insulin,     Psychiatric/Substance Use:       Infectious Disease:       Malignancy:       Other:            Physical Exam    Airway  airway exam normal      Mallampati: II   TM distance: > 3 FB   Neck ROM: full   Mouth opening: > 3 cm    Respiratory Devices and Support         Dental  no notable dental history         Cardiovascular   cardiovascular exam normal          Pulmonary   pulmonary exam normal                OUTSIDE LABS:  CBC:   Lab Results   Component Value Date    WBC 4.8 06/10/2021    WBC 5.5 06/09/2021    HGB 7.5 (L) 06/10/2021    HGB 7.7 (L) 06/09/2021    HCT 24.2 (L) 06/10/2021    HCT 25.1 (L) 06/09/2021     (L) 06/10/2021     06/09/2021     BMP:   Lab  Results   Component Value Date     06/10/2021     06/09/2021    POTASSIUM 4.3 06/10/2021    POTASSIUM 4.8 06/09/2021    CHLORIDE 104 06/10/2021    CHLORIDE 105 06/09/2021    CO2 29 06/10/2021    CO2 28 06/09/2021    BUN 26 06/10/2021    BUN 33 (H) 06/09/2021    CR 1.00 06/10/2021    CR 0.96 06/09/2021     (H) 06/10/2021     (H) 06/09/2021     COAGS:   Lab Results   Component Value Date    PTT 30 06/03/2021    INR 1.12 06/07/2021     POC:   Lab Results   Component Value Date     (H) 06/10/2021     HEPATIC:   Lab Results   Component Value Date    ALBUMIN 1.2 (L) 06/09/2021    PROTTOTAL 5.1 (L) 06/09/2021    ALT 7 06/09/2021    AST <3 06/09/2021    ALKPHOS 65 06/09/2021    BILITOTAL 0.2 06/09/2021     OTHER:   Lab Results   Component Value Date    LACT 1.0 06/03/2021    A1C 6.8 (H) 06/03/2021    ELODIA 8.0 (L) 06/10/2021    PHOS 2.6 06/09/2021    MAG 1.8 06/09/2021    LIPASE <10 (L) 06/04/2021    AMYLASE 26 (L) 01/13/2021       Anesthesia Plan    ASA Status:  3   NPO Status:  NPO Appropriate    Anesthesia Type: MAC.     - Reason for MAC: straight local not clinically adequate   Induction: Intravenous.   Maintenance: TIVA.        Consents    Anesthesia Plan(s) and associated risks, benefits, and realistic alternatives discussed. Questions answered and patient/representative(s) expressed understanding.     - Discussed with:  Patient         Postoperative Care            Comments:                MARCUS FELICIANO MD

## 2021-06-10 NOTE — CONSULTS
[facility] Nurse Inpatient Wound Assessment   Reason for consultation: Evaluate and treat  buttock wounds    Assessment  Buttock, perianal, sacral wounds due to Friction, Incontinence Associated Dermatitis (IAD) and Moisture Associated Skin Damage (MASD)  Status: initial assessment, per pt has had a slow to heal wound over area in the past so likely scar tissue present to area increasing risk for damage  Fungal rash noted     Treatment Plan  Buttock wounds: BID apply antifungal powder (per MAR) to rash to buttock and bilateral groin and rub into the skin, then apply critic-aid cream over powder for protection from stool/urine. Reapply critic-aid paste PRN to maintain barrier. With incontinence, cleanse with Nichelle cleanse and protect and paper washclothes. Do not need to remove all paste with cleaning, only skim off top soiled layer and reapply to maintain barrier. If full removal needed, please use baby oil to reduce friction forces to the skin.   Turns q2hrs, lift foot of bed prior to lifting head of bed to reduce shearing to sacrum.         Orders Written  Recommended provider order: Antifungal powder to buttock and groin rash BID  WOC Nurse follow-up plan:weekly  Nursing to notify the Provider(s) and re-consult the WOC Nurse if wound(s) deteriorates or new skin concern.    Patient History  According to provider note(s):  This is a 65 year old male with PMH testicular cancer s/p radiation c/b bowel injury requiring resection (possible partial colectomy/possible partial SBR), chronic lymphedema & paraplegia of lower extremities, chronic idiopathic pancreatitis & biliary stenosis, small bowel intussusception, extensive atherosclerosis of SMA, chronic malnutrition on TPN/tube feeds.  Pt admitted with hematemesis & melena following PEG placement 6/1.  Found to have a rectosigmoid mass during endoscopic evaluation on 6/1 - over 3cm in length, near 50% luminal obstruction w/ adjacent small probably tubular adenomas.  However  mass has been present since 2019, path at that time demonstrated tubulovillous adenoma.      Objective Data  Containment of urine/stool: Incontinence Protocol    Active Diet Order  Orders Placed This Encounter      NPO per Anesthesia Guidelines for Procedure/Surgery Except for: Meds, Other; Specify: prep      Output:   I/O last 3 completed shifts:  In: 1238 [P.O.:518; I.V.:30; NG/GT:210]  Out: 1900 [Urine:1900]    Risk Assessment:   Sensory Perception: 2-->very limited  Moisture: 1-->constantly moist  Activity: 1-->bedfast  Mobility: 3-->slightly limited  Nutrition: 2-->probably inadequate  Friction and Shear: 1-->problem  Levy Score: 10                          Labs:   Recent Labs   Lab 06/10/21  0715 06/09/21  0538 06/07/21  0540 06/07/21  0540 06/03/21  1331 06/03/21  1331   ALBUMIN  --  1.2*  --  1.7*   < >  --    PREALB  --   --   --  14*   < >  --    HGB 7.5* 7.7*   < >  --    < > 7.9*   INR  --   --   --  1.12   < >  --    WBC 4.8 5.5   < >  --    < >  --    A1C  --   --   --   --   --  6.8*    < > = values in this interval not displayed.       Physical Exam  Areas of skin assessed: focused buttock    Wound Location:  Buttock/sacrum/groin  Date of last photo 6/10  Wound History: patient getting golytely prep so with constant liquid stools, per patient has been trying to heal a wound for awhile to buttock prior to hospitalization as well        Wound Base: 100 % dermis and superficial scab     Palpation of the wound bed: normal      Drainage: small     Description of drainage: serosanguinous     Measurements (length x width x depth, in cm) three wound sites upper two wounds each measuring about 2cm  x 2cm  x  0.1cm, lower wound measures 1.4cm x 0.5cm x 0.1 cm      Tunneling N/A     Undermining N/A  Periwound skin: erythema- blanchable and rash, fungal appearing       Color: red      Temperature: normal   Odor: mild  Pain: moderate, tender      Interventions  Visual inspection and assessment completed   Wound  Care Rationale Provide protection   Wound Care: done per plan of care  Supplies: floor stock  Current off-loading measures: Pillows  Current support surface: Standard  Low air loss mattress  Education provided to: importance of repositioning and plan of care  Discussed plan of care with Patient and Nurse    Brittany Solano RN, CWOCN

## 2021-06-11 NOTE — PLAN OF CARE
Vital signs:  BP (!) 160/77 (BP Location: Right arm)   Pulse 97   Temp 98.1  F (36.7  C) (Oral)   Resp 16   Ht 1.829 m (6')   Wt 60.6 kg (133 lb 9.6 oz)   SpO2 98%   BMI 18.12 kg/m      Shift:1270-9137       Activity: ROM absent in BLE. Says sensation is intact in BLE, but cannot move them, Sensation and ROM intact in BUE. Turning q2  Neuros: A&O x4  Respiratory: Ls:clear/diminished, denies SOB, adequate oxygen >92% on RA   GI/: +BS, LBM 6/10, +gas, primofit with AUO   Diet: Fulls & TF w/ feeds through J @ 60 ml/hr  Skin: groin rash, pressure injury on buttocks treated per orders, R PIV, L PICC, lower abdominal scabbing/rash, R upper abd GJ tube  Lines:R PIV, L PICC, R upper GJ tube  Labs: K4.5 Mg 1.8 Phos 3.1 WBC 5.1 Hgb 7.2  & 151    Pain/nausea:pain around GJ tube controlled with oxycodone, int nausea controlled with zofran   Plan:Cont POC

## 2021-06-11 NOTE — PROGRESS NOTES
Diabetes Consult Daily  Progress Note          Assessment/Plan:     HPI:   Nick Zamudio is a 65 year old male with PMHx of hypertension, type II diabetes, history of testicular cancer with bilateral lower extremity paraparesis, bilateral lower extremity lymphedema due to complication of radiation therapy, history of small bowel obstruction, failure to thrive, severe malnutrition, biliary stenosis, chronic pancreatitis    Assessment:     1. Type II diabetes, A1c not reliable due to anemia  2. TF induced hyperglycemia (started on 6/5)        Plan:         NPH 18 units BID - resumed with TF (0600 and 1800) will work towards 08 and 2000 hrs    Resume aspart 1 unit per 12 grams carb    Continue aspart medium correction q4h    glucose checks per protocol, q4 hours    Hypoglycemia protocol    PRN D10W at 100 ml/hr in case of TF interruption to avoid hypoglycemia if NPH on board       Outpatient diabetes follow up: TBD  Plan discussed with patient, bedside RN, and primary team via this note.           Interval History:     The last 24 hours progress and nursing notes reviewed.  Patient has no new questions/complaints today.  Reviewed plan and no additional questions.      Creat 0.91    Recent Labs   Lab 06/11/21  0921 06/11/21  0723 06/11/21  0515 06/10/21  2202 06/10/21  1806 06/10/21  1133 06/10/21  0715 06/10/21  0437 06/09/21  0538 06/09/21  0538 06/08/21  0814 06/08/21  0814 06/07/21  0540 06/07/21  0540 06/06/21  0617 06/06/21  0617   GLC  --  198*  --   --   --   --  161*  --   --  201*  --  211*  --  96  --  135*   *  --  214* 164* 156* 132*  --  152*   < >  --    < >  --    < >  --    < >  --     < > = values in this interval not displayed.           Nutrition:     Orders Placed This Encounter      Clear Liquid Diet    Vital 1.5 60 mls/hr - continuous (11.2g cho per hour)        PTA Regimen:     BG monitoring frequency: 1-2 times/d  Medications:   Lantus 20 units daily  NovoLog 2  per 50>150          Review of Systems:   CC: denies   Constitutional:   No fever/chills  ENT/Mouth:   No hearing changes, no ear pain, no sore throat, no rhinorrhea, no difficulty swallowing  Eyes:  No eye pain, no discharge, no vision changes  CV:   No CP/SOB, no new edema  Resp:  No cough, no wheezing  GI:   No N/V, no constipation, + diarrhea - improved   :  No dysuria, frequency, or hematuria - external cath  Musk:  No new joint swelling/pain, denies back pain  Skin/heme:   No new rashes/bruises/open areas.  No pruritis  Neuro:   No new weakness, no  numbness/tingling, no headache  Psych:   No new anxiety, denies depression  Endocrine:  No polyuria or polydipsia         Medications:   Steroid planning:  no       Physical Exam:   BP (!) 160/77 (BP Location: Right arm)   Pulse 97   Temp 98.1  F (36.7  C) (Oral)   Resp 16   Ht 1.829 m (6')   Wt 60.5 kg (133 lb 6.1 oz)   SpO2 98%   BMI 18.09 kg/m      General:   NAD, pleasant,  resting comfortably in bed. Resting on L side, pale  HEENT:  NC/AT. MMM, sclera not injected  Lungs:  unremarkable, no new cough, no SOB  ABD:   soft  Skin:  warm and dry, no obvious lesions/rash  MSK:   moving all extremities  Lymp:   + + LE edema - dorsum of feet - lymphedema team today to wrap feet  Mental Status:  Alert and oriented x3  Psych:   Cooperative, good eye contact, full affect          Data:     Lab Results   Component Value Date    A1C 6.8 06/03/2021        Lab Results   Component Value Date    WBC 5.1 06/11/2021     Lab Results   Component Value Date    RBC 2.49 06/11/2021     Lab Results   Component Value Date    HGB 7.2 06/11/2021     Lab Results   Component Value Date    HCT 23.8 06/11/2021     No components found for: MCT  Lab Results   Component Value Date    MCV 96 06/11/2021     Lab Results   Component Value Date    MCH 28.9 06/11/2021     Lab Results   Component Value Date    MCHC 30.3 06/11/2021     Lab Results   Component Value Date    RDW 13.6 06/11/2021      Lab Results   Component Value Date     06/11/2021     Recent Labs   Lab Test 06/11/21  0723 06/10/21  0715    137   POTASSIUM 4.5 4.3   CHLORIDE 105 104   CO2 28 29   ANIONGAP 5 4   * 161*   BUN 19 26   CR 0.91 1.00   ELODIA 8.0* 8.0*     Liver Function Studies -   Recent Labs   Lab Test 06/09/21  0538   PROTTOTAL 5.1*   ALBUMIN 1.2*   BILITOTAL 0.2   ALKPHOS 65   AST <3   ALT 7     Lab Results   Component Value Date    INR 1.12 06/07/2021    INR 1.09 06/04/2021    INR 1.09 06/03/2021    INR 1.28 03/15/2021    INR 1.09 01/13/2021    INR 1.19 12/09/2020    INR 1.12 09/29/2020    INR 1.14 07/13/2020     DANICA Dickinson CNP   Inpatient Diabetes Management Service  Pager - [phone]  Friday - Monday 0800 - 1600 hrs  After hours above - Diabetes Management Team job code: [phone]    I spent a total of 25 minutes bedside and on the inpatient unit managing glycemic care.  Over 50% of my time on the unit was spent counseling the patient and/or coordinating care regarding acute hyperglycemic management.  See note for details.

## 2021-06-11 NOTE — PROGRESS NOTES
GI SIGNOFF NOTE  -Diet as tolerated  -Per discussion with colorectal surgery, completed colonoscopy yesterday (bx of sigmoid lesion) with poor prep will be adequate for pre-surgical purposes, they will plan for elective surgery in the next few weeks, patient aware  -No further inpatient GI work up indicated at this time  -Continue current Creon dosing, BID PPI, and PO nutrition as able along with continued tube feeds    DANICA Farnsworth CNP on 6/11/2021 at 8:19 AM    Discussed with GI staff Dr. Manzano

## 2021-06-11 NOTE — PLAN OF CARE
Pt VSS, neuro intact, adequate UO, and glucose elevated . Notified MD about glucose to see if a one time dose of insulin based on the sliding scale can be given. No orders given just to continue with NPH. Pt complained of pain, 1 dose of  oxycodone was given.

## 2021-06-11 NOTE — PROGRESS NOTES
Colorectal Surgery Progress Note  [facility]  POD#8      Subjective:  No acute events. Got colonoscopy yesterday, inadequate prep to fully visualize colon but GI was able to get biopsies of known rectosigmoid mass. Awaiting pathology. Pt denies abdominal pain. Tolerating diet.     Vitals:  Vitals:    06/10/21 1407 06/10/21 1625 06/10/21 2036 06/10/21 2318   BP: (!) 140/56 125/51 116/43 131/46   BP Location:  Right arm Right arm Right arm   Pulse: 78 65 74 82   Resp: 18 18 18 18   Temp: 97.8  F (36.6  C) 96.6  F (35.9  C) 97.5  F (36.4  C) 98.6  F (37  C)   TempSrc: Oral Oral Oral Oral   SpO2: 100% 100% 100% 100%   Weight:       Height:         I/O:  I/O last 3 completed shifts:  In: 2340 [I.V.:690; NG/GT:1290]  Out: 1900 [Urine:900; Stool:1000]    Physical Exam:  Gen: AAOx3, NAD  Pulm: Non-labored breathing  Abd: Soft, non-distended, appropriately tender, no guarding/rebound  Ext:  Warm and well-perfused    BMP  Recent Labs   Lab 06/10/21  0715 06/09/21  0538 06/08/21  0814 06/07/21  0540 06/06/21  0617 06/05/21  0654    135 137 137 139 139   POTASSIUM 4.3 4.8 5.1 4.5 4.1 3.8   CHLORIDE 104 105 108 109 108 107   CO2 29 28 26 27 25 26   BUN 26 33* 41* 47* 52* 50*   CR 1.00 0.96 0.97 0.96 0.91 0.94   * 201* 211* 96 135* 345*   MAG  --  1.8  --  2.0 2.0 2.1   PHOS  --  2.6  --  2.7 3.4 3.6     CBC  Recent Labs   Lab 06/10/21  0715 06/09/21  0538 06/08/21  1101 06/08/21  0814   WBC 4.8 5.5 5.4 Canceled, Test credited   HGB 7.5* 7.7* 7.9* Canceled, Test credited   HCT 24.2* 25.1* 26.6* Canceled, Test credited   * 173 169 Canceled, Test credited         ASSESSMENT: This is a 65 year old male with PMH testicular cancer s/p radiation c/b bowel injury requiring resection (possible partial colectomy/possible partial SBR), chronic lymphedema & paraplegia of lower extremities, chronic idiopathic pancreatitis & biliary stenosis, small bowel intussusception, extensive  atherosclerosis of SMA, chronic malnutrition on TPN/tube feeds.  Pt admitted with hematemesis & melena following PEG placement 6/1.  Found to have a rectosigmoid mass during endoscopic evaluation on 6/1 - over 3cm in length, near 50% luminal obstruction w/ adjacent small probably tubular adenomas.  However mass has been present since 2019, path at that time demonstrated tubulovillous adenoma. Repeat colonoscopy with biopsies 6/10, awaiting path to determine further surgical planning.     - Apprec GI for endoscopic evaluation and biopsies  - Will follow up path; surgical plan pending  - Colorectal will continue to follow    Mil Frankel MD  Colon and Rectal Surgery    Patient was seen and discussed with Dr. Valles

## 2021-06-11 NOTE — PROGRESS NOTES
[facility]  Hospitalist Progress Note  George Sherman MD  06/11/2021    Assessment & Plan     Nick Zamudio is a 65 year old male admitted on 6/3/2021. 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      6/10  - discussed with GI consult    - no new issues identified  - plans for discharge later today or tomorrow     Acute blood loss anemia 2/2 to UGIB  Hematemesis, melena   Most concerning for bleeding following recent PEG placement procedure. Currently hemodynamically stable.  S/p 1 unit pRBC 6/3  - EGD showed  - Uncomplicated dilation of the indeterminatre distal                        duodenal stenosis to 15mm, however the enteroscope could                        not advanced to the jejunum                        - Submucosal lesion of the proximal 4th portion just                        beyond the stenosis and out of range for endoscopic                        evaluation                        - Uncomplicated placement of an 18F one piece                        gastrojejunostomy with adjunct T tag gastropexy                        - Probable malignant mass (given size of over 3cm in                        length and near 50% luminal obstruction) involving the                        distal sigmoid just upstream of the rectum, with                        adjacent small probable tubular adenomas; biopsies                        deferred given previous biopsies demonstrating at least                        tubular adenoma                        - Indeterminate stenosis of the sigmoid upstream of  the                        mass   - hgb stable to improved  -  PPI BID     Nonsevere malnutrition   Failure to thrive, deconditioning   - continue tube feeds   - Restaredt PTA Creon 6/5  - PT/OT consults, back to [facility] where he has a bed hold     Malignant colonic mass  -- noted on recent endoscopy  -- CRS consult appreciated  -- s/p colonoscopy pending pathology  -- plan for outpatient surgery in 3-4 weeks.    Lactic acidosis resolved  Likely due to dehydration and acute blood loss. Received 2L IV fluids and 1 unit pRBC     Chronic pancreatitis  Chronic abdominal pain, diarrhea   - Oxycodone 5 mg Q6H PRN, Tylenol Q6H PRN (PTA takes percocet)  - Zofran, compazine PRN  - continue Creon  - restarted tube feeds and ADAT -low fat diet  - no new pain     Type II DM   With TPN and transitioning to tube feeds consulted Endo for assistant with higher blood sugars  (See there not for details- likely transition from IV to s/c today)   - Hold PTA metformin (although looks to have been stopped after most recent admission to [facility] in April).  - appreciate endocrine note and care. continue with NPH 18 U bid and sliding scale insulin medium intensity scale every 4 hours.     HLD  - resume statin     Diet: Adult Formula Drip Feeding: Continuous Vital 1.5; Jejunostomy; Goal Rate: 60; mL/hr; Medication - Feeding Tube Flush Frequency: At least 15-30 mL water before and after medication administration and with tube clogging; Amount to Send (Nutrition us...  Advance Diet as Tolerated: Regular Diet Adult; Low Fat Diet      DVT Prophylaxis: Pneumatic Compression Devices  Cummins Catheter: not present  Code Status: Full Code         Disposition Plan  Expected discharge:   -- today or tomorrow   -- anticipate discharge back to [facility]    Interval History   -- no new complaints  -- wife at bedside  -- tolerating tube feedings    -Data reviewed today: I reviewed all new labs and imaging over the last 24 hours. I personally  reviewed no images or EKG's today.    Physical Exam    , Blood pressure (!) 160/77, pulse 97, temperature 98.1  F (36.7  C), temperature source Oral, resp. rate 16, height 1.829 m (6'), weight 60.6 kg (133 lb 9.6 oz), SpO2 98 %.  Vitals:    06/08/21 1700 06/09/21 2221 06/11/21 1108   Weight: 60.3 kg (132 lb 15 oz) 60.5 kg (133 lb 6.1 oz) 60.6 kg (133 lb 9.6 oz)     Vital Signs with Ranges  Temp:  [96.6  F (35.9  C)-98.8  F (37.1  C)] 98.1  F (36.7  C)  Pulse:  [65-97] 97  Resp:  [16-18] 16  BP: (116-160)/(43-77) 160/77  SpO2:  [97 %-100 %] 98 %  I/O's Last 24 hours  I/O last 3 completed shifts:  In: 2400 [I.V.:690; NG/GT:1290]  Out: 2700 [Urine:1700; Stool:1000]    Constitutional: Awake, alert, cooperative, no apparent distress  Respiratory: Clear to auscultation bilaterally, no crackles or wheezing  Cardiovascular: Regular rate and rhythm, normal S1 and S2, and no murmur noted  GI: Normal bowel sounds, soft, non-distended, non-tender  Skin/Integumen: No rashes, no cyanosis, +++ edema  Other:  LE paresis    Medications        dextrose Stopped (06/10/21 1646)       amylase-lipase-protease  1-2 capsule Per J Tube Q4H    And     sodium bicarbonate  325 mg Per Feeding Tube Q4H     amylase-lipase-protease  3 capsule Oral TID w/meals     cholestyramine  1 packet Oral Daily     insulin aspart  1-7 Units Subcutaneous Q4H     insulin aspart   Subcutaneous TID AC     insulin NPH  18 Units Subcutaneous 2 times per day     miconazole   Topical BID     multivitamins w/minerals  15 mL Per Feeding Tube Daily     pantoprazole  40 mg Oral or Feeding Tube BID     polyethylene glycol  2,000 mL Oral Once     sodium chloride (PF)  3 mL Intracatheter Q8H        Data   Recent Labs   Lab 06/11/21  0723 06/10/21  0715 06/09/21  0538 06/07/21  0540 06/07/21  0540   WBC 5.1 4.8 5.5   < >  --    HGB 7.2* 7.5* 7.7*   < >  --    MCV 96 96 97   < >  --     144* 173   < >  --    INR  --   --   --   --  1.12    137 135   < > 137    POTASSIUM 4.5 4.3 4.8   < > 4.5   CHLORIDE 105 104 105   < > 109   CO2 28 29 28   < > 27   BUN 19 26 33*   < > 47*   CR 0.91 1.00 0.96   < > 0.96   ANIONGAP 5 4 2*   < > 2*   ELODIA 8.0* 8.0* 7.9*   < > 7.9*   * 161* 201*   < > 96   ALBUMIN  --   --  1.2*  --  1.7*   PROTTOTAL  --   --  5.1*  --  4.9*   BILITOTAL  --   --  0.2  --  0.5   ALKPHOS  --   --  65  --  69   ALT  --   --  7  --  7   AST  --   --  <3  --  7    < > = values in this interval not displayed.       No results found for this or any previous visit (from the past 24 hour(s)).    George Sherman MD  Text Page  (7am to 6pm)

## 2021-06-11 NOTE — PROGRESS NOTES
Care Management Follow Up    Length of Stay (days): 8    Expected Discharge Date: 06/11/21    Concerns to be Addressed: discharge planning     Patient plan of care discussed at interdisciplinary rounds: Yes    Anticipated Discharge Disposition:   [facility]  [facility] [place] [address]  [phone]  Admissions: [phone]  F: [phone]     Anticipated Discharge Services: None  Anticipated Discharge DME: None    Patient/family educated on Medicare website which has current facility and service quality ratings: yes  Education Provided on the Discharge Plan: yes   Patient/Family in Agreement with the Plan: yes    Referrals Placed by CM/SW: NA- pt has bed hold at [facility]  Private pay costs discussed: Not applicable    Additional Information:  KATHRINE following for dispo needs- per pt's med team, pt is med ready to discharge and will need to follow up in a few weeks for surgery. Pt will need TF for discharge.    KATHRINE spoke with admissions and [facility] and they are short staff today. Admissions will call SW back to see if they will be able to accept pt today.    KATHRINE spoke with provider and confirmed that pt is med ready for discharge. Per provider, pt would prefer to discharge tomorrow and provider is fine with discharge tomorrow.    ADDENDUM: 1344  KATHRINE spoke with Mel in admissions and they are unable to accept pt today. Per Mel, they can accept pt tomorrow early morning. KATHRINE paged provider to inform him of discharge for tomorrow.    KATHRINE met with pt in room to introduce self, role, and to discuss dispo planning. Pt is in agreement to discharging tomorrow and confirmed that he would like a stretcher ride arranged. Pt is aware of potential for private pay and is in agreement with discharge plan.    KATHRINE arranged a stretcher ride with [employer] ([phone]) for tomorrow at 11am. KATHRINE left vm with TCU admissions to inform them of discharge time  and faxed over updated notes.     SW completed PCS form and faxed to [facility] Billing. KATHRINE placed form in pt's chart.     HELENA Campbell, [facility]  Acute Care Float   [facility]  Phone: [phone]  Pager: [phone]

## 2021-06-11 NOTE — PROGRESS NOTES
Care Management Discharge Note    Discharge Date: 06/12/21  Discharge Disposition:   [facility]  [address] [place], [place] [address] [phone]  Admissions: [phone]  F: [phone]    Discharge Services: Transportation, rehab  Discharge DME: None    Discharge Transportation: Stretcher ride arranged for 11am with [facility] ([phone])    Private pay costs discussed: transportation costs    PAS Confirmation Code: NA- pt returning to TCU.   Patient/family educated on Medicare website which has current facility and service quality ratings: NA- pt has bed hold at [facility].     Education Provided on the Discharge Plan: yes  Persons Notified of Discharge Plans: Pt, bedside nurse, provider, Viridiana Palmer, and facility.   Patient/Family in Agreement with the Plan: yes    Handoff Referral Completed: Yes    Additional Information:  Pt to discharge Saturday (6/12) back to [facility] SW arranged stretcher ride with [employer] for 11am. Pt is in agreement with discharge plan.     KATHRINE left vm with Viridiana (Dietician w/ [facility]; ph: [phone]) to provide update to discharge plan.     Weekend SW will continue to follow to fax over discharge orders.     ADDENDUM: 3777  KATHRINE spoke with Admissions (Mel & Yasmin) and informed them that pt is on TFs. They requested that pt be sent with a few days worth of supply and they can order on Monday. KATHRINE paged Zenia tabares, requesting a call.    ADDENDUM: 4070  KATHRINE spoke with Zenia and she would like clarification on how many days worth of formula pt will need.     KATHRINE spoke with Yasmin (Admissions) and per Yasmin, they will need 3 days worth of supply. KATHRINE relayed information to dietgilda.     HELENA Campbell, [facility]  Acute Care Float   [facility]  Phone: [phone]  Pager: [phone]

## 2021-06-11 NOTE — PLAN OF CARE
OT: Hold, Pt reporting that swelling is near baseline and anticipated discharge in 1-2 days to facility.  Pt reports having edema velcro garments at home and reports no concerns with fit.  Pt with some edema noted in dorsum of both feet, with increased edema noted in L LE.  Therapist educated Pt on conservative edema management techniques.  Will hold Edema evaluation in case Pt remains inpatient and needs inpatient edema needs.

## 2021-06-11 NOTE — PROGRESS NOTES
CLINICAL NUTRITION SERVICES - 'REASSESSMENT NOTE     Nutrition Prescription    RECOMMENDATIONS FOR MDs/PROVIDERS TO ORDER:  - please include new MVW Complete softgels with discharge orders (severe Vitamin A and D deficiency, Vitamin E wnl and Vitamin K was high)  - Recommend checking Zn lab in 2 weeks after supplement period ends.  May need another 2 weeks of 220 mg Znsulfate.  Zn should not be supplemented long term as it can cause Cu deficiencies.     Recommendations already ordered by Registered Dietitian (RD):  - Will add MVW softgels--1/day.   - Discontinue certavit.   - Will add 220 mg Zn sulfate x 14 days.   -Notified NSA that patient would need 18 cans (3 day supply) of Vital 1.5 at time of discharge Saturday.  NSA will also send up 3 empty jugs also for TCU to use when mixing PERT and Creon with TF.   - Continue current TF with PERT.     Future/Additional Recommendations:  -Anticipate discharge Saturday.      EVALUATION OF THE PROGRESS TOWARD GOALS   Diet: Clear liquids (later advanced to full liquids today)  Nutrition Support: Vital 1.5 @ 60ml/hr (1440ml/day) will provide: 2160 kcals (37 kcal/kg based on 60.3 kg), 97 g PRO (1.6 g/kg), 1100 ml free H20, 269 g CHO, 82 g Fat, and 8 g fiber daily.     Intake: Unfortunately unable to  TF delivered over past 7 days as TF volume not documented consistently in the I/Os section.   No oral intake documented in doc flowsheet.  Although RN noted pt tolerated full liquid diet.      NEW FINDINGS   -General:   -Wt: If weights accurate wt down 8# ( over past 7 days.  +3 moderate edema B/l feet, ankles.  06/11/21 1108 60.6 kg (133 lb 9.6 oz) bed    06/09/21 2221 60.5 kg (133 lb 6.1 oz) bed   06/08/21 1700 60.3 kg (132 lb 15 oz) bed   06/07/21 2100 60.5 kg (133 lb 6.1 oz) bed   06/06/21 1700 61.3 kg (135 lb 2.3 oz) bed   06/05/21 1635 63.1 kg (139 lb 1.8 oz) bed   06/04/21 1600 64.3 kg (141 lb 12.1 oz) bed   06/03/21 1913 64.3 kg (141 lb 12.1 oz) bed   06/03/21 0951  67.2 kg (148 lb 3.2 oz) no source     Dosing Weight: Adjust to 60.3 kg 6/8 weight--74% IBW (w/ some potential BLE muscle wasting w/ paraparesis)    ASSESSED NUTRITION NEEDS  Estimated Energy Needs: 2970-8378 kcals/day (30-35kcals/kg)  Justification:  Repletion, higher goal for potential malabsorption w/ radiation scarring.  Estimated Protein Needs: 72-90+ grams protein/day (1.2 - 1.5+ grams of pro/kg)  Justification: Increased needs and Repletion, wound.  Estimated Fluid Needs: (1 mL/kcal)   Justification: Maintenance and Per provider pending fluid status  -Labs:Vitamin A-0.20, Retinol Palmitate <0.02 (= severe deficieny and depletion of liver stores).   Vitamin D 5- severe deficinecy  Vitamin E wnl  Vitamin K 8.43 6/4  Zinc 21.6 (at 1/3 of low end of normal range)  Fecal elastase- 4.4 6/8  Electrolytes stable.   Blood sugars stable < 180.  -GI:  Last BM: large watery diarrhea-6/10 related to bowel prep .  -Skin: Stage 2 noted 6/10 on coccyx.  -Meds: Creon and bicarb w/ TF q 4 hr plus w/ meals.  -EN Access: PEG/J placed 6/1  -PMH/Nutrition Hx: testicular cancer and radiation therapy c/b bowel perforation, bilateral lower extremity paraparesis, mesenteric stenosis (not amenable to vascular stent placement), Type II DM, HTN, h/o SBO, failure to thrive and severe malnutrition on TPN, biliary stenosis and chronic pancreatitis s/p ERCP and stent placement, and chronic diarrhea presenting to ED following PEGJ placement, and sigmoidoscopy 6/1 with acutely worse nausea, vomiting, hematemesis, melena, and abdominal pain.  -Endocrine: Endo following, NPH BID and novolog q 4 hr + 1:12 ratio for novolog:gm CHO w/ po intake    MALNUTRITION (Unable to see pt)  % Intake: < 75% for >/= 1 month (non-severe)  % Weight Loss: >2% in past week (severe)  Subcutaneous Fat Loss: Unable to assess  Muscle Loss: Unable to assess  Fluid Accumulation/Edema: moderate  Malnutrition Diagnosis: Non-severe malnutrition in the context of chronic  illness    Previous Goals   Total avg nutritional intake to meet a minimum of 25 kcal/kg and 1.2 g PRO/kg daily (per dosing wt 67 kg).  Evaluation: Unable to evaluate--TF volumes not noted in I/Os to properly assess     Previous Nutrition Diagnosis  Inadequate protein-energy intake related to inadequate parenteral nutrition infusion, poor appetite, and suspected malabsorption of nutrients as evidenced by up to 10% weight loss x 6 months, TPN dependent with TPN PTA providing <75% of needs.    Evaluation: No longer applicable, nutrition diagnosis changed below     CURRENT NUTRITION DIAGNOSIS  Altered GI function related to pancreatic and biliary dysfunction as evidenced by severe deficiencies of Vitamin A and D and very low fecal elastase.        INTERVENTIONS  Implementation  Education--clarified with pt that we would ensure that Vital 1.5 formula was sent with him   Collaboration with other providers  Multivitamin/mineral supplement therapy  See Recommendations.    Goals  Total avg nutritional intake to meet a minimum of 30 kcal/kg and 1.2 g PRO/kg daily (per dosing wt 60.3 kg).     Monitoring/Evaluation  Progress toward goals will be monitored and evaluated per protocol.     Leesa García RD, LD   7B (M-F) Pager: [phone]  RD Weekend Pager: [phone]

## 2021-06-12 NOTE — PROGRESS NOTES
Diabetes Consult Daily  Progress Note          Assessment/Plan:     HPI:   Nick Zamudio is a 65 year old male with PMHx of hypertension, type II diabetes, history of testicular cancer with bilateral lower extremity paraparesis, bilateral lower extremity lymphedema due to complication of radiation therapy, history of small bowel obstruction, failure to thrive, severe malnutrition, biliary stenosis, chronic pancreatitis    Assessment:     1. Type II diabetes, A1c not reliable due to anemia  2. TF induced hyperglycemia (started on 6/5)        Plan:         NPH 18 units BID - resumed with TF (0600 and 1800) will work towards 08 and 2000 hrs    Resume aspart 1 unit per 12 grams carb    Continue aspart medium correction q4h    glucose checks per protocol, q4 hours    Hypoglycemia protocol    PRN D10W at 100 ml/hr in case of TF interruption to avoid hypoglycemia if NPH on board       Outpatient diabetes follow up: TBD  Plan discussed with patient, bedside RN, and primary team via this note.           Interval History:     The last 24 hours progress and nursing notes reviewed.          Patient discharged home prior to writer physically seeing today  Did chart review -> BG stable   No changes to regimen      Recent Labs   Lab 06/12/21  0345 06/12/21  0014 06/11/21  2048 06/11/21  1631 06/11/21  1324 06/11/21  0921 06/11/21  0723 06/10/21  0715 06/10/21  0715 06/09/21  0538 06/09/21  0538 06/08/21  0814 06/08/21  0814 06/07/21  0540 06/07/21  0540 06/06/21  0617 06/06/21  0617   GLC  --   --   --   --   --   --  198*  --  161*  --  201*  --  211*  --  96  --  135*   * 124* 161* 151* 130* 180*  --    < >  --    < >  --    < >  --    < >  --    < >  --     < > = values in this interval not displayed.           Nutrition:     Orders Placed This Encounter      Full Liquid Diet    Vital 1.5 60 mls/hr - continuous (11.2g cho per hour)        PTA Regimen:     BG monitoring frequency: 1-2  times/d  Medications:   Lantus 20 units daily  NovoLog 2 per 50>150          Review of Systems:   CC: deferred, patient discharged         Medications:   Steroid planning:  no       Physical Exam:   /46 (BP Location: Right arm)   Pulse 86   Temp 98.4  F (36.9  C) (Oral)   Resp 18   Ht 1.829 m (6')   Wt 60.6 kg (133 lb 9.6 oz)   SpO2 99%   BMI 18.12 kg/m      Deferred - patient discharged.         Data:     Lab Results   Component Value Date    A1C 6.8 06/03/2021        Lab Results   Component Value Date    WBC 5.1 06/11/2021     Lab Results   Component Value Date    RBC 2.49 06/11/2021     Lab Results   Component Value Date    HGB 7.2 06/11/2021     Lab Results   Component Value Date    HCT 23.8 06/11/2021     No components found for: MCT  Lab Results   Component Value Date    MCV 96 06/11/2021     Lab Results   Component Value Date    MCH 28.9 06/11/2021     Lab Results   Component Value Date    MCHC 30.3 06/11/2021     Lab Results   Component Value Date    RDW 13.6 06/11/2021     Lab Results   Component Value Date     06/11/2021     Recent Labs   Lab Test 06/11/21  0723 06/10/21  0715    137   POTASSIUM 4.5 4.3   CHLORIDE 105 104   CO2 28 29   ANIONGAP 5 4   * 161*   BUN 19 26   CR 0.91 1.00   ELODIA 8.0* 8.0*     Liver Function Studies -   Recent Labs   Lab Test 06/09/21  0538   PROTTOTAL 5.1*   ALBUMIN 1.2*   BILITOTAL 0.2   ALKPHOS 65   AST <3   ALT 7     Lab Results   Component Value Date    INR 1.12 06/07/2021    INR 1.09 06/04/2021    INR 1.09 06/03/2021    INR 1.28 03/15/2021    INR 1.09 01/13/2021    INR 1.19 12/09/2020    INR 1.12 09/29/2020    INR 1.14 07/13/2020     DANICA Dickinson CNP   Inpatient Diabetes Management Service  Pager - [phone]  Friday - Monday 0800 - 1600 hrs  After hours above - Diabetes Management Team job code: [phone]    Not seen - chart check only. Discharged

## 2021-06-12 NOTE — PLAN OF CARE
Pt A&O x4, int nausea and pain controlled with zofran and oxycodone. Adequate urinary output prior to discharge, pressure wound on sacrum/coccyx treated per orders prior to discharge. L SL PICC removed prior to discharge. J-tube flushed and clamped prior to leaving Pt left via HE tx and headed to [facility]. Discharge instructions sent to facility before discharge and nurse to nurse report given prior to pt arriving at facility. Pt brought belongings with him as well as 4 days of TF (Vital 1.5).

## 2021-06-12 NOTE — PROGRESS NOTES
Colorectal Surgery Progress Note  [facility]  POD#9      Subjective:  No acute events. Pain controlled. Tolerating diet. Path pending on biopsy of rectosigmoid mass. No other complaints.     Vitals:  Vitals:    06/11/21 0839 06/11/21 1108 06/11/21 1550 06/11/21 2250   BP: (!) 160/77  132/48 114/46   BP Location: Right arm  Right arm Right arm   Pulse: 97  83 86   Resp: 16  18 18   Temp: 98.1  F (36.7  C)  98.7  F (37.1  C) 98.4  F (36.9  C)   TempSrc: Oral  Oral Oral   SpO2: 98%  100% 99%   Weight:  60.6 kg (133 lb 9.6 oz)     Height:         I/O:  I/O last 3 completed shifts:  In: 680 [P.O.:60; I.V.:20; NG/GT:300]  Out: 1450 [Urine:1450]    Physical Exam:  Gen: AAOx3, comfortable in bed  Pulm: Non-labored breathing  Abd: Soft, non-distended, minimally tender, no guarding/rebound  Ext:  Warm and well-perfused    BMP  Recent Labs   Lab 06/11/21  0723 06/10/21  0715 06/09/21  0538 06/08/21  0814 06/07/21  0540 06/06/21  0617    137 135 137 137 139   POTASSIUM 4.5 4.3 4.8 5.1 4.5 4.1   CHLORIDE 105 104 105 108 109 108   CO2 28 29 28 26 27 25   BUN 19 26 33* 41* 47* 52*   CR 0.91 1.00 0.96 0.97 0.96 0.91   * 161* 201* 211* 96 135*   MAG 1.8  --  1.8  --  2.0 2.0   PHOS 3.1  --  2.6  --  2.7 3.4     CBC  Recent Labs   Lab 06/11/21  0723 06/10/21  0715 06/09/21  0538 06/08/21  1101   WBC 5.1 4.8 5.5 5.4   HGB 7.2* 7.5* 7.7* 7.9*   HCT 23.8* 24.2* 25.1* 26.6*    144* 173 169         ASSESSMENT: This is a 65 year old male with PMH testicular cancer s/p radiation c/b bowel injury requiring resection (possible partial colectomy/possible partial SBR), chronic lymphedema & paraplegia of lower extremities, chronic idiopathic pancreatitis & biliary stenosis, small bowel intussusception, extensive atherosclerosis of SMA, chronic malnutrition on TPN/tube feeds.  Pt admitted with hematemesis & melena following PEG placement 6/1.  Found to have a rectosigmoid mass during endoscopic  evaluation on 6/1 - over 3cm in length, near 50% luminal obstruction w/ adjacent small probably tubular adenomas.  However mass has been present since 2019, path at that time demonstrated tubulovillous adenoma. Repeat colonoscopy with biopsies 6/10, awaiting path to determine further surgical planning.     - Will follow up path; surgical plan pending  - Appropriate follow up with colorectal has been made.  - Colorectal will sign off at this time. Please call with further questions/concerns.    Mil Frankel MD  Colon and Rectal Surgery    Patient was seen and discussed with Dr. Valles

## 2021-06-12 NOTE — PLAN OF CARE
Physical Therapy Discharge Summary    Reason for therapy discharge:    Discharged to transitional care facility.    Progress towards therapy goal(s). See goals on Care Plan in [facility] electronic health record for goal details.  Goals not met.  Barriers to achieving goals:   discharge from facility.    Therapy recommendation(s):    Continued therapy is recommended.  Rationale/Recommendations:  to progress to PLOF.

## 2021-06-12 NOTE — DISCHARGE SUMMARY
[facility]  Discharge Summary        Nick Zamudio MRN# [id number]   YOB: 1955 Age: 65 year old     Date of Admission:  6/3/2021  Date of Discharge:  6/12/2021  Admitting Physician:  Chris Donovan MD  Discharge Physician: George Sherman MD  Discharging Service: Hospitalist     Primary Provider:    Echo Lopez  Primary Care Physician Phone Number: [phone]         Discharge Diagnoses/Problem Oriented Hospital Course (Providers):    Nick Zamudio was admitted on 6/3/2021 by Chris Donovan MD and I would refer you to their history and physical.  The following problems were addressed during his hospitalization:    Nick Zamudio is a 65 year old male admitted on 6/3/2021. 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            Acute blood loss anemia 2/2 to UGIB  Hematemesis, melena   Most concerning for bleeding following recent PEG placement procedure. Currently hemodynamically stable.  S/p 1 unit pRBC 6/3  - EGD showed  - Uncomplicated dilation of the indeterminatre distal                        duodenal stenosis to 15mm, however the enteroscope could                        not advanced to the jejunum                        - Submucosal lesion of the proximal 4th portion just                        beyond the stenosis and out of range for endoscopic                        evaluation                        - Uncomplicated placement of an 18F one piece                        gastrojejunostomy with adjunct T tag gastropexy                        - Probable malignant mass (given  size of over 3cm in                        length and near 50% luminal obstruction) involving the                        distal sigmoid just upstream of the rectum, with                        adjacent small probable tubular adenomas; biopsies                        deferred given previous biopsies demonstrating at least                        tubular adenoma                        - Indeterminate stenosis of the sigmoid upstream of the                        mass   - hgb stable to improved  -  PPI BID     Nonsevere malnutrition   Failure to thrive, deconditioning   - continue tube feeds   - Restaredt PTA Creon 6/5  - PT/OT consults, back to [facility] where he has a bed hold     Malignant colonic mass  -- noted on recent endoscopy  -- CRS consult appreciated  -- s/p colonoscopy pending pathology  -- plan for outpatient surgery in 3-4 weeks after nutritional improvement.     Lactic acidosis resolved  Likely due to dehydration and acute blood loss. Received 2L IV fluids and 1 unit pRBC     Chronic pancreatitis  Chronic abdominal pain, diarrhea   - Oxycodone 5 mg Q6H PRN, Tylenol Q6H PRN (PTA takes percocet)  - Zofran, compazine PRN  - continue Creon  - restarted tube feeds + ADAT -low fat dietpain     Type II DM   With TPN and transitioning to tube feeds consulted Endo for assistant with higher blood sugars  (See there not for details- likely transition from IV to s/c today)   - Hold PTA metformin (although looks to have been stopped after most recent admission to [facility] in April).  - appreciate endocrine note and care. continue with NPH 18 U bid and sliding scale insulin medium intensity scale every 4 hours.     HLD  - resume statin     Diet: Adult Formula Drip Feeding: Continuous Vital 1.5; Jejunostomy; Goal Rate: 60; mL/hr; Medication - Feeding Tube Flush Frequency: At least 15-30 mL water before and after medication administration and with tube clogging; Amount to Send (Nutrition us...  Advance Diet  as Tolerated: Regular Diet Adult; Low Fat Diet       DVT Prophylaxis: Pneumatic Compression Devices             Code Status:      Full Code         Important Results:      NAD  HEENT NORMAL  PULM CTA  CV RRR  GI PEG TUBE< SOFT +BS  MS LYMPHEDEMA FEET > LEGS  DERM WARM AND DRY  PSYCH STABLE  NEURO LOWER LEG PARESIS         Pending Results:        Unresulted Labs Ordered in the Past 30 Days of this Admission     Date and Time Order Name Status Description    6/10/2021 1359 Surgical pathology exam In process                Discharge Instructions and Follow-Up:      Follow-up Appointments     Follow Up and recommended labs and tests      Follow up with colorectal surgery as directed                  Discharge Disposition:      Discharged to rehabilitation facility         Discharge Medications:        Current Discharge Medication List      START taking these medications    Details   !! amylase-lipase-protease (CREON 24) 94194-72306 units CPEP per EC capsule 1-2 capsules by Per J Tube route every 4 hours  Qty:      Associated Diagnoses: Chronic pancreatitis, unspecified pancreatitis type (H)      cholestyramine (QUESTRAN) 4 g packet Take 1 packet (4 g) by mouth daily  Qty:      Associated Diagnoses: Chronic pancreatitis, unspecified pancreatitis type (H)      insulin  UNIT/ML injection Inject 18 Units Subcutaneous 2 times daily    Associated Diagnoses: Type 2 diabetes mellitus with other specified complication, unspecified whether long term insulin use (H)      mvw complete formulation (CHEWABLES) tablet Take 1 tablet by mouth daily  Qty:      Comments: NDCs: [place] [address]-91, [place] [address]-06, Grape [address]-15  Associated Diagnoses: Type 2 diabetes mellitus with other specified complication, unspecified whether long term insulin use (H)      pantoprazole (PROTONIX) 2 mg/mL SUSP suspension 20 mLs (40 mg) by Oral or Feeding Tube route 2 times daily  Qty: 1200 mL, Refills: 1    Associated Diagnoses:  Gastrointestinal hemorrhage, unspecified gastrointestinal hemorrhage type      sodium bicarbonate 325 MG tablet 1 tablet (325 mg) by Per Feeding Tube route every 4 hours  Qty:      Associated Diagnoses: Chronic pancreatitis, unspecified pancreatitis type (H)      zinc sulfate (ZINCATE) 220 (50 Zn) MG capsule Take 1 capsule (220 mg) by mouth daily  Qty:      Associated Diagnoses: Chronic pancreatitis, unspecified pancreatitis type (H)       !! - Potential duplicate medications found. Please discuss with provider.      CONTINUE these medications which have NOT CHANGED    Details   ACCU-CHEK GUIDE test strip USE 1 EACH TO TEST DAILY. CODE E11.9      !! amylase-lipase-protease (CREON) 44176-50332 units CPEP per EC capsule TAKE 3 WITH MEALS / 1 2 WITH SNACKS, UP TO 15 PER DAY.      atorvastatin (LIPITOR) 20 MG tablet Take 20 mg by mouth daily      ondansetron (ZOFRAN) 4 MG tablet Take 1 tablet (4 mg) by mouth every 6 hours as needed for nausea  Qty: 30 tablet, Refills: 0    Associated Diagnoses: Nausea      oxyCODONE-acetaminophen (PERCOCET) 5-325 MG tablet TAKE 1 TABLET BY MOUTH EVERY 8 HOURS AS NEEDED FOR PAIN      senna (SENOKOT) 8.6 MG tablet Take 1 tablet by mouth 2 times daily as needed for constipation      insulin aspart (NOVOLOG PEN) 100 UNIT/ML pen Inject 0-12 units subcutaneous every 4 hours. 150 - 199 ....................... 2 units  200 - 249 ....................... 4 units  250 - 299 ....................... 6 units  300 - 349 ....................... 8 units  350 - 399 .......................10 units  400 or greater ............... 12 units       !! - Potential duplicate medications found. Please discuss with provider.      STOP taking these medications       insulin glargine (LANTUS VIAL) 100 UNIT/ML vial Comments:   Reason for Stopping:         metFORMIN (GLUCOPHAGE) 500 MG tablet Comments:   Reason for Stopping:                    Allergies:         Allergies   Allergen Reactions     Penicillins Other  (See Comments)     Ibuprofen Rash     Tolerates Aleve            Consultations This Hospital Stay:      Consultation during this admission received from GI and CRS          Discharge Orders for Skilled Facility (from Discharge Orders):        After Care Instructions     Activity - Up with nursing assistance      Advance Diet as Tolerated      Follow this diet upon discharge: Orders Placed This Encounter      Adult Formula Drip Feeding: Continuous Vital 1.5; Jejunostomy; Goal Rate: 60; mL/hr; Medication - Feeding Tube Flush Frequency: At least 15-30 mL water before and after medication administration and with tube clogging; Amount to Send (Nutrition us...      Full Liquid Diet         General info for SNF      Length of Stay Estimate: Short Term Care: Estimated # of Days <30  Condition at Discharge: Improving  Level of care:skilled   Rehabilitation Potential: Good  Admission H&P remains valid and up-to-date: Yes  Recent Chemotherapy: N/A  Use Nursing Home Standing Orders: N/A         Glucose monitor nursing POCT      Before meals and at bedtime         Mantoux instructions      Give two-step Mantoux (PPD) Per Facility Policy Yes                    Rehab orders for Skilled Facility (from Discharge Orders):      Referrals     Future Labs/Procedures    Home infusion referral     Comments:    Your provider has referred you to: OTHER PROVIDERS: [facility]    Local Address (if different from home address): Other (specify full   address and phone number): TCU TBD    Anticipated Length of Therapy: TBD    Home Infusion Pharmacist to adjust therapy based on labs and clinical   assessments: No (Home Infusion will call for order)    Labs:  May draw labs from Venous Catheter: Yes  Home Infusion Pharmacist to order labs based on therapy type and clinical   assessments: No   Labs to be drawn: TBD  Frequency: TBD  Date of 1st lab draw: TBD  Call/Fax Lab Results to: TBD    Agency Staff to assess nursing needs for Infusion  Therapy.    Access Device Management:  IV Access Type: PICC  Flush with Heparin and Normal Saline IVP PRN and routine site care (per   agency protocol) to maintain access device? No (MD to specify):     [place]  Enteral feedings    Occupational Therapy Adult Consult     Comments:    Evaluate and treat as clinically indicated.    Reason:  lymphedema    Physical Therapy Adult Consult     Comments:    Evaluate and treat as clinically indicated.    Reason:  deconditioned             Discharge Time:       Greater than 30 minutes.        Image Results From This Hospital Stay (For Non-EPIC Providers):        Results for orders placed or performed during the hospital encounter of 06/03/21   CT Abdomen Pelvis w Contrast    Narrative    EXAMINATION: TEMPORARY, 6/3/2021 12:11 PM    TECHNIQUE:  Helical CT images from the lung apices through the  symphysis pubis were obtained  with contrast.     COMPARISON: CT enterography 4/26/2021    HISTORY: Abdominal pain, acute, nonlocalized    FINDINGS:    Abdomen and pelvis: Stable appearance of the subcentimeter  hypodensities in hepatic segment 4a. Additional subcentimeter  hypodensity in the inferior right hepatic lobe is unchanged.  Postsurgical changes of cholecystectomy. There is mild diffuse intra  and extrahepatic biliary dilatation, unchanged from the prior exam.  Mild soft tissue stranding surrounding the common bile duct. Marked  pancreatic atrophy with numerous coarse calcifications throughout the  parenchyma and some of which appear to be within the pancreatic duct,  unchanged. Stable mild dilation of the main pancreatic duct measuring  up to 5 mm in the body. Spleen is within normal limits. Adrenal glands  are unremarkable.    Marked atrophy of the right renal cortex with moderate to severe right  hydronephrosis. There is diffuse right urothelial thickening and  enhancement which is more pronounced compared to the prior exam.  Unchanged cluster of nonobstructing calyceal  stones in the lower pole  of the right kidney. Normal enhancement of the left kidney with new  mild to moderate left hydronephrosis. Unchanged nonobstructing left  renal stones. Multiple bilateral simple renal cysts. There is  urothelial thickening and enhancement on the left, though less  pronounced than on the right. Urinary bladder is moderately distended,  without significant wall thickening. Prostate is mildly enlarged with  dystrophic calcifications throughout.    Percutaneous gastrojejunostomy balloon is within the gastric lumen  with the tip of the tube terminating in the proximal jejunum. No  abnormally dilated loops of small or large bowel. Postsurgical changes  of partial small bowel resection with anastomotic staple line in the  right hemiabdomen. Retained oral contrast from procedure on 6/1/2021  opacifies the majority of the transverse, descending, and sigmoid  colon. No bowel wall thickening or pneumatosis. Surgical staples in  the right lower quadrant, presumably from appendectomy. There is mild  diffuse mesenteric haziness. No free fluid or free air in the abdomen  or pelvis. No portal venous gas.    Extensive atherosclerotic disease of the abdominal aorta and iliac  vasculature with complete occlusion of the left external iliac artery.  Unopacified vascular bypass graft coursing within the left  subcutaneous fat of the anterolateral left chest and abdominal wall,  terminating at the left superficial femoral artery which is opacified  distally. Mild to moderate stenosis at the origin of the celiac axis  and superior mesenteric artery. The portal venous system is patent.  Postsurgical changes of retroperitoneal lymph node dissection. 8 mm  short axis left retroperitoneal lymph node, previously 9 mm (series 5,  image 271). Scattered prominent inguinal lymph nodes, unchanged.     Lower thorax: Cardiac size is within normal limits. Trace pericardial  effusion. Aortic valvular calcifications. Small left  pleural effusion  with overlying compressive atelectasis in the left lower lobe, new  from prior exam.    Bones and soft tissues:   Diffuse body wall anasarca. Chronic soft tissue skin thickening,  irregularity, and calcifications along the anterior left paramidline  abdominal wall, which track cranially along the abdominal musculature.  Diffuse osteopenia of the bones. Degenerative changes of the lumbar  spine. No acute or suspicious osseous abnormality.      Impression    IMPRESSION:   1. Mild to moderate bilateral hydronephrosis with urothelial  thickening and enhancement, right greater than left. Findings are  suspicious for urinary tract infection . Recommend correlation with  urinalysis. Of note the left hydronephrosis is new from comparison  exam on 4/26/2021.  2. New small left pleural effusion with overlying atelectasis.  3. Sequelae of chronic pancreatitis with marked pancreatic parenchymal  atrophy and numerous parenchymal and ductal calcifications. There is  mild peripancreatic fat stranding tracking along the common bile duct.  Consider correlation with laboratory values to evaluate for acute on  chronic pancreatitis.  4. Stable intrahepatic and extrahepatic biliary dilatation, status  post cholecystectomy. No evidence of biliary obstruction.    I have personally reviewed the examination and initial interpretation  and I agree with the findings.    KO SHAFER MD   XR Chest Port 1 View    Narrative    Exam: XR CHEST PORT 1 VIEW, 6/3/2021 1:55 PM    Indication: PICC line verification    Comparison: Same day CT    Findings:   Single portable AP radiograph of the chest and 60 degrees. Left arm  PICC tip projects at the superior cavoatrial junction. Surgical clips  over the left axilla. The trachea is midline. Cardiac silhouette is  within normal limits. No pneumothorax. Small left pleural effusion and  mild underlying left basilar opacity, otherwise the lungs are clear.  The upper abdomen is  unremarkable.      Impression    Impression:   1. Left arm PICC tip projects at the superior cavoatrial junction.  2. Stable small left pleural effusion and underlying left basilar  atelectasis/consolidation. Lungs are otherwise clear.    I have personally reviewed the examination and initial interpretation  and I agree with the findings.    MARC RAYMUNDO MD   CT Chest w Contrast    Narrative    Examination: CT CHEST W CONTRAST, 6/9/2021 11:54 AM     History: Colon cancer, initial workup    Comparison: None.    Technique: Helical acquisition of CT images from the lung apices to  the kidneys after the uncomplicated administration of 65 cc of isovue  370. Coronal and axial MIP images were reconstructed from the source  data.    Findings:    Lungs: The central tracheobronchial tree is patent. Small left greater  than right bilateral pleural effusions and adjacent compressive  atelectasis. Linear atelectasis in the right lower lobe. No  consolidative airspace opacity. Tiny nodular opacities along the left  major fissure for example on series 6 image 132, favored to represent  small crystal-fissural lymph nodes. No suspicious mass or pulmonary  nodule.    Chest: Thyroid is unremarkable. Normal caliber and configuration of  the thoracic great vessels. Heart size is normal. Trace pericardial  effusion. Moderate coronary artery and aortic valve annulus and  leaflet calcifications. Main pulmonary artery and thoracic aorta are  normal in caliber. No enlarged axillary, mediastinal or hilar lymph  nodes. Unopacified vascular bypass graft coursing from the left axilla  along the left chest wall.    Abdomen: Limited evaluation of the upper abdomen percutaneous  gastrojejunostomy tube balloon within the stomach. . Stable  hypodensity in hepatic segment 4A. Similar appearance of mild  intrahepatic biliary ductal dilation. Cholecystectomy. Similar  appearance of bilateral hydronephrosis. Left renal cyst. Severe  pancreatic atrophy and  pancreatic calcifications. Adrenal glands are  normal. Air dilated colon visualized in the upper abdomen. Abdominal  aortic and extensive celiac axis and SMA atherosclerosis.  Approximately 25-30% narrowing of the perirenal abdominal aorta.    Bones: No acute osseus abnormality or suspicious bony lesion. Diffuse  osteopenia. Multilevel degenerative changes of the spine.       Impression    Impression: In this patient with colon cancer:  1. No suspicious mass or lymphadenopathy within the chest to suggest  metastatic disease.  2. Small bilateral pleural effusions and adjacent compressive  atelectasis.  3. Air dilated colon visualized in the upper abdomen, which may  represent a large bowel obstruction/ileus.  4. Additional findings as detailed in the body of report.  5. Aortic annular and valvular leaflet calcifications.  6. Hydronephrosis and left renal cyst.    I have personally reviewed the examination and initial interpretation  and I agree with the findings.    NOEMI BATES MD           Most Recent Lab Results In EPIC (For Non-EPIC Providers):    Most Recent 3 CBC's:  Recent Labs   Lab Test 06/11/21  0723 06/10/21  0715 06/09/21  0538   WBC 5.1 4.8 5.5   HGB 7.2* 7.5* 7.7*   MCV 96 96 97    144* 173      Most Recent 3 BMP's:  Recent Labs   Lab Test 06/11/21  0723 06/10/21  0715 06/09/21  0538    137 135   POTASSIUM 4.5 4.3 4.8   CHLORIDE 105 104 105   CO2 28 29 28   BUN 19 26 33*   CR 0.91 1.00 0.96   ANIONGAP 5 4 2*   ELODIA 8.0* 8.0* 7.9*   * 161* 201*     Most Recent 3 Troponin's:No lab results found.    Invalid input(s): TROP, TROPONINIES  Most Recent 3 INR's:  Recent Labs   Lab Test 06/07/21  0540 06/04/21  0736 06/03/21  0624   INR 1.12 1.09 1.09     Most Recent 2 LFT's:  Recent Labs   Lab Test 06/09/21  0538 06/07/21  0540   AST <3 7   ALT 7 7   ALKPHOS 65 69   BILITOTAL 0.2 0.5     Most Recent Cholesterol Panel:  Recent Labs   Lab Test 05/27/21   CHOL 63   LDL 26   HDL 25*   TRIG  62     Most Recent 6 Bacteria Isolates From Any Culture (See EPIC Reports for Culture Details):  Recent Labs   Lab Test 06/06/21  1335   CULT 10,000 to 50,000 colonies/mL  mixed urogenital chuyita  Susceptibility testing not routinely done       Most Recent TSH, T4 and HgbA1c:  Recent Labs   Lab Test 06/03/21  1331   A1C 6.8*

## 2021-06-12 NOTE — PLAN OF CARE
No major events overnight. Pt alert and oriented, asking appropriate questions about his care. O2 sats stable on RA. Tolerating full liquid diet, and continuous Vital 1.5 given in 4hr increments mixed with Creon. Primofit for urinary incontinence management. No BM ovenight.    Plan: discharge to LTC today

## 2021-06-12 NOTE — PROVIDER NOTIFICATION
Paged MD Sherman asking if pt should have PICC removed prior to discharge. Dr. Sherman said the PICC should be removed since pt will only be on Tube Feeds at the rehab center.

## 2021-06-14 NOTE — PROGRESS NOTES
Art stated he is at a TCU currently, [facility]. Left a VM with their coordinator to give her his outpatient appts for transportation. Gave her my call back number

## 2021-06-17 NOTE — TELEPHONE ENCOUNTER
RECORDS RECEIVED FROM: Internal   Appt date: 06/30/2021   NOTES STATUS DETAILS   OFFICE NOTE from referring provider  Internal 06/07/2021 -- Telephone Encounter -- Christo Pepe MD   OFFICE NOTE from other specialist   Internal 06/10/2021, Jan Leon DO   DISCHARGE SUMMARY from hospital  N/A    DISCHARGE REPORT from the ER N/A    OPERATIVE REPORT  N/A    MEDICATION LIST Internal    LABS     PFC TESTING N/A    ANAL PAP N/A    BIOPSIES/PATHOLOGY RELATED TO DIAGNOSIS N/A    DIAGNOSTIC PROCEDURES     COLONOSCOPY Internal 06/10/2021, 06/15/2020 -- Internal   UPPER ENDOSCOPY (EGD) Internal 06/01/2021, [facility]    FLEX SIGMOIDOSCOPY  Internal 06/01/2021, Upper GI, [facility]   ERCP Internal 12/09/2020, 09/29/2020, 07/13/2020, [facility].   IMAGING (DISC & REPORT)      CT  Internal/CE 06/03/2021, 08/06/2020, CT Abdomen -- Internal  04/30/2021, 11/13/2019, CT Abdomen/Pelvis -- [facility]  05/22/2020, 05/13/2020 -- [facility]   MRI Internal 07/28/2021, MR Pelvis   XRAY Care Everywhere 04/30/2021, 11/13/2019, XR Abdomen -- [facility]   ULTRASOUND (ENDOANAL/ENDORECTAL) Internal/CE 02/12/2021, US Abdomen  09/17/2019, US Abdomen -- [facility]  08/26/2019, US Adbdomen -- [facility]     Action 06/25/2021 JTV 9:04am   Action Taken CSS sent a request to [facility] and [facility] to push over images to PACS.     Action 06/28/2021 JTV 9:31am   Action Taken CSS sent another request to [facility] and [facility] requesting images to be pushed over.     @1:48pm, records received from [facility] and forwarded to imaging. Images in PACS resolved.    @4:04PM, Records received from [facility] and forwarded to the imaging department.

## 2021-06-17 NOTE — TELEPHONE ENCOUNTER
Called [facility] to give verbal orders to remove ttags - tube placed 6/1  Facility confirmed they were removed yesterday.    ML

## 2021-06-25 NOTE — TELEPHONE ENCOUNTER
[facility] Call Center    Phone Message    May a detailed message be left on voicemail: yes     Reason for Call: Other: Alisha is requesting a call back please to discuss if Pt is needing his MRI before his visit with Dr. Grubbs. The MRI was rescheduled and it is now after his visit. Please advise. Thank you!     Action Taken: Message routed to:  [facility] Rectal     Travel Screening: Not Applicable

## 2021-06-28 NOTE — TELEPHONE ENCOUNTER
I left a message with Alisha stated that the MRI will need to be before the appointment on Wednesday. I asked that she call me back to verify appt reschedule

## 2021-06-28 NOTE — TELEPHONE ENCOUNTER
[facility] Call Center    Phone Message    May a detailed message be left on voicemail: yes     Reason for Call: Other: Kade called [facility], she cancelled appointment with Dr. Grubbs for 6/30/21, per Aj pt put off MRI and needs to reschedule.  Pt scheduled for MRI on 7/28/21 at 3 p.m. at the [facility]. Please call Aj back for schedling. Dx Rectal Mass [phone] secure line, ok to leave a detailed message.      Action Taken: Message routed to:  [facility] Other    Travel Screening: Not Applicable

## 2021-06-28 NOTE — TELEPHONE ENCOUNTER
GRETTAM x1 with kranthi to see if patient would like to move up MRI and visit with Dr. Grubbs. Requested a call back

## 2021-07-05 PROBLEM — K55.029 ISCHEMIC NECROSIS OF SMALL BOWEL (H): Status: ACTIVE | Noted: 2021-01-01

## 2021-07-05 PROBLEM — R10.13 ABDOMINAL PAIN, EPIGASTRIC: Status: ACTIVE | Noted: 2021-01-01

## 2021-07-05 NOTE — BRIEF OP NOTE
[facility]    Brief Operative Note    Pre-operative diagnosis: Pneumatosis coli [K63.89]  Post-operative diagnosis Pneumatosis coli, small bowel necrosis, intraabdominal sepsis    Procedure: Procedure(s):  Exploratory laparotomy, extensive lysis of adhesions, small bowel resection, temporary abdominal closure with AbThera  Surgeon: Surgeon(s) and Role:     * Stanton Huang MD - Primary     * Holden Doherty MD - Resident - Assisting     * Jose Alejandro Kirkpatrick MD - Resident - Assisting  Anesthesia: General   Estimated blood loss: Less than 50 ml  Drains: Abthera abdominal wound vac  Specimens:   ID Type Source Tests Collected by Time Destination   A : Intrabdominal mass coming off of proximal jejunum Tissue Other SURGICAL PATHOLOGY EXAM Stanton Huang MD 7/5/2021 10:46 AM    B : Necrotic Jejunum Tissue Other SURGICAL PATHOLOGY EXAM Stanton Huang MD 7/5/2021  1:51 PM      Findings:   Extensive small bowel adhesions, section of necrotic jejunum, enterotomy .  Complications: None.  Implants: * No implants in log *    Plan:  - Admit to SICU  - Open abdomen, intubated, sedated  - Abthera to -125 mmHg  - G and J to gravity  - Continue OG to LIS  - IV medications only  - Continue abx  - Continue mcclure  - Return to OR tomorrow, 7/6/21

## 2021-07-05 NOTE — ANESTHESIA CARE TRANSFER NOTE
Patient: Nick Zamudio    Procedure(s):  LAPAROTOMY, POSSIBLE BOWEL RESECTION, temporary abdominal closure with abthera    Diagnosis: Pneumatosis coli [K63.89]  Diagnosis Additional Information: No value filed.    Anesthesia Type:   No value filed.     Note:    Oropharynx: endotracheal tube in place  Level of Consciousness: iatrogenic sedation        Dentition: dentition unchanged  Vital Signs Stable: post-procedure vital signs reviewed and stable  Report to RN Given: handoff report given  Patient transferred to: ICU  Comments: Connected to vent upon arrival. vT 480 f 14 fio2 50%   ICU Handoff: Call for PAUSE to initiate/utilize ICU HANDOFF, Identified Patient, Identified Responsible Provider, Reviewed the Pertinent Medical History, Discussed Surgical Course, Reviewed Intra-OP Anesthesia Management and Issues during Anesthesia, Set Expectations for Post Procedure Period and Allowed Opportunity for Questions and Acknowledgement of Understanding      Vitals: (Last set prior to Anesthesia Care Transfer)  CRNA VITALS  7/5/2021 1428 - 7/5/2021 1511      7/5/2021             Pulse:  86    ART BP:  112/53    SpO2:  100 %        Electronically Signed By: DANICA Bazan CRNA  July 5, 2021  3:11 PM

## 2021-07-05 NOTE — H&P
SURGICAL ICU ADMISSION NOTE  2021    PRIMARY TEAM: EGS  PRIMARY PHYSICIAN: Stanton Huang MD   REASON FOR CRITICAL CARE ADMISSION: Bowel left in discontinuity, requires ICU level hemodynamic monitoring  ADMITTING PHYSICIAN: Dr. Sarabjit MD  Date of Service: 2021    ASSESSMENT:  Nick Zamudio is a 65 y.o. male with PMH of testicular cancer s/p radiation therapy, paraparesis, biliary stenosis, duodenal stenosis, failure to thrive s/p PEG-J c/b GI bleed, chronic pancreatitis, prior lap augustine c/b intussusception s/p small bowel resection, and diffuse atherosclerosis who was admitted to the SICU following emergent exploratory laparotomy on 2021. In the OR, approximately 50cm of perforated small bowel was resected and that patient was left in discontinuity with an Abthera. Patient required levophed for circulatory support. 2U pRBCs were given in the OR for a preop hbg of 8.1.     Patient was brought to the SICU intubated and sedated.   Plan to return to OR for second look     PLAN:     Neuro/ pain/ sedation:  - Monitor neurological status. Notify the MD for any acute changes in exam.  - Pain: fentanyl gtt, tylenol suppository PRN  - Sedation: propofol gtt  - Hold PTA percocet 5-325mg TID     Pulmonary care:   - ETT tube in place  - Vent settings: /14/5/50  - Required levophed transiently in the OR; arrived to the SICU off of it   - Supplemental oxygen to keep saturation above 92 %.  - albuterol nebs PRN  - AB.41/38/183/24/0.1     Cardiovascular:    #Diffuse atherosclerosis   #HTN  #HLD  - Monitor hemodynamic status. Notify the MD for any acute changes in exam.  - Holding lipitor 20mg     GI care:   #Pneumatosis coli, small bowel necrosis  #Biliary stenosis s/p stent   #Duodenal stenosis s/p dilation  #Chronic pancreatitis  #Hx Cdiff   #Chronic mesenteric ischemia  #Hx lap augustine c/b intussusception s/p small bowel resection  - strict NPO   - Continue OJ to LIS  - Had ~50 cm of small  bowel removed in the OR 7/5. Bowel left in discontinuity and Abthera placed (set to -125mmhg)  - h/o chronic diarrhea, last BM 7/4  - PEG-J placed 6/1 -- don't use for meds/feeds  - PEG-J placement complicated by GI bleed last admission     Renal/ Fluids/ Electrolytes/ Nutrition:   #Failure to thrive s/p PEG-J  - IVF: LR @ 75 ml/hr; received 2.8L intraop  - ICU electrolyte replacement HIGH protocol - K, Mg, Phos  - No indication for parenteral nutrition.  - PEG-J placed 6/1; no tube feeds at this time  - Holding PTA creon (pancreatic enzymes)  - CT on 7/5 demonstrated bl hydroureteronephosis, bl renal calculi, bl renal cysts  - Keep Cummins in place; Will continue to monitor intake and output  - Will discuss nutritional needs after OR tmrw     Endocrine:    #T2DM  - Sliding scale for diabetes management.  - Hgb a1c 6.5  - Hold PTA aspart and NPH     ID/ Antibiotics:  #Intraabdominal sepsis  #Leukocytosis  - WBC 13.0, recheck in AM  - Cipro/flagyl given in ED  - Start Zosyn      Heme:     - Hemoglobin 8.6 on admission to SICU   - s/p 2 U pRBCs intraop      Prophylaxis:    - Mechanical prophylaxis for DVT.   - IV protonix  - No chemical DVT prophylaxis due to high risk of bleeding.     MSK:   - Chronic wound on mons pubis; local wound care as needed  - Plan to consult PT/OT when more stable     Lines/ tubes/ drains:  - ETT  - PIVx2  - art line  - RIJ CVC  - OG to LIS  - Abthera wound vac  - Cummins     Disposition:  -Surgical ICU. OR tomorrow in am    Patient seen, findings and plan discussed with surgical ICU staff Dr. Sarabjit Dee MD  PGY-1 Surgery    See [facility] for on-call pager information.      - - - - - - - - - - - - - - - - - - - - - - - - - - - - - - - - - - - - - - - - - - - - - - - - - - - - - - - - - - - - - - - - - - - - - - - -     HISTORY PRESENTING ILLNESS:     REVIEW OF SYSTEMS: 10 point ROS neg other than the symptoms noted above in the HPI.    PAST MEDICAL HISTORY:    has no past medical  history on file.    SURGICAL HISTORY:    has no past surgical history on file.    SOCIAL HISTORY:    reports previous alcohol use. He reports previous drug use.    FAMILY HISTORY: No bleeding/clotting disorders nor problems with anesthesia.    ALLERGIES:      Allergies   Allergen Reactions     Ibuprofen        MEDICATIONS:  No current facility-administered medications on file prior to encounter.   No current outpatient medications on file prior to encounter.      PHYSICAL EXAMINATION:  Temp:  [98.4  F (36.9  C)-99.1  F (37.3  C)] 98.4  F (36.9  C)  Pulse:  [] 81  Resp:  [6-20] 18  BP: (161-196)/(74-99) 172/99  MAP:  [81 mmHg-87 mmHg] 81 mmHg  Arterial Line BP: (120-130)/(60-63) 121/61  FiO2 (%):  [50 %] 50 %  SpO2:  [100 %] 100 %     General: sedated.  HEENT: Normocephalic, atraumatic.  Neck: No cervical lymphadenopathy. No thyromegaly. RIJ CVC in place.  Chest wall: Symmetric thorax. No masses or tenderness to palpation.  Respiratory: Intubated. Lung sounds clear to auscultation bilaterally.  Cardiovascular: Regular rate and rhythm.  Gastrointestinal: Abdomen open with Abthera wound vac in place.  Genitourinary:  Cummins in place.  Extremities:  No limb deformities. No LE edema.  Skin: warm and dry. Wound over mons pubis.      LABS: Reviewed.   Arterial Blood Gases   Recent Labs   Lab 07/05/21  1357 07/05/21  1239 07/05/21  1042   PH 7.41 7.41 7.41   PCO2 38 39 40   PO2 183* 192* 356*   HCO3 24 25 25     Complete Blood Count   Recent Labs   Lab 07/05/21  1554 07/05/21  1357 07/05/21  1239 07/05/21  1042 07/05/21  0454   WBC 13.0*  --   --   --  13.0*   HGB 8.6* 9.3* 9.1* 8.1* 9.0*     --   --   --  397     Basic Metabolic Panel  Recent Labs   Lab 07/05/21  1554 07/05/21  1357 07/05/21  1239 07/05/21  1042 07/05/21  0454    139 137 138 138   POTASSIUM 4.0 4.4 4.3 3.9 4.5   CHLORIDE 108  --   --   --  106   CO2 25  --   --   --  26   BUN 35*  --   --   --  46*   CR 0.84  --   --   --  0.98   *  170* 175* 184* 153*     Liver Function Tests  Recent Labs   Lab 07/05/21  1554 07/05/21  0454   AST  --  8   ALT  --  14   ALKPHOS  --  91   BILITOTAL  --  0.2   ALBUMIN  --  2.4*   INR 1.30*  --      Pancreatic Enzymes  Recent Labs   Lab 07/05/21  0454   LIPASE <10*     Coagulation Profile  Recent Labs   Lab 07/05/21  1554   INR 1.30*     Lactate  Invalid input(s): LACTATE    IMAGING:  Recent Results (from the past 24 hour(s))   CT Abdomen Pelvis w Contrast    Narrative    EXAM: CT ABDOMEN PELVIS W CONTRAST  LOCATION: [facility]  DATE/TIME: 7/5/2021 6:02 AM    INDICATION: Nausea/vomiting  COMPARISON: 06/03/2021  TECHNIQUE: CT scan of the abdomen and pelvis was performed following injection of IV contrast. Multiplanar reformats were obtained. Dose reduction techniques were used.  CONTRAST: 80 mL Isovue-370    FINDINGS:   LOWER CHEST: Lung bases clear.    HEPATOBILIARY: Small hepatic cyst and mild biliary prominence similar. Cholecystectomy.    PANCREAS: Atrophic. Multiple calcifications consistent with chronic pancreatitis.    SPLEEN: Normal.    ADRENAL GLANDS: Normal.    KIDNEYS/BLADDER: Bilateral moderate hydronephrosis and renal calculi as seen previously. Ureteral wall thickening. Bilateral renal cysts.    BOWEL: Percutaneous gastrojejunostomy. Fluid-filled stomach. Postsurgical change sigmoid colon. Postsurgical change of small bowel. Mildly dilated loops of central small bowel. Pneumatosis of small bowel in the left lower abdomen and air within adjacent   mesenteric vasculature. Air within branch of the superior mesenteric vein midline series 3 image 220. Proximal jejunal lipoma again noted.    LYMPH NODES: Normal.    VASCULATURE: Advanced atherosclerotic vascular disease with probable chronic occlusion of branches of the superior mesenteric artery. Chronic occlusion of the left external iliac and left common femoral artery. Partially visualized left bypass graft is   not opacified as  previously.    PELVIC ORGANS: Stable. Dystrophic pelvic calcifications.    MUSCULOSKELETAL: Soft tissue edema left thigh. Chronic soft tissue thickening and dystrophic calcification left ventral pelvis. Osseous demineralization and degenerative change osseous structures.      Impression    IMPRESSION:   1.  Mildly dilated loops of central small bowel. Pneumatosis of small bowel with gas within adjacent mesenteric venous branches. Correlate for bowel ischemia.  2.  Moderate bilateral hydroureteronephrosis again seen. Ureteral wall thickening. Underlying UTI not excluded.  3.  Advanced atherosclerotic vascular disease with chronic occlusion of the left internal iliac and common femoral arteries. The left bypass graft is not opacified as seen previously.  4.  Bilateral renal calculi and renal cysts again seen.  5.  Chronic pancreatitis.    6.  Results called to Dr. Holden Casiano at 0636   XR Chest Port 1 View    Narrative    EXAM: XR CHEST PORT 1 VIEW  7/5/2021 3:29 PM      HISTORY: post op; ETT tube    COMPARISON: None    FINDINGS: Single view of the chest. Tip the ET tube projects 3.6 cm  above the elias. Gastric tube tip and sidehole projected over the  stomach. Right IJ central venous catheter sheath projects over the mid  SVC with tip projecting over the high right atrium.  No right  pneumothorax. No pleural effusion. Normal cardiomediastinal  silhouette. No acute airspace opacities. Partial visualization of the  gastrojejunostomy tube. Pneumatosis of the left lower quadrant bowel  is better visualized on same-day CT.      Impression    IMPRESSION:  1. The ET tube projects 3.6 cm above the elias.  2. No acute airspace disease.    I have personally reviewed the examination and initial interpretation  and I agree with the findings.    PRETTY PAZ MD          SYSTEM ID:  [id number]   XR Abdomen Port 1 View    Narrative    Exam: XR ABDOMEN PORT 1 VIEWS, 7/5/2021 3:34 PM    Indication: post op; check lines and  tubes    Comparison: CT AP same date    Findings:   Single AP view of the abdomen was obtained. Surgical changes of open  abdominal surgery and bowel resection with multiple surgical clips and  anastomotic suture material present. Right upper quadrant  cholecystectomy clips. An enteric tube with tip and side-port projects  over the expected region of the stomach. Gastrojejunostomy tube tip is  in the proximal jejunum. Surgical drain projects over the left   abdomen. Prominent though nondistended gas-filled loops of colon and  small bowel. No definite pneumatosis or portal venous gas. Vascular  calcifications.      Impression    Impression:  1. New postsurgical changes of open abdominal surgery and bowel  resection as above.  2. Surgical drain in the left abdomen and enteric tube tip and  side-port projects over the expected region of the stomach.    I have personally reviewed the examination and initial interpretation  and I agree with the findings.    KO SHAFER MD          SYSTEM ID:  [id number]

## 2021-07-05 NOTE — ANESTHESIA PROCEDURE NOTES
Central Line/PA Catheter Placement  Pre-Procedure   Staff -        Anesthesiologist:  Holden Narvaez MD       Performed By: anesthesiologist       Location: OR       Pre-Anesthestic Checklist: patient identified, IV checked, site marked, risks and benefits discussed, informed consent, monitors and equipment checked, pre-op evaluation and at physician/surgeon's request  Timeout:       Correct Patient: Yes        Correct Procedure: Yes        Correct Site: Yes        Correct Position: Yes        Correct Laterality: Yes     Procedure   Procedure: central line       Laterality: right       Insertion Site: internal jugular.  Sterile Prep        All elements of maximal sterile barrier technique followed       Patient Prep/Sterile Barriers: draped, hand hygiene, gloves , hat , mask , draped, gown, sterile gel and probe cover       Skin prep: Chloraprep  Insertion/Injection        Technique: ultrasound guided        1. Ultrasound was used to evaluate the access site.       2. Vein evaluated via ultrasound for patency/adequacy.       3. Using real-time ultrasound the needle/catheter was observed entering the artery/vein.       Introducer Type: 9 Fr, 2-lumen MAC        Hands-off Catheter: Hands-off 3-lumen via introducer   Narrative        Tegaderm and Biopatch dressing used.       Complications: None apparent,        blood aspirated from all lumens,        Verification method: Placement to be verified post-op

## 2021-07-05 NOTE — H&P
General Surgery History and Physical  July 5, 2021    CC: Acute on chronic pain      Assessment and Plan:  65 year old male with history of testicular cancer s/p radiation therapy, paraparesis, biliary stenosis, duodenal stenosis, failure to thrive s/p PEG-J, chronic pancreatitis, prior lap augustine c/b intussusception s/p bowel resection, diffuse atherosclerosis, here with acute worsening of chronic abdominal pain and pneumatosis on imaging concerning for bowel ischemia. We discussed the risks and benefits of surgical management including the high risk of mortality and morbidity, including enterotomy, possible bowel resection, possible ostomy, post operative infection, and bleeding during the surgery. Survival rate estimated to be 25% due to his extensive abdominal surgical history, comorbidities, and new acute problem. Art understand these risks and wishes to proceed with an emergent exploratory laparotomy. At his request we called his wife and discussed these risks with her as well. We explained that the colorectal surgery team has been contacted and will assess his sigmoid mass during the surgery and provide further recommendations at that time. Both he and his wife understand these risks and are in agreement with their wish to proceed with surgery.    - To OR for emergent ex lap  - NPO  - cipro and flagyl given in ED    Seen and discussed with chief, Dr. Doherty, who discussed with staff, Dr. Huang.    Jose Alejandro Kirkpatrick MD  Surgery resident  [phone]        HPI:  Nick Zamudio is a 64 yo male with a history of testicular cancer s/p radiation therapy, complicated by radiation induced paraparesis, biliary stenosis s/p ERCP, duodenal stenosis, chronic pancreatitis, small bowel injury s/p bowel resection and history of chronic mesenteric stenosis who presents in ED from his nursing home with acute onset of worsening abdominal pain for the last 24 hours. Chronic epigastric pain associated with chronic pancreatitis suddenly  became worse yesterday, had one episode nausea. H/o chronic diarrhea and is passing stool and gas; last BM last night was loose. No fevers, chills, hematochezia, or melena. Imaging notable for small bowel pneumatosis with adjacent mesenteric venous gas concerning for bowel ischemia.  Follows with Dr. Pepe for chronic pancreatitis. Recently had PEG-J placed 6/1, later readmitted with acute GI bleed. Evaluation at that time showed 15 mm stenosis in the 4th portion of duodenum with submucosal lesion just distal to this and 3 cm sigmoid mass with ~50% luminal obstruction. Tubular adenoma on biopsy, however undergoing pre-operative work-up for possible resection with colorectal surgery. Per chart review he underwent lap augustine at [facility] in 2019, complicated by intususception s/p ex lap and small bowel resection.    Pt has duplicate charts in EMR, see other chart for additional history MRN [id number]          Medical History:  Testicular cancer as teenage s/p radiaiton therapy  HTN  T2DM  Chronic pancreatitis  Small bowel intussusception  Failure to thrive s/p PEG-J tube    Surgical History:  2019 lap augustine, ex lap  PEG-J tube 6/1/21  Axillary-profunda bypass, occluded    Social History:  Social History     Tobacco Use     Smoking status: None   Substance Use Topics     Alcohol use: Not Currently     Drug use: Not Currently       Family History:  History reviewed. No pertinent family history.    Medications:  Current Facility-Administered Medications   Medication     metroNIDAZOLE (FLAGYL) intermittent infusion 1,000 mg     sodium chloride 0.9% infusion     No current outpatient medications on file.       Allergies:  Penicillin  Ibuprofen      Exam:  BP (!) 161/89   Pulse 105   Temp 99.1  F (37.3  C) (Oral)   Resp 9   Ht 1.829 m (6')   Wt 59 kg (130 lb)   SpO2 100%   BMI 17.63 kg/m      Gen: Appears uncomfortable  HEENT: Atraumatic, normocephalic  Neuro: Alert and oriented  Pulm: nonlabored breathing on  room air, no cough  CV: Tachycardic, regular rhythm by radial pulse, noncyanotic  ABD: Rigid, numerous midline scars, diffuse tenderness most pronounced in RLQ.  MSK:  Normal active range of motion, no edema  Skin: Warm, dry, no rashes or abrasions on exposed skin      Laboratory Results:  WBC 13, Hgb 9.0, Plt 397  BUN 46, otherwise unremarkable  Lactate 1.3      Imaging:  CT A/P  1.  Mildly dilated loops of central small bowel. Pneumatosis of small bowel with gas within adjacent mesenteric venous branches. Correlate for bowel ischemia.  2.  Moderate bilateral hydroureteronephrosis again seen. Ureteral wall thickening. Underlying UTI not excluded.  3.  Advanced atherosclerotic vascular disease with chronic occlusion of the left internal iliac and common femoral arteries. The left bypass graft is not opacified as seen previously.  4.  Bilateral renal calculi and renal cysts again seen.  5.  Chronic pancreatitis.

## 2021-07-05 NOTE — PROGRESS NOTES
"Admitted/transferred from: or  Reason for admission/transfer: monitor vitals post op   2 RN skin assessment: completed by tonie ROSS rn  Result of skin assessment and interventions/actions: abrasion to R leg, large pimple on back  Center, surgical wound - g tube   Height, weight, drug calc weight: 5'10\"- 58 kg  Patient belongings small bag with phone and glasses shirt, a silver color necklace  [employer] education added to care plan  ?    "

## 2021-07-05 NOTE — H&P (VIEW-ONLY)
ED Provider Note  [facility]      History     Chief Complaint   Patient presents with     Abdominal Pain     Nausea & Vomiting     HPI  Nick Zamudio is a 65 year old male who has a past medical history of testicular cancer status post radiation, partial quadriplegia, status post cholecystectomy, type 2 diabetes, chronic pancreatitis presenting with upper abdominal pain.  This is the worst pancreatitis pain he has had.  He does not drink.  He is from a skilled nursing facility.  Recent admission for failure to thrive and GI bleed due to injury with feeding tube.  Patient is having difficulty eating and drinking, is having nausea and vomiting with his pain.  He has been having bowel movements without black or blood.  Denies fevers or chills.  No shortness of breath or chest pain.    Past Medical History  History reviewed. No pertinent past medical history.  History reviewed. No pertinent surgical history.  No current outpatient medications on file.    Allergies   Allergen Reactions     Ibuprofen      Family History  History reviewed. No pertinent family history.  Social History   Social History     Tobacco Use     Smoking status: None   Substance Use Topics     Alcohol use: Not Currently     Drug use: Not Currently      Past medical history, past surgical history, medications, allergies, family history, and social history were reviewed with the patient. No additional pertinent items.       Review of Systems  A complete review of systems was performed with pertinent positives and negatives noted in the HPI, and all other systems negative.    Physical Exam   BP: (!) 196/93  Pulse: 86  Temp: 99.1  F (37.3  C)  Resp: 20  Height: 182.9 cm (6')  Weight: 59 kg (130 lb)  SpO2: 100 %  Physical Exam  Physical Exam   Constitutional: oriented to person, place, and time. appears cachectic and in pain  HENT:   Head: Normocephalic and atraumatic.   Neck: Normal range of motion.   Pulmonary/Chest:  Effort normal. No respiratory distress.   Cardiac: No murmurs, rubs, gallops. RRR.  Abdominal: Abdomen soft, epigastric tenderness to palpation with guarding.  MSK: Long bones without deformity or evidence of trauma  Neurological: alert and oriented to person, place, and time.   Skin: Skin is warm and dry.   Psychiatric:  normal mood and affect.  behavior is normal. Thought content normal.     ED Course      Procedures             EKG Interpretation:      Interpreted by Holden Casiano MD  Time reviewed: 0530  Symptoms at time of EKG: epigastric pain   Rhythm: normal sinus   Rate: normal  Axis: normal  Ectopy: none  Conduction: normal  ST Segments/ T Waves: No ST-T wave changes  Q Waves: none  Comparison to prior: No old EKG available    Clinical Impression: normal EKG             Results for orders placed or performed during the hospital encounter of 07/05/21   CBC with platelets differential     Status: Abnormal   Result Value Ref Range    WBC 13.0 (H) 4.0 - 11.0 10e9/L    RBC Count 3.15 (L) 4.4 - 5.9 10e12/L    Hemoglobin 9.0 (L) 13.3 - 17.7 g/dL    Hematocrit 28.4 (L) 40.0 - 53.0 %    MCV 90 78 - 100 fl    MCH 28.6 26.5 - 33.0 pg    MCHC 31.7 31.5 - 36.5 g/dL    RDW 14.2 10.0 - 15.0 %    Platelet Count 397 150 - 450 10e9/L    Diff Method Automated Method     % Neutrophils 86.6 %    % Lymphocytes 6.0 %    % Monocytes 5.5 %    % Eosinophils 0.4 %    % Basophils 0.2 %    % Immature Granulocytes 1.3 %    Nucleated RBCs 0 0 /100    Absolute Neutrophil 11.3 (H) 1.6 - 8.3 10e9/L    Absolute Lymphocytes 0.8 0.8 - 5.3 10e9/L    Absolute Monocytes 0.7 0.0 - 1.3 10e9/L    Absolute Eosinophils 0.1 0.0 - 0.7 10e9/L    Absolute Basophils 0.0 0.0 - 0.2 10e9/L    Abs Immature Granulocytes 0.2 0 - 0.4 10e9/L    Absolute Nucleated RBC 0.0      Medications   HYDROmorphone (DILAUDID) injection 1 mg (has no administration in time range)   ondansetron (ZOFRAN) injection 4 mg (has no administration in time range)   0.9% sodium  chloride BOLUS (has no administration in time range)     Followed by   sodium chloride 0.9% infusion (has no administration in time range)        Assessments & Plan (with Medical Decision Making)   MDM  Patient with abdominal pain found to have pneumatosis intestinalis on CT abd. Patient has hypertensive, not  Tachycardic. Lactic normal. WBC elevated. Antibiotics ordered. Surgery is at the bedside shortly after CT findings. Discussed heparin with surgery as concern for ischemic cause from chronic occlusion of abdominal vessels. They will review imaging and decide on plan. Patient to be signed out to oncoming physician pending surgical evaluation.    I have reviewed the nursing notes. I have reviewed the findings, diagnosis, plan and need for follow up with the patient.    New Prescriptions    No medications on file       Final diagnoses:   Pneumatosis intestinalis   Abdominal pain, epigastric       --  Holden Casiano  [facility] EMERGENCY DEPARTMENT  7/5/2021     Holden Casiano MD  07/05/21 0714

## 2021-07-05 NOTE — ED TRIAGE NOTES
[facility] with c/o abdominal pain and N/V. Patient arrives from nursing home with increasingly worse abdominal pain. PMH of pancreatitis, similar feeling. Patient endorses pain to the left side of abdomen. 75 of fentanyl and 4 of Zofran given in route.  138 BG. Hypertensive in route.

## 2021-07-05 NOTE — ED NOTES
Bed: ED12  Expected date: 7/5/21  Expected time:   Means of arrival: Ambulance  Comments:  [id number]  65m abd pain, nausea

## 2021-07-05 NOTE — ANESTHESIA PREPROCEDURE EVALUATION
Anesthesia Pre-Procedure Evaluation    Patient: Nick Zamudio   MRN: [id number] : 1955      Nick Zamudio is a 66 yo male with a history of testicular cancer s/p radiation therapy, complicated by radiation induced paraparesis, biliary stenosis s/p ERCP, duodenal stenosis, chronic pancreatitis, small bowel injury s/p bowel resection and history of chronic mesenteric stenosis who presents in ED from his nursing home with acute onset of worsening abdominal pain for the last 24 hours. Chronic epigastric pain associated with chronic pancreatitis suddenly became worse yesterday, had one episode nausea. H/o chronic diarrhea and is passing stool and gas; last BM last night was loose. No fevers, chills, hematochezia, or melena. Imaging notable for small bowel pneumatosis with adjacent mesenteric venous gas concerning for bowel ischemia.  Follows with Dr. Pepe for chronic pancreatitis. Recently had PEG-J placed , later readmitted with acute GI bleed. Evaluation at that time showed 15 mm stenosis in the 4th portion of duodenum with submucosal lesion just distal to this and 3 cm sigmoid mass with ~50% luminal obstruction. Tubular adenoma on biopsy, however undergoing pre-operative work-up for possible resection with colorectal surgery. Per chart review he underwent lap augustine at [facility] in 2019, complicated by intususception s/p ex lap and small bowel resection.     Pt has duplicate charts in EMR, see other chart for additional history MRN [id number]              Medical History:  Testicular cancer as teenage s/p radiaiton therapy  HTN  T2DM  Chronic pancreatitis  Small bowel intussusception  Failure to thrive s/p PEG-J tube     Surgical History:  2019 lap augustine, ex lap  PEG-J tube 21  Axillary-profunda bypass, occluded       Preoperative Diagnosis: Pneumatosis coli [K63.89]   Procedure : Procedure(s):  LAPAROTOMY, POSSIBLE BOWEL RESECTION, POSSIBLE ILIOSTOMY, POSSIBLE COLOSTOMY     History reviewed.  No pertinent past medical history.   History reviewed. No pertinent surgical history.   Allergies   Allergen Reactions     Ibuprofen       Social History     Tobacco Use     Smoking status: Not on file   Substance Use Topics     Alcohol use: Not Currently      Wt Readings from Last 1 Encounters:   07/05/21 59 kg (130 lb)        Anesthesia Evaluation            ROS/MED HX  ENT/Pulmonary:       Neurologic:     (+) Spinal cord injury,     Cardiovascular:     (+) hypertension-Peripheral Vascular Disease-- Other and Symptomatic. ---    METS/Exercise Tolerance:     Hematologic:     (+) anemia,     Musculoskeletal:       GI/Hepatic:       Renal/Genitourinary:       Endo:     (+) type II DM,     Psychiatric/Substance Use:       Infectious Disease:       Malignancy:   (+) Malignancy, History of Other.Other CA Remission status post Surgery, Chemo and Radiation.    Other:            Physical Exam    Airway  airway exam normal           Respiratory Devices and Support         Dental  no notable dental history         Cardiovascular             Pulmonary               Other findings: Chronically ill.     OUTSIDE LABS:  CBC:   Lab Results   Component Value Date    WBC 13.0 (H) 07/05/2021    HGB 9.1 (L) 07/05/2021    HGB 8.1 (L) 07/05/2021    HCT 28.4 (L) 07/05/2021     07/05/2021     BMP:   Lab Results   Component Value Date     07/05/2021     07/05/2021    POTASSIUM 4.3 07/05/2021    POTASSIUM 3.9 07/05/2021    CHLORIDE 106 07/05/2021    CO2 26 07/05/2021    BUN 46 (H) 07/05/2021    CR 0.98 07/05/2021     (H) 07/05/2021     (H) 07/05/2021     COAGS: No results found for: PTT, INR, FIBR  POC: No results found for: BGM, HCG, HCGS  HEPATIC:   Lab Results   Component Value Date    ALBUMIN 2.4 (L) 07/05/2021    PROTTOTAL 6.9 07/05/2021    ALT 14 07/05/2021    AST 8 07/05/2021    ALKPHOS 91 07/05/2021    BILITOTAL 0.2 07/05/2021     OTHER:   Lab Results   Component Value Date    PH 7.41 07/05/2021     LACT 2.1 (H) 07/05/2021    ELODIA 9.3 07/05/2021    PHOS 4.2 07/05/2021    MAG 2.2 07/05/2021    LIPASE <10 (L) 07/05/2021       Anesthesia Plan    ASA Status:  4, emergent    NPO Status:  ELEVATED Aspiration Risk/Unknown    Anesthesia Type: General.     - Airway: ETT   Induction: RSI.   Maintenance: Balanced.   Techniques and Equipment:     - Lines/Monitors: Arterial Line, Central Line     - Blood: PRBC     - Drips/Meds: Norepi     Consents    Anesthesia Plan(s) and associated risks, benefits, and realistic alternatives discussed. Questions answered and patient/representative(s) expressed understanding.     - Discussed with:  Implied consent/emergency         Postoperative Care    Pain management: IV analgesics.        Comments:    Patient's with multiple co-morbidities including radiation induced paraplegia, multiple bowel surgeries, testicular cancer, and ischemic bowel. Surgeon quoting very high risk of mortality associated with this surgery.             Holden Narvaez MD

## 2021-07-05 NOTE — ANESTHESIA PROCEDURE NOTES
Arterial Line Procedure Note  Pre-Procedure   Staff -        Anesthesiologist:  Holden Narvaez MD       Performed By: anesthesiologist       Location: OR       Pre-Anesthestic Checklist: patient identified, IV checked, risks and benefits discussed, informed consent, monitors and equipment checked, pre-op evaluation and at physician/surgeon's request  Timeout:       Correct Patient: Yes        Correct Procedure: Yes        Correct Site: Yes        Correct Position: Yes   Procedure   Procedure: arterial line       Laterality: left       Insertion Site: radial.  Sterile Prep        Standard elements of sterile barrier followed       Skin prep: Chloraprep  Insertion/Injection        Technique: ultrasound guided        1. Ultrasound was used to evaluate the access site.       2. Artery evaluated via ultrasound for patency/adequacy.       3. Using real-time ultrasound the needle/catheter was observed entering the artery/vein.       Catheter Type/Size: 20 G, 1.75 in/4.5 cm quick cath (integral wire)  Narrative        Tegaderm dressing used.       Complications: None apparent,        Arterial waveform: Yes        IBP within 10% of NIBP: Yes

## 2021-07-05 NOTE — ANESTHESIA PROCEDURE NOTES
Airway       Patient location during procedure: OR  Staff -        CRNA: Kizzy Newby APRN CRNA       Performed By: CRNA  Consent for Airway        Urgency: elective  Indications and Patient Condition       Indications for airway management: crystal-procedural       Induction type:intravenous       Mask difficulty assessment: 1 - vent by mask    Final Airway Details       Final airway type: endotracheal airway       Successful airway: ETT - single  Endotracheal Airway Details        ETT size (mm): 8.0       Cuffed: yes       Successful intubation technique: direct laryngoscopy       DL Blade Type: Cho 2       Grade View of Cords: 1       Adjucts: stylet       Position: Right       Measured from: gums/teeth       Secured at (cm): 22       Bite block used: None    Post intubation assessment        Placement verified by: capnometry, equal breath sounds and chest rise        Number of attempts at approach: 1       Secured with: silk tape       Ease of procedure: easy       Dentition: Intact and Unchanged    Medication(s) Administered   Medication Administration Time: 7/5/2021 9:52 AM

## 2021-07-05 NOTE — PLAN OF CARE
Problem: Adult Inpatient Plan of Care  Goal: Absence of Hospital-Acquired Illness or Injury  Intervention: Identify and Manage Fall Risk  Recent Flowsheet Documentation  Taken 7/5/2021 1700 by Jeff Zuleta RN  Safety Promotion/Fall Prevention: activity supervised  Taken 7/5/2021 1600 by Jeff Zuleta RN  Safety Promotion/Fall Prevention: activity supervised  Taken 7/5/2021 1550 by Jeff Zuleta RN  Safety Promotion/Fall Prevention: activity supervised  Intervention: Prevent Skin Injury  Recent Flowsheet Documentation  Taken 7/5/2021 1700 by Jeff uZleta RN  Body Position:   left   head repositioned, left  Taken 7/5/2021 1600 by Jeff Zuleta RN  Body Position:   left   head repositioned, left  Taken 7/5/2021 1550 by Jeff Zuleta RN  Body Position:   left   head repositioned, left   Bp l;ow - urine output low cvp -2- 0 hr wnl 82 sinus- bp 97/57 mean 72-   A fluid deficite-   I md aware would like to not give fluids to minimize abd  swelling.

## 2021-07-05 NOTE — ED PROVIDER NOTES
ED Provider Note  [facility]      History     Chief Complaint   Patient presents with     Abdominal Pain     Nausea & Vomiting     HPI  Nick Zamudio is a 65 year old male who has a past medical history of testicular cancer status post radiation, partial quadriplegia, status post cholecystectomy, type 2 diabetes, chronic pancreatitis presenting with upper abdominal pain.  This is the worst pancreatitis pain he has had.  He does not drink.  He is from a skilled nursing facility.  Recent admission for failure to thrive and GI bleed due to injury with feeding tube.  Patient is having difficulty eating and drinking, is having nausea and vomiting with his pain.  He has been having bowel movements without black or blood.  Denies fevers or chills.  No shortness of breath or chest pain.    Past Medical History  History reviewed. No pertinent past medical history.  History reviewed. No pertinent surgical history.  No current outpatient medications on file.    Allergies   Allergen Reactions     Ibuprofen      Family History  History reviewed. No pertinent family history.  Social History   Social History     Tobacco Use     Smoking status: None   Substance Use Topics     Alcohol use: Not Currently     Drug use: Not Currently      Past medical history, past surgical history, medications, allergies, family history, and social history were reviewed with the patient. No additional pertinent items.       Review of Systems  A complete review of systems was performed with pertinent positives and negatives noted in the HPI, and all other systems negative.    Physical Exam   BP: (!) 196/93  Pulse: 86  Temp: 99.1  F (37.3  C)  Resp: 20  Height: 182.9 cm (6')  Weight: 59 kg (130 lb)  SpO2: 100 %  Physical Exam  Physical Exam   Constitutional: oriented to person, place, and time. appears cachectic and in pain  HENT:   Head: Normocephalic and atraumatic.   Neck: Normal range of motion.   Pulmonary/Chest:  Effort normal. No respiratory distress.   Cardiac: No murmurs, rubs, gallops. RRR.  Abdominal: Abdomen soft, epigastric tenderness to palpation with guarding.  MSK: Long bones without deformity or evidence of trauma  Neurological: alert and oriented to person, place, and time.   Skin: Skin is warm and dry.   Psychiatric:  normal mood and affect.  behavior is normal. Thought content normal.     ED Course      Procedures             EKG Interpretation:      Interpreted by Holden Casiano MD  Time reviewed: 0530  Symptoms at time of EKG: epigastric pain   Rhythm: normal sinus   Rate: normal  Axis: normal  Ectopy: none  Conduction: normal  ST Segments/ T Waves: No ST-T wave changes  Q Waves: none  Comparison to prior: No old EKG available    Clinical Impression: normal EKG             Results for orders placed or performed during the hospital encounter of 07/05/21   CBC with platelets differential     Status: Abnormal   Result Value Ref Range    WBC 13.0 (H) 4.0 - 11.0 10e9/L    RBC Count 3.15 (L) 4.4 - 5.9 10e12/L    Hemoglobin 9.0 (L) 13.3 - 17.7 g/dL    Hematocrit 28.4 (L) 40.0 - 53.0 %    MCV 90 78 - 100 fl    MCH 28.6 26.5 - 33.0 pg    MCHC 31.7 31.5 - 36.5 g/dL    RDW 14.2 10.0 - 15.0 %    Platelet Count 397 150 - 450 10e9/L    Diff Method Automated Method     % Neutrophils 86.6 %    % Lymphocytes 6.0 %    % Monocytes 5.5 %    % Eosinophils 0.4 %    % Basophils 0.2 %    % Immature Granulocytes 1.3 %    Nucleated RBCs 0 0 /100    Absolute Neutrophil 11.3 (H) 1.6 - 8.3 10e9/L    Absolute Lymphocytes 0.8 0.8 - 5.3 10e9/L    Absolute Monocytes 0.7 0.0 - 1.3 10e9/L    Absolute Eosinophils 0.1 0.0 - 0.7 10e9/L    Absolute Basophils 0.0 0.0 - 0.2 10e9/L    Abs Immature Granulocytes 0.2 0 - 0.4 10e9/L    Absolute Nucleated RBC 0.0      Medications   HYDROmorphone (DILAUDID) injection 1 mg (has no administration in time range)   ondansetron (ZOFRAN) injection 4 mg (has no administration in time range)   0.9% sodium  chloride BOLUS (has no administration in time range)     Followed by   sodium chloride 0.9% infusion (has no administration in time range)        Assessments & Plan (with Medical Decision Making)   MDM  Patient with abdominal pain found to have pneumatosis intestinalis on CT abd. Patient has hypertensive, not  Tachycardic. Lactic normal. WBC elevated. Antibiotics ordered. Surgery is at the bedside shortly after CT findings. Discussed heparin with surgery as concern for ischemic cause from chronic occlusion of abdominal vessels. They will review imaging and decide on plan. Patient to be signed out to oncoming physician pending surgical evaluation.    I have reviewed the nursing notes. I have reviewed the findings, diagnosis, plan and need for follow up with the patient.    New Prescriptions    No medications on file       Final diagnoses:   Pneumatosis intestinalis   Abdominal pain, epigastric       --  Holden Casiano  [facility] EMERGENCY DEPARTMENT  7/5/2021     Holden Casiano MD  07/05/21 0714

## 2021-07-06 PROBLEM — K63.89 PNEUMATOSIS INTESTINALIS: Status: ACTIVE | Noted: 2021-01-01

## 2021-07-06 NOTE — ANESTHESIA CARE TRANSFER NOTE
Patient: Nick Zamudio    Procedure(s):  EXPLORATORY LAPAROTOMY  segmental BOWEL RESECTION, abdominal washout, abdominal wound CLOSURE    Diagnosis: Open abdominal wall wound [S31.109A]  Diagnosis Additional Information: No value filed.    Anesthesia Type:   General     Note:    Oropharynx: endotracheal tube in place and nasal gatric tube in place  Level of Consciousness: iatrogenic sedation      Independent Airway: airway patency satisfactory and stable  Dentition: dentition unchanged  Vital Signs Stable: post-procedure vital signs reviewed and stable  Report to RN Given: handoff report given  Patient transferred to: ICU  Comments: Pt remains intubated and sedated in the ICU. Vent settings: 500/12/5/60%. Pt sedated with propofol and fentanyl. Pt VSS throughout transport and upon arrival to the ICU. Pt care report given to receiving RN>   ICU Handoff: Call for PAUSE to initiate/utilize ICU HANDOFF, Identified Patient, Identified Responsible Provider, Reviewed the Pertinent Medical History, Discussed Surgical Course, Reviewed Intra-OP Anesthesia Management and Issues during Anesthesia, Set Expectations for Post Procedure Period and Allowed Opportunity for Questions and Acknowledgement of Understanding      Vitals: (Last set prior to Anesthesia Care Transfer)  CRNA VITALS  7/6/2021 1604 - 7/6/2021 1653      7/6/2021             Resp Rate (observed):  10    Resp Rate (set):  10        Electronically Signed By: DANICA Conley CRNA  July 6, 2021  4:53 PM

## 2021-07-06 NOTE — CONSULTS
WO Nurse Inpatient Wound Assessment   Reason for consultation: Evaluate and treat  Pubic and coccyx wounds    Assessment  Pubic are wounds due to chronic skin area from radiation   Upper buttock (near intergluteal cleft- MASD  Mid back- Trauma vs PI POA- if PI stage 2 vs 3  Status: initial assessment and evolving    Treatment Plan  Buttocks and intergluteal cleft wounds: BID and PRN     Cleanse the area with Nichelle cleanse and protect, very gently with soft cloth.    Apply thin layer of critic aid paste.    With repeat application, do not scrub the paste, only remove soiled paste and reapply.    If complete removal of paste is necessary use baby oil/mineral oil (#[id number]) and soft wash cloth.    Ensure pt has Mark-cushion (#[id number]) while sitting up in the chair.    Use only one Covidien pad in between mattress and pt. No brief while in bed.    POC for wound located on Mid back-    Cleanse the area with NS and pat dry.    Apply No sting film barrier to periwound skin.    Cover wound with Mepilex    Change dressing Q 3 days.    Pubic area- leave it ROSALBA   Orders Written  Recommended provider order: None, at this time  WO Nurse follow-up plan:weekly  Nursing to notify the Provider(s) and re-consult the WO Nurse if wound(s) deteriorates or new skin concern.    Patient History  According to provider note(s):  Gino Zamudio is a 66 yo male with h/o testicular cancer s/p radiation therapy, paraparesis, biliary stenosis, duodenal stenosis, failure to thrive s/p PEG-J c/b GI bleed, chronic pancreatitis, prior lap augustine c/b intussusception s/p small bowel resection, now with intraabdominal sepsis s/p small bowel resection on 7/5, in discontinuity with open abdomen.     Objective Data  Containment of urine/stool: Indwelling catheter    Active Diet Order  Orders Placed This Encounter      NPO for Medical/Clinical Reasons Except for: No Exceptions      Output:   I/O last 3 completed shifts:  In: 0567.91 [I.V.:9047.91; IV  Piggyback:1000]  Out: 4175 [Urine:1525; Emesis/NG output:1200; Drains:400; Other:1000; Blood:50]    Risk Assessment:   Sensory Perception: 1-->completely limited  Moisture: 4-->rarely moist  Activity: 1-->bedfast  Mobility: 1-->completely immobile  Nutrition: 3-->adequate  Friction and Shear: 2-->potential problem  Levy Score: 12                          Labs:   Recent Labs   Lab 07/06/21  0300 07/05/21  1554 07/05/21  0454 07/05/21  0454   ALBUMIN  --   --   --  2.4*   HGB 8.6* 8.6*   < > 9.0*   INR  --  1.30*  --   --    WBC 21.9* 13.0*  --  13.0*   A1C  --  6.4*  --   --     < > = values in this interval not displayed.         Physical Exam  Areas of skin assessed: focused pubic area, coccyx, buttocks, back, perianal area    Wound Location:  Pubic area     7/6    2. Buttocks (intergluteal cleft)        Date of last photo 7/2  Wound History:POA    Wound Base: 100 % dermis     Palpation of the wound bed: normal      Drainage: scant     Description of drainage: none     Measurements (length x width x depth, in cm) 1.1  x 0.5  x 0.1 cm      Tunneling N/A     Undermining N/A  Periwound skin: intact and erythema- blanchable that extends about 7 cm almost circumferentially      Color: pink      Temperature: normal   Odor: none  Pain: facial expression of distress,    3. Mid back      Date of last photo 7/2  Wound History:POA, trauma vs PI    Wound Base: 100 % dermis/fibrin vs slough tissue     Palpation of the wound bed: normal      Drainage: scant     Description of drainage: none     Measurements (length x width x depth, in cm) 0.5  x 0.3  x 0.1 cm      Tunneling N/A     Undermining N/A  Periwound skin: intact and erythema- blanchable that extends about 0.8 cm almost circumferentially      Color: pink      Temperature: normal   Odor: none  Pain: facial expression of distress,    Interventions  Visual inspection and assessment completed   Wound Care Rationale Improve absorptive capacity, Promote moist wound healing  without tissue dehydration  and Provide protection   Wound Care: done per plan of care  Supplies: floor stock and discussed with RN  Current off-loading measures: Pillows  Current support surface: Standard  Atmos Air mattress  Education provided to: importance of repositioning, plan of care, wound progress, Moisture management and Off-loading pressure  Discussed plan of care with Nurse    Chiquita Rosario RN

## 2021-07-06 NOTE — PROGRESS NOTES
CLINICAL NUTRITION SERVICES - ASSESSMENT NOTE     Nutrition Prescription    RECOMMENDATIONS FOR MDs/PROVIDERS TO ORDER:  --Recommend starting nutrition support within next 24 hrs.        --If able to use GI tract, recommend starting enteral nutrition via Jtube.       --If unable to use GI tract, recommend starting TPN via a central line, as pt meets criteria to start TPN sooner than 7 days due to severe malnutrition diagnosis.     Malnutrition Status:    Severe malnutrition in the context of chronic illness    Recommendations already ordered by Registered Dietitian (RD):  None additional     Future/Additional Recommendations:  If EN becomes POC:  --Enteral access: Jtube  --GOAL: Osmolite 1.5 Elvis @ goal of 45ml/hr (1080ml/day) +4 Prosource will provide: 1780 kcals (30 kcal/kg), 111 g PRO (1.9 g/kg), 822 ml free H20, 219 g CHO, 53 g fat, and 0 g fiber daily.  --Start TF @ 15 ml/hr and advance by 10 ml q 8 hrs until goal rate.  --Do not start or advance TF rate unless K+ >3.0, Mg++ > 1.5,  and Phos > 1.9.  --Minimum 30 ml q 4 hrs water flushes for tube patency.  --MVI/minerals supplement if not already ordered  Once begin TFs, begin the following pancreatic enzyme regimen and recommend order each of the following:   (please copy and paste administration instructions into each order)  A) Sodium Bicarb tablet (325 mg), 1 tablet q 4 hrs via Jtube. Administration Instructions: Crush 1 tablet and mix into 15 ml of warm water and use this solution to mix with Creon pancreatic enzymes. DO NOT administer directly into Jtube (to be mixed into TF formula with Creon enzyme - see Creon enzyme order)   B) Creon 24, 1 capsules q 4 hrs via Jtube. Administration Instructions:   --If TF rate is running @ 10-25 ml/hr, administer 0 capsules;   --If TF rate is running @ 30-45 ml/hr, increase to 1 capsule q 4 hrs.    **Open capsule and empty contents into 15 ml sodium bicarb solution (see sodium bicarb order), let dissolve for about 20-30  "minutes and then add this solution to the amount of TF formula hung in TF bag every 4 hrs (i.e., once TF @ goal infusion 45 ml/hr will mix 1 capsules into 180 ml of TF formula every 4 hrs).   *Note: this enzyme regimen with TF @ goal infusion will provide approx 2717 units of lipase/gram of total Fat daily and approp dosing initially for pancreatic insufficiency.     If TPN becomes POC:  --Ensure central venous access  --Use dosing weight of 59 kg  --Start TPN - volume per PharmD with initial 130g Dex (GIR 1.53 mg/kg/min), 115g AA + 250 ml 20% IV lipids 5x/week.        --Order lipids only if off propofol.   --Advance PN dex by 25-50g every 1-2 days if K+ >3.0, Mg++ >1.5, and phos >1.9 and BGs stable (per Pharm D/RD discretion) to goal of 230g Dex (GIR 2.71 mg/kg/min) to provide 1599 kcals (27 kcal/kg), 1.95 g PRO/kg, with 22% kcals from Fat. Micronutrient supplementation per PharmD.       REASON FOR ASSESSMENT  Nick Zamudio is a/an 65 year old male assessed by the dietitian for Interdisciplinary Rounds and Nutrition Risk Monitoring    NUTRITION HISTORY  PMH testicular cancer s/p radiation therapy, paraparesis, biliary stenosis, duodenal stenosis, failure to thrive s/p PEG-J c/b GI bleed, chronic pancreatitis, prior lap augustine c/b intussusception s/p small bowel resection, now with intraabdominal sepsis s/p ~50 cm small bowel resection on 7/5, in discontinuity with open abdomen. Plan for return to OR today.  OGT with 1200 ml output and Abthera with 500 ml output per team.     No additional information available per Care Everywhere. Per provider notes, pt does take Creon PTA. Unsure of PTA enteral nutrition regimen.     CURRENT NUTRITION ORDERS  Diet: NPO    LABS  K+ 4.2 (WNL)  Phos 4.7 (H)  A1c 6.4%    MEDICATIONS  High sliding scale insulin  Fentanyl  LR @ 75 ml/hr  Norepi @ 0.1 mcg/kg/min  Propofol @ 10.6 ml/hr    ANTHROPOMETRICS  Height: 177.8 cm (5' 10\")  Most Recent Weight: 59 kg (130 lb 1.1 oz); 58 kg on 7/5  "   IBW: 75.5 kg  BMI: Underweight BMI <18.5 (18.3 kg/m2)  Weight History:   Wt Readings from Last 30 Encounters:   07/06/21 59 kg (130 lb 1.1 oz)     Dosing Weight: 59 kg admission weight    ASSESSED NUTRITION NEEDS  Estimated Energy Needs: [phone] kcals/day (25 - 30 - 35 kcals/kg)  Justification: Increased needs, Repletion, vented, and Underweight  Estimated Protein Needs:  grams protein/day (1.5 - 2 grams of pro/kg)  Justification: Increased needs, Repletion and Wound healing  Estimated Fluid Needs: (1 mL/kcal)   Justification: Maintenance and Per provider pending fluid status    PHYSICAL FINDINGS  See malnutrition section below.  Intubated - ETT  PEGJ placed 6/1  Abdomen flat, firm per flowsheets    MALNUTRITION  % Intake: Unable to assess  % Weight Loss: Unable to assess  Subcutaneous Fat Loss: Facial region:  severe, Upper arm:  moderate and Thoracic/intercostal:  moderate  Muscle Loss: Temporal:  severe, Facial & jaw region:  severe, Thoracic region (clavicle, acromium bone, deltoid, trapezius, pectoral):  severe, Upper arm (bicep, tricep):  severe, Upper leg (quadricep, hamstring):  severe, Patellar region:  severe and Posterior calf:  severe  Fluid Accumulation/Edema: None noted  Malnutrition Diagnosis: Severe malnutrition in the context of chronic illness    NUTRITION DIAGNOSIS  Inadequate protein-energy intake related to mechanical ventilation and altered GI function as evidenced by NPO status x at least 1 day without the start of nutrition support, currently meeting 0% of needs.       INTERVENTIONS  Implementation  Collaboration with other providers - rounds  Enteral Nutrition - recs  Parenteral Nutrition/IV Fluids - recs     Goals  Total avg nutritional intake to meet a minimum of 25 kcal/kg and 1.5 g PRO/kg daily (per dosing wt 59 kg).     Monitoring/Evaluation  Progress toward goals will be monitored and evaluated per protocol.    Crissy Reese, MS, RD, LD, [facility]  SICU: [phone]  *[phone]

## 2021-07-06 NOTE — PHARMACY-ADMISSION MEDICATION HISTORY
Admission Medication History Completed by Pharmacy    See [facility] Admission Navigator for allergy information, preferred outpatient pharmacy, prior to admission medications and immunization status.     Medication History Sources:     [facility]- spoke with nurse [phone]    Spoke with wife Ines Perez    Changes made to PTA medication list (reason):    Added: all medications listed below    Deleted: None    Changed: None    Additional Information:    None    Prior to Admission medications    Medication Sig Last Dose Taking? Auth Provider   amylase-lipase-protease (CREON 24) 55818-36987 units CPEP per EC capsule 2 capsules by Per Feeding Tube route Mix 2 capsules with each tube feed supplement can.  Supplement runs @ 100 ml/hr from 1000 to 2000 7/4/2021 at Unknown time Yes Unknown, Entered By History   amylase-lipase-protease (CREON 24) 76597-25802 units CPEP per EC capsule Take 1-2 capsules by mouth Take with snacks or supplements Past Week at Unknown time Yes Unknown, Entered By History   amylase-lipase-protease (CREON 24) 27952-56939 units CPEP per EC capsule Take 3 capsules by mouth as needed Takes 3 capsules if having a meal Past Week at Unknown time Yes Unknown, Entered By History   insulin  UNIT/ML vial Inject 18 Units Subcutaneous 2 times daily 7/4/2021 at Unknown time Yes Unknown, Entered By History   insulin regular 100 UNIT/ML vial Sliding scale insulin per assisted living  Yes Unknown, Entered By History   ondansetron (ZOFRAN-ODT) 4 MG ODT tab Take 4 mg by mouth every 8 hours as needed for nausea Past Week at Unknown time Yes Unknown, Entered By History   oxyCODONE-acetaminophen (PERCOCET) 5-325 MG tablet Take 1 tablet by mouth every 4 hours as needed for pain Past Week at Unknown time Yes Unknown, Entered By History   pantoprazole (PROTONIX) 40 MG EC tablet Take 40 mg by mouth daily 7/4/2021 at Unknown time Yes Unknown, Entered By History   sodium bicarbonate  325 MG tablet 325 mg by Per Feeding Tube route Crush and mix 1 tablet with each tube feed supplement can. Supplement runs @ 100 ml/hr from 1000 to 2000 7/4/2021 at Unknown time Yes Unknown, Entered By History       Date completed: 07/05/21    Medication history completed by: Buffy Pearson, PharmD

## 2021-07-06 NOTE — PROGRESS NOTES
"General Surgery Progress Note    No acute events overnight. Marginal UOP, on levophed.    BP (!) 172/99   Pulse 77   Temp 98.8  F (37.1  C)   Resp 14   Ht 1.778 m (5' 10\")   Wt 59 kg (130 lb 1.1 oz)   SpO2 98%   BMI 18.66 kg/m      I/O last 3 completed shifts:  In: 5007.91 [I.V.:3207.91; IV Piggyback:1000]  Out: 4175 [Urine:1525; Emesis/NG output:1200; Drains:400; Other:1000; Blood:50]    Gen: intubated, sedated  Resp: mechanically ventilated, CMV  CV: RRR, noncyanotic  Abdomen: Soft, nondistended, Abthera wound vac in place, no leak, 400 ml serosang output  Ext: WWP    WBC 21.9, hgb 8.6, plts 297  Mg 1.8, Phos 4.7    A/P: Gino Zamudio is a 64 yo male with h/o testicular cancer s/p radiation therapy, paraparesis, biliary stenosis, duodenal stenosis, failure to thrive s/p PEG-J c/b GI bleed, chronic pancreatitis, prior lap augustine c/b intussusception s/p small bowel resection, now with intraabdominal sepsis s/p small bowel resection on 7/5, in discontinuity with open abdomen. Plan for return to OR today.    - Wean pressors as tolerated  - Continue abx  - NPO  - OG to LIS  - PEG-J to gravity, no tube feeds, no meds through feeding tube  - abthera to -125 mmHg  - Return to OR later today  - Appreciate SICU care    Seen and discussed with chief, Dr. Doherty.    Jose Alejandro Ireland MD  Surgery resident  [phone]      "

## 2021-07-06 NOTE — ANESTHESIA POSTPROCEDURE EVALUATION
Patient: Nick Zamudio    Procedure(s):  EXPLORATORY LAPAROTOMY  segmental BOWEL RESECTION, abdominal washout, abdominal wound CLOSURE    Diagnosis:Open abdominal wall wound [S31.109A]  Diagnosis Additional Information: No value filed.    Anesthesia Type:  General    Note:  Disposition: ICU            ICU Sign Out: Anesthesiologist/ICU physician sign out WAS performed   Postop Pain Control: Uneventful            Sign Out: Well controlled pain   PONV: No   Neuro/Psych: Uneventful            Sign Out: Acceptable/Baseline neuro status   Airway/Respiratory:             Sign Out: AIRWAY IN SITU/Resp. Support               Airway in situ/Resp. Support: ETT                 Reason: Planned Pre-op   CV/Hemodynamics: Uneventful            Sign Out: Acceptable CV status; No obvious hypovolemia; No obvious fluid overload   Other NRE: NONE   DID A NON-ROUTINE EVENT OCCUR? No           Last vitals:  Vitals:    07/06/21 1145 07/06/21 1200 07/06/21 1300   BP:      Pulse: 70 71 76   Resp:      Temp:  36.6  C (97.8  F)    SpO2: 92% 91% 91%       Last vitals prior to Anesthesia Care Transfer:  CRNA VITALS  7/6/2021 1604 - 7/6/2021 1656      7/6/2021             Resp Rate (observed):  10    Resp Rate (set):  10          Electronically Signed By: Bud Macias MD  July 6, 2021  4:56 PM

## 2021-07-06 NOTE — OP NOTE
[facility]    Operative Note    Pre-operative diagnosis: Small bowel pneumatosis   Post-operative diagnosis Same     Procedure: EXPLORATORY LAPAROTOMY  segmental BOWEL RESECTION, abdominal washout, temporary abdominal wound CLOSURE   Surgeon: Surgeon(s) and Role:     * Raul Huang MD - Primary     * Holden Doherty MD - Resident - Assisting     * Jose Alejandro Kirkpatrick MD - Resident - Assisting   Anesthesia: General    Estimated blood loss: Less than 50 ml   Drains: Abthera abdomen wound vac     Specimens:           ID Type Source Tests Collected by Time   A : Intrabdominal mass coming off of proximal jejunum Tissue Other SURGICAL PATHOLOGY EXAM Stanton Huang MD 7/5/2021 10:46 AM   B : Necrotic Jejunum Tissue Other SURGICAL PATHOLOGY EXAM Stanton Huang MD 7/5/2021  1:51 PM      Findings: Necrotic small bowel in the mid abdomen. No free succus entericus; Numerous intraabdominal adhesions requiring extensive lysis of adhesions.          Complications: None.   Implants: None.           INDICATIONS FOR PROCEDURE  Nick Zamudio is a 65 year old male with history of testicular cancer s/p radiation therapy, paraparesis, biliary stenosis, duodenal stenosis, failure to thrive s/p PEG-J, chronic pancreatitis, prior lap augustine c/b intussusception s/p bowel resection, diffuse atherosclerosis, here with acute worsening of chronic abdominal pain and pneumatosis on imaging concerning for bowel ischemia.     After understanding the risks and benefits of proceeding with an exploratory laparotomy, he agreed to an operation. General risks related to abdominal surgery were reviewed with the patient. These include, but are not limited to, death, myocardial infarction, pneumonia, urinary tract infection, deep venous thrombosis with or without pulmonary embolus, abdominal infection from bowel injury or abscess, bowel obstruction, wound infection, and  bleeding.    OPERATIVE PROCEDURE:  Under the benefits of general endotracheal anesthesia, the peritoneal cavity was entered through a vertical midline incision just superior to an old laparotomy scar. Multiple adhesions were encountered.      Adhesions between the abdominal wall and the underlying small bowel and omentum were completely dissected using a combination of blunt and electrocautery dissection. This portion of the procedure required 120 additional minutes of operating room time.     There was a portion of small bowel that appeared to be necrotic. As we were mobilizing this portion of the bowel, there was an inadvertent enterotomy, which was unavoidable due to the character of the necrotic bowel. The spillage was immediately contained with suction. This portion of the bowel was densely adhered to the retroperitoneum. After carefully mobilizing the bowel, we identified the full area of necrosis. We used two blue load YANETH staplers and ligasure to resect that portion of the bowel. Due to the patient's unstable clinical status. We left him in discontinuity and irrigated the abdomen. Roughly 50 cm was resected.    Peritoneal lavage was performed using 2 L normal saline.    The abdomen was inspected for hemostasis.      An abthera temporary abdominal closure device was placed    Dr. Huang was scrubbed for all critical components of the operation.    All sponge and needle counts were correct x 2 at the end of the procedure. The patient was brought directly to the SICU.    Holden Doherty MD  General Surgery PGY-7

## 2021-07-06 NOTE — PLAN OF CARE
Follows commands, BUE>BLE d/t partial paraplegia. PERRLA. Sedated with propofol and fentanyl; BP low, goal of > 60 MAP met with .01-.03 of levo, afebrile, NSR; CMV 50% 14r, 500tV, 5peep, minimal secretions; Cummins in place with 20-30ml/hr output most of the night 1L in LR bolus given. OG to LIS with 1L output in total, 1 smear BM. Edema to feet, abthera wound vac to abdomen    PLAN: OR in morning to hopefully close the patients abdomen

## 2021-07-06 NOTE — PLAN OF CARE
Major Shift Events:  Pt vitally stable with levophed turned off this morning. One 500mL bolus given for pressures and uop. Follows commands in all extremities; nods yes to pain in throat and stomach, fentanyl increased for throat pain (also has chronic pancreatitis). Remains on ventilator after abdominal surgery this afternoon, small to moderate amount of oral secretions. Uop borderline around 30mL/hr, multiple small gray BMs during shift. WOC consulted for wounds. Moderate output out of OG/NG and VAC, 100mL/hr out of ERLIN post-op. Wife updated on plan of care.  Plan: Extubate and monitor overnight.  For vital signs and complete assessments, please see documentation flowsheets.       Problem: Adult Inpatient Plan of Care  Goal: Plan of Care Review  Outcome: No Change  Goal: Absence of Hospital-Acquired Illness or Injury  Outcome: No Change  Intervention: Identify and Manage Fall Risk  Recent Flowsheet Documentation  Taken 7/6/2021 1200 by Jeff Arceo RN  Safety Promotion/Fall Prevention:    increase visualization of patient    lighting adjusted    room door open  Taken 7/6/2021 0830 by Jeff Arceo RN  Safety Promotion/Fall Prevention:    increase visualization of patient    lighting adjusted    room door open  Intervention: Prevent Skin Injury  Recent Flowsheet Documentation  Taken 7/6/2021 1200 by Jeff Arceo, RN  Body Position: supine  Taken 7/6/2021 1000 by Jeff Arceo RN  Body Position:    turned    left  Taken 7/6/2021 0830 by Jeff Arceo RN  Body Position:    turned    right  Intervention: Prevent and Manage VTE (Venous Thromboembolism) Risk  Recent Flowsheet Documentation  Taken 7/6/2021 1200 by Jeff Arceo, RN  VTE Prevention/Management: pneumatic compression device  Taken 7/6/2021 0830 by Jeff Arceo RN  VTE Prevention/Management: pneumatic compression device  Goal: Readiness for Transition of Care  Outcome: No Change     Problem: Adjustment to Illness (Delirium)  Goal:  Optimal Coping  Outcome: No Change     Problem: Altered Behavior (Delirium)  Goal: Improved Behavioral Control  Outcome: No Change     Problem: Attention and Thought Clarity Impairment (Delirium)  Goal: Improved Attention and Thought Clarity  Outcome: No Change     Problem: Sleep Disturbance (Delirium)  Goal: Improved Sleep  Outcome: No Change     Problem: Adult Inpatient Plan of Care  Goal: Optimal Comfort and Wellbeing  Outcome: Improving     Problem: Fluid Volume Deficit  Goal: Fluid Balance  Outcome: Improving

## 2021-07-06 NOTE — BRIEF OP NOTE
[facility]    Brief Operative Note    Pre-operative diagnosis: Open abdominal wall wound [S31.109A]  Post-operative diagnosis Intraabdominal sepsis, ischemic bowel s/p bowel resection    Procedure: Procedure(s):  EXPLORATORY LAPAROTOMY  segmental BOWEL RESECTION, abdominal washout, abdominal wound CLOSURE  Surgeon: Surgeon(s) and Role:     * Doug English MD - Primary     * Holden Doherty MD - Resident - Assisting     * Jose Alejandro Kirkpatrick MD - Resident - Assisting      * Renee Vogel MD - Resident - Assisting  Anesthesia: General   Estimated blood loss: Less than 10 ml  Drains: Christian-Rosas  Specimens:   ID Type Source Tests Collected by Time Destination   A : Segmental small bowel resection Tissue Abdomen SURGICAL PATHOLOGY EXAM Doug English MD 7/6/2021  2:34 PM    B : Segmental small bowel resection #2 Tissue Abdomen SURGICAL PATHOLOGY EXAM Doug English MD 7/6/2021  2:56 PM      Findings:   Dusky distal jejunum, resected. Fascia closed primarily. Skin left open.  Complications: None.  Implants: * No implants in log *      Plan:  - return to SICU  - abdominal wound with closed fascia   - skin open, packed with Kerlix   - Continue abx for 4 days  - NG to LIS  - PEG-J to gravity, no meds  - await ROBF before starting TF  - mcclure  - extubate as able  - wean pressors and sedation as able  - DVT ppx POD1 7/7/21

## 2021-07-06 NOTE — PROGRESS NOTES
SURGICAL ICU PROGRESS NOTE  2021      CO-MORBIDITIES:   Pneumatosis intestinalis  Abdominal pain, epigastric  Pneumatosis intestinalis    ASSESSMENT:  Nick Zamudio is a 65 y.o. male with PMH of testicular cancer s/p radiation therapy, biliary stenosis, duodenal stenosis, failure to thrive s/p PEG-J c/b GI bleed, chronic pancreatitis, prior lap augustine c/b intussusception s/p small bowel resection, and diffuse atherosclerosis who is POD#1 from exploratory laparotomy on 2021. In the OR, approximately 50cm of necrotic small bowel was resected and the patient was left in discontinuity.     The patient is now POD#0 from an exploratory laparotomy, 20cm small bowel resection and primary anastomosis and abdominal fascial closure. Skin open and packed with kerlix. Patient returned to the SICU weaned off pressors and intubated. OG was replaced with an NG during the operation.    TODAY'S PROGRESS/PLANS:   - Plan to extubate once able to wean off propofol  - ABX to confirm NG placement    PLAN:  Neuro/ pain/ sedation:  - Monitor neurological status. Notify the MD for any acute changes in exam.  - Pain: fentanyl gtt, tylenol suppository PRN  - Sedation: propofol gtt - wean  - Hold PTA percocet 5-325mg TID    Pulmonary care:   - ETT tube in place and current vent settings /14/5/50  - Supplemental oxygen to keep saturation above 92 %.  - albuterol nebs PRN  - AB.41/38/183/24/0.1  - Plan to extubate later tonight vs am as tolerated    Cardiovascular:    #Extensive atherosclerosis   #Chronic mesenteric ischemia  #HTN  #HLD  #Hypotension   - Monitor hemodynamic status. Notify the MD for any acute changes in exam.  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 Skin packed with kerlix.  - strict NPO   - NG -- ABX to confirm placement  - When bowel function returns, will plan to begin tube feeds  - PEG-J placed 6/1 -- to gravity, no meds/feeds   - PEG-J placement complicated by GI bleed last admission  - h/o chronic diarrhea, last BM 7/4  - Holding PTA creon (pancreatic enzymes)     Fluids/ Electrolytes/ Nutrition:   #Failure to thrive s/p PEG-J  - post op labs pending  - IVF: LR @ 100 ml/hr  - Keep Cummins in place; will continue to monitor intake and output  - ICU electrolyte replacement HIGH protocol - K, Mg, Phos  - No indication for parenteral nutrition.  - PEG-J placed 6/1; no tube feeds at this time  - Will further discuss nutritional needs after OR today     Renal:  - CT on 7/5 demonstrated bl hydroureteronephosis, bl renal calculi, bl renal cysts      Endocrine:    #T2DM  - Sliding scale for diabetes management  - Hgb a1c 6.5  - Hold PTA aspart and NPH      ID/ Antibiotics:  #Intraabdominal sepsis  #Leukocytosis  - WBC 21.9, up from 13.0  - Urine culture -- likely contamined  - Cipro/flagyl given in ED  - Continue Zosyn; plan for 4 more days      Heme:     - Hemoglobin preop 8.6; post op labs ordered  - s/p 2 U pRBCs intraop 7/5      Prophylaxis:    - Mechanical prophylaxis for DVT  - IV protonix  - No chemical DVT prophylaxis due to high risk of bleeding; will start Lovenox 7/7      MSK:   #paraplegia 2/2 cobalt radiation for testicular cancer  #Hx of lymphedema   - Known chronic wound on mons pubis; new pressure wound on coccyx   - Wound care consulted  - Plan to consult PT/OT when more stable      Lines/ tubes/ drains:  - ETT  - PIVx2  - art line  - [facility]  CVC  - NG to LIS  - PEG-J  - Cummins  - ERLIN drain      Disposition:  -Surgical ICU       ========================================      Patient seen, findings and plan discussed with surgical ICU staff Dr. Hernandez Dee MD  PGY-1 General Surgery     Please see [facility] for current contact information.       ====================================    SUBJECTIVE:   - Returned from OR this evening intubated and sedated.    - Patient bowel reanastomosed and closed fascia with skin open and packed with Kerlix.     OBJECTIVE:   1. VITAL SIGNS:   Temp:  [97.8  F (36.6  C)-99  F (37.2  C)] 97.8  F (36.6  C)  Pulse:  [68-90] 76  Resp:  [14] 14  MAP:  [57 mmHg-85 mmHg] 71 mmHg  Arterial Line BP: ()/(44-66) 105/51  FiO2 (%):  [40 %-50 %] 50 %  SpO2:  [88 %-100 %] 91 %  Ventilation Mode: CMV/AC  (Continuous Mandatory Ventilation/ Assist Control)  FiO2 (%): 50 %  Rate Set (breaths/minute): 14 breaths/min  Tidal Volume Set (mL): 500 mL  PEEP (cm H2O): 5 cmH2O  Oxygen Concentration (%): 50 %  Resp: 14      2. INTAKE/ OUTPUT:   I/O last 3 completed shifts:  In: 2686.89 [I.V.:1286.89; Other:400; IV Piggyback:1000]  Out: 2863 [Urine:713; Emesis/NG output:1550; Drains:600]    3. PHYSICAL EXAMINATION:   General: sedated but awake;  able to nod head in response to questions.   HEENT: Normocephalic, atraumatic. NG in place.  Neck: No cervical lymphadenopathy. No thyromegaly. RIJ CVC in place.  Chest wall: Symmetric thorax. No masses or tenderness to palpation.  Respiratory: Intubated. Lung sounds clear to auscultation bilaterally.  Cardiovascular: Regular rate and rhythm.  Gastrointestinal: Abdominal fascia and subcutaneous tissue closed; incision covered with gauze and dressing. PEG-J in place. R ERLIN drain with serosanguinous drainage.   Genitourinary:  Cummins in place.  Extremities:  No limb deformities. No LE edema. PIVx2.   Skin: warm and dry. Wound over mons pubis and coccyx.    4. INVESTIGATIONS:   Arterial Blood  Gases   Recent Labs   Lab 07/05/21  1357 07/05/21  1239 07/05/21  1042   PH 7.41 7.41 7.41   PCO2 38 39 40   PO2 183* 192* 356*   HCO3 24 25 25     Complete Blood Count   Recent Labs   Lab 07/06/21  0300 07/05/21  1554 07/05/21  1357 07/05/21  1239 07/05/21  0454 07/05/21  0454   WBC 21.9* 13.0*  --   --   --  13.0*   HGB 8.6* 8.6* 9.3* 9.1*   < > 9.0*    271  --   --   --  397    < > = values in this interval not displayed.     Basic Metabolic Panel  Recent Labs   Lab 07/06/21 0300 07/05/21 1554 07/05/21 1357 07/05/21  1239 07/05/21  0454 07/05/21  0454    139 139 137   < > 138   POTASSIUM 4.2 4.0 4.4 4.3   < > 4.5   CHLORIDE 109 108  --   --   --  106   CO2 26 25  --   --   --  26   BUN 37* 35*  --   --   --  46*   CR 0.99 0.84  --   --   --  0.98   * 144* 170* 175*   < > 153*    < > = values in this interval not displayed.     Liver Function Tests  Recent Labs   Lab 07/05/21 1554 07/05/21 0454   AST  --  8   ALT  --  14   ALKPHOS  --  91   BILITOTAL  --  0.2   ALBUMIN  --  2.4*   INR 1.30*  --      Pancreatic Enzymes  Recent Labs   Lab 07/05/21 0454   LIPASE <10*     Coagulation Profile  Recent Labs   Lab 07/05/21  1554   INR 1.30*     Lactate  Invalid input(s): LACTATE    5. RADIOLOGY:   No results found for this or any previous visit (from the past 24 hour(s)).

## 2021-07-06 NOTE — PROGRESS NOTES
SURGICAL ICU PROGRESS NOTE  2021      ASSESSMENT:  Nick Zamudio is a 65 y.o. male with PMH of testicular cancer s/p radiation therapy, biliary stenosis, duodenal stenosis, failure to thrive s/p PEG-J c/b GI bleed, chronic pancreatitis, prior lap augustine c/b intussusception s/p small bowel resection, and diffuse atherosclerosis who was admitted to the SICU following emergent exploratory laparotomy on 2021. In the OR, approximately 50cm of perforated small bowel was resected and that patient was left in discontinuity with an Abthera. Patient required levophed for circulatory support. 2U pRBCs were given in the OR for a preop hbg of 8.1.     Patient was brought to the SICU intubated and sedated on , with plan to return to OR this AM for second look and possible abdominal closure.     PLAN:     Neuro/ pain/ sedation:  - Monitor neurological status. Notify the MD for any acute changes in exam.  - Pain: fentanyl gtt, tylenol suppository PRN  - Sedation: propofol gtt - wean  - Hold PTA percocet 5-325mg TID     Pulmonary care:   - ETT tube in place  - Vent settings: /14/5/50  - Supplemental oxygen to keep saturation above 92 %.  - albuterol nebs PRN  - AB.41/38/183/24/0.1     Cardiovascular:    #Extensive atherosclerosis   #Chronic mesenteric ischemia  #HTN  #HLD  #Hypotension   - Monitor hemodynamic status. Notify the MD for any acute changes in exam.  - Required levophed transiently in the OR . Was weaned off prior to SICU admission.   - Overnight experienced low UOP and mild hypotension (SBPs 90) requiring reinitiation of levophed up to 0.02mcg/hr; likely due to fluid under-resusitation and increased losses from OG and abthera. S/p overnight 1L LR bolus with improvement  - This AM, positive passive leg raise test and incr pulse pressure variation -- ordered 0.5L LR bolus and incr maintenance IVFs to 100ml/hr   - Maintain MAPs > 60  - Holding PTA lipitor 20mg  -  CT demonstrates chronic occlusion  of the SMA and left internal iliac and common femoral arteries     GI care:   #Pneumatosis coli, small bowel necrosis  #Biliary stenosis s/p stent   #Duodenal stenosis s/p dilation  #Chronic pancreatitis  #Hx Cdiff   #Hx lap augustine c/b intussusception s/p small bowel resection  - Had ~50 cm of small bowel removed in the OR 7/5. Bowel left in discontinuity and Abthera placed (set to -125mmhg)  - Abthera 500 ml output overnight  - strict NPO   - Continue OJ to LIS - high out put overnight 1200ml  - PEG-J placed 6/1 -- don't use for meds/feeds  - PEG-J placement complicated by GI bleed last admission  - h/o chronic diarrhea, last BM 7/4  - Holding PTA creon (pancreatic enzymes)    Fluids/ Electrolytes/ Nutrition:   #Failure to thrive s/p PEG-J  - IVF: LR @ 75 ml/hr; received 2.8L intraop -- increased to LR @ 100ml/hr  - Overnight experienced low UOP (some hours 0.33cc/hr/kg); s/p 1L LR with improvement in UOP and BP  - This morning SBP still 90's with positive passive leg raise -- 0.5L LR bolus ordered  - Keep Cummins in place; will continue to monitor intake and output  - ICU electrolyte replacement HIGH protocol - K, Mg, Phos  - No indication for parenteral nutrition.  - PEG-J placed 6/1; no tube feeds at this time  - Will further discuss nutritional needs after OR today    Renal:  - CT on 7/5 demonstrated bl hydroureteronephosis, bl renal calculi, bl renal cysts     Endocrine:    #T2DM  - Sliding scale for diabetes management  - Hgb a1c 6.5  - Hold PTA aspart and NPH     ID/ Antibiotics:  #Intraabdominal sepsis  #Leukocytosis  #UTI  - WBC 21.9, up from 13.0  - Urine culture pending   - Cipro/flagyl given in ED  - Continue Zosyn      Heme:     - Hemoglobin 8.6 on admission to SICU; today 8.6  - s/p 2 U pRBCs intraop 7/5     Prophylaxis:    - Mechanical prophylaxis for DVT  - IV protonix  - No chemical DVT prophylaxis due to high risk of bleeding.     MSK:   #paraplegia 2/2 cobalt radiation for testicular cancer  #Hx of  lymphedema   - Known chronic wound on mons pubis  - Coccyx wound - wound care ordered  - Plan to consult PT/OT when more stable     Lines/ tubes/ drains:  - ETT  - PIVx2  - art line  - RIJ CVC  - OG to LIS  - PEG-J  - Abthera wound vac  - Cummins     Disposition:  -Surgical ICU. OR this morning in am    Patient seen, findings and plan discussed with surgical ICU staff Dr. Ng      - - - - - - - - - - - - - - - - - -  Dimas Dee MD  [facility] General Surgery PGY-1  07/06/2021    See [facility] for on-call pager information.        - - - - - - - - - - - - - - - - - - - - - - - - - - - - - - - - - - - - - - - - - - - - - - - - - - - - - - - - - - - - - - - - - - - - - -    HISTORY PRESENTING ILLNESS:     REVIEW OF SYSTEMS: 10 point ROS neg other than the symptoms noted above in the HPI.    PAST MEDICAL HISTORY:    has no past medical history on file.    SURGICAL HISTORY:    has no past surgical history on file.    SOCIAL HISTORY:    reports previous alcohol use. He reports previous drug use.    FAMILY HISTORY: No bleeding/clotting disorders nor problems with anesthesia.    ALLERGIES:      Allergies   Allergen Reactions     Ibuprofen      Penicillins Unknown       MEDICATIONS:  No current facility-administered medications on file prior to encounter.   amylase-lipase-protease (CREON 24) 43073-82408 units CPEP per EC capsule, 2 capsules by Per Feeding Tube route Mix 2 capsules with each tube feed supplement can.  Supplement runs @ 100 ml/hr from 1000 to 2000  amylase-lipase-protease (CREON 24) 24897-56215 units CPEP per EC capsule, Take 1-2 capsules by mouth Take with snacks or supplements  amylase-lipase-protease (CREON 24) 76709-02014 units CPEP per EC capsule, Take 3 capsules by mouth as needed Takes 3 capsules if having a meal  insulin  UNIT/ML vial, Inject 18 Units Subcutaneous 2 times daily  insulin regular 100 UNIT/ML vial, Sliding scale insulin per assisted living  ondansetron (ZOFRAN-ODT) 4 MG ODT  tab, Take 4 mg by mouth every 8 hours as needed for nausea  oxyCODONE-acetaminophen (PERCOCET) 5-325 MG tablet, Take 1 tablet by mouth every 4 hours as needed for pain  pantoprazole (PROTONIX) 40 MG EC tablet, Take 40 mg by mouth daily  sodium bicarbonate 325 MG tablet, 325 mg by Per Feeding Tube route Crush and mix 1 tablet with each tube feed supplement can. Supplement runs @ 100 ml/hr from 1000 to 2000        PHYSICAL EXAMINATION:  Temp:  [98  F (36.7  C)-99  F (37.2  C)] 98.8  F (37.1  C)  Pulse:  [] 77  Resp:  [6-19] 14  BP: (161-176)/(74-99) 172/99  MAP:  [57 mmHg-87 mmHg] 75 mmHg  Arterial Line BP: ()/(44-66) 116/53  FiO2 (%):  [50 %] 50 %  SpO2:  [91 %-100 %] 98 %     General: sedated but awake and able to nod head in response to questions.   HEENT: Normocephalic, atraumatic. OG in place.  Neck: No cervical lymphadenopathy. No thyromegaly. RIJ CVC in place.  Chest wall: Symmetric thorax. No masses or tenderness to palpation.  Respiratory: Intubated. Lung sounds clear to auscultation bilaterally.  Cardiovascular: Regular rate and rhythm.  Gastrointestinal: Abdomen open with Abthera wound vac in place. PEG-J in place.   Genitourinary:  Cummins in place.  Extremities:  No limb deformities. No LE edema. PIVx2.   Skin: warm and dry. Wound over mons pubis and coccyx.      LABS: Reviewed.   Arterial Blood Gases   Recent Labs   Lab 07/05/21  1357 07/05/21  1239 07/05/21  1042   PH 7.41 7.41 7.41   PCO2 38 39 40   PO2 183* 192* 356*   HCO3 24 25 25     Complete Blood Count   Recent Labs   Lab 07/06/21  0300 07/05/21  1554 07/05/21  1357 07/05/21  1239 07/05/21  0454 07/05/21  0454   WBC 21.9* 13.0*  --   --   --  13.0*   HGB 8.6* 8.6* 9.3* 9.1*   < > 9.0*    271  --   --   --  397    < > = values in this interval not displayed.     Basic Metabolic Panel  Recent Labs   Lab 07/06/21  0300 07/05/21  1554 07/05/21  1357 07/05/21  1239 07/05/21  0454 07/05/21  0454    139 139 137   < > 138    POTASSIUM 4.2 4.0 4.4 4.3   < > 4.5   CHLORIDE 109 108  --   --   --  106   CO2 26 25  --   --   --  26   BUN 37* 35*  --   --   --  46*   CR 0.99 0.84  --   --   --  0.98   * 144* 170* 175*   < > 153*    < > = values in this interval not displayed.     Liver Function Tests  Recent Labs   Lab 07/05/21  1554 07/05/21  0454   AST  --  8   ALT  --  14   ALKPHOS  --  91   BILITOTAL  --  0.2   ALBUMIN  --  2.4*   INR 1.30*  --      Pancreatic Enzymes  Recent Labs   Lab 07/05/21  0454   LIPASE <10*     Coagulation Profile  Recent Labs   Lab 07/05/21  1554   INR 1.30*     Lactate  Invalid input(s): LACTATE    IMAGING:  Recent Results (from the past 24 hour(s))   XR Chest Port 1 View    Narrative    EXAM: XR CHEST PORT 1 VIEW  7/5/2021 3:29 PM      HISTORY: post op; ETT tube    COMPARISON: None    FINDINGS: Single view of the chest. Tip the ET tube projects 3.6 cm  above the elias. Gastric tube tip and sidehole projected over the  stomach. Right IJ central venous catheter sheath projects over the mid  SVC with tip projecting over the high right atrium.  No right  pneumothorax. No pleural effusion. Normal cardiomediastinal  silhouette. No acute airspace opacities. Partial visualization of the  gastrojejunostomy tube. Pneumatosis of the left lower quadrant bowel  is better visualized on same-day CT.      Impression    IMPRESSION:  1. The ET tube projects 3.6 cm above the elias.  2. No acute airspace disease.    I have personally reviewed the examination and initial interpretation  and I agree with the findings.    PRETTY PAZ MD          SYSTEM ID:  [id number]   XR Abdomen Port 1 View    Narrative    Exam: XR ABDOMEN PORT 1 VIEWS, 7/5/2021 3:34 PM    Indication: post op; check lines and tubes    Comparison: CT AP same date    Findings:   Single AP view of the abdomen was obtained. Surgical changes of open  abdominal surgery and bowel resection with multiple surgical clips and  anastomotic suture material  present. Right upper quadrant  cholecystectomy clips. An enteric tube with tip and side-port projects  over the expected region of the stomach. Gastrojejunostomy tube tip is  in the proximal jejunum. Surgical drain projects over the left   abdomen. Prominent though nondistended gas-filled loops of colon and  small bowel. No definite pneumatosis or portal venous gas. Vascular  calcifications.      Impression    Impression:  1. New postsurgical changes of open abdominal surgery and bowel  resection as above.  2. Surgical drain in the left abdomen and enteric tube tip and  side-port projects over the expected region of the stomach.    I have personally reviewed the examination and initial interpretation  and I agree with the findings.    KO SHAFER MD          SYSTEM ID:  [id number]

## 2021-07-07 NOTE — PROGRESS NOTES
Extubated to BIPAP 12/5, 14,40% WITHOUT COMPLICATIONS. PATIENT IS DOING GREAT SO FAR SpO2 100, HR 87, /64 MAP OF 84, BS CLEAR AND DIMINISHED, GOOD COUGH WITH THINK TAN SECRETIONS.     RT WILL FOLLOW AND ENCOURAGED DEEP BREATHING EXERCISE.

## 2021-07-07 NOTE — PROGRESS NOTES
Pt still on fentanyl gtt, RT switched him to PS however apnea alarm going off frequently, called SICU resident, at bedside, okay to keep intubated overnight. MD stated it is okay to keep pt on Fentanyl drip.

## 2021-07-07 NOTE — PLAN OF CARE
Major Shift Events: Patient alert, obeys commands. Moves bilateral upper extremities spontaneously, and slightly wiggles toes in bilateral lower extremities. Propofol stopped at 0630 due to hypotension.Fentanyl drip still at 150. MAP goal >65, patient hemoglobin this morning 6.7 (SICU called and notified). 1 Unit PRBC ordered. Patient continues on CMV Vent settings, 40% Tidal Volume 500, PEEP of 5 and Rate of 14. NPO, NG to low int suction, G-tube clamped. Patient had X1 small loose bowel movement this shift. Cummins in place (SICU notified of low urine output). Replaced potassium and magnesium this shift.  Plan: Wean Fentanyl and pressure support this morning.

## 2021-07-07 NOTE — PLAN OF CARE
Major Shift Events:  Received one unit of PRC. HGB >7. Extubated->Bipap-> 2LPM oxymask  Plan: Start trickle TF tomorrow, Tx to floor tomorrow?   For vital signs and complete assessments, please see documentation flowsheets.

## 2021-07-07 NOTE — PLAN OF CARE
"OT: initial attempts to see pt for evaluation successful however after a period of questioning pt for home information, pt giving inconsistent answers and eventually refusing further evaluation answering \"yes\" to wanting to focus on breathing. Pt with increased anxiety however resolving after therapist leaving. RN notified and pt does not appear to be in any further distress.   "

## 2021-07-07 NOTE — PROGRESS NOTES
"General Surgery Progress Note    S: No acute events overnight. Failed pressure support last evening. Propofol held this morning for mild hypotension. Hgb drifted down, 6.7, transfused 1 unit RBCs this AM. At least one BM recorded post-op    O: /52   Pulse 83   Temp 98.3  F (36.8  C) (Axillary)   Resp 14   Ht 1.778 m (5' 10\")   Wt 59 kg (130 lb 1.1 oz)   SpO2 98%   BMI 18.66 kg/m      I/O last 3 completed shifts:  In: 3677.57 [I.V.:3277.57; Other:400]  Out: 2282 [Urine:1363; Emesis/NG output:400; Drains:519]    Gen: Intubated, sedated  Resp: mechanically ventilated, on CMV  CV: RRR  Abdomen: soft, no grimacing with palpation, midline wound with scant serosang drainage, dressing change    WBC 9.8 (13.6)  Hgb 6.7, from 7.4 post-op  Plt 224    A/P: Gino Zamudio is a 64 yo male with h/o testicular cancer s/p radiation therapy, paraparesis, biliary stenosis, duodenal stenosis, failure to thrive s/p PEG-J c/b GI bleed, chronic pancreatitis, prior lap augustine c/b intussusception s/p small bowel resection, admitted with intraabdominal sepsis, s/p ex-lap with necrotic small bowel, resected on 7/5, returned to OR 7/6 for additional small bowel resection and abdominal closure. Recovering well, off pressors, leukocytosis resolved.    Recommendations:  - Hold tube feeds, agree with starting TPN  - PEG-J to gravity, no meds via tube  - NG to LIS  - Continue abx, complete 4 day course 7/10/21  - abdominal wound care with daily dressing changes of saline-soaked kerlix  - extubate as able  - Hold lovenox    Seen and discussed with chief, Dr. Doherty.    Jose Alejandro Kirkpatrick MD  Surgery resident  [id number]      "

## 2021-07-07 NOTE — PROGRESS NOTES
SURGICAL ICU PROGRESS NOTE  2021      CO-MORBIDITIES:   Pneumatosis intestinalis  Abdominal pain, epigastric  Pneumatosis intestinalis    ASSESSMENT:  Nick Zamudio is a 65 y.o. male with PMH of testicular cancer s/p radiation therapy, biliary stenosis, duodenal stenosis, failure to thrive s/p PEG-J c/b GI bleed, chronic pancreatitis, prior lap augustine c/b intussusception s/p small bowel resection, and diffuse atherosclerosis who is s/p exploratory laparotomy with SB resection (~ 50 cm) with open abdomen in discontinuity on 2021 and exploratory laparotomy with further SB resection (~20cm), primary anastomosis and abdominal fascial closure with open skin on 2021.     TODAY'S PROGRESS/PLANS:   - Plan to extubate today if passes pressure support trial  - Discontinue propofol  - Pain medication regimen adjusted: Reduce Fentanyl gtt, Increase Dilaudid PRN, start ketamine gtt   - Discontinue Levophed  - Goal MAP > 65  - 1 U pRBC transfused this morning.  - Consult PT/OT    PLAN:  Neuro/ pain/ sedation:  - Monitor neurological status. Notify the MD for any acute changes in exam.  - Discontinue propofol     Pain:   - Reduce Fentanyl gtt to 25-50 mcg/hr  - Increase Dilaudid to 0.3-0.5 mg IV q2 hr PRN  - Start ketamine gtt   - Tylenol suppository PRN    Pulmonary care:   #Post-operative respiratory support  - ETT tube in place and current vent settings VC//14/5/30  - Supplemental oxygen to keep saturation above 92 %.  - albuterol nebs PRN  - AB.37/38/193/22/3.2  - PS trials going well this afternoon, plan to extubate as able today to Bipap (insp 10/ exp 5)    Cardiovascular:    #Extensive atherosclerosis   #Chronic mesenteric ischemia  #HTN  #HLD  #Hypotension, improving  - Hypotension from AM now improved s/p 1 U pRBC and discontinuation of propofol.  - Monitor hemodynamic status. Notify the MD for any acute changes in exam.  - Discontinued Levophed Off pressors since ~ 1400 .   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  - Fascia closed. Skin packed with kerlix.  - Strict NPO   - NG to LIS, XR confirmed placement  - PEG-J placed 6/1 -- to gravity, no meds/feeds. Functional per patient. Placement complicated by GI bleed last admission.   - BM x2 today. h/o chronic diarrhea.  - Holding PTA creon (pancreatic enzymes)      Renal/FEN:   #Failure to thrive s/p PEG-J  - IVF: LR @ 100 ml/hr  - Cummins in place; will continue to monitor intake and output  - ICU electrolyte replacement HIGH protocol - K, Mg, Phos  - No indication for parenteral nutrition.  - PEG-J placed 6/1; no tube feeds at this time  - Cleared to use PEG-J for TF/meds starting 7/8 per General surgery  - CT (7/5) demonstrated bl hydroureteronephosis, bl renal calculi, bl renal cysts    Endocrine:    #T2DM   - Sliding scale for diabetes management, not given   - Hgb a1c 6.5  - Hold PTA aspart and NPH     ID/ Antibiotics:  #Intraabdominal sepsis, improving  #Leukocytosis, resolved  #MRSA positive  - WBC 9.8, normalized. WBC 13.6 post-op 7/6.  - MRSA nares Cx positive  - Urine Cx (7/5) likely contamined  - Cipro/flagyl given in ED  - Continue Zosyn, end 7/10     Heme:     #Acute blood loss anemia  - Hgb 6.7, decreased from Hgb 7.4 post-op 7/6.   - 1 U pRBC this morning.  - Recheck Hgb @ 1200: 7.6. Appropriate rise in Hgb. Daily CBC and consider repeat pRBC as needed.   - s/p 2 U pRBCs intraop 7/5      Prophylaxis:    - Mechanical prophylaxis for DVT  - IV  protonix  - Start Lovenox 40 mg SQ     MSK:   #paraplegia 2/2 cobalt radiation for testicular cancer  #Hx of lymphedema   - Known chronic wound on mons pubis  - New pressure wound on sacral and wound on mid back  - WOC following   - Plan to consult PT/OT      Lines/ tubes/ drains:  - ETT  - PIVx2  - art line  - RIJ CVC  - R introducer  - NG to LIS  - PEG-J  - Cummins  - ERLIN drain R abdomen      Disposition:  -Surgical ICU     ========================================    Patient seen, findings and plan discussed with surgical ICU staff Dr. Hernandez Hicks, MS4    Resident/Fellow Attestation   I, Dimas Dee, was present with the medical/NATALIE student who participated in the service and in the documentation of the note, as edited above.  I have verified the history and personally performed the physical exam and medical decision making.  I agree with the assessment and plan of care as documented in the note.      Dimas Dee MD  General Surgery PGY1  Date of Service: 07/07/2021        ====================================    SUBJECTIVE:   - Patient initially apneic during pressure support trial this morning. Performed well on repeat PS trial after weaning Fentanyl gtt from 150 to 50 mcg/hr.   - Patient more alert this morning. Able to converse by head and arm motions.   - Patient soft BP overnight, MAP low 60s. Improved s/p 1 uRBC.    OBJECTIVE:   1. VITAL SIGNS:   Temp:  [97.8  F (36.6  C)-99.3  F (37.4  C)] 98.4  F (36.9  C)  Pulse:  [73-87] 77  Resp:  [14-20] 20  BP: ()/(52-60) 122/52  MAP:  [59 mmHg-293 mmHg] 76 mmHg  Arterial Line BP: ()/() 117/57  FiO2 (%):  [30 %-40 %] 30 %  SpO2:  [91 %-100 %] 98 %  Ventilation Mode: (S) CPAP/PS  (Continuous positive airway pressure with Pressure Support)  FiO2 (%): 30 %  Rate Set (breaths/minute): 14 breaths/min  Tidal Volume Set (mL): 500 mL  PEEP (cm H2O): 5 cmH2O  Pressure Support (cm H2O): 7 cmH2O  Oxygen Concentration (%): 40  %  Resp: 20      2. INTAKE/ OUTPUT:   I/O last 3 completed shifts:  In: 3677.57 [I.V.:3277.57; Other:400]  Out: 2282 [Urine:1363; Emesis/NG output:400; Drains:519]    3. PHYSICAL EXAMINATION:   General: Alert;  able to gesture and nod head in response to questions. Lying in bed.  HEENT: Normocephalic, atraumatic. NG in place.  Neck: No cervical lymphadenopathy. No thyromegaly. RIJ CVC in place.  Chest wall: Symmetric chest rise. No masses.  Respiratory: Intubated. Lung sounds clear to auscultation bilaterally.  Cardiovascular: Regular rate and rhythm.  Gastrointestinal: Abdominal fascia and subcutaneous tissue closed; incision covered with gauze and dressing. PEG-J in place. R ERLIN drain with serosanguinous drainage.   Genitourinary:  Cummins in place.  Extremities:  No limb deformities. Edema superimposed on chronic lymphedema bilaterally. SCD in place. PIV and art line upper extremities. Protective mitts hands bilaterally.  Skin: warm and dry. Wound over mons pubis unchanged.     4. INVESTIGATIONS:   Arterial Blood Gases   Recent Labs   Lab 07/07/21  1140 07/06/21  1738 07/05/21  1357 07/05/21  1239   PH 7.37 7.40 7.41 7.41   PCO2 38 31* 38 39   PO2 193* 195* 183* 192*   HCO3 22 19* 24 25     Complete Blood Count   Recent Labs   Lab 07/07/21  1202 07/07/21  0347 07/06/21  1722 07/06/21  0300 07/05/21  1554   WBC  --  9.8 13.6* 21.9* 13.0*   HGB 7.6* 6.7* 7.4* 8.6* 8.6*   PLT  --  224 247 297 271     Basic Metabolic Panel  Recent Labs   Lab 07/07/21  0347 07/06/21  1722 07/06/21  0300 07/05/21  1554    144 140 139   POTASSIUM 3.7 3.1* 4.2 4.0   CHLORIDE 111* 115* 109 108   CO2 23 20 26 25   BUN 41* 33* 37* 35*   CR 1.09 0.88 0.99 0.84   * 119* 116* 144*     Liver Function Tests  Recent Labs   Lab 07/05/21  1554 07/05/21  0454   AST  --  8   ALT  --  14   ALKPHOS  --  91   BILITOTAL  --  0.2   ALBUMIN  --  2.4*   INR 1.30*  --      Pancreatic Enzymes  Recent Labs   Lab 07/05/21  0454   LIPASE <10*      Coagulation Profile  Recent Labs   Lab 07/05/21  1554   INR 1.30*     Lactate  Invalid input(s): LACTATE    5. RADIOLOGY:   No results found for this or any previous visit (from the past 24 hour(s)).

## 2021-07-08 NOTE — PROGRESS NOTES
CLINICAL NUTRITION SERVICES - BRIEF NOTE      Nutrition Prescription     RECOMMENDATIONS FOR MDs/PROVIDERS TO ORDER:  --Additional water flushes as per primary team.     Recommendations already ordered by Registered Dietitian (RD):  --Enteral access: Jtube  --Trophic TF: Osmolite 1.5 @ 10 ml/hr.   --Do not start or advance TF rate unless K+ >3.0, Mg++ > 1.5,  and Phos > 1.9.  --Minimum 30 ml q 4 hrs water flushes for tube patency.  --Certavite daily.     --Checking Fecal elastase, vitamin A/D/E/K, and zinc.     Future/Additional Recommendations:  Ability to advance TF to goal rate:  --GOAL: Osmolite 1.5 Elvis @ goal of 45ml/hr (1080ml/day) +4 Prosource will provide: 1780 kcals (30 kcal/kg), 111 g PRO (1.9 g/kg), 822 ml free H20, 219 g CHO, 53 g fat, and 0 g fiber daily.  --Advance by 10-15 ml q 8 hrs until goal rate.    PERT - order once able to start advancing to goal rate:  Once begin TFs, begin the following pancreatic enzyme regimen and recommend order each of the following:   (please copy and paste administration instructions into each order)  A) Sodium Bicarb tablet (325 mg), 1 tablet q 4 hrs via Jtube. Administration Instructions: Crush 1 tablet and mix into 15 ml of warm water and use this solution to mix with Creon pancreatic enzymes. DO NOT administer directly into Jtube (to be mixed into TF formula with Creon enzyme - see Creon enzyme order)   B) Creon 24, 1 capsules q 4 hrs via Jtube. Administration Instructions:   --If TF rate is running @ 10-25 ml/hr, administer 0 capsules;   --If TF rate is running @ 30-45 ml/hr, increase to 1 capsule q 4 hrs.    **Open capsule and empty contents into 15 ml sodium bicarb solution (see sodium bicarb order), let dissolve for about 20-30 minutes and then add this solution to the amount of TF formula hung in TF bag every 4 hrs (i.e., once TF @ goal infusion 45 ml/hr will mix 1 capsules into 180 ml of TF formula every 4 hrs).   *Note: this enzyme regimen with TF @ goal  infusion will provide approx 2717 units of lipase/gram of total Fat daily and approp dosing initially for pancreatic insufficiency.       *Please see full assessment note from 7/6/2021    New Findings:  Consult received per provider for Registered Dietitian to Order TF per Medical Nutrition Therapy Guidelines.    Okay to begin TF @ 10 ml/hr per SICU.     Interventions  Collaboration with other providers - rounds  Enteral Nutrition - Initiate trophic TF    RD to follow per protocol.    Crissy Reese, MS, RD, LD, [facility]  SICU: [phone] *[phone]

## 2021-07-08 NOTE — OP NOTE
Procedure Date: 07/06/2021    PREOPERATIVE DIAGNOSIS:  Open abdomen and necrotic bowel.    POSTOPERATIVE DIAGNOSIS:  Open abdomen and necrotic bowel.    PROCEDURE:  Washout of the abdomen, resection segmental small bowel, primary anastomosis, and closure of abdominal fascia.    STAFF SURGEON:  Doug English MD    RESIDENT SURGEON:  Holden Doherty MD    ASSISTANT SURGEON:  Jose Alejandro Kirkpatrick MD    SECOND ASSISTANT:  Renee Vogel MD    CLINICAL HISTORY:  Nick Zamudio is a 65-year-old male who presents with necrotic bowel that was resected previously and an open abdomen to determine if additional bowel would require resection.  The patient's family understood the risks and benefits of surgery, including the potential for injury to intra-abdominal organs, need for second surgery, potential for hernia, the potential for additional necrotic bowel, as well as all of the anesthetic complications, possible including myocardial infarction, pulmonary emboli, stroke, even death in the operating room.  The patient's family understood these risks, and they wished to proceed.     DESCRIPTION OF PROCEDURE:  Nick Zamudio was taken to the main operating room under general endotracheal anesthesia.  The patient was prepped and draped in sterile fashion.  Following appropriate timeout procedure to assure patient identification and procedure, the previous temporary abdominal dressing was removed.  The patient was again prepped and washed.  The bowel had necrotic tissue for approximately 20-30 cm in the distal portion of the small bowel.  This was resected using the YANETH stapler with a blue load and the open LigaSure.  This was passed off to Pathology.  Attention was then directed to washing out the abdomen, which was washed with 4-6 liters of sterile normal saline, which returned clear.  The patient's bowel appeared pink and glistening.  A side-to-side functional end-to-end anastomosis was stapled using the Endo-YANETH stapler and a blue  load and the TA stapler and a blue load.  The bowel anastomosis was widely patent.  The staple anastomosis was oversewn with 4-0 PDS suture, and the mesentery was then reapproximated with 4-0 PDS suture.  The abdominal cavity was carefully reinspected.  A #19 round Gerry drain was placed and exited through a separate incision in the right lower quadrant, secured to the skin level with 3-0 nylon suture.  This drain was placed in the area of the mesentery where the copious amounts of drainage was noted.  Fascia was then reapproximated with running and interrupted 0 PDS suture.  The skin edges were left open, packed with radiopaque gauze.  The patient tolerated the procedure well, needle, sponge count correct x 2, and the patient was returned to postanesthesia recovery in good condition.    Doug English MD        D: 2021   T: 2021   MT: [facility]    Name:     LISA ARIAS  MRN:      [id number]        Account:        [id number]   :      1955           Procedure Date: 2021     Document: [id number]

## 2021-07-08 NOTE — PLAN OF CARE
Major Shift Events: Patient alert and oriented X4, obeys commands. Moves bilateral upper extremities spontaneously, and slightly wiggles toes in bilateral lower extremities. Ketamine drip at 10. MAP goal >65. NPO, NG to low int suction, G-tube clamped. Patient had X4 medium loose bowel movements this shift. Primifit in place, patient had no urge to void at beginning of shift (bladder scanned for 518, notified SICU) but shortly after had 1 large urine incontinent episode and voided 300 after replacing primifit. Replaced potassium this morning.  Plan: Start Trickle tube feeds this morning.

## 2021-07-08 NOTE — PROGRESS NOTES
SURGICAL ICU PROGRESS NOTE  July 6, 2021      CO-MORBIDITIES:   Pneumatosis intestinalis  Abdominal pain, epigastric  Pneumatosis intestinalis    ASSESSMENT:  Nick Zamudio is a 65 y.o. male with PMH of testicular cancer s/p radiation therapy, biliary stenosis, duodenal stenosis, failure to thrive s/p PEG-J c/b GI bleed, chronic pancreatitis, prior lap augustine c/b intussusception s/p small bowel resection, and diffuse atherosclerosis who is s/p exploratory laparotomy with SB resection (~ 50 cm) with open abdomen in discontinuity on 7/5/2021 and exploratory laparotomy with further SB resection (~20cm), primary anastomosis and abdominal fascial closure with open skin on 7/6/2021.     TODAY'S PROGRESS/PLANS:   - Start TF @ 10 ml, nutrition following  - Start Creon per nutrition   - Stop D5LR IVF   - Discontinue fentanyl gtt, off since 7/7   - Remove art line, NG tube, right internal jugular CVC. 2 new PIV placed (20G, 18G)  - Pain medication regimen to be adjusted per general surgery pending transfer  - Transfer to floor    PLAN:  Neuro/ pain/ sedation:  - Monitor neurological status. Notify the MD for any acute changes in exam.  - Discontinue fentanyl, off since 7/7 @ 1300  - Pain medications changes per general surgery today pending transfer to floor:    - Continue Dilaudid IV PRN   - Continue ketamine gtt    - Switch Tylenol suppository PRN to scheduled enteral Tylenol    - Start scheduled enteral gabapentin    - Start scheduled IV Robaxin      Pulmonary care:    #Post-operative respiratory support, improving  - Patient extubated to BiPAP (~3.5 hrs) 7/7. On Oxymask.   - No BiPAP per general surgery   - Supplemental oxygen to keep saturation above 92 %.    Cardiovascular:    #Extensive atherosclerosis   #Chronic mesenteric ischemia  #HTN  #HLD  #Hypotension, improving  - MAPs at goal > 65. Off pressors since 7/6.   - Monitor hemodynamic status. Notify the MD for any acute changes in exam.  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  - Fascia closed. Skin packed with kerlix.  - TF through PEG-J @ 10 ml/hr.    - Cleared to use PEG-J for TF/meds starting 7/8 per General surgery  - Remove NG tube per general surgery   - BM x4 post-op. h/o chronic diarrhea.  - Resume PTA creon (pancreatic enzymes) per nutrition     Renal/FEN:   #Failure to thrive s/p PEG-J  - Hypervolemic per clinical exam, weight up 5 kg since admission, and +4.8 L   - Consider Lasix if improved spontaneous voiding   - Stop D5 IVF.   - Cummins out; external urinary catheter  - Concern for urinary retention, continue to monitor  - ICU electrolyte replacement HIGH protocol - K, Mg, Phos  - CT (7/5) demonstrated bl hydroureteronephosis, bl renal calculi, bl renal cysts    Endocrine:    #T2DM   - Sliding scale for diabetes management, not given   - Started D5 IVF for concern regarding pending hypoglycemia, -155. Tube feeds beginning today  - Hgb a1c 6.5  - Hold PTA aspart and NPH     ID/ Antibiotics:  #Intraabdominal sepsis, improving  #Leukocytosis, resolved  #MRSA positive  - WBC 7.2, normalized   - MRSA nares Cx positive  - Urine Cx (7/5) likely contamined  - Cipro/flagyl given in ED  - Continue Zosyn, end 7/10     Heme:     #Acute blood loss anemia  - Hgb 7.1, stable (7.6) after 1 uRBC 7/7  - s/p 2 U pRBCs intraop 7/5  - daily CBC      Prophylaxis:    - Mechanical prophylaxis for DVT  - Switch IV Protonix to parenteral   - Lovenox 40 mg SQ     MSK:   #paraplegia  2/2 cobalt radiation for testicular cancer  #Hx of lymphedema   - Known chronic wound on mons pubis  - Pressure wound on sacral and wound on mid back  - Increased LE edema in setting of chronic lymphedema, stopped IVF today   - WOC following   - PT/OT      Lines/ tubes/ drains:  - art line, remove today  - RIJ CVC,  remove today  - R introducer, remove today  - NG, remove today  - PIVx2 (20G and 16G), newly placed today   - PEG-J  - ERLIN drain R abdomen      Disposition:  -Surgical ICU     ========================================    Patient seen, findings and plan discussed with surgical ICU staff Dr. Hernandez Hicks, MS4      Resident/Fellow Attestation   I, Dimas Dee, was present with the medical student who participated in the service and in the documentation of the note, as edited above.  I have verified the history and personally performed the physical exam and medical decision making.  I agree with the assessment and plan of care as documented in the note.      Dimas Dee MD  General Surgery PGY1  Date of Service: 07/08/2021    ====================================    SUBJECTIVE:   - Patient more alert this morning. Able to converse by head and arm motions, spoke to make needs known.   - Patient reports breathing is going well. BiPAP for approximately 3.5 hours after extubation and now on oxymask. No apneic episodes per nursing, though patient does quickly desat to the mid 80s when oxygen mask is removed.     OBJECTIVE:   1. VITAL SIGNS:   Temp:  [98.3  F (36.8  C)-99.3  F (37.4  C)] 98.3  F (36.8  C)  Pulse:  [70-94] 76  Resp:  [14-20] 16  BP: ()/(52-60) 122/52  MAP:  [59 mmHg-99 mmHg] 87 mmHg  Arterial Line BP: ()/(30-69) 136/58  FiO2 (%):  [30 %-40 %] 30 %  SpO2:  [93 %-100 %] 99 %  Ventilation Mode: (S) CPAP/PS  (Continuous positive airway pressure with Pressure Support)  FiO2 (%): 30 %  Rate Set (breaths/minute): 14 breaths/min  Tidal Volume Set (mL): 500  mL  PEEP (cm H2O): 5 cmH2O  Pressure Support (cm H2O): 7 cmH2O  Oxygen Concentration (%): 40 %  Resp: 16      2. INTAKE/ OUTPUT:   I/O last 3 completed shifts:  In: 3557.62 [I.V.:3242.62]  Out: 834 [Urine:515; Emesis/NG output:50; Drains:269]    3. PHYSICAL EXAMINATION:   General: Alert; able to gesture and nod head in response to questions. Lying in bed.  HEENT: Normocephalic, atraumatic. NG in place.  Neck: No cervical lymphadenopathy. No thyromegaly. RIJ CVC in place.  Chest wall: Symmetric chest rise. No masses.  Respiratory: wearing oxymask. Lung sounds clear to auscultation bilaterally.  Cardiovascular: Regular rate and rhythm.  Gastrointestinal:  incision covered with gauze and dressing. PEG-J in place. R ERLIN drain with serous, lightly sanguinous drainage.   Extremities: 3+ pitting edema superimposed on chronic lymphedema bilaterally to the knees. PCD in place. PIV and art line upper extremities.  Skin: warm and dry.     4. INVESTIGATIONS:   Arterial Blood Gases   Recent Labs   Lab 07/07/21  1140 07/06/21  1738 07/05/21  1357 07/05/21  1239   PH 7.37 7.40 7.41 7.41   PCO2 38 31* 38 39   PO2 193* 195* 183* 192*   HCO3 22 19* 24 25     Complete Blood Count   Recent Labs   Lab 07/08/21  0426 07/07/21  1202 07/07/21  0347 07/06/21  1722 07/06/21  0300   WBC 7.2  --  9.8 13.6* 21.9*   HGB 7.1* 7.6* 6.7* 7.4* 8.6*     --  224 247 297     Basic Metabolic Panel  Recent Labs   Lab 07/08/21  0426 07/07/21  0347 07/06/21  1722 07/06/21  0300    144 144 140   POTASSIUM 3.5 3.7 3.1* 4.2   CHLORIDE 114* 111* 115* 109   CO2 22 23 20 26   BUN 38* 41* 33* 37*   CR 1.08 1.09 0.88 0.99   * 104* 119* 116*     Liver Function Tests  Recent Labs   Lab 07/05/21  1554 07/05/21  0454   AST  --  8   ALT  --  14   ALKPHOS  --  91   BILITOTAL  --  0.2   ALBUMIN  --  2.4*   INR 1.30*  --      Pancreatic Enzymes  Recent Labs   Lab 07/05/21  0454   LIPASE <10*     Coagulation Profile  Recent Labs   Lab 07/05/21  1551    INR 1.30*     Lactate  Invalid input(s): LACTATE    5. RADIOLOGY:   No results found for this or any previous visit (from the past 24 hour(s)).

## 2021-07-08 NOTE — PLAN OF CARE
Major Shift Events: Pt transferred from 4A to 6D this afternoon. A&O x4. AVSS on RA. Ketamine gtt and prn IV dilaudid given for pain. Strict NPO. TFs infusing into J-tube @ 10 mL/hr. Denies nausea. X2 stools this shift, incontinent. Primofit in place for urine incontinence. Bedrest, baseline wheelchair bound. Assist of 2 and lift. Repo q 2 hrs. Pulsate mattress ordered.  Plan: Cont to monitor pain, TF tolerance, and skin integrity. Cont w/ POC.   For vital signs and complete assessments, please see documentation flowsheets.

## 2021-07-08 NOTE — PLAN OF CARE
Admitted/transferred from: 4A  Reason for admission/transfer: no longer required ICU cares  2 RN skin assessment: completed by Haylie Prince, RN and Reema Hayes, RN.  Result of skin assessment and interventions/actions: Midline abdominal incision w/ dressing is c/d/i. RLQ ERLIN drain w/ dressing is c/d/i. G/J site w/ dressing is c/d/i. L foot on underside of big toe has a scabbed callous. R shin has skin tear w/ square mepilex and a scabbed abrasion. Rash present on groin. Blanchable redness on coccyx and sacrum, applied barrier paste. Mid back open area, about size of dime, new mepilex placed. BLE edema present, baseline per pt. Skin tag behind R ear.  Patient belongings: brought up from 4A-cell phone, necklace in specimen container, shirt, glasses    ?

## 2021-07-08 NOTE — PROGRESS NOTES
"General Surgery Progress Note    S: Extubated last night. Pain meds adjusted yesterday, started ketamine gtt. Passing flatus, BM x3. No N/V. Slept okay O/N. Ucmmins removed, voiding independently. Marginal UOP, improving this morning. Plan to transfer to floor today.    O: /52   Pulse 70   Temp 98.3  F (36.8  C) (Axillary)   Resp 16   Ht 1.778 m (5' 10\")   Wt 64.6 kg (142 lb 6.7 oz)   SpO2 99%   BMI 20.43 kg/m      I/O last 24 h:  UOP: 500 ml, 300 ml in last shift  N ml  Drain: 197 ml      Gen: no acute distress, resting comfortably in bed  HEENT: Atraumatic, normocephalic  Neuro: Alert and oriented. No gross neurologic deficits changes from baseline.  Pulm: nonlabored breathing on oxymask, no cough  CV: RRR by radial pulse, noncyanotic   ABD:soft, tenderness medially, non-distended. Midline incision is well approximated without surrounding erythema or purulent drainage. Drain with thin serosanguinous fluid. Dressing externally was clean and dry, replaced. NG in place draining scant dark yellow fluid and was pulled.   MSK:  Normal active range of motion, no edema  Skin: Warm, dry, no rashes or abrasions on exposed skin  Psych: Normal affect, cooperative    Wbc 7.2, hgb 7.1 (7.6), plt 210  Na 144, K 3.5, Cr 1.08, Mg 2.1, Phos 3.6    No interval imaging    A/P: Gino Zamudio is a 64 yo male with h/o testicular cancer s/p radiation therapy, paraparesis, biliary stenosis, duodenal stenosis, failure to thrive s/p PEG-J c/b GI bleed, chronic pancreatitis, prior lap augustine c/b intussusception s/p small bowel resection, admitted with intraabdominal sepsis, s/p ex-lap with necrotic small bowel, resected on , returned to OR  for additional small bowel resection and abdominal closure. Now extubated and recovering appropriately. Will be transferred from ICU to floor today.     Neuro:  - Start oral pain control with scheduled tylenol 975 mg q6h, IV robaxin 500 mg q4h (orders placed on transfer)   - Start " gabapentin 300 mg q12h  - Continue ketamine gtt and dilaudid 0.3-0.5 mg q2 PRN  - Recommend stopping fentanyl drip    CV:  - Remove AA line and MAP line. If good IV access, may remove Cardis. If not, replace with triple lumen.   - Currently holding PTA lipitor 20 mg    Resp:  - Supplemental O2 (goal: sats > 92%), wean as tolerated  - Albuterol nebs PRN  - No BiPAP given concern for positive pressure on proximal anastomosis     GI: POD #2 from small bowel resection with primary anastomosis and fascia closure.   - Start trickle tube feeds via J tube (10 ml/hr)  - Clamp G tube  - Meds IV or liquid PO   - NG removed today  - NPO except for liquid meds.  - Continue abx, complete 4 day course ending 7/10/21  - Abdominal wound care with daily dressing changes of saline-soaked kerlix   - Continue IV protonix    Renal:   Decreased UOP noted yesterday (0.33 ml/kg/hr), improved since midnight. Cr WNL.  - Continue to monitor renal labs, Mg, Phos daily  - IVF: continue LR w D5 at 75 ml/hr    Heme:  Acute blood loss and dilutional anemia, stable Hgb 7.1, s/p 1 u RBC transfused yesterday  - Continue ppx with Lovenox 40 mg subQ daily   - Monitor CBCs daily    Endo:  History of T2DM. Good BG control here with sugars < 180.  - HA1c 6.5  - Currently holding sliding scale, PTA aspart and NPH    ID:  Intraabdominal sepsis and leukocytosis has resolved. WBC WNL 7/8 at 7.2.   - Received cipro/flagyl in ED  - Continue Zosyn (end state 7/10)  - WOCN consulted for chronic mons pubis radiation wound and coccyx wound    MSK:  - OT and PT consults following    Ppx:  - SCDs  - lovenox    Dispo: Will be transferred out of the SICU to floor today.     Seen and discussed with chief, Dr. Doherty.    Erin Hill, MS4 MPH  [employer]          ____________________________________  I saw and examined the patient with the medical student and agree with the findings as documented. I have edited the note to reflect my findings  and plan where necessary.    Jose Alejandro Kirkpatrick MD  Surgery resident  [id number]

## 2021-07-08 NOTE — PROGRESS NOTES
"Transferred to: 6D at 1415  Status at time of transfer: Stable. BP 95/47 (BP Location: Left arm)   Pulse 59   Temp 98  F (36.7  C) (Oral)   Resp 16   Ht 1.778 m (5' 10\")   Wt 64.6 kg (142 lb 6.7 oz)   SpO2 94%   BMI 20.43 kg/m    On RA. PIV x2. Primafit in place.      Belongings: Glasses and phone with pt. Belongings sent with on bed.   Cummins removed? (if no, why?): Yes  Chart and medications: delivered to unit by 4A staff.  Family notified: Yes  "

## 2021-07-09 NOTE — PLAN OF CARE
Pt is a total care , turn Q2H ketamine gtt to be discharge  today. VSS ube feeding increased to 20 at 0700.  Pt incont. of B&B  ERLIN x 1 patent  rash to groin, no acute changes noted. Plan of care reviewed with pt  Problem: Adult Inpatient Plan of Care  Goal: Plan of Care Review  Outcome: Improving  Flowsheets (Taken 7/9/2021 0740)  Plan of Care Reviewed With: patient  Goal: Optimal Comfort and Wellbeing  Outcome: Improving     Problem: Adjustment to Illness (Delirium)  Goal: Optimal Coping  Outcome: Improving     Problem: Sleep Disturbance (Delirium)  Goal: Improved Sleep  Outcome: Improving     Problem: Postoperative Nausea and Vomiting (Surgery Nonspecified)  Goal: Nausea and Vomiting Relief  Outcome: Improving     Problem: Adult Inpatient Plan of Care  Goal: Absence of Hospital-Acquired Illness or Injury  Intervention: Identify and Manage Fall Risk  Recent Flowsheet Documentation  Taken 7/9/2021 0400 by Juanita Cantrell RN  Safety Promotion/Fall Prevention:   activity supervised   fall prevention program maintained   increased rounding and observation   lighting adjusted   patient and family education  Taken 7/9/2021 0000 by Juanita Cantrell RN  Safety Promotion/Fall Prevention:   activity supervised   fall prevention program maintained   increased rounding and observation   lighting adjusted   patient and family education  Taken 7/8/2021 2000 by Juanita Cantrell RN  Safety Promotion/Fall Prevention:   activity supervised   fall prevention program maintained   increased rounding and observation   lighting adjusted   patient and family education  Intervention: Prevent Skin Injury  Recent Flowsheet Documentation  Taken 7/9/2021 0400 by Juanita Cantrell RN  Body Position:   right   turned   foot of bed elevated   heels elevated   legs elevated  Taken 7/9/2021 0000 by Juanita Cantrell RN  Body Position:   right   turned   foot of bed elevated   heels elevated   legs elevated  Taken 7/8/2021 2200 by Óscar  GIANCARLO Grant  Body Position:   left   turned   heels elevated   legs elevated   upper extremity elevated   weight shifting  Taken 7/8/2021 2000 by Juanita Cantrell RN  Body Position:   right   turned   foot of bed elevated   heels elevated   legs elevated  Intervention: Prevent and Manage VTE (Venous Thromboembolism) Risk  Recent Flowsheet Documentation  Taken 7/9/2021 0400 by Juanita Cantrell RN  VTE Prevention/Management: pneumatic compression device  Taken 7/9/2021 0000 by Juanita Cantrell RN  VTE Prevention/Management: pneumatic compression device  Taken 7/8/2021 2000 by Juanita Cantrell RN  VTE Prevention/Management: pneumatic compression device     Problem: Ongoing Anesthesia Effects (Surgery Nonspecified)  Goal: Anesthesia/Sedation Recovery  Intervention: Optimize Anesthesia Recovery  Recent Flowsheet Documentation  Taken 7/9/2021 0400 by Juanita Cantrell RN  Safety Promotion/Fall Prevention:   activity supervised   fall prevention program maintained   increased rounding and observation   lighting adjusted   patient and family education  Taken 7/9/2021 0000 by Juanita Cantrell RN  Safety Promotion/Fall Prevention:   activity supervised   fall prevention program maintained   increased rounding and observation   lighting adjusted   patient and family education  Taken 7/8/2021 2000 by Juanita Cantrell RN  Safety Promotion/Fall Prevention:   activity supervised   fall prevention program maintained   increased rounding and observation   lighting adjusted   patient and family education     Problem: Pain (Surgery Nonspecified)  Goal: Acceptable Pain Control  Intervention: Prevent or Manage Pain  Recent Flowsheet Documentation  Taken 7/8/2021 2000 by Juantia Cantrell RN  Pain Management Interventions: medication (see MAR)  Taken 7/8/2021 1956 by Juanita Cantrell RN  Pain Management Interventions: medication (see MAR)

## 2021-07-09 NOTE — PROGRESS NOTES
07/09/21 1000   Quick Adds   Type of Visit Initial Occupational Therapy Evaluation   Living Environment   Current Living Arrangements residential facility   Home Accessibility no concerns   Living Environment Comments OT: Pt w/c bound at baseline, no concerns w/ home (nursing facility)   Self-Care   Usual Activity Tolerance moderate   Current Activity Tolerance fair   Regular Exercise No   Disability/Function   Hearing Difficulty or Deaf no   Wear Glasses or Blind yes   Vision Management glasses   Concentrating, Remembering or Making Decisions Difficulty no   Difficulty Communicating no   Difficulty Eating/Swallowing no   Walking or Climbing Stairs Difficulty yes   Mobility Management electric w/c   Dressing/Bathing Difficulty yes   Fall history within last six months yes   Number of times patient has fallen within last six months 1  (per chart)   General Information   Onset of Illness/Injury or Date of Surgery 07/05/21   Referring Physician Carie Valera MD   Additional Occupational Profile Info/Pertinent History of Current Problem Gino Zamudio is a 64 yo male with h/o testicular cancer s/p radiation therapy, paraparesis, biliary stenosis, duodenal stenosis, failure to thrive s/p PEG-J c/b GI bleed, chronic pancreatitis, prior lap augustine c/b intussusception s/p small bowel resection, admitted with intraabdominal sepsis, s/p ex-lap with necrotic small bowel, resected on 7/5, returned to OR 7/6 for additional small bowel resection and abdominal closure. Now extubated and recovering appropriately, was transferred out of the ICU on 7/8.    Existing Precautions/Restrictions abdominal;fall   Left Upper Extremity (Weight-bearing Status)   (10# lifting restriction)   Right Upper Extremity (Weight-bearing Status)   (10# lifting restriction)   Cognitive Status Examination   Cognitive Status Comments OT: No concerns at this time   Visual Perception   Impact of Vision Impairment on Function (Vision) OT: Pt wears glasses at  baseline, no acute changes noted at this time   Strength Comprehensive (MMT)   Comment, General Manual Muscle Testing (MMT) Assessment OT: NT 2/2 post surgical precuations   Bed Mobility   Comment (Bed Mobility) OT: mod A x2   Balance   Balance Comments OT: mod> SBA EOB    Instrumental Activities of Daily Living (IADL)   IADL Comments OT: Pt's family or nursing home assists prn   Clinical Impression   Criteria for Skilled Therapeutic Interventions Met (OT) yes   OT Diagnosis decreased ind in I/ADLS   OT Problem List-Impairments impacting ADL problems related to;activity tolerance impaired;balance;pain;post-surgical precautions;mobility   ADL comments/analysis decreased ind in I/ADLS   Assessment of Occupational Performance 1-3 Performance Deficits   Planned Therapy Interventions (OT) ADL retraining;balance training;bed mobility training;strengthening;transfer training;progressive activity/exercise;home program guidelines   Clinical Decision Making Complexity (OT) low complexity   Therapy Frequency (OT) 6x/week   Predicted Duration of Therapy 7/21/21   Risk & Benefits of therapy have been explained evaluation/treatment results reviewed;care plan/treatment goals reviewed;patient   OT Discharge Planning    OT Discharge Recommendation (DC Rec) Home with assist;home with home care occupational therapy   OT Rationale for DC Rec OT: Pt lives in nursing home, will be able to discharge back to nursing home w/ home therapy to increase strength and ind in ADLS   Total Evaluation Time (Minutes)   Total Evaluation Time (Minutes) 4

## 2021-07-09 NOTE — PROGRESS NOTES
"General Surgery Progress Note    S: Nursing notes reviewed. No acute events. Passing flatus and incontinent of stool x 3. No N/V following NG tube removal. Reports mild bloating and pain over the L side, across the abdomen, and epigastrium. Reports breathing is improving.     O: /51 (BP Location: Left arm)   Pulse 76   Temp 98.6  F (37  C) (Oral)   Resp 18   Ht 1.778 m (5' 10\")   Wt 65 kg (143 lb 4.8 oz)   SpO2 96%   BMI 20.56 kg/m      I/O last 24 h:  UOP: 610 ml, 500 ml in last shift   N ml (removed)  Drain: 83 ml       Gen: no acute distress, resting comfortably in bed  HEENT: Atraumatic, normocephalic  Neuro: Alert and oriented. No gross neurologic deficits changes from baseline.  Pulm: nonlabored breathing on nasal canula, no cough. Phlegm production noted, suctioned independently   ABD:soft, tenderness medially, non-distended. Midline incision is well approximated without surrounding erythema or purulent drainage. Drain with thin serosanguinous fluid. Dressing externally was clean and dry, replaced.   MSK:  Normal active range of motion, no edema  Skin: Warm, dry, no rashes or abrasions on exposed skin  Psych: Normal affect, cooperative    Wbc 7.0, hgb stable at 7.7, plt 224  Na 147, K 3.5, Cr 0.99 (1.08), Mg 2.1, Phos 2.7    No interval imaging    A/P: Gino Zamudio is a 66 yo male with h/o testicular cancer s/p radiation therapy, paraparesis, biliary stenosis, duodenal stenosis, failure to thrive s/p PEG-J c/b GI bleed, chronic pancreatitis, prior lap augustine c/b intussusception s/p small bowel resection, admitted with intraabdominal sepsis, s/p ex-lap with necrotic small bowel, resected on , returned to OR  for additional small bowel resection and abdominal closure. Now extubated and recovering appropriately, was transferred out of the ICU on .       Changes today:   - Increase TF from 10 ml/hr to 20 ml/hr, may increase to 30 ml/hr later today if tolerating  - Start liquid oxycodone " 5-10 mg q4h  - Stop ketamine gtt  - Change dressings twice daily  - Sips of clear liquids for comfort  - Start 50 ml/hr 1/2 NS + 20 mEq K+    Neuro:  - Discontinue ketamine gtt  - Start liquid oxycodone 5-10 mg q4h  - Continue oral pain control with scheduled tylenol 975 mg q6h, IV robaxin 500 mg q4h (orders placed on transfer)   - Continue gabapentin 300 mg q12h  - Continue dilaudid 0.3-0.5 mg q2 PRN      CV:  - Maintain PIV access   - Currently holding PTA lipitor 20 mg    Resp:  - Supplemental O2 (goal: sats > 92%), wean as tolerated  - Albuterol nebs PRN  - No BiPAP given concern for positive pressure on proximal anastomosis     GI: S/p small bowel resection with primary anastomosis and fascia closure.   -  Increase tube feeds via J tube from 10 ml/hr to 20 ml/hr this morning. Reassess this afternoon. If tolerating, may increase to 30 ml/hr tonight   - Clamp G tube  - Meds IV or liquid PO   - Clear sips for comfort, liquid meds.  - Continue abx, complete 4 day course ending 7/10/21  - Abdominal wound care with twice daily dressing changes of saline-soaked kerlix   - Continue IV protonix    Renal:   Decreased UOP noted yesterday (0.33 ml/kg/hr) and today (0.39 ml/kg/hr), again improved since midnight. Cr WNL.  - Continue to monitor renal labs, Mg, Phos daily  - IVF: continue LR w D5 at 75 ml/hr vs adding D5 1/2 NS    Heme:  Acute blood loss and dilutional anemia, stable Hgb  - Continue ppx with Lovenox 40 mg subQ daily   - Monitor CBCs daily    Endo:  History of T2DM. Good BG control here with sugars < 180.  - HA1c 6.5  - Currently holding sliding scale, PTA aspart and NPH    Electrolytes:  Hypernatremia noted to 147 today, increased from 144 yesterday. Likely secondary to NPO status/dehydration. Patient appears euvolemic.    - Continue to monitor. IVF had been stopped following leaving SICU. Start 50 ml/hr 1/2 NS + 20 mEq K+.     ID:  Intraabdominal sepsis and leukocytosis has resolved.  - Received cipro/flagyl  in ED  - Continue Zosyn (end state 7/10)  - WOCN consulted for chronic mons pubis radiation wound and coccyx wound    MSK:  - OT and PT consults following    Ppx:  - SCDs  - lovenox    Dispo: Likely here for several more days until tolerating PO intake.    Seen and discussed with chief, Dr. Doherty.    Erin Hill, MS4 MPH  [employer]    ____________________________________  I saw and examined the patient with the medical student and agree with the findings as documented. I have edited the note to reflect my findings and plan where necessary.    Jose Alejandro Kirkpatrick MD  Surgery resident  [phone]

## 2021-07-09 NOTE — PROGRESS NOTES
CLINICAL NUTRITION SERVICES - BRIEF NOTE  See full assessment note from 7/6.      Nutrition Prescription    RECOMMENDATIONS FOR MDs/PROVIDERS TO ORDER:  Recommend switching to non-dextrose containing IVF once receiving goal TF.     Vitamin D Replacement: recommend 50,000 international unit(s) PO once weekly x 8 weeks OR 6000 international unit(s) once daily x 8 weeks to achieve 25-hydroxyvitamin D level greater than 30 nanograms/mL (per Micromedex)     Recommend the following goal TF regimen: Osmolite 1.5 Elvis @ goal of 45ml/hr (1080ml/day) + 4 Prosource will provide: 1780 kcals (30 kcal/kg), 111 g PRO (1.9 g/kg), 822 ml free H20, 219 g CHO, 53 g fat, and 0 g fiber daily.  --Advance by 10-15 ml q 8 hrs until goal rate.    Recommendations already ordered by Registered Dietitian (RD):  PERT regimen:   A) Sodium Bicarb tablet (325 mg), 1 tablet q 4 hrs via Jtube. Administration Instructions: Crush 1 tablet and mix into 15 ml of warm water and use this solution to mix with Creon pancreatic enzymes. DO NOT administer directly into Jtube (to be mixed into TF formula with Creon enzyme - see Creon enzyme order)   B) Creon 24, 1 capsules q 4 hrs via Jtube. Administration Instructions:   --If TF rate is running @ 10-25 ml/hr, administer 0 capsules;   --If TF rate is running @ 30-45 ml/hr, increase to 1 capsule q 4 hrs.    **Open capsule and empty contents into 15 ml sodium bicarb solution (see sodium bicarb order), let dissolve for about 20-30 minutes and then add this solution to the amount of TF formula hung in TF bag every 4 hrs (i.e., once TF @ goal infusion 45 ml/hr will mix 1 capsules into 180 ml of TF formula every 4 hrs).   *Note: this enzyme regimen with TF @ goal infusion will provide approx 2717 units of lipase/gram of total Fat daily and approp dosing initially for pancreatic insufficiency.    Future/Additional Recommendations:  - Monitor results of fecal elastase test and Vitamin A//E/K and Zinc   - Monitor TF  tolerance and ability to advance to goal   - Ability to cycle TF in the future per pt's home regimen      NUTRITION HISTORY:  Spoke with pt at bedside this morning to discuss PTA EN and PERT regimen. Pt reports he lives in a nursing facility that helps him with connect and administering the TF. He is not sure what formula he uses but does a cyclic feed overnight at 100 ml/hr x 12 hrs (8 pm - 8 am). With this he mixes 2 Creon 24 enzymes with sodium bicarb and administers this solution per 1 L bag of TF (receives total of 1.2 L TF/feed).     NEW FINDINGS:  - NGT removed - no N/V at present   - D5 IVF at 50 ml/hr (204 kcal/day)  - Vitamin D 6 (L)   - TF advanced to 20 ml/hr this morning at 0700 and per MD note may increase to 30 ml/hr later today if tolerating. If TF advanced to 30 ml/hr, will need PERT regimen in place.     INTERVENTIONS:  Implementation:  Collaboration with other providers - pt discussed this morning in interdisciplinary rounds   Nutrition-related medication management - PERT orders as above     Follow up/Monitoring:  Nutrition will continue to follow and monitor per protocol.     Yi Roberto, RD  6D Pager: [phone]

## 2021-07-09 NOTE — PLAN OF CARE
Major Shift Events:  Patient Hemodynamically stable today. Ketamine gtt stopped and pain control with PRN orders. TF at 20ml til 7pm. Patient with continuous loose stools. Unable to keep away from coccyx wound. Rectal pouch attempted but unable to adhere to the skin due to close proximity to the coccyx bleeding wound. Could benefit from Rectal tube if okay by surgery team. Patient report polyps may be in the way.    Plan:   Downgrade per surgery team. Recommendation of managing loose stools with tube feedinds. Goal tube feed up to 30ml at 1900 and to start Creon with increase tube feeds goal.    For vital signs and complete assessments, please see documentation flowsheets.

## 2021-07-09 NOTE — PLAN OF CARE
PT / 6D - PT orders received. Per discussion with OT - pt with no acute PT needs at this time and will only require one therapy discipline to address rehab needs during IP stay. Pt lives in nursing home and uses electric WC at baseline. OT will continue to follow and progress ADLs, bed mobility, transfers, and WC mobility within post-op precautions. PT to complete orders.

## 2021-07-10 NOTE — PLAN OF CARE
Notified MD at 5:10 AM regarding Patient's 300mL bile, watery emesis and new nausea.      Order for Zofran was obtained and was instructed to hold TF's.     Comments:     GISEL Zamudio [id number]: Pt just vomited 300mLs of watery, bile-looking emesis. Has TFs going at 30mL/hr. No anti-emetics ordered. Hold TFs? Thank you, GIANCARLO Bell [phone] or [phone]

## 2021-07-10 NOTE — PROGRESS NOTES
Surgery Progress Note  07/10/2021       Subjective:  Patient having liquid, watery stools overnight. Nursing unable to place rectal tube d/t patient anatomy. Patient having nausea w/vomiting this AM.      Objective:  Temp:  [98.3  F (36.8  C)-98.8  F (37.1  C)] 98.3  F (36.8  C)  Pulse:  [71-90] 87  Resp:  [5-16] 11  BP: (111-136)/(56-74) 136/74  SpO2:  [95 %-100 %] 98 %    I/O last 3 completed shifts:  In: 3140 [P.O.:930; I.V.:1410; NG/GT:410]  Out: 1875 [Urine:1350; Emesis/NG output:300; Drains:125; Stool:100]      PE:  Gen: awake, alert, NAD  CV/Resp: NLB on RA  Abd: soft, nondistended, TTP around incision. J-tube in place on R.  Incision: midline incision healing w/no signs of infection, proximal 2/3 of incision open w/Kerlix in place, external dressing c/d/i. ERLIN drain w/serosanguinous output.  Ext: nonedematous     Labs:  Recent Labs   Lab 07/10/21  0712 07/09/21  0737 07/08/21  0426   WBC 10.0 7.0 7.2   HGB 8.2* 7.7* 7.1*    224 210       Recent Labs   Lab 07/10/21  0712 07/09/21  0737 07/08/21  0426   * 147* 144   POTASSIUM 3.3* 3.5 3.5   CHLORIDE 123* 119* 114*   CO2 20 24 22   BUN 28 31* 38*   CR 0.96 0.99 1.08   * 147* 155*   ELODIA 8.5 8.0* 7.8*   MAG 2.0 2.1 2.1   PHOS 2.7 2.7 3.6       Imaging:  None     Assessment/Plan:   Gino Zamudio is a 64 yo male with h/o testicular cancer s/p radiation therapy, paraparesis, biliary stenosis, duodenal stenosis, failure to thrive s/p PEG-J c/b GI bleed, chronic pancreatitis, prior lap augustine c/b intussusception s/p small bowel resection, admitted with intraabdominal sepsis, s/p ex-lap with necrotic small bowel, resected on 7/5, returned to OR 7/6 for additional small bowel resection and abdominal closure. Now extubated, was transferred out of the ICU on 7/8.         Changes today:   - Hold TFs  - NPO, sips okay  - Change dressings twice daily  - Zosyn ending today  - IVFs: D5 1/2NS w/KCl at 75 mL/hr     Neuro:  - Continue oral pain control with  scheduled tylenol 975 mg q6h, IV robaxin 500 mg q4h (orders placed on transfer), liquid oxycodone 5-10 mg Q4H  - Continue gabapentin 300 mg q12h  - Continue dilaudid 0.3-0.5 mg q2 PRN     CV:  - Maintain PIV access   - Currently holding PTA lipitor 20 mg     Resp:  - Supplemental O2 (goal: sats > 92%), wean as tolerated  - Albuterol nebs PRN  - No BiPAP given concern for positive pressure on proximal anastomosis      GI: S/p small bowel resection with primary anastomosis and fascia closure.   - Hold TFs  - NPO, sips okay  - Clamp G tube  - Meds IV or liquid PO   - Zosyn 4 day course ending 7/10/21  - Abdominal wound care with twice daily dressing changes of saline-soaked kerlix   - Continue IV protonix     Renal/Fluids:   UOP improving. Cr WNL. Hypernatremia to 151 today, likely 2/2 NPO status/dehydration. Pt appears euvolemic.  - Continue to monitor renal labs, Mg, Phos daily  - IVF: 75 mL/hr (D5 1/2 NS + KCl 20 mEq/L)  - Replace potassium per protocol     Heme:  Acute blood loss and dilutional anemia, stable Hgb  - Continue ppx with Lovenox 40 mg subQ daily   - Monitor CBCs daily     Endo:  History of T2DM. -207  - HbA1c 6.5  - Currently holding sliding scale, PTA aspart and NPH     ID:  Intraabdominal sepsis and leukocytosis has resolved.  - Received cipro/flagyl in ED  - Continue Zosyn (end today)  - WOCN consulted for chronic mons pubis radiation wound and coccyx wound     MSK:  - OT and PT consults following     Ppx:  - SCDs  - lovenox     Dispo: Likely several days.       Seen, examined, and discussed with chief resident, who will discuss with staff.  - - - - - - - - - - - - - - - - - -  Paulette Marques MD  PGY-1, General Surgery  [phone]

## 2021-07-10 NOTE — PLAN OF CARE
[id number]    Major Shift Events: A&Ox4. VSS on RA. HR in 80s-90s most of shift. Patient continues to have muliple watery, liquid stools. Received order for rectal tube, but writer and charge RN were not able to get it placed due to patient's anatomy. D5+1/2NS+KCl 20mEq infusing at 50mL/hr. Patient denied nausea all shift until about 0400 but became nauseous suddenly and vomitted 300mL of liquid, bile-like emesis. Order received for PRN Zofran and was instructed by MD to hold tube feedings. Prior to emesis, TFs were running all night at 30mL/hr with 30mL free water flushes Q4H. Patient on pulsate mattress and turned and repositioned Q2H overnight. Pressure sore on coccyx open to air and continues to bleed at times due to wiping bottom frequently due to so many loose stools. Midline incision dressing CDI. ERLIN drain patent with serosanguineous output. Male external catheter in place. Q4H BG checks covered with sliding scale insulin. Receiving PRN IV Dilaudid, PRN Oxycodone, and scheduled Tylenol, Gabapentin, and Robaxin for pain.    For vital signs and complete assessments, please see documentation flowsheets.

## 2021-07-11 NOTE — PROGRESS NOTES
General Surgery Progress Note    Subjective:  Patient with increased diarrhea last night and rectal pouch dislodged. Tube feeds were on hold given nausea/vomiting yesterday. Reporting increased pain, controled with IV dilaudid and oxycodone. Denies current N/V, CP, or SOB.      Objective:  Temp:  [97.3  F (36.3  C)-99.1  F (37.3  C)] 98.8  F (37.1  C)  Pulse:  [80-95] 86  Resp:  [11-21] 14  BP: (134-145)/(62-77) 145/68  SpO2:  [98 %-100 %] 99 %    I/O last 3 completed shifts:  In: 2485 [P.O.:1200; I.V.:675; NG/GT:610]  Out: 2058 [Urine:900; Emesis/NG output:520; Drains:38; Stool:600]      PE:  Gen: awake, alert, NAD  CV/Resp: NLB on RA  Abd: soft, nondistended, faint erythema around incision, stable.  Incision: proximal 1/3 with open skin edges, small amount of fibrinous exudate on dressing.  ERLIN drain w/serosanguinous output.  Ext: peripheral wasting, nonedematous     Labs:  WBC 13.5 (10.0)  Hgb 7.8 (8.2)  Plts 208 (240)  Na 149 (151), K 3.8 (3.3)  Mg 1.8, Phos 2.3  -207    Imaging:  None     Assessment/Plan:   Gino Zamudio is a 66 yo male with h/o testicular cancer s/p radiation therapy, paraparesis, biliary stenosis, duodenal stenosis, failure to thrive s/p PEG-J c/b GI bleed, chronic pancreatitis, prior lap augustine c/b intussusception s/p small bowel resection, admitted with intraabdominal sepsis, s/p ex-lap with necrotic small bowel, resected on 7/5, returned to OR 7/6 for additional small bowel resection and abdominal closure. Now extubated, was transferred out of the ICU on 7/8. Regressed in his recovery yesterday with poor tolerance of tubefeeds. Now improved and will resume TF at lower rate today.    - Pain control with dilaudid, liquid oxy, robaxin  - Resume TF via J at 15 ml/h  - G tube to gravity  - NPO with sips for comfort  - Endocrine consult for diabetes management, recs appreciated, continue high-dose SSI in meantime  - BID abdominal wound dressing changes. Please use abdominal binder to secure  dressing, no tape.  - Abx completed 7/10  - hypernatremia improving, continue mIVF  - Prophylactic lovenox and protonix     Erin Hill, MS4 MPH  [employer]    ____________________________________  I saw and examined the patient with the medical student and agree with the findings as documented. I have edited the note to reflect my findings and plan where necessary.    Jose Alejandro Kirkpatrick MD PGY-1  General Surgery  Pager: [phone]

## 2021-07-11 NOTE — CONSULTS
Inpatient Diabetes Consult    Chief Complaint T2DM on TF and high intesnity sliding scale  Consult requested by: Dr. Paulette Marques MD      Assessment and plan:  Nick Zamudio is a 65 year old male with PMHx of testicular cancer s/p radiation therapy, partial quadriplegia, chronic pancreatitis, type II diabetes, biliary stenosis s/p ERCP, duodenal stenosis, prior laparoscopic cholecystectomy complicated by intusseption s/p small bowel resection. He is now admitted with sepsis secondary to intra-abdominal infection s/p exploratory laparotomy with necrotic small bowel resection on 7/5. Returned to the OR on 7/6 for additional small bowel resection and abdominal closure.    1. Type II diabetes  2. TF induced hyperglycemia-currently     TF had been discontinued yesterday, yet to be restarted today at 15 ml/hr      We will not start the patient on NPH for tube feeding today, will wait for TF to be started and see whether the patient tolerates it    continue NovoLog correction scale insulin every 4 hours    glucose checks every four hours    hypoglycemia protocol      Patient is seen, examined and discussed with MD Roberta Arambula MD  Endocrinology Fellow  Pager Number: [phone]    I, Mary Quispe, evaluated this patient in person. I discussed the patient with the fellow and agree with the assessment and plan of care as documented in the fellow's note. I personally reviewed vital signs, mediations, labs and imaging.    Key Findings: good blood glucose control while off TF, just starting TF again    This was a 15 min visit of which more than 8 minutes were spent in counseling in regards to blood glucose control    Mary Quispe MD  Staff Physician  Endocrinology and Metabolism  [facility]  Pager [phone]      =====================================================================      History of Present Illness  Nick Zamudio is a 65 year old male with PMHx of testicular cancer  s/p radiation therapy, partial quadriplegia, chronic pancreatitis, type II diabetes, biliary stenosis s/p ERCP, duodenal stenosis, prior laparoscopic cholecystectomy complicated by intusseption s/p small bowel resection. He is now admitted with sepsis secondary to intra-abdominal infection s/p exploratory laparotomy with necrotic small bowel resection on 7/5. Returned to the OR on 7/6 for additional small bowel resection and abdominal closure.    Presented to the hospital on 7/5 with progressively worsening upper abdominal pain, associated with nausea, vomiting and poor appetite. He had been having bowel movements. No fevers or chills  on abdominal imaging he was found to have pneumatosis intestinalis. There was concern for ischemic bowel. The patient was subsequently taken to the OR for exploratory laparotomy for removal of the necrotic bowel  he had to be taken to the OR again on 7/6 for removal of the residual necrotic bowel and for abdominal wall closure  moved out of the ICU on 7/8    he had been on tube feeding with Osmolite 1.5 Elvis at 45 ml/hr (did not reach goal rate) containing 1780 kcal, 111 g protein and 219 g CHO   started on 7/8   discontinued on 7/10  discontinued after the patient had been having multiple watery stools along with nausea and vomiting    Plan is to restart tube feeding today starting at 15 ml/hr  patient does not report any significant nausea today, did receive anti-emetics  continues to have loose stools    Recent Labs   Lab 07/11/21  0412 07/11/21  0044 07/10/21  2108 07/10/21  1637 07/10/21  1116 07/10/21  0834 07/10/21  0712 07/09/21  0737 07/08/21  0426 07/07/21  0347 07/06/21  1722 07/06/21  0300   GLC  --   --   --   --   --   --  207* 147* 155* 104* 119* 116*   * 176* 186* 202* 173* 175*  --   --   --   --   --   --          Diabetes Type:  Type 2 Diabetes  Diabetes Duration: >5 years  Usual Diabetes Regimen:   NPH 18 units BID with TF  regular insulin sliding scale      Diabetes Control:   Lab Results   Component Value Date    A1C 6.4 07/05/2021     Diabetes Complications: unclear if he has retinopathy, peripheral neuropathy, nephropathy  Able to Detect Hypoglycemia: Good  Usual Diabetes Care Provider: PCP  Factors Impacting Glucose Control: nutrition, TF    Current Inpatient DM regimen:   Novolog high intensity sliding scale    Review of Systems  10 point ROS completed with pertinent positives and negatives noted in the HPI    Past medical, family and social histories are reviewed and updated.    Past Medical History  History reviewed. No pertinent past medical history.    Family History  History reviewed. No pertinent family history.    Social History  Social History     Socioeconomic History     Marital status:      Spouse name: None     Number of children: None     Years of education: None     Highest education level: None   Occupational History     None   Tobacco Use     Smoking status: None   Substance and Sexual Activity     Alcohol use: Not Currently     Drug use: Not Currently     Sexual activity: Not Currently   Other Topics Concern     None   Social History Narrative     None     Social Determinants of Health     Financial Resource Strain:      Difficulty of Paying Living Expenses:    Food Insecurity:      Worried About Running Out of Food in the Last Year:      Ran Out of Food in the Last Year:    Transportation Needs:      Lack of Transportation (Medical):      Lack of Transportation (Non-Medical):    Physical Activity:      Days of Exercise per Week:      Minutes of Exercise per Session:    Stress:      Feeling of Stress :    Social Connections:      Frequency of Communication with Friends and Family:      Frequency of Social Gatherings with Friends and Family:      Attends Yazdanism Services:      Active Member of Clubs or Organizations:      Attends Club or Organization Meetings:      Marital Status:    Intimate Partner Violence:      Fear of Current or  "Ex-Partner:      Emotionally Abused:      Physically Abused:      Sexually Abused:          Physical Examination:  Vital signs:  Temp: 98.8  F (37.1  C) Temp src: Oral BP: (!) 145/68 Pulse: 86   Resp: 14 SpO2: 99 % O2 Device: Nasal cannula Oxygen Delivery: 2 LPM Height: 177.8 cm (5' 10\") Weight: 64 kg (141 lb 1.5 oz)  Estimated body mass index is 20.24 kg/m  as calculated from the following:    Height as of this encounter: 1.778 m (5' 10\").    Weight as of this encounter: 64 kg (141 lb 1.5 oz).    Gen. appearance: lying in bed during assessment. Does not appear to be in acute distress  HEENT: oral cavity is moist unclear  lungs: clear to auscultation bilaterally  heart: S1 S2 normal. Pulse: regular rate and rhythm, good volume  abdomen: dressing noted over abdomen, tenderness around surgical site  neurological: conscious and fully oriented. Speech: normal. Weakness noted over upper extremities, unable to move lower extremities against gravity  extremities: pitting edema noted over lower extremities      Laboratory  Recent Labs   Lab Test 07/10/21  2014 07/10/21  1133 07/10/21  0712 07/09/21  0737   NA  --  151* 151* 147*   POTASSIUM 3.3*  --  3.3* 3.5   CHLORIDE  --   --  123* 119*   CO2  --   --  20 24   ANIONGAP  --   --  8 4   GLC  --   --  207* 147*   BUN  --   --  28 31*   CR  --   --  0.96 0.99   ELODIA  --   --  8.5 8.0*     CBC RESULTS:   Recent Labs   Lab Test 07/10/21  0712   WBC 10.0   RBC 2.91*   HGB 8.2*   HCT 26.8*   MCV 92   MCH 28.2   MCHC 30.6*   RDW 15.1*          Liver Function Studies -   Recent Labs   Lab Test 07/05/21  0454   PROTTOTAL 6.9   ALBUMIN 2.4*   BILITOTAL 0.2   ALKPHOS 91   AST 8   ALT 14       Active Medications  Current Facility-Administered Medications   Medication     acetaminophen (TYLENOL) solution 975 mg     albuterol (PROVENTIL) neb solution 2.5 mg     amylase-lipase-protease (CREON 24) 39934-16050 units per EC capsule 0-1 capsule    And     sodium bicarbonate tablet 325 " mg     dextrose 10% infusion     dextrose 5% and 0.2% NaCl + KCl 20 mEq/L infusion     glucose gel 15-30 g    Or     dextrose 50 % injection 25-50 mL    Or     glucagon injection 1 mg     enoxaparin ANTICOAGULANT (LOVENOX) injection 40 mg     gabapentin (NEURONTIN) solution 300 mg     HYDROmorphone (PF) (DILAUDID) injection 0.3-0.5 mg     insulin aspart (NovoLOG) injection (RAPID ACTING)     multivitamins w/minerals (CERTAVITE) liquid 15 mL     naloxone (NARCAN) injection 0.2 mg    Or     naloxone (NARCAN) injection 0.4 mg    Or     naloxone (NARCAN) injection 0.2 mg    Or     naloxone (NARCAN) injection 0.4 mg     ondansetron (ZOFRAN-ODT) ODT tab 4 mg     oxyCODONE (ROXICODONE) solution 5-10 mg     pantoprazole (PROTONIX) 2 mg/mL suspension 40 mg     protein modular (PROSOURCE TF) 2 packet     No current outpatient medications on file.       Current Diet  None

## 2021-07-11 NOTE — PROGRESS NOTES
[id number]    Major Shift Events:  A&Ox4. NSR with HR in 80s. Afebrile. RA during the day, 2L NC overnight for comfort. Denies nausea. Rated pain between a 2-6 overnight. Receiving PRN IV Dilaudid and PRN Oxycodone between scheduled Tylenol and Robaxin. Tube feedings on hold since yesterday morning. PEG tube with J-portion to gravity drainage; dark green output. G-portion clamped. Rectal pouch dislodged overnight. Patient continues to have a lot of watery, liquid, loose stools. Male external catheter in place with good urinary output. Pressure sore on coccyx bleeds when wiping bottom. D5+1/4NS+KCl 20mEq infusing at 75mL/hr. Midline outer ABD pad changed overnight due to seroous drainage. Q4H BG checks covered with sliding scale insulin. K+ replaced overnight; awaiting lab recheck this AM.    For vital signs and complete assessments, please see documentation flowsheets.

## 2021-07-11 NOTE — DOWNTIME EVENT NOTE
The EMR was down for 4 hours on 7/11/2021.    RN was responsible for completing the paper charting during this time period.     The following information was re-entered into the system by Arabella Brewster RN: Flowsheet data, Intake and output and MAR    Arabella Brewster RN  7/11/2021

## 2021-07-12 NOTE — PROGRESS NOTES
Surgery Progress Note  07/12/2021       Subjective:  Increased pain and N overnight, received Robaxin, IV dilaudid, oxycodone and Zofran. TF restarted yesterday. Rectal tube dislodged overnight with liquid stool. No fever/chills.      Objective:  Temp:  [98.8  F (37.1  C)-99.2  F (37.3  C)] 99.2  F (37.3  C)  Pulse:  [] 90  Resp:  [10-18] 10  BP: (135-146)/(71-79) 141/76  SpO2:  [99 %-100 %] 100 %    I/O last 3 completed shifts:  In: 5206.25 [P.O.:1200; I.V.:3116.25; NG/GT:590]  Out: 2235 [Urine:1000; Emesis/NG output:925; Drains:10; Stool:300]      Gen: Awake, alert, NAD  Resp: NLB on RA  Abd: soft, nondistended, erythema around incision outlined with skin marker  Incision: proximal 1/3 with open skin edges, small amount of fibrinous exudate on dressing. ERLIN drain w/ scant serosanguinous output  Ext: peripheral wasting, 2+ pitting edema, feet > lower legs     Results for orders placed or performed during the hospital encounter of 07/05/21 (from the past 24 hour(s))   Glucose by meter   Result Value Ref Range    GLUCOSE BY METER POCT 169 (H) 70 - 99 mg/dL   Sodium   Result Value Ref Range    Sodium 146 (H) 133 - 144 mmol/L   Phosphorus   Result Value Ref Range    Phosphorus 2.1 (L) 2.5 - 4.5 mg/dL   Glucose by meter   Result Value Ref Range    GLUCOSE BY METER POCT 146 (H) 70 - 99 mg/dL   Glucose by meter   Result Value Ref Range    GLUCOSE BY METER POCT 181 (H) 70 - 99 mg/dL   Glucose by meter   Result Value Ref Range    GLUCOSE BY METER POCT 192 (H) 70 - 99 mg/dL   Glucose by meter   Result Value Ref Range    GLUCOSE BY METER POCT 199 (H) 70 - 99 mg/dL   Glucose by meter   Result Value Ref Range    GLUCOSE BY METER POCT 192 (H) 70 - 99 mg/dL   Basic metabolic panel   Result Value Ref Range    Sodium 146 (H) 133 - 144 mmol/L    Potassium 3.3 (L) 3.4 - 5.3 mmol/L    Chloride 118 (H) 94 - 109 mmol/L    Carbon Dioxide (CO2) 20 20 - 32 mmol/L    Anion Gap 8 3 - 14 mmol/L    Urea Nitrogen 14 7 - 30 mg/dL     Creatinine 0.91 0.66 - 1.25 mg/dL    Calcium 8.5 8.5 - 10.1 mg/dL    Glucose 197 (H) 70 - 99 mg/dL    GFR Estimate 88 >60 mL/min/1.73m2   CBC with platelets   Result Value Ref Range    WBC Count 20.3 (H) 4.0 - 11.0 10e3/uL    RBC Count 2.91 (L) 4.40 - 5.90 10e6/uL    Hemoglobin 8.1 (L) 13.3 - 17.7 g/dL    Hematocrit 26.8 (L) 40.0 - 53.0 %    MCV 92 78 - 100 fL    MCH 27.8 26.5 - 33.0 pg    MCHC 30.2 (L) 31.5 - 36.5 g/dL    RDW 15.7 (H) 10.0 - 15.0 %    Platelet Count 247 150 - 450 10e3/uL   Magnesium   Result Value Ref Range    Magnesium 1.7 1.6 - 2.3 mg/dL   Phosphorus   Result Value Ref Range    Phosphorus 2.3 (L) 2.5 - 4.5 mg/dL   Asymptomatic COVID-19 Virus (Coronavirus) by PCR Nasopharyngeal    Specimen: Nasopharyngeal; Swab    Narrative    The following orders were created for panel order Asymptomatic COVID-19 Virus (Coronavirus) by PCR Nasopharyngeal.  Procedure                               Abnormality         Status                     ---------                               -----------         ------                     SARS-COV2 (COVID-19) Vir...[[id number]]  Normal              Final result                 Please view results for these tests on the individual orders.   SARS-COV2 (COVID-19) Virus RT-PCR    Specimen: Nasopharyngeal; Swab   Result Value Ref Range    SARS CoV2 PCR Negative Negative    Narrative    Testing was performed using the Xpert Xpress SARS-CoV-2 Assay on the  Cepheid Gene-Xpert Instrument Systems. Additional information about  this Emergency Use Authorization (EUA) assay can be found via the Lab  Guide. This test should be ordered for the detection of SARS-CoV-2 in  individuals who meet SARS-CoV-2 clinical and/or epidemiological  criteria. Test performance is unknown in asymptomatic patients. This  test is for in vitro diagnostic use under the FDA EUA for  laboratories certified under CLIA to perform high complexity testing.  This test has not been FDA cleared or approved. A negative  result  does not rule out the presence of PCR inhibitors in the specimen or  target RNA in concentration below the limit of detection for the  assay. The possibility of a false negative should be considered if  the patient's recent exposure or clinical presentation suggests  COVID-19. This test was validated by the [facility]. This laboratory is certified under  the Clinical Laboratory Improvement Amendments of 1988 (CLIA-88) as  qualified to perform high complexity laboratory testing.         Imaging:  None    Assessment/Plan:   Gino Zamudio is a 64 yo male with h/o testicular cancer s/p radiation therapy, paraparesis, biliary stenosis, duodenal stenosis, failure to thrive s/p PEG-J c/b GI bleed, chronic pancreatitis, prior lap augustine c/b intussusception s/p small bowel resection, admitted with intraabdominal sepsis, s/p ex-lap with necrotic small bowel, resected on 7/5, returned to OR 7/6 for additional small bowel resection and abdominal closure. Now extubated, was transferred out of the ICU on 7/8. Regressed in his recovery on 7/10 with poor tolerance of tubefeeds. Pain improved and resumed TF yesterday but had feeds in G instead of J. Will monitor after correction and titrate up as tolerated.     - Pain control with dilaudid, liquid oxy, robaxin  - Continue TF via J at 15 ml/h. Will reassess tomorrow to increase if tolerating. Will discuss adding fiber to TF and investigate medications potentially causing diarrhea.  - G tube to gravity  - NPO with sips for comfort  - Endocrine consulted for diabetes management given sugars > 200, will continue HSSI until tube feeds increased. Appreciate recommendations  - TID abdominal wound dressing changes. Please try to use abdominal binder to secure dressing, no tape. If not tolerating, okay to use tape  - Abx completed 7/10. WBC count 20.3 today, up from 13.9 yesterday. Added C.diff PCR, will repeat CT scan, start vancomycin  and Zosyn  - Lymphedema consult for BLE wraps given edema  - Hypernatremia improving, continue mIVF  - Ppx: lovenox and protonix       Seen, examined, and discussed with chief resident, who will discuss with staff.    I was present with the medical student who participated in the service and in the documentation of the note. I have verified the history and personally performed the physical exam and medical decision making. I agree with the assessment and plan of care as documented in the note.    Paulette Marques MD  PGY-1, General Surgery  [phone]  - - - - - - - - - - - - - - - - - -  Erin Hill MS4 MPH  [employer]

## 2021-07-12 NOTE — CONSULTS
Care Management Initial Consult    General Information  Assessment completed with: Patient at bedside, spouse Ines via phone [phone]   Type of CM/SW Visit: Initial Assessment  Primary Care Provider verified and updated as needed: Yes   Readmission within the last 30 days: no previous admission in last 30 days   Advance Care Planning: Advance Care Planning Reviewed: present on chart, verified with patient          Communication Assessment  Patient's communication style: spoken language (English or Bilingual)    Hearing Difficulty or Deaf: no   Wear Glasses or Blind: yes    Cognitive  Cognitive/Neuro/Behavioral: WDL  Level of Consciousness: alert  Arousal Level: opens eyes spontaneously  Orientation: oriented x 4  Mood/Behavior: flat affect, cooperative  Best Language: 0 - No aphasia  Speech: clear, spontaneous, logical    Living Environment:   People in home: facility resident     Current living Arrangements:  ([facility])      Able to return to prior arrangements: TBD - pt stating he would like a different TCU at time of discharge.        Family/Social Support:  Care provided by:  (Staff)  Provides care for: no one  Marital Status:   Wife, Children (3 daughters - 2 in [place], [place] and 1 in [place])    Description of Support System: Supportive, Involved         Current Resources:   Patient receiving home care services: No  Community Resources: None  Equipment currently used at home: other (see comments) (lives at care facility)  Supplies currently used at home: None    Employment/Financial:  Employment Status: retired        Financial Concerns: No concerns identified        Functional Status:  Prior to admission patient needed assistance: PTA, pt was dependent on facility staff for ADL's and IADL's.     Mental Health Status:  Mental Health Status: No Current Concerns       Chemical Dependency Status:  Chemical Dependency Status: No Current Concerns              Values/Beliefs:  Spiritual, Cultural Beliefs, Anabaptism Practices, Values that affect care: no               Additional Information:  SW called [facility] admissions [phone] and left VM requesting return phone call re: pt's bed hold status.  SW met with pt at bedside to introduce self and role on treatment team. Pt confirmed he is coming from [facility] and has been paying for a bed hold, however would like to discontinue this and look at a different TCU at discharge. Pt reports he was unhappy with his care. Pt requested SW come back tomorrow a list for TCU's in [place], [place] (as this is where his home is located.) SW will come back tomorrow. Pt requested SW call his spouse.   SW called Ines to introduce self and role on treatment team. Ines was in support of pt looking at different TCU's.    HELENA Biswas, LGSW  6D Adult Acute Care   Ph:[phone]  Pager [phone]

## 2021-07-12 NOTE — PLAN OF CARE
Notified MD (General Surgery Night Coverage) at 12:56 AM;  regarding order clarification and increased pain.      Orders were obtained for Robaxin.    Comments:     Robb TOMPKINS, [id number]: Pt is experiencing more pain this evening. Was getting Robaxin scheduled 4X/day, but this dropped off of the MAR earlier on dayshift. Can we add this again to his pain regimen? Thanks! [place] [id number]

## 2021-07-12 NOTE — PHARMACY-VANCOMYCIN DOSING SERVICE
"Pharmacy Vancomycin Initial Note  Date of Service 2021  Patient's  1955  65 year old, male    Indication: Postoperative Infection    Current estimated CrCl = Estimated Creatinine Clearance: 72.3 mL/min (based on SCr of 0.91 mg/dL).    Creatinine for last 3 days  7/10/2021:  7:12 AM Creatinine 0.96 mg/dL  2021:  7:39 AM Creatinine 0.91 mg/dL  2021:  8:55 AM Creatinine 0.91 mg/dL    Recent Vancomycin Level(s) for last 3 days  No results found for requested labs within last 72 hours.      Vancomycin IV Administrations (past 72 hours)      No vancomycin orders with administrations in past 72 hours.                Nephrotoxins and other renal medications (From now, onward)    Start     Dose/Rate Route Frequency Ordered Stop    21 1200  piperacillin-tazobactam (ZOSYN) 3.375 g vial to attach to  mL bag     Note to Pharmacy: For [facility], [facility] and [facility]: For Zosyn-naive patients, use the \"Zosyn initial dose + extended infusion\" order panel.    3.375 g  over 30 Minutes Intravenous EVERY 8 HOURS 21 1158            Contrast Orders - past 72 hours (72h ago, onward)    None          Loading dose: 1500 mg at 12:18 2021.  Regimen: 1250 mg IV every 24 hours.  Start time: 12:22 on 2021  Exposure target: AUC24 (range)400-600 mg/L.hr   AUC24,ss: 413 mg/L.hr  Probability of AUC24 > 400: 54 %  Ctrough,ss: 11 mg/L  Probability of Ctrough,ss > 20: 10 %  Probability of nephrotoxicity (Lodise LORE ): 7 %        Plan:  1. Start vancomycin  1500mg (23mg/kg) IV once followed by 1250 (20mg/kg) mg IV q24h.   2. Vancomycin monitoring method: AUC  3. Vancomycin therapeutic monitoring goal: 400-600 mg*h/L  4. Pharmacy will check vancomycin levels as appropriate in 1-3 Days.    5. Serum creatinine levels will be ordered daily for the first week of therapy and at least twice weekly for subsequent weeks.      Willy Colon RPH    "

## 2021-07-12 NOTE — PROGRESS NOTES
Inpatient Diabetes Management Service - Daily Progress Note    Chief Complaint T2DM on TF and high intesnity sliding scale  Consult requested by: Dr. Paulette Marques MD      Assessment and plan:  Nick Zamudio is a 65 year old male with PMHx of type 2 diabetes, testicular cancer s/p radiation therapy, partial quadriplegia, chronic pancreatitis, type 2 diabetes, biliary stenosis s/p ERCP, duodenal stenosis, prior laparoscopic cholecystectomy complicated by intusseption s/p small bowel resection. He was admitted with sepsis secondary to intra-abdominal infection s/p exploratory laparotomy with necrotic small bowel resection on 7/5. Returned to the OR on 7/6 for additional small bowel resection and abdominal closure.  Currently on TF that have been restarted at 15 ml/hr at 0900 on 7/11/21      We will start NPH 4 units BID for tube feeding today.  Dose will need to be adjusted once TF rate is increased    We will continue NovoLog correction scale insulin every 4 hours    glucose checks every four hours    hypoglycemia protocol    Plan discussed with patient and bedside nurse.    =================================================================      Interval History   TF re-started at 15 ml/h on 7/11/21 at 0900.  Per note G rather than J tube was initially used. This was corrected and patient is now feeling better.  Plan is to continue TF at 15 ml/h for today and increase it tomorrow depending on how he is tolerating it.  Patient continues to have loose stools.     BG are reasonable with insulin correction every 4 hours.        Recent Labs   Lab 07/12/21  0824 07/12/21  0648 07/12/21  0339 07/12/21  0049 07/11/21  2037 07/11/21  1702 07/11/21  0739 07/11/21  0412 07/11/21  0044 07/10/21  2108 07/10/21  1637 07/10/21  1116 07/10/21  0834   * 199* 192* 181* 146* 169*   < >  --   --   --   --   --   --    BGM  --   --   --   --   --   --   --  145* 176* 186* 202* 173* 175*    < > = values in this interval not  "displayed.        Physical Examination:  Vital signs:  Temp: 98.9  F (37.2  C) Temp src: Oral BP: (!) 150/78 Pulse: 98   Resp: 13 SpO2: 100 % O2 Device: Nasal cannula Oxygen Delivery: 2 LPM Height: 177.8 cm (5' 10\") Weight: 64 kg (141 lb 1.5 oz)  Estimated body mass index is 20.24 kg/m  as calculated from the following:    Height as of this encounter: 1.778 m (5' 10\").    Weight as of this encounter: 64 kg (141 lb 1.5 oz).    Gen. appearance: lying in bed during assessment. Does not appear to be in acute distress  HEENT: oral cavity is moist and clear  Neurological: conscious and fully oriented. Speech: normal. Weakness noted over upper extremities, unable to move lower extremities against gravity  Extremities: pitting edema on lower extremities      Laboratory  Recent Labs   Lab Test 07/10/21  2014 07/10/21  1133 07/10/21  0712 07/09/21  0737   NA  --  151* 151* 147*   POTASSIUM 3.3*  --  3.3* 3.5   CHLORIDE  --   --  123* 119*   CO2  --   --  20 24   ANIONGAP  --   --  8 4   GLC  --   --  207* 147*   BUN  --   --  28 31*   CR  --   --  0.96 0.99   ELODIA  --   --  8.5 8.0*     CBC RESULTS:   Recent Labs   Lab Test 07/10/21  0712   WBC 10.0   RBC 2.91*   HGB 8.2*   HCT 26.8*   MCV 92   MCH 28.2   MCHC 30.6*   RDW 15.1*          Liver Function Studies -   Recent Labs   Lab Test 07/05/21  0454   PROTTOTAL 6.9   ALBUMIN 2.4*   BILITOTAL 0.2   ALKPHOS 91   AST 8   ALT 14       Active Medications  Current Facility-Administered Medications   Medication     acetaminophen (TYLENOL) solution 975 mg     albuterol (PROVENTIL) neb solution 2.5 mg     amylase-lipase-protease (CREON 24) 52807-26247 units per EC capsule 0-1 capsule    And     sodium bicarbonate tablet 325 mg     dextrose 10% infusion     dextrose 5% and 0.2% NaCl + KCl 20 mEq/L infusion     glucose gel 15-30 g    Or     dextrose 50 % injection 25-50 mL    Or     glucagon injection 1 mg     enoxaparin ANTICOAGULANT (LOVENOX) injection 40 mg     gabapentin " (NEURONTIN) solution 300 mg     HYDROmorphone (PF) (DILAUDID) injection 0.3-0.5 mg     insulin aspart (NovoLOG) injection (RAPID ACTING)     methocarbamol (ROBAXIN) tablet 500 mg     multivitamins w/minerals (CERTAVITE) liquid 15 mL     naloxone (NARCAN) injection 0.2 mg    Or     naloxone (NARCAN) injection 0.4 mg    Or     naloxone (NARCAN) injection 0.2 mg    Or     naloxone (NARCAN) injection 0.4 mg     ondansetron (ZOFRAN-ODT) ODT tab 4 mg     oxyCODONE (ROXICODONE) solution 5-10 mg     pantoprazole (PROTONIX) 2 mg/mL suspension 40 mg     potassium & sodium phosphates (NEUTRA-PHOS) Packet 1 packet     protein modular (PROSOURCE TF) 2 packet     No current outpatient medications on file.       Current Diet  None    TF at 15 ml/hour    It was a pleasure to see you at your recent visit.  Please let me know if you have any questions or concerns.  Sincerely,    Naina Mendieta MD PhD    Division of Endocrinology and Diabetes  Diabetes Management Team job code: [id number]

## 2021-07-12 NOTE — PROGRESS NOTES
CLINICAL NUTRITION SERVICES - REASSESSMENT NOTE     Nutrition Prescription    RECOMMENDATIONS FOR MDs/PROVIDERS TO ORDER:  1. Recommend starting Zinc Sulfate (220 mg daily x 10 days) given low level. Zinc deficiency can cause diarrhea.   2. TF advancement per primary team, goal of 45 mL/hr.   3. Consider changing tylenol solution to tablet (crush/flush) due to high osmolarity of tylenol solution.  4. Recommend switching to non-dextrose containing IVF once receiving goal TF.   5. Recommend rechecking fat soluble vitamins and zinc in 8 weeks.     Malnutrition Status:    Severe malnutrition in the context of chronic illness    Recommendations already ordered by Registered Dietitian (RD):  1. 2 mL AquADEK daily to supplement vitamin A, D, E, and K.    2. Continue current EN rate.     Future/Additional Recommendations:  Monitor TF tolerance and ability to advance to goal rate.      EVALUATION OF THE PROGRESS TOWARD GOALS   Diet: NPO  Nutrition Support: Osmolite 1.5 via J-tube @ 15 mL/hr + 4 pkts ProSource. Provides 700 kcal (12 kcal/kg) and 67 g PRO (1.1 g/kg).   Intake: TF re-started at 15 ml/h on 7/11/21 at 0900.  Per note G rather than J tube was initially used. This was corrected and patient is now feeling better.  Plan is to continue TF at 15 ml/h for today and increase it tomorrow depending on how he is tolerating it.        NEW FINDINGS   Weight: 64 kg on 7/10, weight up from admit wt.     Labs:   Na 146 (H)  K+ 3.3 (L)  Mg 1.7 (WNL)  Phos 2.3 (L)  BG 7/11-7/12: 145-197  Vit A 0.11 (L)  Vit D 6 (L)  Vit E 3.5 (L)  Vit K 0.22 (WNL, low end)  INR 1.3 (H)  Zinc 23.7 (L)  Fecal elastase 6.8 (L)    Meds:   Tylenol solution  PERT w/ TFs- once TFs at 30 mL/hr  Novolog  NPH  Certavite  Kayciel  D5 @ 75 mL/hr    GI:   Patient continues to have loose stools  Rectal tube dislodged overnight with liquid stool  C. Diff ordered     MALNUTRITION  % Intake: </= 50% for >/= 5 days (severe)  % Weight Loss: None noted  Subcutaneous  Fat Loss: Facial region:  severe, Upper arm:  moderate and Thoracic/intercostal:  moderate  Muscle Loss: Temporal:  severe, Facial & jaw region:  severe, Thoracic region (clavicle, acromium bone, deltoid, trapezius, pectoral):  severe, Upper arm (bicep, tricep):  severe, Upper leg (quadricep, hamstring):  severe, Patellar region:  severe and Posterior calf:  severe  Fluid Accumulation/Edema: Moderate  Malnutrition Diagnosis: Severe malnutrition in the context of chronic illness    Previous Goals   Total avg nutritional intake to meet a minimum of 25 kcal/kg and 1.5 g PRO/kg daily (per dosing wt 59 kg).  Evaluation: Not met    Previous Nutrition Diagnosis  Inadequate protein-energy intake related to mechanical ventilation and altered GI function as evidenced by NPO status x at least 1 day without the start of nutrition support, currently meeting 0% of needs.     Evaluation: No change    CURRENT NUTRITION DIAGNOSIS  Inadequate protein-energy intake related to altered GI function as evidenced slow TF advancement, currently meeting <50% nutirtion needs    INTERVENTIONS  Implementation  Collaboration with other providers - Paged provider regarding recommendations above  Enteral Nutrition - Continue at current rate per provider  Multivitamin/mineral supplement therapy - AquADEKS 2 mL per day, recommendation for zinc supplementation above     Goals  Total avg nutritional intake to meet a minimum of 25 kcal/kg and 1.5 g PRO/kg daily (per dosing wt 59 kg).    Monitoring/Evaluation  Progress toward goals will be monitored and evaluated per protocol.    Crissy Alvarado, RD, LD  6D RD pager [phone]

## 2021-07-12 NOTE — PROVIDER NOTIFICATION
Notified MD (General Surgery Night Coverage) at 5:52 AM regarding an error found by writer from previous shift when patient's TFs were re-initiated at 15mL/hr yesterday on dayshift and to update team on patient's condition regarding this mistake.     Comments:    GISEL Zamudio, [id number]: Fyi found that previous RN had TFs running into G of PEG instead of J as ordered. J had been to gravity until now. 225mL total output from J when it was to gravity. Pt has had more pain overnight & mild nausea at beginning of shift relieved with Zofran. Thanks[place] [id number]

## 2021-07-12 NOTE — PROGRESS NOTES
[id number]    Major Shift Events: A&Ox4. Afebrile on shift. Had some mild nausea at beginning of shift relieved with Zofran. NSR with HR in 80s-90s. On 2L O2 NC overnight for comfort. Rectal tube in place, but is leaking large amounts of loose, watery brown stool. Male external catheter patent with adequate urinary output. Abdominal pain overnight managed with PRN IV Dilaudid, PRN Oxycodone, scheduled Tylenol and scheduled Robaxin. Phosphorus replacement started on shift; was 2.3. Next dose to be administered this AM. TF's corrected to order (please see previous note) and infusing at 15mL/hr. Q4H BG checks covered with sliding scale insulin.     For vital signs and complete assessments, please see documentation flowsheets.

## 2021-07-13 NOTE — PROGRESS NOTES
Inpatient Diabetes Management Service - Daily Progress Note    Chief Complaint T2DM on TF and high intesnity sliding scale  Consult requested by: Dr. Paulette Marques MD      Assessment and plan:  Nick Zamudio is a 65 year old male with PMHx of type 2 diabetes, testicular cancer s/p radiation therapy, partial quadriplegia, chronic pancreatitis, type 2 diabetes, biliary stenosis s/p ERCP, duodenal stenosis, prior laparoscopic cholecystectomy complicated by intusseption s/p small bowel resection. He was admitted with sepsis secondary to intra-abdominal infection s/p exploratory laparotomy with necrotic small bowel resection on 7/5. Returned to the OR on 7/6 for additional small bowel resection and abdominal closure.    Currently on TF continuing at 15 ml/hr with BG at goal. It is unclear how much of this is being absorbed and TPN will start tonight concurrently with an additional 90 g carb.          We will continue NPH 4 units this morning.  Will cautiously increase NPH to 6 units this evening ONCE TPN has started.    Advise if TPN is interrupted for any reason.  Dextrose will need to continue to run.     We will continue NovoLog correction scale insulin every 4 hours    glucose checks every four hours    hypoglycemia protocol    Plan discussed with bedside nurse.  Note left for patent.      =================================================================      Interval History   TF re-started at 15 ml/h on 7/11/21 at 0900.  Nonetheless he continues to have nausea and large amounts of loose stool.      BG are at goal 137 - 163 with bid NPH insulin and aspart correction every 4 hours.      He had increased pain overnight and plan is to limit po intake.  Nutrition consulted and beginning TPN tonight at 8 pm in addition to TF.  Currently receiving 73 g carb with TF if absorbed which is questionable.  TPN beginning tonight will provide additional 90 g/day. Plan is to add another 45 g tomorrow and additional 45 g  "Thursday to reach goal of 180 g carb via TPN in addition to 73 daily with TF. (Total daily carb 253 g.)       Recent Labs   Lab 07/13/21  0730 07/13/21  0456 07/12/21  2301 07/12/21  2022 07/12/21  1653 07/12/21  1420 07/11/21  0739 07/11/21  0412 07/11/21  0044 07/10/21  2108 07/10/21  1637 07/10/21  1116 07/10/21  0834   * 152* 147* 145* 145* 163*   < >  --   --   --   --   --   --    BGM  --   --   --   --   --   --   --  145* 176* 186* 202* 173* 175*    < > = values in this interval not displayed.        Physical Examination:  Vital signs:  Temp: 99.2  F (37.3  C) Temp src: Oral BP: 113/62 Pulse: 93   Resp: 10 SpO2: 100 % O2 Device: Nasal cannula Oxygen Delivery: 2 LPM Height: 177.8 cm (5' 10\") Weight: 64 kg (141 lb 1.5 oz)  Estimated body mass index is 20.24 kg/m  as calculated from the following:    Height as of this encounter: 1.778 m (5' 10\").    Weight as of this encounter: 64 kg (141 lb 1.5 oz).    Gen. appearance: lying in bed during assessment. He is asleep and unable to arouse with gentle speech. Gaunt.   Skin is dry and grayish in appearence.    HEENT: oral cavity is moist and clear  Neurological: Asleep.  Extremities: pitting edema in his feet.  While legs are slim, his feet appear to be large clubs.        Laboratory  Recent Labs   Lab Test 07/10/21  2014 07/10/21  1133 07/10/21  0712 07/09/21  0737   NA  --  151* 151* 147*   POTASSIUM 3.3*  --  3.3* 3.5   CHLORIDE  --   --  123* 119*   CO2  --   --  20 24   ANIONGAP  --   --  8 4   GLC  --   --  207* 147*   BUN  --   --  28 31*   CR  --   --  0.96 0.99   ELODIA  --   --  8.5 8.0*     CBC RESULTS:   Recent Labs   Lab Test 07/10/21  0712   WBC 10.0   RBC 2.91*   HGB 8.2*   HCT 26.8*   MCV 92   MCH 28.2   MCHC 30.6*   RDW 15.1*          Liver Function Studies -   Recent Labs   Lab Test 07/05/21  0454   PROTTOTAL 6.9   ALBUMIN 2.4*   BILITOTAL 0.2   ALKPHOS 91   AST 8   ALT 14       Active Medications  Current Facility-Administered " Medications   Medication     acetaminophen (TYLENOL) tablet 975 mg     albuterol (PROVENTIL) neb solution 2.5 mg     amylase-lipase-protease (CREON 24) 94377-75817 units per EC capsule 0-1 capsule    And     sodium bicarbonate tablet 325 mg     dextrose 10% infusion     dextrose 5% and 0.2% NaCl + KCl 20 mEq/L infusion     glucose gel 15-30 g    Or     dextrose 50 % injection 25-50 mL    Or     glucagon injection 1 mg     enoxaparin ANTICOAGULANT (LOVENOX) injection 40 mg     gabapentin (NEURONTIN) solution 300 mg     HYDROmorphone (PF) (DILAUDID) injection 0.3-0.5 mg     insulin aspart (NovoLOG) injection (RAPID ACTING)     insulin NPH injection 4 Units     insulin NPH injection 4 Units     lidocaine (LMX4) cream     lidocaine 1 % 0.1-5 mL     methocarbamol (ROBAXIN) injection 750 mg     multivitamin CF FORMULA (AquADEKS) liquid 2 mL     naloxone (NARCAN) injection 0.2 mg    Or     naloxone (NARCAN) injection 0.4 mg    Or     naloxone (NARCAN) injection 0.2 mg    Or     naloxone (NARCAN) injection 0.4 mg     ondansetron (ZOFRAN-ODT) ODT tab 4 mg     oxyCODONE (ROXICODONE) solution 5-10 mg     pantoprazole (PROTONIX) IV push injection 40 mg     piperacillin-tazobactam (ZOSYN) 3.375 g vial to attach to  mL bag     protein modular (PROSOURCE TF) 2 packet     sodium chloride (PF) 0.9% PF flush 10-40 mL     vancomycin 1250 mg in 0.9% NaCl 250 mL intermittent infusion 1,250 mg     zinc sulfate (ZINCATE) capsule 220 mg     No current outpatient medications on file.       Current Diet  None    TF at 15 ml/hour.    As above:  Currently receiving 73 g carb with TF if absorbed which is questionable.  TPN beginning tonight will provide additional 90 g/day. Plan is to add another 45 g tomorrow and additional 45 g Thursday to reach goal of 180 g carb via TPN in addition to 73 daily with TF. (Total daily carb 253 g.)     Also has Dextrose running.        It is my privilege to be involved in the care of the above patient.      Rosalee Vincent PA-C, MPAS  [facility]  Diabetes, Endocrinology, and Metabolism  [phone] Appointments/Nurse  [phone] Fax  [phone] pager  [phone] nurse line          Diabetes Management Team job code: [phone]

## 2021-07-13 NOTE — PROGRESS NOTES
Care Management Follow Up    Length of Stay (days): 8    Expected Discharge Date:  TBD     Concerns to be Addressed: discharge planning     Patient plan of care discussed at interdisciplinary rounds: Yes    Anticipated Discharge Disposition: Transitional Care     Anticipated Discharge Services: None  Anticipated Discharge DME: None    Patient/family educated on Medicare website which has current facility and service quality ratings: yes  Education Provided on the Discharge Plan:  Yes  Patient/Family in Agreement with the Plan: yes    Referrals Placed by CM/SW:  None as of yet  Private pay costs discussed: Not applicable    Additional Information:  SW met with pt and spouse at bedside. SW provided medicare.gov list of rated facilities for pt and spouse to review.   SW to continue to follow for discharge planning needs.    HELENA Biswas, PATYSW  6D Adult Acute Care   Ph:[phone]  Pager [phone]

## 2021-07-13 NOTE — CONSULTS
[facility] Nurse Inpatient Wound Assessment   Reason for consultation: Evaluate and treat  Pubic and coccyx wounds    Assessment  Pubic are wounds due to chronic skin area from radiation   Upper buttock/sacrococcygeal/intergluteal cleft- severe MASD- deteriorating d/t severe chronic loose incontinent stools- Per RN internal fecal management system failed due to internal polyps and leaking external pouches- will try external pouch on Th once has more epithelial tissue on open areas  Mid back- Trauma vs PI POA- if PI stage 2 vs 3  Status: evolving, deteriorating and follow up    Treatment Plan  Buttocks/sacrococcygeal area and intergluteal cleft wounds: Every shift and PRN     Cleanse the area with Nichelle cleanse and protect, very gently with soft cloth.  Apply ostomy powder (#7839) on all open and denuded skin.  Apply Triad paste (#365389) on top of it.  With repeat application, do not scrub the paste, only remove soiled paste and reapply.  If complete removal of paste is necessary use baby oil/mineral oil and soft wash cloth.  Leave the brief off in bed to let the area dry as much as possible.  Turn and reposition Q 2hrs.   Ensure pt has Mark-cushion (#485105) while sitting up in the chair.    Continue with pulsate mattress. Use only one Covidien pad in between mattress and pt. No diaper while in bed.     Ensure pt has Mark-cushion (#670414) while sitting up in the chair.      POC for wound located on Mid back-    Cleanse the area with NS and pat dry.    Apply No sting film barrier to periwound skin.    Cover wound with Mepilex    Change dressing Q 3 days.    Pubic area- leave it ROSALBA   Orders Written  Recommended provider order: None, at this time  WO Nurse follow-up plan:twice weekly  Nursing to notify the Provider(s) and re-consult the WOC Nurse if wound(s) deteriorates or new skin concern.    Patient History  According to provider note(s):  Gino Zamudio is a 66 yo male with h/o testicular cancer s/p radiation therapy,  paraparesis, biliary stenosis, duodenal stenosis, failure to thrive s/p PEG-J c/b GI bleed, chronic pancreatitis, prior lap augustine c/b intussusception s/p small bowel resection, now with intraabdominal sepsis s/p small bowel resection on 7/5, in discontinuity with open abdomen.     Objective Data  Containment of urine/stool: Indwelling catheter    Active Diet Order  None      Output:   I/O last 3 completed shifts:  In: 2616.25 [P.O.:720; I.V.:1501.25; NG/GT:275]  Out: 1835 [Urine:1100; Emesis/NG output:735]    Risk Assessment:   Sensory Perception: 2-->very limited  Moisture: 2-->very moist  Activity: 1-->bedfast  Mobility: 2-->very limited  Nutrition: 2-->probably inadequate  Friction and Shear: 1-->problem  Levy Score: 10                          Labs:   Recent Labs   Lab 07/13/21  0730 07/12/21  0855   HGB 8.3* 8.1*   WBC 25.7* 20.3*   CRP  --  250.0*         Physical Exam  Areas of skin assessed: focused pubic area, coccyx, buttocks, back, perianal area    Wound Location:  Pubic area     7/6    2. Buttocks (intergluteal cleft)/sacrococcegeal area      7/2 7/13        Date of last photo 7/13    Wound History: POA. deteriorating d/t severe chronic loose incontinent stools - Per RN internal fecal management system failed due to internal polyps and leaking external pouches- will try external pouch on Th once has more epithelial tissue on open areas    Wound Base: 70 % dermis and 30% epidermal necrosis     Palpation of the wound bed: normal      Drainage: scant     Description of drainage: none     Measurements (length x width x depth, in cm) 6  x 7  x 0.1 cm      Tunneling N/A     Undermining N/A  Periwound skin: intact and erythema- blanchable that extends about 9 cm almost circumferentially      Color: pink      Temperature: normal   Odor: none  Pain: facial expression of distress,    3. Mid back      Date of last photo 7/2  Wound History:POA,  trauma vs PI    Wound Base: 100 % dermis/fibrin vs slough tissue     Palpation of the wound bed: normal      Drainage: scant     Description of drainage: none     Measurements (length x width x depth, in cm) 0.5  x 0.3  x 0.1 cm (no significant changes)     Tunneling N/A     Undermining N/A  Periwound skin: intact and erythema- blanchable that extends about 0.8 cm almost circumferentially      Color: pink      Temperature: normal   Odor: none  Pain: facial expression of distress,    Interventions  Visual inspection and assessment completed   Wound Care Rationale Improve absorptive capacity, Promote moist wound healing without tissue dehydration  and Provide protection   Wound Care: done per plan of care  Supplies: floor stock and discussed with RN  Current off-loading measures: Pillows  Current support surface: Standard  Atmos Air mattress  Education provided to: importance of repositioning, plan of care, wound progress, Moisture management and Off-loading pressure  Discussed plan of care with Nurse    Chiquita Rosario RN

## 2021-07-13 NOTE — PROGRESS NOTES
IP Diabetes Management  Daily Note           Assessment and Plan:   HPI:Nick Zamudio is a 65 year old male with PMHx of type 2 diabetes, testicular cancer s/p radiation therapy, partial quadriplegia, chronic pancreatitis, biliary stenosis s/p ERCP, duodenal stenosis, prior laparoscopic cholecystectomy complicated by intusseption s/p small bowel resection. He was admitted with sepsis secondary to intra-abdominal infection s/p exploratory laparotomy with necrotic small bowel resection on 7/5. Returned to the OR on 7/6 for additional small bowel resection and abdominal closure. Post op course has been complicated by evidence of ileus on imaging, and profuse watery diarrhea with TF, limiting nutritional advancement.     Assessment:   1)Type II Diabetes Mellitus  2) TF induced hyperglycemia     Plan:    -NPH 4 units x1 this morning- covering dextrose in TPN until insulin can be added to bag today, even though TF are off.    -add regular insulin into TPN today: 0.12 units per gram (10 units for 90g dextrose)   -note- dextrose containing IVF are stopping today per RD.   -reduce aspart sliding scale from high to moderate intensity, q4h    -BG monitoring q4h   -hypoglycemia protocol   -carb counting protocol   -diabetes education needs are not identified.     Outpatient follow up: with PCP versus [facility].   Plan discussed with patient, bedside RN, and primary team ia this note.        Interval History and Assessment: interval glucose trend reviewed:  BG were well controlled yesterday. Dip to 70's in the evening yesterday. ? Interruption in TF (not clear per documentation). Nevertheless, he is likely minimally absorbing carb content of TF with profuse diarrhea.     TPN was initiated yesterday at 2000. BG trending up to 170's overnight with 2 extra units of NPH given subQ to cover the dextrose content.     TF were stopped at 0300 as bag ran out, and no equivalent TF product was available to hang. Likely that  TPN running with less than typical insulin dosing to cover prevented hypoglycemia as a result. He is now developing hyperglycemia with TPN running, no insulin in bag; will administer subQ NPH until new bag can be hung with insulin in it.     Nutrition plan for today:   -TF resumption is questionable.   -TPN to continue at 90g dextrose today.   -D5 0.2 NSS running at 75cc/hour (~4g carbohydrate per hour) will be stopped today.    Current nutritional intake and type: None  (RN notes indicating some liquids for comfort are being allowed)     PTA Diabetes Regimen:   NPH 18 units BID with TF (per RD notes, UNSURE of PTA enteral regimen)  regular insulin sliding scale (?)    Discharge Planning: TBD.            Diabetes History:   Type of Diabetes: Type 2 Diabetes Mellitus, TPN/Enteral Feeding Induced Hyperglycemia  Lab Results   Component Value Date    A1C 6.4 07/05/2021              Review of Systems:     The Review of Systems is negative other than noted in the Interval History.           Medications:     Current Facility-Administered Medications   Medication     acetaminophen (TYLENOL) tablet 975 mg     albuterol (PROVENTIL) neb solution 2.5 mg     [Held by provider] amylase-lipase-protease (CREON 24) 30989-21385 units per EC capsule 0-1 capsule    And     [Held by provider] sodium bicarbonate tablet 325 mg     dextrose 10% infusion     dextrose 10% infusion     glucose gel 15-30 g    Or     dextrose 50 % injection 25-50 mL    Or     glucagon injection 1 mg     enoxaparin ANTICOAGULANT (LOVENOX) injection 40 mg     gabapentin (NEURONTIN) solution 300 mg     heparin lock flush 10 UNIT/ML injection 5-20 mL     heparin lock flush 10 UNIT/ML injection 5-20 mL     HYDROmorphone (PF) (DILAUDID) injection 0.3-0.5 mg     insulin aspart (NovoLOG) injection (RAPID ACTING)     [Held by provider] insulin NPH injection 4 Units     insulin NPH injection 6 Units     lidocaine (LMX4) cream     lidocaine (LMX4) cream     lidocaine 1 %  "0.1-5 mL     lipids (INTRALIPID) 20 % infusion 250 mL     multivitamin CF FORMULA (AquADEKS) liquid 2 mL     naloxone (NARCAN) injection 0.2 mg    Or     naloxone (NARCAN) injection 0.4 mg    Or     naloxone (NARCAN) injection 0.2 mg    Or     naloxone (NARCAN) injection 0.4 mg     ondansetron (ZOFRAN-ODT) ODT tab 4 mg     oxyCODONE (ROXICODONE) solution 5-10 mg     pantoprazole (PROTONIX) IV push injection 40 mg     parenteral nutrition - ADULT compounded formula     parenteral nutrition - ADULT compounded formula     piperacillin-tazobactam (ZOSYN) 3.375 g vial to attach to  mL bag     [Held by provider] protein modular (PROSOURCE TF) 2 packet     sodium chloride (PF) 0.9% PF flush 10-20 mL     sodium chloride (PF) 0.9% PF flush 10-40 mL     sodium chloride (PF) 0.9% PF flush 10-40 mL     vancomycin 1250 mg in 0.9% NaCl 250 mL intermittent infusion 1,250 mg     zinc sulfate (ZINCATE) capsule 220 mg            Physical Exam:    /75 (BP Location: Left arm)   Pulse 92   Temp 98.1  F (36.7  C) (Oral)   Resp 14   Ht 1.778 m (5' 10\")   Wt 64 kg (141 lb 1.5 oz)   SpO2 100%   BMI 20.24 kg/m    General: pleasant, in moderate distress, due to pain   HEENT: normocephalic, atraumatic. Oral mucous membranes dry  Lungs: unlabored respiration, no cough  ABD: rounded, soft  Skin: warm and dry, no obvious lesions  MSK:  moves all extremities  Lymp:  no LE edema   Mental status:  alert, oriented to self, place, time  Psych:  flat affect, calm and appropriate interaction             Data:     Recent Labs   Lab 07/14/21  1155 07/14/21  0857 07/14/21  0402 07/14/21  0257 07/13/21  2108 07/13/21  1706 07/11/21  0739 07/11/21  0412 07/11/21  0044 07/10/21  2108 07/10/21  1637 07/10/21  1116 07/10/21  0834   * 201* 157* 176* 121* 87   < >  --   --   --   --   --   --    BGM  --   --   --   --   --   --   --  145* 176* 186* 202* 173* 175*    < > = values in this interval not displayed.     Lab Results   Component " Value Date    WBC 43.1 (H) 07/14/2021    WBC 25.7 (H) 07/13/2021    WBC 20.3 (H) 07/12/2021    HGB 8.0 (L) 07/14/2021    HGB 8.3 (L) 07/13/2021    HGB 8.1 (L) 07/12/2021    HCT 25.6 (L) 07/14/2021    HCT 27.4 (L) 07/13/2021    HCT 26.8 (L) 07/12/2021    MCV 91 07/14/2021    MCV 92 07/13/2021    MCV 92 07/12/2021     07/14/2021     07/13/2021     07/12/2021     Lab Results   Component Value Date     (H) 07/14/2021     (H) 07/13/2021     (H) 07/12/2021    POTASSIUM 3.2 (L) 07/14/2021    POTASSIUM 2.9 (L) 07/13/2021    POTASSIUM 2.9 (L) 07/13/2021    CHLORIDE 117 (H) 07/14/2021    CHLORIDE 121 (H) 07/13/2021    CHLORIDE 118 (H) 07/12/2021    CO2 18 (L) 07/14/2021    CO2 19 (L) 07/13/2021    CO2 20 07/12/2021     (H) 07/14/2021     (H) 07/14/2021     (H) 07/13/2021     Lab Results   Component Value Date    BUN 14 07/12/2021    BUN 19 07/11/2021    BUN 28 07/10/2021     No results found for: TSH  Lab Results   Component Value Date    AST 8 07/05/2021    ALT 14 07/05/2021    ALKPHOS 91 07/05/2021       35 minutes spent on the date of the encounter doing chart review, history and exam, documentation and further activities per the note      Over 50% of my time on the unit was spent counseling the patient and/or coordinating care regarding acute hyperglycemia management.  See note for details.    To contact Endocrine Diabetes service:   From 8AM-4PM: page inpatient diabetes provider that is following the patient  For questions or updates from 4PM-8AM: page the diabetes job code for on call fellow: [phone]    Amber Rodney PA-C  Inpatient Diabetes Management Service  Pager [phone]

## 2021-07-13 NOTE — PROGRESS NOTES
Surgery Progress Note  07/13/2021       Subjective:  Increased pain reported overnight, worse after PO intake (sips for comfort) per nursing report. Required increased PRN dilaudid and oxy, bringing pain from 8-9 to 4-5. Continues to have diarrhea, unable to be controlled by rectal tube (limited by anatomy) or pouch (unable to adhere). Slept 3-4 hours. No N/V.     Objective:  Temp:  [98.1  F (36.7  C)-99.2  F (37.3  C)] 99.2  F (37.3  C)  Pulse:  [] 93  Resp:  [10-20] 10  BP: (113-150)/(62-78) 113/62  SpO2:  [99 %-100 %] 100 %    I/O last 3 completed shifts:  In: 2616.25 [P.O.:720; I.V.:1501.25; NG/GT:275]  Out: 1835 [Urine:1100; Emesis/NG output:735]      Gen: awake, alert, NAD  Resp: NLB on RA  Abd: soft, nondistended, erythema around incision outlined with skin marker. Incontinent of stool.  Incision: proximal 1/3 with open skin edges, small amount of fibrinous exudate on dressing. ERLIN drain w/ scant serosanguinous output  Ext: peripheral wasting, 2+ pitting edema, feet > lower legs     Labs:  Recent Labs   Lab 07/12/21  0855 07/11/21  0739 07/10/21  0712   WBC 20.3* 13.5* 10.0   HGB 8.1* 7.8* 8.2*    208 240       Recent Labs   Lab 07/13/21  0456 07/12/21  2301 07/12/21  2136 07/12/21  2022 07/12/21  0855 07/11/21  1704 07/11/21  0739 07/10/21  2014 07/10/21  0712 07/10/21  0712   NA  --   --  146*  --  146* 146* 149*  --    < > 151*   POTASSIUM  --   --   --   --  3.3*  --  3.8 3.3*  --  3.3*   CHLORIDE  --   --   --   --  118*  --  121*  --   --  123*   CO2  --   --   --   --  20  --  22  --   --  20   BUN  --   --   --   --  14  --  19  --   --  28   CR  --   --   --   --  0.91  --  0.91  --   --  0.96   * 147*  --  145* 197*  --  185*  --    < > 207*   ELODIA  --   --   --   --  8.5  --  8.5  --   --  8.5   MAG  --   --   --   --  1.7  --  1.8  --   --  2.0   PHOS  --   --   --   --  2.3* 2.1* 2.3*  --   --  2.7    < > = values in this interval not displayed.       Imaging:  CT CAP  7/13/21  IMPRESSION:     1.  Postoperative changes of interval resection of a loop of small  bowel. 2 anastomotic sites are patent. There is dilatation of the  stomach and jejunum with gradual taper and no definite transition  point. Findings suggest postoperative ileus.  2.  Patchy airspace opacities in the posterior right upper lobe  suggestive of pneumonia.  3.  Small bilateral pleural effusions and adjacent atelectasis.  4.  Persistent bilateral moderate hydronephrosis.  5.  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  6.  Other nonacute findings     Assessment/Plan:   Gino Zamudio is a 66 yo male with h/o testicular cancer s/p radiation therapy, paraparesis, biliary stenosis, duodenal stenosis, failure to thrive s/p PEG-J c/b GI bleed, chronic pancreatitis, prior lap augustine c/b intussusception s/p small bowel resection, admitted with intraabdominal sepsis, s/p ex-lap with necrotic small bowel, resected on 7/5, returned to OR 7/6 for additional small bowel resection and abdominal closure. Now extubated, was transferred out of the ICU on 7/8. Regressed in his recovery on 7/10 with poor tolerance of TFs.     Neuro:   - Pain control with dilaudid, liquid oxy, robaxin   Will increase robaxin today from 500 q4h to 750 q4h   Switch Tylenol liquid to tablet for loose BMs    GI: Pt w/ileus on CT but having BMs.  - PICC placement for TPN as pt not tolerating TFs at goal, will continue TFs  - Continue TF via J at 15 ml/h.   Goal: 45 ml/hr    Once at goal, switch to non-dextrose containing IVF  - Cap G tube  - Started zinc per nutrition  - NPO with sips or ice chips; no more than 1 cup in 8 hr.  - TID abdominal wound dressing changes. Okay to use tape.  - Will consider GI Consult    Endo:  - Endocrine consulted for diabetes management given sugars > 200, will continue HSSI. Started NPH 4 U BID on 7/12. Appreciate recommendations    ID:   - Abx completed 7/10.   - WOCN following.  Hx of sacral wound, worsening 2/2 diarrhea.  - WBC count increased to 25.7 today, up from 20.3 yesterday.   - C.diff PCR negative, and repeat CT scan demonstrating evidence of likely pneumonia   - Started vancomycin and Zosyn 7/12   - Start chest physio today    Lymph:  - Lymphedema consult for BLE edema    Electrolytes:  - Hypernatremia improved but stable at 146, continue mIVF  - Replace electrolytes as needed    - Ppx: lovenox and protonix    I was present with the medical student who participated in the service and in the documentation of the note. I have verified the history and personally performed the physical exam and medical decision making. I agree with the assessment and plan of care as documented in the note.     Seen, examined, and discussed with chief resident, who will discuss with staff.    Paulette Marques MD  PGY-1, General Surgery  [phone]  - - - - - - - - - - - - - - - - - -  Erin Hill, MS4 MPH  [employer]

## 2021-07-13 NOTE — PROGRESS NOTES
07/13/21 1400   Quick Adds   Type of Visit Occupational Therapy Re-evaluation  (Lymphedema evaluation )   Sensory   Sensory Comments light touch intact.   Pain Assessment   Patient Currently in Pain No   Edema General Information   Onset of Edema   (acute on chronic)   Affected Body Part(s) Right LE;Left LE   Etiology Comments decreased mobility, chronic edema.    Edema Precaution Comments Patient with no movement in LEs but full sensation.    General Comments/Previous Edema Treatment/Edema Equipment Patient reports using full leg velcro compression garments at baseline. Reports donning IND each day.    Edema Examination/Assessment   Skin Condition Pitting;Dryness;Intact   Skin Condition Comments Skin intact with moderate dryness at feet. Mepilex dressing at anterior R shin. Soft 2+ pitting in B feet and nonpitting in legs.    Skin Integrity Mepilex at anterior R shin - remaining skin intact.    Pitting Assessment 2+ pitting in feet only.   Edema Assessment Comments GCB applied to B LEs from MTPs to knee creases.    Clinical Impression   Criteria for Skilled Therapeutic Interventions Met (OT) yes   Edema: Patient Presentation Edema   Influenced by the following impairments decreased functional mobility.   Assessment of Occupational Performance 1-3 Performance Deficits   Edema: Planned Interventions Gradient compression bandaging;Fit for compression garment;Edema exercises;Precautions to prevent infection/exacerbation;Education   Intervention Comments GCB to B LEs until patient can bring velcro garments into hospital.   Clinical Decision Making Complexity (OT) low complexity   Therapy Frequency (OT) 5x/week   Predicted Duration of Therapy 1-2 weeks   Risk & Benefits of therapy have been explained evaluation/treatment results reviewed;care plan/treatment goals reviewed

## 2021-07-13 NOTE — PROCEDURES
[facility]    Double Lumen PICC Placement    Date/Time: 7/13/2021 10:58 AM  Performed by: Marion Stanley RN  Authorized by: Paulette May MD     UNIVERSAL PROTOCOL   Site Marked: Yes  Prior Images Obtained and Reviewed:  Yes  Required items: Required blood products, implants, devices and special equipment available    Patient identity confirmed:  Verbally with patient and arm band  NA - No sedation, light sedation, or local anesthesia  Confirmation Checklist:  Patient's identity using two indicators, relevant allergies, procedure was appropriate and matched the consent or emergent situation and correct equipment/implants were available  Time out: Immediately prior to the procedure a time out was called    Universal Protocol: the Joint Commission Universal Protocol was followed    Preparation: Patient was prepped and draped in usual sterile fashion           ANESTHESIA    Anesthesia: Local infiltration  Local Anesthetic:  Lidocaine 1% without epinephrine  Anesthetic Total (mL):  3.5      SEDATION    Patient Sedated: No        Preparation: skin prepped with ChloraPrep  Skin prep agent: skin prep agent completely dried prior to procedure  Sterile barriers: maximum sterile barriers were used: cap, mask, sterile gown, sterile gloves, and large sterile sheet  Hand hygiene: hand hygiene performed prior to central venous catheter insertion  Type of line used: PICC and Power PICC  Catheter type: double lumen  Lumen type: non-valved  Catheter size: 5 Fr  Brand: Bard  Lot number: [id number]  Placement method: venipuncture, MST, ultrasound and tip confirmation system  Number of attempts: 1  Successful placement: yes  Orientation: right  Location: basilic vein (0.47 cm vein diameter)  Site rationale: patient's preference  Arm circumference: adults 10 cm  Extremity circumference: 25  Visible catheter length: 3  Total catheter length: 39  Dressing and securement: blood  cleaned with CHG, chlorhexidine disc applied, statlock, site cleaned, sterile dressing applied and glue  Post procedure assessment: free fluid flow and blood return through all ports  PROCEDURE   Patient Tolerance:  Patient tolerated the procedure well with no immediate complications

## 2021-07-13 NOTE — PROGRESS NOTES
CLINICAL NUTRITION SERVICES - BRIEF NOTE      Nutrition Prescription     RECOMMENDATIONS FOR MDs/PROVIDERS TO ORDER:  Replace potassium today   Discontinue D5 once TPN starts to prevent refeeding      Recommendations already ordered by Registered Dietitian (RD):  Ordered Triglycerides, Liver enzymes (alk phos, direct bili)    Dosing weight 59 kg   Acces: Central Line (not yet in place)   1800 mL (75 ml/hr to match current IV), Dex 90 g (GIR- 1 mg/kg/min), 50 g AA (0.8 g/kg) 250 ml 20% lipid 4x per week = 792kcal (13 kcal/kg)  Advance dex by 45 g/day to goal dextrose 180 g (GIR= 2.1 mg/kg/min)  Goal TPN provides 1800 ml/day, 180 g Dex, 50 g AA and 250 mL lipid 4x per week = 1098 kcal (~19 kcal/kg) and 26% kcal via fat     -TF at current rate (with modular) + parenteral nutrition to meet 100% of estimated needs, 1798 kcal (30 kcal/kg) and 117 g protein (1.9g/kg), 46 g fat/day   --Electrolytes/additives per PharmD   --Patient currently receiving ADEK     Continue current TF rate per primary team. Paged nurse (if not currently giving enzymes, TF hang rate with open system 8 hrs)     Future/Additional Recommendations:  1. Monitor tolerance of enteral nutrition, if signs/symptoms of worsening ileus- Hold TF     2. If need to stop TF due to ileus and worsening symptoms, recommend increasing TPN provisions to meet 100% of estimated needs:   1800 ml, 230 g Dex (GIR = 2.7 mg/kg/min), 110 g AA (1.8 g/kg), 250 mL 20% lipid 5x per week = 1579 kcal (~27 kcal/kg) and 22% kcal fat     3. If GI function improves, recommending advancing TF by 10 mL q 6-8 hours. Continue TPN until TF rate is 35 mL/hr (~80% of goal needs) to ensure tolerance    4. Could consider starting Creon 95350 q 4 hours with TF (mix 1 enzymes with 325 mg sodium bicarb) to provide 83140 units lipase (4000 units lipase/g fat)     *Please see full assessment note from 7/12/21    New Findings:  Received page: Please add fiber, received consult: Pharmacy/Nutrition to  start/manage TPN- Persistent ileus     --CT chest/abdomen 7/12:   Postoperative changes of interval resection of a loop of small  bowel. 2 anastomotic sites are patent. There is dilatation of the  stomach and jejunum with gradual taper and no definite transition  point. Findings suggest postoperative ileus.    Nutrition support order (7/11): Osmolite 1.5 @ 15 mL/hr + 4 packets prosource = 360 mL TF, 700 kcal, 67 g protein, 73 g CHO, 18 g fat, 0 g fiber     GI: 8 stools noted yesterday/100 ml vis rectal tube (rectal tube now removed)     Labs: Na 146 (H), K+ 2.9 (L), Po4 2.6 (WNL), Mg 1.7 (WNL)   -Fecal Elastase 6.8   -zinc supplementation ordered 7/12     Endocrine following     Interventions  Collaboration with other nutrition professionals  Enteral Nutrition - continue per team   --No fiber at this time due to ileus   Parenteral Nutrition/IV Fluids - Initiate  Paged endocrinology     RD to follow per protocol.    Jessica Garcia RD, LD  6B pager: [phone]

## 2021-07-14 NOTE — PROGRESS NOTES
Patient had two episodes of confusion during the night. Reoriented easily. Woke after midnight with 10/10 pain. Was able to get in under control by about 5 a.m.    See flowsheet for further information.

## 2021-07-14 NOTE — PROGRESS NOTES
CLINICAL NUTRITION SERVICES - BRIEF NOTE     Nutrition Prescription    RECOMMENDATIONS FOR MDs/PROVIDERS TO ORDER:  Continue potassium replacement     Recommendations already ordered by Registered Dietitian (RD):  Instructions for PharmD:  Dosing weight 59 kg   Access: Central Line  1800 mL (75 ml/hr to match current IV), Dex 90 g (GIR- 1 mg/kg/min), 50 g AA (0.8 g/kg) 250 ml 20% lipid 4x per week = 792kcal (13 kcal/kg)    Advance dex by 45 g/day to goal dextrose 180 g (GIR= 2.1 mg/kg/min) per PharmD/RD  Goal TPN provides 1800 ml/day, 180 g Dex, 50 g AA and 250 mL lipid 4x per week = 1098 kcal (~19 kcal/kg) and 26% kcal via fat     Discontinue D5 with TPN    Future/Additional Recommendations:  Monitor tolerance of TPN and if medically appropriate to restart TFs. Recommendations for TPN advancement in RD note from 7/13.    See RD note 7/12 for full assessment      NEW FINDINGS   Meds: D5 @ 75 mL/hr- now discontinued   Insulin per Endo- plan to add insulin to TPN bag today    Labs: K+ 3.2 (L, improved from 2.9)    INTERVENTIONS  Implementation  Collaboration with other providers- Discussed TPN w/ PharmD and Endo    Monitoring/Evaluation  Will continue to monitor and evaluate per protocol.    Crissy Alvarado, RD, LD  6D RD pager [phone]

## 2021-07-14 NOTE — PLAN OF CARE
"Vital signs stable. Oxy, dilaudid and tylenol given for abdominal pain. Abdomin firm (baseline) but sensitive to touch. Abdominal CT done at 1830. Pt generalized edema + 2. Dependent edema + 3. Lymph wraps on. Family sent compression boots from home. External cath in place. UA sent. Stage 3 pressure sore on coccyx. Repositioning [id number]. Pulsating mattress in place. Wound care following for wound on coccyx and lower abdominal area. Continuous TPN and lipids. Hold tube feeds through J tube. G tube hooked up to gravity.     /72 (BP Location: Left arm)   Pulse 87   Temp 98  F (36.7  C) (Oral)   Resp 19   Ht 1.778 m (5' 10\")   Wt 64 kg (141 lb 1.5 oz)   SpO2 100%   BMI 20.24 kg/m      "

## 2021-07-14 NOTE — PROGRESS NOTES
Surgery Progress Note  07/14/2021       Subjective:  Per Nursing, patient confused twice overnight but easily reoriented. Awoke around 0000 w/pain 10/10, improved by 0500. It was reported that he may have slept too much during the day, prompting him to be awake from midnight to 4am.     This morning, Mr. Zamudio was having some vomiting and nausea and tube feeds were held. Reports current left sided chest pain reproducible to palpation. Notes breathing is okay, no fevers/chills.      Objective:  Temp:  [98.1  F (36.7  C)-99.8  F (37.7  C)] 98.1  F (36.7  C)  Pulse:  [] 92  Resp:  [8-21] 14  BP: (103-138)/(63-89) 138/75  SpO2:  [96 %-100 %] 100 %    I/O last 3 completed shifts:  In: 2412.07 [P.O.:30; I.V.:1518.75; NG/GT:150]  Out: 625 [Urine:500; Emesis/NG output:125]      PE:  Gen: awake, alert, NAD  CV/Resp: NLB on RA  Abd: soft, nondistended, PEGJ in place on R  Incision: proximal 1/3 with open skin edges, small amount of fibrinous exudate on dressing, erythema improving and remains within previously marked borders  Skin: sacral wound unchanged, chronic suprapubic wound unchanged  : external male catheter in place  Ext: BLE edematous, lymphedema wraps in place.     Labs:  Recent Labs   Lab 07/14/21  0257 07/13/21  0730 07/12/21  0855   WBC 43.1* 25.7* 20.3*   HGB 8.0* 8.3* 8.1*    312 247       Recent Labs   Lab 07/14/21  0857 07/14/21  0402 07/14/21  0257 07/13/21  0730 07/12/21  2136 07/12/21  0855   NA  --   --  145* 146* 146* 146*   POTASSIUM  --   --  3.2* 2.9*  2.9*  --  3.3*   CHLORIDE  --   --  117* 121*  --  118*   CO2  --   --  18* 19*  --  20   BUN  --   --  16 14  --  14   CR  --   --  1.17 1.04  --  0.91   * 157* 176* 137*  --  197*   ELODIA  --   --  9.0 8.8  --  8.5   MAG  --   --  1.6 1.7  --  1.7   PHOS  --   --  2.8 2.6  --  2.3*       Imaging:  CT AP w/contrast ordered     Assessment/Plan:   Gino Zamudio is a 64 yo male with h/o testicular cancer s/p radiation therapy,  paraparesis, biliary stenosis, duodenal stenosis, failure to thrive s/p PEG-J c/b GI bleed, chronic pancreatitis, prior lap augustine c/b intussusception s/p small bowel resection, admitted with intraabdominal sepsis, s/p ex-lap with necrotic small bowel, resected on 7/5, returned to OR 7/6 for additional small bowel resection and abdominal closure.     Plan:    Neuro:   - Pain control with dilaudid, liquid oxy, gabapentin, start lidocaine patches                 Currently holding robaxin given confusion                 Switched Tylenol liquid to tablet for loose BMs     GI/Nutrition: Pt w/ileus on CT but having BMs.  - Continue TPN   - Holding TF currently  - G tube to gravity  - Zinc per Nutrition recs  - NPO with sips or ice chips; no more than 1 cup Q8H  - QID abdominal wound dressing changes. Okay to use tape.  - Will consider GI Consult     Endo:  - Endocrine consulted for diabetes management for sugars > 200, will continue HSSI. Started NPH 4 U BID on 7/12, received x1 this morning. Endo will be adding regular insulin into TPN today (0.12 U per gram). Appreciate recommendations.     ID:   - WOCN following. Hx of sacral wound, worsening 2/2 diarrhea.  - WBC count worsening, 43.1<--25.7<--20.3. Pt afebrile, on Abx  - Will check UA with reflex to UC today  - C.diff PCR negative, and CT from 7/12 demonstrated evidence of likely pneumonia                 - Continue vancomycin and Zosyn             Lymph:  - Lymphedema consulted; wraps in place. Has velcro wraps at home, okay to use here     Fluids/Electrolytes:  - Stop IVFs today as pt on TPN  - Hypernatremia improved and stable  - Replace electrolytes as needed  - Space sodium checks to Qday     - Ppx: lovenox and protonix      I was present with the medical student who participated in the service and in the documentation of the note. I have verified the history and personally performed the physical exam and medical decision making. I agree with the assessment and  plan of care as documented in the note.    Seen, examined, and discussed with chief resident, who will discuss with staff.  - - - - - - - - - - - - - - - - - -  Erin Hill, MS4  [employer] [facility] [employer] [facility] [employer]    Paulette Marques MD  PGY-1, General Surgery  [phone]

## 2021-07-15 NOTE — H&P
SURGICAL ICU ADMISSION NOTE  07/15/2021      PRIMARY TEAM: General Surgery  PRIMARY PHYSICIAN: Dr. Whipple  REASON FOR CRITICAL CARE ADMISSION: intubated and ventilatory support   ADMITTING PHYSICIAN: Dr. Batista  Date of Service (when I saw the patient): 07/15/2021    ASSESSMENT:  Nick Zamudio is a 65 year old male who was admitted to the SICU on 7/5/2021 s/p exploratory laparotomy with small bowel resection. Necrotic bowel was resected, and there was an inadequate amount of viable bowel. The family is aware and will be visiting him and then making a decision about how to proceed with cares.     PLAN:    Neurological:  # Acute postoperative pain   - Monitor neurological status. Delirium preventions and precautions  - Pain: fentanyl gtt, dilaudid PRN    - Sedation plan: propofol    Pulmonary:   # Post operative ventilatory support  # Acute hypoxic respiratory support   - VENT: Continue full vent support. Ventilatory bundle. HOB elevation     Cardiovascular:    # Shock-distributive   - Monitor hemodynamic status.   - Continue levo for MAP>65    Gastroenterology/Nutrition:  # s/p bowel resection, in discontinuity  - NPO  - Continue TPN    Fluids/Electrolytes:   -  for IV fluid hydration    Renal:  - Will continue to monitor intake and output.  - Cummins in place    Endocrine:  # Diabetes mellitus   - Sliding scale for glucose management. Goal to keep BG< 180 for optimal wound healing     ID:  # septic shock  # intra-abdominal infection  - continue vanc, zosyn    Heme:     # Acute blood loss anemia   # Anemia of critical illness   - Hemoglobin 7.6  - Transfuse if hgb <7.0 or signs/symptoms of hypoperfusion. Monitor and trend.     MSK:  # Weakness and deconditioning of critical illness   - Physical and occupational therapy consult     General Cares/Prophylaxis:    DVT Prophylaxis: Pneumatic Compression Devices  GI Prophylaxis: PPI  Restraints: Restraints for medical healing needed: NO    Lines/ tubes/ drains:  - ETT, art  line, PICC, mcclure, G tube    Disposition:  - Surgical ICU.     Patient seen, findings and plan discussed with surgical ICU staff, Dr. Batista.    Francesca Singleton MD (PGY-2)  Surgery    - - - - - - - - - - - - - - - - - - - - - - - - - - - - - - - - - - - - - - - - - - - - - -   HISTORY PRESENTING ILLNESS: Nick Zamudio is a 65 year old male h/o testicular cancer s/p radiation therapy, paraparesis, biliary stenosis, duodenal stenosis, failure to thrive s/p PEG-J c/b GI bleed, chronic pancreatitis, prior lap augustine c/b intussusception s/p small bowel resection, admitted with intraabdominal sepsis, s/p ex-lap with necrotic small bowel, resected on 7/5, returned to OR 7/6 for additional small bowel resection and abdominal closure.     He is being admitted to the SICU on 7/5/2021 s/p exploratory laparotomy with small bowel resection. Necrotic bowel was resected, and there was an inadequate amount of viable bowel. The family is aware and will be visiting him and then making a decision about how to proceed with cares.     REVIEW OF SYSTEMS: 10 point ROS neg other than the symptoms noted above in the HPI.    PAST MEDICAL HISTORY:   History reviewed. No pertinent past medical history.    SURGICAL HISTORY:   Past Surgical History:   Procedure Laterality Date     LAPAROTOMY EXPLORATORY N/A 7/5/2021    Procedure: LAPAROTOMY, POSSIBLE BOWEL RESECTION, temporary abdominal closure with abthera;  Surgeon: Stanton Huang MD;  Location: [facility] OR     LAPAROTOMY EXPLORATORY N/A 7/6/2021    Procedure: EXPLORATORY LAPAROTOMY;  Surgeon: Doug English MD;  Location: [facility] OR     RESECTION ILEOCECAL N/A 7/6/2021    Procedure: segmental BOWEL RESECTION, abdominal washout, abdominal wound CLOSURE;  Surgeon: Doug English MD;  Location: [facility] OR       SOCIAL HISTORY:   Social History     Tobacco Use     Smoking status: None   Substance Use Topics     Alcohol use: Not Currently     Drug use: Not Currently       FAMILY HISTORY:    History reviewed. No pertinent family history.  No bleeding/clotting disorders nor problems with anesthesia.     ALLERGIES:   Allergies   Allergen Reactions     Ibuprofen      Penicillins Unknown       MEDICATIONS:  No current facility-administered medications on file prior to encounter.  amylase-lipase-protease (CREON 24) 24419-76592 units CPEP per EC capsule, 2 capsules by Per Feeding Tube route Mix 2 capsules with each tube feed supplement can.  Supplement runs @ 100 ml/hr from 1000 to 2000  amylase-lipase-protease (CREON 24) 24129-27201 units CPEP per EC capsule, Take 1-2 capsules by mouth Take with snacks or supplements  amylase-lipase-protease (CREON 24) 95849-29726 units CPEP per EC capsule, Take 3 capsules by mouth as needed Takes 3 capsules if having a meal  insulin  UNIT/ML vial, Inject 18 Units Subcutaneous 2 times daily  insulin regular 100 UNIT/ML vial, Sliding scale insulin per assisted living  ondansetron (ZOFRAN-ODT) 4 MG ODT tab, Take 4 mg by mouth every 8 hours as needed for nausea  oxyCODONE-acetaminophen (PERCOCET) 5-325 MG tablet, Take 1 tablet by mouth every 4 hours as needed for pain  pantoprazole (PROTONIX) 40 MG EC tablet, Take 40 mg by mouth daily  sodium bicarbonate 325 MG tablet, 325 mg by Per Feeding Tube route Crush and mix 1 tablet with each tube feed supplement can. Supplement runs @ 100 ml/hr from 1000 to 2000        PHYSICAL EXAMINATION:  Temp:  [98  F (36.7  C)-98.3  F (36.8  C)] 98  F (36.7  C)  Pulse:  [86-96] 86  Resp:  [13-19] 19  BP: (125-143)/(70-75) 127/72  FiO2 (%):  [40 %] 40 %  SpO2:  [97 %-100 %] 100 %  General: intubated, sedated  HEENT: ETT in place, NCAT  Neck: trachea midline   Neuro: sedated  Pulm/Resp: intubated and ventilated  CV: RRR  Abdomen: Soft, nd, midline incision closed with clean and dry dressing in placee  : mcclure catheter in place with clear yellow urine  Incisions/Skin: midline incision closed with nylon sutures, clean and dry dressing  over  MSK/Extremities: no peripheral edema, peripheral pulses intact, extremities wwp    LABS: Reviewed.   Arterial Blood Gases   Recent Labs   Lab 07/15/21  0110 07/15/21  0049 07/15/21  0012   PH 7.21* 7.24* 7.15*   PCO2 32* 32* 37   PO2 110* 108* 117*   HCO3 13* 14* 13*     Complete Blood Count   Recent Labs   Lab 07/15/21  0110 07/15/21  0049 07/15/21  0012 07/14/21  0257 07/13/21  0730 07/12/21  0855 07/11/21  0739   WBC  --   --   --  43.1* 25.7* 20.3* 13.5*   HGB 7.6* 7.7* 8.0* 8.0* 8.3* 8.1* 7.8*   PLT  --   --   --  354 312 247 208     Basic Metabolic Panel  Recent Labs   Lab 07/15/21  0110 07/15/21  0049 07/15/21  0012 07/14/21 2028 07/14/21  0257 07/13/21  0730 07/12/21  1420 07/12/21  0855 07/11/21  1201 07/11/21  0739    136 135  --  145* 146*   < > 146*   < > 149*   POTASSIUM 3.5 3.4* 3.5  --  3.2* 2.9*  2.9*  --  3.3*   < > 3.8   CHLORIDE  --   --   --   --  117* 121*  --  118*  --  121*   CO2  --   --   --   --  18* 19*  --  20  --  22   BUN  --   --   --   --  16 14  --  14  --  19   CR  --   --   --   --  1.17 1.04  --  0.91  --  0.91   * 155* 154* 164* 176* 137*  --  197*   < > 185*    < > = values in this interval not displayed.     Liver Function Tests  Recent Labs   Lab 07/13/21  0730   ALKPHOS 111     Pancreatic Enzymes  No lab results found in last 7 days.  Coagulation Profile  No lab results found in last 7 days.    IMAGING:  Recent Results (from the past 24 hour(s))   CT Abdomen Pelvis w Contrast   Result Value    Radiologist flags Evidence of ischemic bowel in the mid abdomen with (AA)    Narrative    EXAMINATION: CT ABDOMEN PELVIS W CONTRAST  7/14/2021 7:01 PM      CLINICAL HISTORY: Abdominal abscess/infection suspected; pt w/ hx of  radiation and multiple abdominal surgeries, admitted with  intraabdominal sepsis. POD9 from necrotic bowel resection, now with  uptrending WBC (43 today)    COMPARISON: 7/12/2021    PROCEDURE COMMENTS: CT of the abdomen was performed with  iopamidol  (ISOVUE-370) solution 86 mL intravenous and oral contrast. Coronal and  sagittal reformatted images were obtained.    FINDINGS:  Lower thorax:   Left greater than right pleural effusions and associated atelectasis.  Peribronchovascular consolidation and tree-in-bud nodularity in the  dependent portion of the right upper lobe base.     Abdomen and pelvis:  Moderate anasarca. The liver appears unremarkable. Unchanged  intrahepatic biliary dilatation, particularly on the left. Gallbladder  appears absent. Increased wedgelike hypoenhancing defects within the  spleen. Unremarkable appearing adrenal glands. Unchanged moderate to  severe hydronephrosis with right-sided cortical thinning, with delayed  excretion on the right. Multiple hypoattenuating renal cysts, left  parapelvic cysts/diverticula. 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 Left  axillofemoral bypass is partially visualized. Patency cannot be  determined on the basis of this study. Post surgical changes over the  left suprapubic region.    There is a moderate amount of pneumoperitoneum in the right upper  quadrant. Large gas containing collection measuring 25 x 16 x 8 cm in  the retroperitoneal cavity surrounding hypoenhancing bowel loops in  the mid abdomen in the location of 2 small bowel anastomoses. The  bowel associated with the left lower quadrant anastomosis is  significantly hypoenhancing. Percutaneous gastrostomy balloon and  catheter are within the lumen of the visualized severely distended  stomach, containing layering material.    Osseous structures:   No aggressive appearing bony abnormalities.      Impression    IMPRESSION:  1. Large gas containing collection measuring 25 x 16 x 8 cm within the  mid peritoneal cavity surrounding hypoenhancing bowel concerning for  contained necrotic perforation, likely secondary to an  anastomotic  leak. Moderate volume of free peritoneal air in the right upper  quadrant.  1a. Severe distention of the stomach and duodenum despite patent  appearing gastric tube with evidence of impaired motility.  2. Extensive splenic infarcts, progressed from 7/12/2021.  3. Left greater than right pleural effusions and associated  atelectasis. Likely aspiration pneumonitis in the base of the right  upper lobe.    [Critical Result: Evidence of ischemic bowel in the mid abdomen with  large necrotic/feculent collection, likely anastomotic leak.  Unexplained pneumoperitoneum]    Finding was identified on 7/14/2021 7:20 PM.     Dr. Jasso was contacted by Dr. Muse at 7/14/2021 7:38 PM and  verbalized understanding of the critical finding.     I have personally reviewed the examination and initial interpretation  and I agree with the findings.    OMID GROSSMAN MD         SYSTEM ID:  [id number]

## 2021-07-15 NOTE — PROGRESS NOTES
I was paged to the patient's room to discuss comfort cares with the family. They wanted to extubate the patient and make him comfortable.     Comfort care orders placed and RT was called.     Francesca Singleton MD (PGY-2)  Surgery

## 2021-07-15 NOTE — ANESTHESIA CARE TRANSFER NOTE
Patient: Nick Zamudio    Procedure(s):  Exploratory Laparotomy with Small Bowel Resection    Diagnosis: Free intraperitoneal air [K66.8]  Diagnosis Additional Information: No value filed.    Anesthesia Type:   General     Note:    Oropharynx: endotracheal tube in place  Level of Consciousness: iatrogenic sedation      Independent Airway: airway patency not satisfactory and stable  Dentition: dentition unchanged  Vital Signs Stable: post-procedure vital signs reviewed and stable  Report to RN Given: handoff report given  Patient transferred to: ICU  Comments: Patient transferred to ICU with monitors applied and ventilated via AMBU bag. Vitally stable throughout transport. Sign out given to bedside RN, case and complications discussed, recommendations relayed, all questions answered. No apparent complications of anesthesia.    Charlotte Diaz MD  Anesthesiology Resident, [place] [place] [address]   [phone]        ICU Handoff: Call for PAUSE to initiate/utilize ICU HANDOFF, Identified Patient, Identified Responsible Provider, Reviewed the Pertinent Medical History, Discussed Surgical Course, Reviewed Intra-OP Anesthesia Management and Issues during Anesthesia, Set Expectations for Post Procedure Period and Allowed Opportunity for Questions and Acknowledgement of Understanding      Vitals: (Last set prior to Anesthesia Care Transfer)  CRNA VITALS  7/15/2021 0112 - 7/15/2021 0212      7/15/2021             Resp Rate (observed):  15    Resp Rate (set):  15        Electronically Signed By: Charlotte Diaz MD  July 15, 2021  2:12 AM

## 2021-07-15 NOTE — PROGRESS NOTES
Overnight Progress Note:    Paged by radiology with emergent finding on CT scan.     Free air and bowel ischemia noted on CT, concern for acute abdomen.    Will alert Sr and Chiefs, and begin boarding patient for OR. Expecting exploratory laparotomy.       Please page if questions,  Devaughn Jasso MD  Surgical Resident  #[phone]

## 2021-07-15 NOTE — ANESTHESIA PROCEDURE NOTES
Airway       Patient location during procedure: OR       Procedure Start/Stop Times: 7/14/2021 11:22 PM  Staff -        Anesthesiologist:  Rayray Talavera MD       Resident/Fellow: Charlotte Diaz MD       Performed By: resident  Consent for Airway        Urgency: emergent  Indications and Patient Condition       Indications for airway management: crystal-procedural       Induction type:RSI       Mask difficulty assessment: 0 - not attempted    Final Airway Details       Final airway type: endotracheal airway       Successful airway: ETT - single  Endotracheal Airway Details        ETT size (mm): 7.5       Cuffed: yes       Successful intubation technique: direct laryngoscopy       DL Blade Type: MAC 3       Grade View of Cords: 2       Adjucts: stylet       Position: Right       Measured from: lips       Secured at (cm): 24    Post intubation assessment        Placement verified by: capnometry, equal breath sounds and chest rise        Number of attempts at approach: 1       Number of other approaches attempted: 0       Secured with: ties and cloth tape       Ease of procedure: easy       Dentition: Intact and Unchanged

## 2021-07-15 NOTE — PROGRESS NOTES
I spoke with Nick and his wife separately about his CT scan findings showing free air and fluid- likely either from anastomotic breakdown or new necrosis.  I expressed my concern that he may not be able to recover from this, that we may not be able to remove the ventilator or fix his intraabdominal process.  I offered exploratory laparotomy vs comfort care.  He would like to proceed with operation-likely open abdomen and remaining intubated.  I explained this to his wife afterwards and answered all questions.    OR is aware and will bring him down urgently.  I wrote for additional pain medicine and IVF for now..

## 2021-07-15 NOTE — PROGRESS NOTES
CLINICAL NUTRITION SERVICES    Informed decision made to change pt s status to comfort care. Nutrition interventions discontinued and no further interventions planned at this time. RD can be consulted if needed.    RD signing off on 7/15/2021.

## 2021-07-15 NOTE — CONSULTS
[facility] Nurse Inpatient Wound Assessment   Reason for consultation: Evaluate and treat  Pubic and coccyx wounds    Assessment  Pubic are wounds due to chronic skin area from radiation   Upper buttock/sacrococcygeal/intergluteal cleft- severe MASD- deteriorating d/t severe chronic loose incontinent stools- Per RN internal fecal management system failed due to internal polyps and leaking external pouches- will try external pouch on Th once has more epithelial tissue on open areas  Mid back- Trauma vs PI POA- if PI stage 2 vs 3  Status: evolving, deteriorating and follow up     7/15- Pt did not have loose stools since last night, not recommend rectal pouch at this time. Recommended to continue with ostomy powder and triad paste. Pt is on comfort cares.    Treatment Plan  Buttocks/sacrococcygeal area and intergluteal cleft wounds: Every shift and PRN     Cleanse the area with Nichelle cleanse and protect, very gently with soft cloth.  Apply ostomy powder (#2139) on all open and denuded skin.  Apply Triad paste (#334449) on top of it.  With repeat application, do not scrub the paste, only remove soiled paste and reapply.  If complete removal of paste is necessary use baby oil/mineral oil and soft wash cloth.  Leave the brief off in bed to let the area dry as much as possible.  Turn and reposition Q 2hrs.   Ensure pt has Mark-cushion (#459961) while sitting up in the chair.    Continue with pulsate mattress. Use only one Covidien pad in between mattress and pt. No diaper while in bed.     Ensure pt has Mark-cushion (#569927) while sitting up in the chair.      POC for wound located on Mid back-    Cleanse the area with NS and pat dry.    Apply No sting film barrier to periwound skin.    Cover wound with Mepilex    Change dressing Q 3 days.    Pubic area- leave it ROSALBA   Orders Written  Recommended provider order: None, at this time  [facility] Nurse follow-up plan:weekly  Nursing to notify the Provider(s) and re-consult the [facility] Nurse if  wound(s) deteriorates or new skin concern.    Patient History  According to provider note(s):  Gino Zamudio is a 64 yo male with h/o testicular cancer s/p radiation therapy, paraparesis, biliary stenosis, duodenal stenosis, failure to thrive s/p PEG-J c/b GI bleed, chronic pancreatitis, prior lap augustine c/b intussusception s/p small bowel resection, now with intraabdominal sepsis s/p small bowel resection on 7/5, in discontinuity with open abdomen.     Objective Data  Containment of urine/stool: Indwelling catheter    Active Diet Order  Orders Placed This Encounter      Advance Diet as Tolerated: Clear Liquid Diet; Regular Diet Adult      Output:   I/O last 3 completed shifts:  In: 4287.93 [P.O.:100; I.V.:1734.73; NG/GT:1020]  Out: 3535 [Urine:1515; Emesis/NG output:700; Other:1300; Blood:20]    Risk Assessment:   Sensory Perception: 2-->very limited  Moisture: 2-->very moist  Activity: 1-->bedfast  Mobility: 2-->very limited  Nutrition: 2-->probably inadequate  Friction and Shear: 1-->problem  Levy Score: 10                          Labs:   Recent Labs   Lab 07/15/21  0110 07/14/21  0257 07/13/21  0730 07/12/21  0855   HGB 7.6* 8.0*   < > 8.1*   WBC  --  43.1*  --  20.3*   CRP  --   --   --  250.0*    < > = values in this interval not displayed.         Physical Exam  Areas of skin assessed: focused pubic area, coccyx, buttocks, back, perianal area    Wound Location:  Pubic area     7/6    2. Buttocks (intergluteal cleft)/sacrococcegeal area      7/2 7/13        Date of last photo 7/13    Wound History: POA. deteriorating d/t severe chronic loose incontinent stools - Per RN internal fecal management system failed due to internal polyps and leaking external pouches- will try external pouch on Th once has more epithelial tissue on open areas    Wound Base: 70 % dermis and 30% epidermal necrosis     Palpation of the wound bed: normal      Drainage:  scant     Description of drainage: none     Measurements (length x width x depth, in cm) 6  x 7  x 0.1 cm      Tunneling N/A     Undermining N/A  Periwound skin: intact and erythema- blanchable that extends about 9 cm almost circumferentially      Color: pink      Temperature: normal   Odor: none  Pain: facial expression of distress,    3. Mid back      Date of last photo 7/2  Wound History:POA, trauma vs PI    Wound Base: 100 % dermis/fibrin vs slough tissue     Palpation of the wound bed: normal      Drainage: scant     Description of drainage: none     Measurements (length x width x depth, in cm) 0.5  x 0.3  x 0.1 cm (no significant changes)     Tunneling N/A     Undermining N/A  Periwound skin: intact and erythema- blanchable that extends about 0.8 cm almost circumferentially      Color: pink      Temperature: normal   Odor: none  Pain: facial expression of distress,    Interventions  Visual inspection and assessment completed   Wound Care Rationale Improve absorptive capacity, Promote moist wound healing without tissue dehydration  and Provide protection   Wound Care: done per plan of care  Supplies: floor stock and discussed with RN  Current off-loading measures: Pillows  Current support surface: Standard  Atmos Air mattress  Education provided to: importance of repositioning, plan of care, wound progress, Moisture management and Off-loading pressure  Discussed plan of care with Nurse    Chiquita Rosario RN

## 2021-07-15 NOTE — PLAN OF CARE
Patient is on comfort cares. Pain well controlled on fentanyl bumps and continuous fentanyl drip.   Continue to monitor.

## 2021-07-15 NOTE — PROGRESS NOTES
Patient extubated to comfort measures at 0330. Initially on 10L oximask, titrated down to room air. Tolerating well.

## 2021-07-15 NOTE — PROGRESS NOTES
SPIRITUAL HEALTH SERVICES  SPIRITUAL ASSESSMENT Progress Note (Palliative Focus)  [facility] ([place]) 4A    REFERRAL SOURCE: Palliative care initial spiritual assessment.    Patient Nick Zamudio's wife Ines reports that they are [employer], and their parish  anointed Nick last evening. Family has the spiritual support they need at present from their own [place] community.    Plan: No plans to follow unless further spiritual support is requested.     Hien Weldon  Palliative   Pager [phone]  [facility] Inpatient Team Consult pager [phone] (M-F 8-4:30)  After-hours Answering Service [phone]

## 2021-07-15 NOTE — PROGRESS NOTES
Patient extubated to comfort measures shortly after arriving to unit. Currently on room air. Arousable, oriented to self and place. Fentanyl infusion for pain control, patients states some pain, but does not want any more pain medicine at this time. Patient left in discontinuity following ex. Lap. Abd incision C/D/I. Cummins in place for comfort measures. Wife remains at bedside. Plan to move patient to general care floor.

## 2021-07-15 NOTE — DISCHARGE SUMMARY
[facility]  Discharge Summary  General Surgery     Nick Zamudio MRN# [id number]   YOB: 1955 Age: 65 year old     Date of Admission:  7/5/2021  Date of Discharge::  7/16/2021  4:20 AM  Admitting Physician:  Stanton Huang MD  Discharge Physician:  Dr. Carlos Whipple  Primary Care Physician:        Echo Lopez          Admission Diagnoses:   Abdominal pain, epigastric [R10.13]  Pneumatosis intestinalis [K63.89]          Discharge Diagnosis:   Small bowel perforation  Poncho necrosis of nearly entire small bowel  Sepsis leading to in-hospital death         Procedures:   Procedure(s):  7/5 Exploratory Laparotomy with Small Bowel Resection and temporary abdominal closure  7/6 Small bowel resection w/primary anastomosis and abdominal closure  7/14 Small bowel resection            Consultations:   PHYSICAL THERAPY ADULT IP CONSULT  OCCUPATIONAL THERAPY ADULT IP CONSULT  VASCULAR ACCESS CARE ADULT IP CONSULT  VASCULAR ACCESS CARE ADULT IP CONSULT  ENDOCRINE DIABETES ADULT IP CONSULT  VASCULAR ACCESS CARE ADULT IP CONSULT  CARE MANAGEMENT / SOCIAL WORK IP CONSULT  LYMPHEDEMA THERAPY IP CONSULT  PHARMACY TO DOSE VANCO  VASCULAR ACCESS FOR PICC PLACEMENT ADULT IP CONSULT  PHARMACY/NUTRITION TO START AND MANAGE TPN  PHARMACY IP CONSULT  PHARMACY IP CONSULT  PHARMACY IP CONSULT  PALLIATIVE CARE ADULT IP CONSULT  SOCIAL WORK IP CONSULT  CARE MANAGEMENT / SOCIAL WORK IP CONSULT          Brief History of Illness:   Nick Zamudio is a 64 yo male with a history of testicular cancer s/p radiation therapy, complicated by radiation induced paraparesis, biliary stenosis s/p ERCP, duodenal stenosis, chronic pancreatitis, small bowel injury s/p bowel resection and history of chronic mesenteric stenosis who presents in ED from his nursing home with acute onset of worsening abdominal pain for the last 24 hours. Chronic epigastric pain associated with chronic pancreatitis suddenly became worse  yesterday, had one episode nausea. H/o chronic diarrhea and is passing stool and gas; last BM last night was loose. No fevers, chills, hematochezia, or melena. Imaging notable for small bowel pneumatosis with adjacent mesenteric venous gas concerning for bowel ischemia.    Follows with Dr. Pepe for chronic pancreatitis. Recently had PEG-J placed 6/1, later readmitted with acute GI bleed. Evaluation at that time showed 15 mm stenosis in the 4th portion of duodenum with submucosal lesion just distal to this and 3 cm sigmoid mass with ~50% luminal obstruction. Tubular adenoma on biopsy, however undergoing pre-operative work-up for possible resection with colorectal surgery. Per chart review he underwent lap augustine at [facility] in 2019, complicated by intususception s/p ex lap and small bowel resection.           Hospital Course:   The patient underwent exploratory laparotomy with small bowel resection and GISELE with temporary abdominal closure on 7/5. He then underwent small bowel resection w/primary anastomosis and abdominal closure on 7/6. Post-operatively he did not tolerate advancing diet and had significantly significantly worsening leukocytosis despite antibiotic therapies. A CT scan showed free air with findings consistent with bowel ischemia. He was taken emergently to the OR for exploratory laparotomy which found a small perforation proximal to the prior anastomosis with roya necrosis of the majority of small bowel. After a discussion with the patient's wife, it was decided to resect necrotic bowel and pursue comfort cares. Patient was extubated in the ICU and subsequently passed away on 7/16 in the presence of his family.         Imaging Studies:     Results for orders placed or performed during the hospital encounter of 07/05/21   CT Abdomen Pelvis w Contrast    Narrative    EXAM: CT ABDOMEN PELVIS W CONTRAST  LOCATION: [facility]  DATE/TIME: 7/5/2021 6:02 AM    INDICATION:  Nausea/vomiting  COMPARISON: 06/03/2021  TECHNIQUE: CT scan of the abdomen and pelvis was performed following injection of IV contrast. Multiplanar reformats were obtained. Dose reduction techniques were used.  CONTRAST: 80 mL Isovue-370    FINDINGS:   LOWER CHEST: Lung bases clear.    HEPATOBILIARY: Small hepatic cyst and mild biliary prominence similar. Cholecystectomy.    PANCREAS: Atrophic. Multiple calcifications consistent with chronic pancreatitis.    SPLEEN: Normal.    ADRENAL GLANDS: Normal.    KIDNEYS/BLADDER: Bilateral moderate hydronephrosis and renal calculi as seen previously. Ureteral wall thickening. Bilateral renal cysts.    BOWEL: Percutaneous gastrojejunostomy. Fluid-filled stomach. Postsurgical change sigmoid colon. Postsurgical change of small bowel. Mildly dilated loops of central small bowel. Pneumatosis of small bowel in the left lower abdomen and air within adjacent   mesenteric vasculature. Air within branch of the superior mesenteric vein midline series 3 image 220. Proximal jejunal lipoma again noted.    LYMPH NODES: Normal.    VASCULATURE: Advanced atherosclerotic vascular disease with probable chronic occlusion of branches of the superior mesenteric artery. Chronic occlusion of the left external iliac and left common femoral artery. Partially visualized left bypass graft is   not opacified as previously.    PELVIC ORGANS: Stable. Dystrophic pelvic calcifications.    MUSCULOSKELETAL: Soft tissue edema left thigh. Chronic soft tissue thickening and dystrophic calcification left ventral pelvis. Osseous demineralization and degenerative change osseous structures.      Impression    IMPRESSION:   1.  Mildly dilated loops of central small bowel. Pneumatosis of small bowel with gas within adjacent mesenteric venous branches. Correlate for bowel ischemia.  2.  Moderate bilateral hydroureteronephrosis again seen. Ureteral wall thickening. Underlying UTI not excluded.  3.  Advanced  atherosclerotic vascular disease with chronic occlusion of the left internal iliac and common femoral arteries. The left bypass graft is not opacified as seen previously.  4.  Bilateral renal calculi and renal cysts again seen.  5.  Chronic pancreatitis.    6.  Results called to Dr. Holden Casiano at 0636   XR Chest Port 1 View    Narrative    EXAM: XR CHEST PORT 1 VIEW  7/5/2021 3:29 PM      HISTORY: post op; ETT tube    COMPARISON: None    FINDINGS: Single view of the chest. Tip the ET tube projects 3.6 cm  above the elias. Gastric tube tip and sidehole projected over the  stomach. Right IJ central venous catheter sheath projects over the mid  SVC with tip projecting over the high right atrium.  No right  pneumothorax. No pleural effusion. Normal cardiomediastinal  silhouette. No acute airspace opacities. Partial visualization of the  gastrojejunostomy tube. Pneumatosis of the left lower quadrant bowel  is better visualized on same-day CT.      Impression    IMPRESSION:  1. The ET tube projects 3.6 cm above the elias.  2. No acute airspace disease.    I have personally reviewed the examination and initial interpretation  and I agree with the findings.    PRETTY PAZ MD          SYSTEM ID:  [id number]   XR Abdomen Port 1 View    Narrative    Exam: XR ABDOMEN PORT 1 VIEWS, 7/5/2021 3:34 PM    Indication: post op; check lines and tubes    Comparison: CT AP same date    Findings:   Single AP view of the abdomen was obtained. Surgical changes of open  abdominal surgery and bowel resection with multiple surgical clips and  anastomotic suture material present. Right upper quadrant  cholecystectomy clips. An enteric tube with tip and side-port projects  over the expected region of the stomach. Gastrojejunostomy tube tip is  in the proximal jejunum. Surgical drain projects over the left   abdomen. Prominent though nondistended gas-filled loops of colon and  small bowel. No definite pneumatosis or portal venous gas.  Vascular  calcifications.      Impression    Impression:  1. New postsurgical changes of open abdominal surgery and bowel  resection as above.  2. Surgical drain in the left abdomen and enteric tube tip and  side-port projects over the expected region of the stomach.    I have personally reviewed the examination and initial interpretation  and I agree with the findings.    KO SHAFER MD          SYSTEM ID:  [id number]   XR Abdomen Port 1 View    Narrative    Exam: XR ABDOMEN PORT 1 VIEWS, 7/6/2021 6:11 PM    Indication: confirm NG placement    Comparison: X-ray 7/5/2021    Findings:   Frontal radiograph of the abdomen. Sidehole of the gastric tube  projects over the distal esophagus. Postoperative bowel with multiple  surgical clips and anastomotic suture present. Surgical drains in  place. Gastrojejunostomy tube in place. Nonobstructive bowel gas  pattern. No pneumatosis or portal venous gas. The lung fields are  unremarkable. No aggressive osseous lesions.      Impression    Impression:   1. Side hole of the gastric tube projects over the distal esophagus.  Advancement recommended.  2. Nonobstructive bowel gas pattern.    I have personally reviewed the examination and initial interpretation  and I agree with the findings.    EDMAR VERAS MD         SYSTEM ID:  [id number]   XR Abdomen Port 1 View    Narrative    XR ABDOMEN PORT 1 VIEWS  7/6/2021 6:14 PM      HISTORY: NGT advanced    COMPARISON: Earlier same day abdominal radiograph    FINDINGS:   Single AP view of the abdomen. Enteric tube in similar position with  tip in the proximal stomach and side-port overlying the distal  esophagus. No cardiomegaly. Clear lungs. Similar postoperative changes  in the central abdomen. Abdominal drain in similar position. Similar  gaseous distention of small and large bowel. Postoperative  pneumoperitoneum.      Impression    IMPRESSION:   1. Enteric tube in similar position with tip in the proximal stomach  and side-port  overlying the distal esophagus. Recommend advancing.  2. Similar gaseous distention of small and large bowel, most  consistent with postoperative ileus.   3. Postoperative pneumoperitoneum.    I have personally reviewed the examination and initial interpretation  and I agree with the findings.    EDMAR VERAS MD         SYSTEM ID:  [id number]   XR Abdomen Port 1 View    Narrative    XR ABDOMEN PORT 1 VIEWS 7/6/2021 7:16 PM    HISTORY: NG tube taken out and repositioned    COMPARISON: Earlier same day abdominal radiograph, subsequent  abdominal radiograph    FINDINGS:   Single AP view of the abdomen. Interval repositioning of enteric tube  with tip in the left mainstem bronchus. No cardiomegaly. Clear lungs.  Similar postoperative changes in the central abdomen. Abdominal drain  in similar position. Similar gaseous distention of small and large  bowel. Postoperative pneumoperitoneum.      Impression    IMPRESSION:   1. Interval repositioning of enteric tube with tip malpositioned in  the left mainstem bronchus. At the time of the dictation, the  subsequent abdominal radiograph demonstrates appropriate  repositioning.  2. Similar gaseous distention of small and large bowel, most  consistent with postoperative ileus.   3. Postoperative pneumoperitoneum.    I have personally reviewed the examination and initial interpretation  and I agree with the findings.    EDMAR VERAS MD         SYSTEM ID:  [id number]   XR Abdomen Port 1 View    Narrative    XR ABDOMEN PORT 1 VIEWS 7/6/2021 7:16 PM    HISTORY: NG removed and repositioned    COMPARISON: Earlier same day abdominal radiographs, subsequent  abdominal radiograph    FINDINGS:   Single AP view of the abdomen. Interval repositioning of enteric tube  with tip in the stomach and side-port near the gastroesophageal  junction. No cardiomegaly. Clear lungs. Similar postoperative changes  in the central abdomen. Abdominal drain in similar position. Similar  gaseous distention of  small and large bowel. Postoperative  pneumoperitoneum.      Impression    IMPRESSION:   1. Interval repositioning of enteric tube with tip in the stomach and  side-port near the gastroesophageal junction. At the time of the  dictation, subsequent abdominal radiograph demonstrates appropriate  positioning with enteric tube tip and side-port overlying the stomach.  2. Similar gaseous distention of small and large bowel, most  consistent with postoperative ileus.   3. Postoperative pneumoperitoneum.    I have personally reviewed the examination and initial interpretation  and I agree with the findings.    EDMAR VERAS MD         SYSTEM ID:  [id number]   XR Abdomen Port 1 View    Narrative    Exam: XR ABDOMEN PORT 1 VIEWS, 7/6/2021 6:25 PM    Indication: NGT advanced    Comparison: X-ray 7/6/2021    Findings:   Frontal radiograph of the abdomen. Gastric tube tip and sidehole  projected over the stomach. Cholecystectomy clip. Gastrojejunostomy  tube. Drain in place, similar to prior. Non-obstructive bowel gas  pattern. No pneumatosis or portal venous gas. Lung bases are  unremarkable.       Impression    Impression:   1. Gastric tube tip and sidehole projected over the stomach.  2. Nonobstructive bowel gas pattern.     I have personally reviewed the examination and initial interpretation  and I agree with the findings.    EDMAR VERAS MD         SYSTEM ID:  [id number]   CT Chest/Abdomen/Pelvis w Contrast    Narrative    EXAMINATION: CT CHEST/ABDOMEN/PELVIS W CONTRAST, 7/12/2021 1:37 PM    TECHNIQUE: Helical CT images from the thoracic inlet through the  symphysis pubis were obtained with intravenous contrast. Contrast  dose: iopamidol (ISOVUE-370) solution 86 mL    COMPARISON: CT abdomen and pelvis 7/5/2021    HISTORY: POD 7 s/p small bowel resection.    FINDINGS:    Chest:    Heart/ Mediastinum: Heart size is unremarkable.  No evidence of  central pulmonary embolism. There are enlarged mediastinal lymph  nodes, for  example a precarinal node measures 2.0 x 1.0 cm. Esophagus  appears unremarkable.     Lungs/pleura: The central tracheobronchial tree is patent.  There are  patchy airspace opacities in the posterior right upper lobe. There is  atelectasis with air bronchograms within the dependent portions of  bilateral lower lobes. Small bilateral pleural effusions.     Chest wall/axilla: No bulky lymphadenopathy. A bypass graft extends  from the left axillary region to the left femoral artery.    Abdomen and Pelvis:    Liver: Persistent intrahepatic biliary ductal dilatation. Scattered  subcentimeter hypodensities are again seen, likely representing cysts.  No suspicious masses.    Gallbladder: Gallbladder is surgically absent.     Spleen: Unremarkable.    Pancreas: Atrophic. Scattered coarse calcifications consistent with  chronic pancreatitis.    Adrenal glands: Unremarkable.    Kidneys: Bilateral renal cysts and scattered calcifications are  unchanged. Persistent moderate bilateral hydronephrosis.    Bowel: A PEG tube is noted where previously a GJ tube was seen. The  stomach is moderately distended with an air-fluid level. Interval  resection of a segment of small bowel. The 2 anastomosis sites appear  patent and the anterior abdomen. The jejunum is distended measuring up  to 5.5 cm in diameter tapering in the pelvis without a clear  transition point. A surgical drain has been placed in the right  anterior pelvic wall and coiled above the urinary bladder.    Lymph nodes: No bulky lymphadenopathy.    Blood vessels: Atherosclerotic calcifications of the aorta and its  major branches. 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    Pelvis: Urinary bladder is distended.    Bones and soft tissues: Multilevel degenerative changes of the spine.  Diffuse osseous demineralization. No suspicious lesions. Diffuse  anasarca. Dystrophic calcifications are again noted left anterior  pelvic wall. An incision site is noted along the  anterior abdominal  wall.      Impression    IMPRESSION:    1.  Postoperative changes of interval resection of a loop of small  bowel. 2 anastomotic sites are patent. There is dilatation of the  stomach and jejunum with gradual taper and no definite transition  point. Findings suggest postoperative ileus.  2.  Patchy airspace opacities in the posterior right upper lobe  suggestive of pneumonia.  3.  Small bilateral pleural effusions and adjacent atelectasis.  4.  Persistent bilateral moderate hydronephrosis.  5.  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  6.  Other nonacute findings as above.    I have personally reviewed the examination and initial interpretation  and I agree with the findings.    EDMAR VERAS MD         SYSTEM ID:  [id number]   CT Abdomen Pelvis w Contrast     Value    Radiologist flags Evidence of ischemic bowel in the mid abdomen with (AA)    Narrative    EXAMINATION: CT ABDOMEN PELVIS W CONTRAST  7/14/2021 7:01 PM      CLINICAL HISTORY: Abdominal abscess/infection suspected; pt w/ hx of  radiation and multiple abdominal surgeries, admitted with  intraabdominal sepsis. POD9 from necrotic bowel resection, now with  uptrending WBC (43 today)    COMPARISON: 7/12/2021    PROCEDURE COMMENTS: CT of the abdomen was performed with iopamidol  (ISOVUE-370) solution 86 mL intravenous and oral contrast. Coronal and  sagittal reformatted images were obtained.    FINDINGS:  Lower thorax:   Left greater than right pleural effusions and associated atelectasis.  Peribronchovascular consolidation and tree-in-bud nodularity in the  dependent portion of the right upper lobe base.     Abdomen and pelvis:  Moderate anasarca. The liver appears unremarkable. Unchanged  intrahepatic biliary dilatation, particularly on the left. Gallbladder  appears absent. Increased wedgelike hypoenhancing defects within the  spleen. Unremarkable appearing adrenal glands. Unchanged moderate  to  severe hydronephrosis with right-sided cortical thinning, with delayed  excretion on the right. Multiple hypoattenuating renal cysts, left  parapelvic cysts/diverticula. 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 Left  axillofemoral bypass is partially visualized. Patency cannot be  determined on the basis of this study. Post surgical changes over the  left suprapubic region.    There is a moderate amount of pneumoperitoneum in the right upper  quadrant. Large gas containing collection measuring 25 x 16 x 8 cm in  the retroperitoneal cavity surrounding hypoenhancing bowel loops in  the mid abdomen in the location of 2 small bowel anastomoses. The  bowel associated with the left lower quadrant anastomosis is  significantly hypoenhancing. Percutaneous gastrostomy balloon and  catheter are within the lumen of the visualized severely distended  stomach, containing layering material.    Osseous structures:   No aggressive appearing bony abnormalities.      Impression    IMPRESSION:  1. Large gas containing collection measuring 25 x 16 x 8 cm within the  mid peritoneal cavity surrounding hypoenhancing bowel concerning for  contained necrotic perforation, likely secondary to an anastomotic  leak. Moderate volume of free peritoneal air in the right upper  quadrant.  1a. Severe distention of the stomach and duodenum despite patent  appearing gastric tube with evidence of impaired motility.  2. Extensive splenic infarcts, progressed from 7/12/2021.  3. Left greater than right pleural effusions and associated  atelectasis. Likely aspiration pneumonitis in the base of the right  upper lobe.    [Critical Result: Evidence of ischemic bowel in the mid abdomen with  large necrotic/feculent collection, likely anastomotic leak.  Unexplained pneumoperitoneum]    Finding was identified on 7/14/2021 7:20 PM.     Dr. Jasso  was contacted by Dr. Muse at 7/14/2021 7:38 PM and  verbalized understanding of the critical finding.     I have personally reviewed the examination and initial interpretation  and I agree with the findings.    OMID GROSSMAN MD         SYSTEM ID:  [id number]              Medications Prior to Admission:     Medications Prior to Admission   Medication Sig Dispense Refill Last Dose     amylase-lipase-protease (CREON 24) 14907-48509 units CPEP per EC capsule 2 capsules by Per Feeding Tube route Mix 2 capsules with each tube feed supplement can.  Supplement runs @ 100 ml/hr from 1000 to 2000 7/4/2021 at Unknown time     amylase-lipase-protease (CREON 24) 38114-14254 units CPEP per EC capsule Take 1-2 capsules by mouth Take with snacks or supplements   Past Week at Unknown time     amylase-lipase-protease (CREON 24) 47925-75281 units CPEP per EC capsule Take 3 capsules by mouth as needed Takes 3 capsules if having a meal   Past Week at Unknown time     insulin  UNIT/ML vial Inject 18 Units Subcutaneous 2 times daily   7/4/2021 at Unknown time     insulin regular 100 UNIT/ML vial Sliding scale insulin per assisted living        ondansetron (ZOFRAN-ODT) 4 MG ODT tab Take 4 mg by mouth every 8 hours as needed for nausea   Past Week at Unknown time     oxyCODONE-acetaminophen (PERCOCET) 5-325 MG tablet Take 1 tablet by mouth every 4 hours as needed for pain   Past Week at Unknown time     pantoprazole (PROTONIX) 40 MG EC tablet Take 40 mg by mouth daily   7/4/2021 at Unknown time     sodium bicarbonate 325 MG tablet 325 mg by Per Feeding Tube route Crush and mix 1 tablet with each tube feed supplement can. Supplement runs @ 100 ml/hr from 1000 to 2000 7/4/2021 at Unknown time              Discharge Medications:     Current Discharge Medication List      CONTINUE these medications which have NOT CHANGED    Details   !! amylase-lipase-protease (CREON 24) 16016-90753 units CPEP per EC capsule 2 capsules by Per  Feeding Tube route Mix 2 capsules with each tube feed supplement can.  Supplement runs @ 100 ml/hr from 1000 to 2000      !! amylase-lipase-protease (CREON 24) 57331-75680 units CPEP per EC capsule Take 1-2 capsules by mouth Take with snacks or supplements      !! amylase-lipase-protease (CREON 24) 33632-58526 units CPEP per EC capsule Take 3 capsules by mouth as needed Takes 3 capsules if having a meal      insulin  UNIT/ML vial Inject 18 Units Subcutaneous 2 times daily      insulin regular 100 UNIT/ML vial Sliding scale insulin per assisted living      ondansetron (ZOFRAN-ODT) 4 MG ODT tab Take 4 mg by mouth every 8 hours as needed for nausea      oxyCODONE-acetaminophen (PERCOCET) 5-325 MG tablet Take 1 tablet by mouth every 4 hours as needed for pain      pantoprazole (PROTONIX) 40 MG EC tablet Take 40 mg by mouth daily      sodium bicarbonate 325 MG tablet 325 mg by Per Feeding Tube route Crush and mix 1 tablet with each tube feed supplement can. Supplement runs @ 100 ml/hr from 1000 to 2000       !! - Potential duplicate medications found. Please discuss with provider.                  Medications Discontinued or Adjusted During This Hospitalization:   N/A         Antibiotics Prescribed at Discharge:   N/A         Day of Discharge Physical Exam:      Physical Exam: Unresponsive to noxious stimuli, Spontaneous respirations absent, Breath sounds absent, Carotid pulse absent, Heart sounds absent and Pupillary light reflex absent                             Final Pathology Result:   FINAL DIAGNOSIS:   A. Intraabdominal Mass Coming Off of Proximal Jejunum; Excision:   Lipoma     B. Jejunum; Resection of Necrotic Portion:   Ischemic infarction extending to involve resection margins:    -With acute serositis compatible with peritonitis    -Benign reactive mesenteric lymph nodes x 9    -Cause of bowel infarction unclear (See Comment)     FINAL DIAGNOSIS:   A. SEGMENTAL SMALL BOWEL RESECTION:   - Ischemic  enteritis with acute serositis   - Superficial mucosal ischemic change present at surgical margin (viable   wall)   - Negative for malignancy     B. SEGMENTAL SMALL BOWEL RESECTION #2:   - Ischemic enteritis with acute serositis   - Anastomotic site shows superficial mucosal ischemic change   - Superficial mucosal ischemic change present at surgical margin (viable   wall)   - Negative for malignancy          Discharge Instructions and Follow-Up:   No discharge procedures on file.           Home Health Care:     Not needed           Discharge Disposition:     Patient  during this admission      Condition at discharge:       Paulette Marques MD  PGY-1, General Surgery  [phone]

## 2021-07-15 NOTE — CONSULTS
Hospice referral received from Irene CHISHOLM.  Irene has requested [facility] not establish contact with pt spouse until 7/16 as spouse is overwhelmed.  [facility] can accept this pt no later than 1 pm 7/16 provided he is stable for transport and discharges to [facility] service area.  No DME needs identified at this time.    Thank you for this referral.    Jazmine Duong RN [facility]  Referral Specialist  C: [phone]  24 hour line: [phone]

## 2021-07-15 NOTE — SIGNIFICANT EVENT
SPIRITUAL HEALTH SERVICES Significant Event  [employer] Sacrament of ANOINTING  [facility] 4A    Pt anointed by own parish  per request of family.    Hien Weldon  Palliative   Pager [phone]

## 2021-07-15 NOTE — ANESTHESIA PREPROCEDURE EVALUATION
Anesthesia Pre-Procedure Evaluation    Patient: Nick Zamudio   MRN: [id number] : 1955        Preoperative Diagnosis: Free intraperitoneal air [K66.8]   Procedure : Procedure(s):  Exploratory Laparotomy with Psb Small Bowel Resection     History reviewed. No pertinent past medical history.   Past Surgical History:   Procedure Laterality Date     LAPAROTOMY EXPLORATORY N/A 2021    Procedure: LAPAROTOMY, POSSIBLE BOWEL RESECTION, temporary abdominal closure with abthera;  Surgeon: Stanton Huang MD;  Location: [facility] OR     LAPAROTOMY EXPLORATORY N/A 2021    Procedure: EXPLORATORY LAPAROTOMY;  Surgeon: Doug English MD;  Location: [facility] OR     RESECTION ILEOCECAL N/A 2021    Procedure: segmental BOWEL RESECTION, abdominal washout, abdominal wound CLOSURE;  Surgeon: Doug English MD;  Location: [facility] OR      Allergies   Allergen Reactions     Ibuprofen      Penicillins Unknown      Social History     Tobacco Use     Smoking status: Not on file   Substance Use Topics     Alcohol use: Not Currently      Wt Readings from Last 1 Encounters:   07/10/21 64 kg (141 lb 1.5 oz)        Anesthesia Evaluation   Pt has had prior anesthetic.     No history of anesthetic complications       ROS/MED HX  ENT/Pulmonary:       Neurologic:     (+) Spinal cord injury (paraparesis),     Cardiovascular: Comment: Diffuse arteriosclerosis    (+) hypertension-Peripheral Vascular Disease-- Other and Symptomatic. ---    METS/Exercise Tolerance:     Hematologic:     (+) anemia,     Musculoskeletal:       GI/Hepatic: Comment: Hx of chronic pancreatitis, failure to thrive    Current admission started with pneumatosis s/p bowel resection; now with findings again of pneumatosis, WBC 43, abdominal pain      Renal/Genitourinary: Comment: Testicular cancer s/p radiation      Endo:     (+) type II DM,     Psychiatric/Substance Use:       Infectious Disease:       Malignancy:   (+) Malignancy, History of Other.Other CA  Remission status post Surgery, Chemo and Radiation.    Other:            Physical Exam    Airway   unable to assess          Respiratory Devices and Support         Dental    unable to assess        Cardiovascular    unable to assess         Pulmonary    Unable to assess           Other findings: Limited exam due to emergent nature of surgery    OUTSIDE LABS:  CBC:   Lab Results   Component Value Date    WBC 43.1 (H) 07/14/2021    WBC 25.7 (H) 07/13/2021    HGB 8.0 (L) 07/14/2021    HGB 8.3 (L) 07/13/2021    HCT 25.6 (L) 07/14/2021    HCT 27.4 (L) 07/13/2021     07/14/2021     07/13/2021     BMP:   Lab Results   Component Value Date     (H) 07/14/2021     (H) 07/13/2021    POTASSIUM 3.2 (L) 07/14/2021    POTASSIUM 2.9 (L) 07/13/2021    POTASSIUM 2.9 (L) 07/13/2021    CHLORIDE 117 (H) 07/14/2021    CHLORIDE 121 (H) 07/13/2021    CO2 18 (L) 07/14/2021    CO2 19 (L) 07/13/2021    BUN 16 07/14/2021    BUN 14 07/13/2021    CR 1.17 07/14/2021    CR 1.04 07/13/2021     (H) 07/14/2021     (H) 07/14/2021     COAGS:   Lab Results   Component Value Date    INR 1.30 (H) 07/05/2021     POC:   Lab Results   Component Value Date     (H) 07/11/2021     HEPATIC:   Lab Results   Component Value Date    ALBUMIN 2.4 (L) 07/05/2021    PROTTOTAL 6.9 07/05/2021    ALT 14 07/05/2021    AST 8 07/05/2021    ALKPHOS 111 07/13/2021    BILITOTAL 0.2 07/05/2021     OTHER:   Lab Results   Component Value Date    PH 7.37 07/07/2021    LACT 1.9 07/14/2021    A1C 6.4 (H) 07/05/2021    ELODIA 9.0 07/14/2021    PHOS 2.8 07/14/2021    MAG 1.6 07/14/2021    LIPASE <10 (L) 07/05/2021    .0 (H) 07/12/2021       Anesthesia Plan    ASA Status:  4, emergent       Anesthesia Type: General.     - Airway: ETT   Induction: RSI, Propofol, Etomidate.   Maintenance: Balanced.   Techniques and Equipment:     - Lines/Monitors: 2nd IV, BIS, Arterial Line, PICC in situ     - Drips/Meds: Norepi      Consents    Anesthesia Plan(s) and associated risks, benefits, and realistic alternatives discussed. Questions answered and patient/representative(s) expressed understanding.     - Discussed with:  Patient      - Extended Intubation/Ventilatory Support Discussed: Yes.      - Patient is DNR/DNI Status: No         Postoperative Care    Pain management: IV analgesics.        Comments:                Charlotte Diaz MD

## 2021-07-15 NOTE — ANESTHESIA POSTPROCEDURE EVALUATION
Patient: Nick Zamudio    Procedure(s):  Exploratory Laparotomy with Small Bowel Resection    Diagnosis:Free intraperitoneal air [K66.8]  Diagnosis Additional Information: No value filed.    Anesthesia Type:  General    Note:  Disposition: ICU            ICU Sign Out: Anesthesiologist/ICU physician sign out WAS performed   Postop Pain Control:    PONV:    Neuro/Psych:    Airway/Respiratory:    CV/Hemodynamics:             Events: Death < 48h postop   Other NRE:             Miscellaneous: Metabolic acidosis   DID A NON-ROUTINE EVENT OCCUR? YES    Event details/Postop Comments:  Patient taken emergently to OR for free air in abdomen. An anastomotic leak was seen in addition to an significant segment of ischemic bowel, which was resected. Please seen operative note for intraoperative findings and intervention from the surgical team. ABG revealed significant acidosis with pH 7.15. 1 amp of sodium bicarbonate was given and the patient was hyperventilated to assist with normalizing his pH. pH 7.24 -> 7.21. Lactate 1.8 -> 2.4 -> 2.7. Discussion with surgeon regarded the patient's acidosis resulted in the agreement to keep patient intubated and to transport him directly to the ICU, where he would be extubated once family was present at bedside. Patient was made DNR/DNI with comfort cares shortly after arriving to the ICU.    Addendum: Patient was declared  this AM  at 0254.           Last vitals:  Vitals:    07/15/21 0400 07/15/21 0500 07/15/21 0600   BP:      Pulse: 77 84 88   Resp: 16     Temp:      SpO2: 100% 99% 99%       Last vitals prior to Anesthesia Care Transfer:  CRNA VITALS  7/15/2021 0112 - 7/15/2021 0212      7/15/2021             Pulse:  81    ART BP:  140/59    SpO2:  100 %          Electronically Signed By: Rayray Talavera MD  July 15, 2021  6:22 AM

## 2021-07-15 NOTE — PROGRESS NOTES
IP Diabetes Management  Daily Note           Assessment and Plan:   HPI:Nick Zamudio is a 65 year old male with PMHx of type 2 diabetes, testicular cancer s/p radiation therapy, partial quadriplegia, chronic pancreatitis, biliary stenosis s/p ERCP, duodenal stenosis, prior laparoscopic cholecystectomy complicated by intusseption s/p small bowel resection. He was admitted with sepsis secondary to intra-abdominal infection s/p exploratory laparotomy with necrotic small bowel resection on 7/5. Returned to the OR on 7/6 for additional small bowel resection and abdominal closure. Post op course has been complicated by evidence of ileus on imaging, and profuse watery diarrhea with TF, limiting nutritional advancement.     Assessment:   1)Type II Diabetes Mellitus  2) TF induced hyperglycemia     Plan:    -TF and TPN have been discontinued. There was insulin in TPN bag.    -subQ insulin orders and BG monitoring have been discontinued, per comfort cares transition.     Plan discussed with primary team.     Interval History and Assessment: interval glucose trend reviewed:  BG improving overnight.     CT abd with persistent abdominal free air. Continued abd pain, leukocytosis. Was taken urgently back to OR, found to have profound bowel ischemia.     Transitioning to comfort cares, as the level of ischemic is unsurvivable.   Per RD, all nutritional supplementation will be discontinued.    Current nutritional intake and type: Orders Placed This Encounter      Advance Diet as Tolerated: Clear Liquid Diet; Regular Diet Adult  (RN notes indicating some liquids for comfort are being allowed)     PTA Diabetes Regimen:   NPH 18 units BID with TF (per RD notes, UNSURE of PTA enteral regimen)  regular insulin sliding scale (?)    Diabetes team to sign off.           Diabetes History:   Type of Diabetes: Type 2 Diabetes Mellitus, TPN/Enteral Feeding Induced Hyperglycemia  Lab Results   Component Value Date    A1C 6.4 07/05/2021               Review of Systems:     The Review of Systems is negative other than noted in the Interval History.           Medications:     Current Facility-Administered Medications   Medication     acetaminophen (TYLENOL) tablet 975 mg     albuterol (PROVENTIL) neb solution 2.5 mg     carboxymethylcellulose PF (REFRESH PLUS) 0.5 % ophthalmic solution 1 drop     Contraindications to both pharmacological and mechanical prophylaxis (must document contraindications for both in this order)     fentaNYL (SUBLIMAZE) 50 mcg/mL bolus from infusion pump 12.5 mcg     fentaNYL (SUBLIMAZE) infusion     heparin lock flush 10 UNIT/ML injection 5-20 mL     HYDROmorphone (PF) (DILAUDID) injection 0.3-0.5 mg     HYDROmorphone (PF) (DILAUDID) injection 0.5 mg     Lidocaine (LIDOCARE) 4 % Patch 1 patch    And     lidocaine patch in PLACE     lidocaine (LMX4) cream     lidocaine (LMX4) cream     lidocaine 1 % 0.1-5 mL     lipids (INTRALIPID) 20 % infusion 250 mL     LORazepam (ATIVAN) injection 1 mg     Morphine Sulfate (PF) injection 5 mg    And     Morphine Sulfate (PF) injection 5 mg     multivitamin CF FORMULA (AquADEKS) liquid 2 mL     naloxone (NARCAN) injection 0.2 mg    Or     naloxone (NARCAN) injection 0.4 mg    Or     naloxone (NARCAN) injection 0.2 mg    Or     naloxone (NARCAN) injection 0.4 mg     norepinephrine (LEVOPHED) 16 mg in  mL infusion MAX CONC CENTRAL LINE     ondansetron (ZOFRAN-ODT) ODT tab 4 mg    Or     ondansetron (ZOFRAN) injection 4 mg     oxyCODONE (ROXICODONE) solution 5-10 mg     pantoprazole (PROTONIX) IV push injection 40 mg     parenteral nutrition - ADULT compounded formula     phenylephrine (TTIA-SYNEPHRINE) 50 mg in NaCl 0.9 % 250 mL infusion     prochlorperazine (COMPAZINE) injection 5 mg    Or     prochlorperazine (COMPAZINE) tablet 5 mg    Or     prochlorperazine (COMPAZINE) suppository 12.5 mg     sodium chloride (PF) 0.9% PF flush 10-20 mL     sodium chloride (PF) 0.9% PF flush 10-40 mL      "sodium chloride (PF) 0.9% PF flush 10-40 mL            Physical Exam:    /72 (BP Location: Left arm)   Pulse 88   Temp 96.9  F (36.1  C) (Axillary)   Resp 16   Ht 1.778 m (5' 10\")   Wt 64 kg (141 lb 1.5 oz)   SpO2 99%   BMI 20.24 kg/m              Data:     Recent Labs   Lab 07/15/21  0159 07/15/21  0110 07/15/21  0049 07/15/21  0012 07/14/21  2028 07/14/21  1643 07/11/21  0739 07/11/21  0412 07/11/21  0044 07/10/21  2108 07/10/21  1637 07/10/21  1116 07/10/21  0834   * 156* 155* 154* 164* 230*   < >  --   --   --   --   --   --    BGM  --   --   --   --   --   --   --  145* 176* 186* 202* 173* 175*    < > = values in this interval not displayed.     Lab Results   Component Value Date    WBC 43.1 (H) 07/14/2021    WBC 25.7 (H) 07/13/2021    WBC 20.3 (H) 07/12/2021    HGB 7.6 (L) 07/15/2021    HGB 7.7 (L) 07/15/2021    HGB 8.0 (L) 07/15/2021    HCT 25.6 (L) 07/14/2021    HCT 27.4 (L) 07/13/2021    HCT 26.8 (L) 07/12/2021    MCV 91 07/14/2021    MCV 92 07/13/2021    MCV 92 07/12/2021     07/14/2021     07/13/2021     07/12/2021     Lab Results   Component Value Date     07/15/2021     07/15/2021     07/15/2021    POTASSIUM 3.5 07/15/2021    POTASSIUM 3.4 (L) 07/15/2021    POTASSIUM 3.5 07/15/2021    CHLORIDE 117 (H) 07/14/2021    CHLORIDE 121 (H) 07/13/2021    CHLORIDE 118 (H) 07/12/2021    CO2 18 (L) 07/14/2021    CO2 19 (L) 07/13/2021    CO2 20 07/12/2021     (H) 07/15/2021     (H) 07/15/2021     (H) 07/15/2021     Lab Results   Component Value Date    BUN 16 07/14/2021    BUN 14 07/13/2021    BUN 14 07/12/2021     No results found for: TSH  Lab Results   Component Value Date    AST 8 07/05/2021    ALT 14 07/05/2021    ALKPHOS 111 07/13/2021    ALKPHOS 91 07/05/2021     Patient was not seen; this is chart review/coordination of care documentation only.     To contact Endocrine Diabetes service:   From 8AM-4PM: page inpatient diabetes " provider that is following the patient  For questions or updates from 4PM-8AM: page the diabetes job code for on call fellow: [phone]    Amber Rodney PA-C  Inpatient Diabetes Management Service  Pager [phone]

## 2021-07-15 NOTE — CONSULTS
Care Management Follow Up    Length of Stay (days): 10    Expected Discharge Date:  7/16/21     Concerns to be Addressed: discharge planning     Patient plan of care discussed at interdisciplinary rounds: Yes    Anticipated Discharge Disposition: Long Term Care, Hospice  Disposition Comments: In theory, patient and family agreeable. However, wife appeared overwhelmed and SW not confident that she fully followed the education provided  Anticipated Discharge Services: None  Anticipated Discharge DME: None    Patient/family educated on Medicare website which has current facility and service quality ratings: yes  Education Provided on the Discharge Plan:  yes  Patient/Family in Agreement with the Plan: yes    Referrals Placed by CM/SW: Hospice, Post Acute Facilities    Accepted  [facility]  [address]  [place], [place] [id number] [phone] (p.)  [phone] (f.)    Patient has a bed hold but had previously expressed that he did not want to return there. SW checked in with their admissions office: she reports visitors and pets would be okay. No preference for hospice agency. They require a $6000 down payment for room and board for hospice patients.    Pending SNF Referrals    [facility]  [address] [place] [address]  [phone] (p.)  [phone] (f.)    [facility]  [address] [place] [address]  [phone] (p.)  [phone] (f.)    [facility]  [address] [place] [address]  [phone] (p.)  [phone] (f.)    Discontinued Referrals    [facility] - called to decline due to no beds available  [address] [place] [address]  [phone] (p.)  [phone] (f.)    Private pay costs discussed: private room/amenity fees    Additional Information:  SW consulted to discuss hospice placement with pt and family. Met with pt, wife Ines and another family member in room.  "Introduced self and reason for visit. Ines stated that she is familiar with hospice. SW explained the difference between hospice at home and hospice at facility. Ines was upset when she learned that not all facilities will allow unlimited visitors, nor can SW guarantee all facilities would allow a pet to visit. SW also explained hospice at home, but Ines felt strongly that family could not provide the necessary support to patient at home. Discussed that pt came from [facility], but that he had also requested not to return there when discussing discharge with prior SW (see note by Renee Ray of 7/12/21). Ines said she didn't feel the staff there had the skills to provide necessary care. SW explained that hospice cares are provided by hospice agencies, but again, they do not provide 24/7 staffing. Ines stated \"then I don't know what to tell you.\"    Ines appeared understandably distraught and overwhelmed, and SW offered to allow her and the rest of the family to discuss and SW would follow up in the morning with options. However, pt stated \"look into it, look into it,\" which his family took as him requesting SW not delay referrals. SW offered to send referrals to SNFs in the [facility] as that had been pt's previous preference and is where his home is, and Ines in agreement with this. She also stated no preference for hospice. As it was late in the afternoon at this point, SW told Ines that she does not need to make additional decisions today, as facilities need time to review and likely will not contact SW until tomorrow. Ines appeared to find this perspective somewhat helpful. Patient's family aware that room and board would be private pay in a facility and stated \"money isn't a barrier.\"    Established with family that SW will send referrals to [facility] SNFs and if there is an accepting facility, SW will find out 1) how much the down payment is; 2) the visitor policy; 3) if " pets are allowed to visit. SW sent referrals and also spoke with [facility] admissions (see above for details). SW also requested [place] hospice liaison review pt's chart to see if he is hospice appropriate. Update provided to gen surgery team.    SW will plan to follow up tomorrow on pt's condition and status of referrals, and follow up with family if appropriate for additional discussion.    HELENA Lee, [facility]  ICU    [facility]   P: [phone]  Pager: [phone]

## 2021-07-15 NOTE — PLAN OF CARE
Occupational Therapy Discharge Summary    Reason for therapy discharge:    Change in medical status.    Progress towards therapy goal(s). See [facility] for goal details.  Goals partially met.  Barriers to achieving goals:   limited tolerance for therapy.    Therapy recommendation(s):    No further therapy is recommended.

## 2021-07-15 NOTE — CONSULTS
[facility]  Palliative Care Consultation Note    Patient: Nick Zamudio  Date of Admission:  7/5/2021    Requesting Clinician / Team: Dr. Patricia Marques  Reason for consult:   Symptom management  Goals of care  Decisional support  Patient and family support    Staff Addendum Recommendations:    Continue IV fentanyl at current rate 100 mcg/hour    Increase prn fentanyl dose to 25-50 mcg up to q 1 hour prn. Leave prn IV hydromorphone in place as backup.    Discontinue lab draws, BP checks, IV pressors, any meds not focused on comfort    Lorazepam 0.5-1 mg IV up to q 1 hours prn for anxiety,agitation    Haloperidol 1 mg IV up to q 4 hours prn agitation/restlessness not relieved by opioids/lorazepam.    Although early in the day, family/pt discussed hope for discharge to residential hospice, he's declined significantly through the day, and I doubt this will be feasible.     Discussed with pt and family that his LE is hours-limited days    Recommendations:    Had long discussion with wife and daughter about prognosis and ongoing care in the hospital. They are aware that Nick is approaching the end of his life and agree with proceeding with comfort focused care. After discussion with wife and daughter we did update the patient on his prognosis and he is aware that he is approaching the end of his life. We discussed we can not be sure how long he has left but we expect it will most likely not be longer than days. We discussed what would be important to him in the time he has left and he agreed that spending time with family was of utmost importance as well as comfort. He would like to see his pets again if it is at all possible. He also said it was important to him to see some remodeling they are doing at his house and family will work on getting some video of the progress.     Patient and family would be interested in the possibility of residential hospice should he prove to  be stable enough and we will discuss this with SW to see what is available and what they qualify for    Would continue with current pain regimen at this time as he is having adequate control, may need to consider changes should his pain not be controlled or if he is able to go to residential hospice (may need Fentanyl patch with prns for example)    Offered /spiritual; support but they feel okay for right now but would only like a  if they need support moving forward    Continue with the remainder of comfort cares    Will continue to follow while in hospital    These recommendations have been discussed with surgical team, family.      Thank you for the opportunity to participate in the care of this patient and family. Our team: will continue to follow.     During regular M-F work hours -- if you are not sure who specifically to contact -- please contact us by sending a text page to our team consult pager at [phone].        After regular work hours and on weekends/holidays, you can call our answering service at [phone]. Also, who's on call for us is available in [employer].   Pt seen, interviewed and examined by me with Pall Care fellow Dr Mikhail Slater. A/P outlined in this note reflect my findings and recommendations.  Alejandro Zamorano MD  Palliative Medicine Consult Team  TT: 84 minutes, with > 50% spent in C/C/E patient/family/care teams re: GOC, POC, Sx management. [id number]        Assessments:  Nick Zamudio is a 65 year old male with a distant history of testicular cancer, biliary stenosi, chronic pancreatitis, paraplegia, previous bowel resection that presented to the ED on 7/5/21 with abdominal pain. He was found to have ischemic bowel. He has had a complicated hospital stay and has required recurrent trips to the OR for ischemia of his small bowel and most recently had 105 cm of ischemic bowel removed and was left discontinuous. The determination at the time of that surgery was  that this was non-survivable event and he was placed on comfort cares and extubated in the SICU early on the morning of 7/15. We have been consulted to help family with ongoing decisions at the end of life, symptom management, and family support.     Today, the patient was seen for:  Ischemic bowel  Comfort care discussions  Family support   Symptoms management at the end of life    Prognosis, Goals, & Planning:       Prognosis, Goals, and/or Advance Care Planning were addressed today: Yes        Summary/Comments: Discussed with patient's wife and she is aware of his prognosis and made the decision with the support of their three adult daughters to pursue comfort cares overnight.       Patient's decision making preferences: unable to assess          Patient has decision-making capacity today for complex decisions: No            I have concerns about the patient/family's health literacy today: No           Patient has a completed Health Care Directive: Unknown or unable to assess.      Code status: 'Special code': comfort cares    Coping, Meaning, & Spirituality:   Mood, coping, and/or meaning in the context of serious illness were addressed today: Yes  Summary/Comments: Patient and family are Shinto and their  was here until 4 AM. They do not feel they need any extra support at this time but would prefer ti to be a  if they were to require more support in the future.     Social:     Key family / caregivers: wife and daughter Lindsey at bedside today. Has 2 other adult daughters.     History of Present Illness:  History gathered today from: patient, family/loved ones, medical chart, medical team members, unit team members    Met in family room with wife this AM. She is very afraid of what is to come. Her and the family were expecting the patient to pass quickly after extubation and now are left wondering what to do since he is still alive this morning. She is hesitant to tell him what is going on  as she is scared it will cause him more suffering. He has been asking about a CT and what the plan is this morning. Per the wife he has had waxing and waning LOC but he seems to be more awake than he has been this AM.  In discussion with the patient he notes his pain has been adequately controlled at this time. He feels his breathing has been good and denies any nausea.     Key Palliative Symptom Data:  # Pain severity the last 12 hours: low  # Dyspnea severity the last 12 hours: low  # Nausea severity the last 12 hours: none    Patient is on opioids: bowels not assessed today.    ROS:  Comprehensive ROS is reviewed and is negative except as here & per HPI:      Past Medical History:  History reviewed. No pertinent past medical history.     Past Surgical History:  Past Surgical History:   Procedure Laterality Date     LAPAROTOMY EXPLORATORY N/A 7/5/2021    Procedure: LAPAROTOMY, POSSIBLE BOWEL RESECTION, temporary abdominal closure with abthera;  Surgeon: Stanton Huang MD;  Location: [facility] OR     LAPAROTOMY EXPLORATORY N/A 7/6/2021    Procedure: EXPLORATORY LAPAROTOMY;  Surgeon: Doug English MD;  Location: [facility] OR     RESECTION ILEOCECAL N/A 7/6/2021    Procedure: segmental BOWEL RESECTION, abdominal washout, abdominal wound CLOSURE;  Surgeon: Doug English MD;  Location: [facility] OR         Family History:  History reviewed. No pertinent family history.      Allergies:  Allergies   Allergen Reactions     Ibuprofen      Penicillins Unknown        Medications:  I have reviewed this patient's medication profile and medications from this hospitalization.   Noted:  Fentanyl drip at 100 mcg/hr and 12.5 mcg every one hour prn  Hydromorphone 0.3-0.5 mg q 2 hours (none today)  Lorazepam 1 mg q 10 minutes prn air hunger  Morphine 5 mg q 30 minutes for signs of distress      Physical Exam:  Vital Signs: Temp: 96.9  F (36.1  C) Temp src: Axillary BP: 127/72 Pulse: 88   Resp: 14 SpO2: 99 % O2 Device: None (Room  air) Oxygen Delivery: 3 LPM  Weight: 141 lbs 1.51 oz   General- sleeping but arouses to voice, interacting appropriately  CV- RRR, no MRG  Pulm- CTA bilaterally      Data reviewed:  Recent imaging reviewed, my comments on pertinents:   CT 7/14  1. Large gas containing collection measuring 25 x 16 x 8 cm within the  mid peritoneal cavity surrounding hypoenhancing bowel concerning for  contained necrotic perforation, likely secondary to an anastomotic  leak. Moderate volume of free peritoneal air in the right upper  quadrant.  1a. Severe distention of the stomach and duodenum despite patent  appearing gastric tube with evidence of impaired motility.  2. Extensive splenic infarcts, progressed from 7/12/2021.  3. Left greater than right pleural effusions and associated  atelectasis. Likely aspiration pneumonitis in the base of the right  upper lobe.        Recent lab data reviewed, my comments on pertinents:   Reviewed in EPIC

## 2021-07-15 NOTE — OP NOTE
Operative Note    Pre-operative diagnosis: Free intraperitoneal air [K66.8]   Post-operative diagnosis Ischemic bowel   Procedure: Procedure(s):  Exploratory Laparotomy with Psb Small Bowel Resection   Surgeon(s): Surgeon(s) and Role:     * Carlos Whipple MD - Primary     * Abhishek Crowe MD - Resident - Assisting   Estimated blood loss: 20 ml    Specimens: ID Type Source Tests Collected by Time Destination   1 : Small Bowel Tissue Other SURGICAL PATHOLOGY EXAM Carlos Whipple MD 7/15/2021 12:33 AM       Findings: Completely necrotic small bowel (with exception of 3cm of terminal ileum and 10cm at ligament of treitz).  Small perforation near site of prior anastomosis.     Description of procedure:    After discussion with the patient about the seriousness of his condition and his high mortality risk, verbal consent was obtained to proceed to the OR for exploratory laparotomy.  I also spoke with his wife to explain his situation and possible intraoperative findings.    He was brought to the OR and placed supine on the operating table.  An A line was placed, he was sedated and intubated without issue.  His abdomen was prepped and draped in sterile fashion and a time out was performed.    His prior midline incision was opened by cutting the PDS suture and bluntly opening the incision. Immediately encountered green succus.  This was suctioned out. The abdomen was opened thru the entire length of the prior incision.  Blunt dissection was used to free up the entire small bowel.  A small perforation was seen proximal to the prior anastomosis this was closed with an fredo clamp.  The entire visible small bowel appeared necrotic.  We worked up proximally and identified about 10cm of potentially viable bowel at the ligament of treitz.  Distally the final 3cm of the terminal ileum was viable.  The stomach and colon appeared normal.      I scrubbed out and spoke with his wife about this and how  it was not survivable.  Due to the perforation we would resect the bowel, and then close the abdomen and bring him to the ICU and allow family to be with him.  She understood.    We then transected at the proximal necrotic border and took the mesentery with a ligasure energy device.  105cm of small bowel was resected down to the viable ileum which was taken with an additional staple load.  We irrigated and then closed the incision with interrupted nylon suture and placed gauze dressings over this.    He will be brought to the ICU and family allowed to visit with plans to extubate him when family has gathered.    I was present for the entire procedure   Full range of motion of upper and lower extremities, no joint tenderness/swelling.

## 2021-07-15 NOTE — PROGRESS NOTES
Surgery Progress Note  07/15/2021       Subjective:  Mr. Zamudio taken to OR emergently overnight for small bowel resection, was found to have necrotic small bowel: poor prognosis and family agreed to pursue comfort cares. This morning, he is resting comfortably in bed.     Objective:  On comfort cares, no vitals     PE:  Gen: resting comfortably  CV/Resp: NLB on RA       Assessment/Plan:   Gino Zamudio is a 66 yo male with h/o testicular cancer s/p radiation therapy, paraparesis, biliary stenosis, duodenal stenosis, failure to thrive s/p PEG-J c/b GI bleed, chronic pancreatitis, prior lap augustine c/b intussusception s/p small bowel resection, admitted with intraabdominal sepsis, s/p ex-lap with necrotic small bowel, resected on 7/5, returned to OR 7/6 for additional small bowel resection and abdominal closure. Overnight, he was taken to OR for resection of majority of small bowel, prognosis poor and family and patient are aware. Patient started on comfort cares this morning. From our perspective, Mr. Zamudio should be medically able to transfer to outside facility within the next day or two if patient and family so desire. We will continue to monitor peripherally.    Disposition: per patient and family, coordinate with Social Work and Palliative Medicine.       Discussed with chief resident, who will discuss with staff.  - - - - - - - - - - - - - - - - - -  Paulette Marques MD  PGY-1, General Surgery  [phone]

## 2021-07-16 NOTE — PROGRESS NOTES
"/72 (BP Location: Left arm)   Pulse 88   Temp 96.9  F (36.1  C) (Axillary)   Resp 14   Ht 1.778 m (5' 10\")   Wt 64 kg (141 lb 1.5 oz)   SpO2 99%   BMI 20.24 kg/m       Writer called into patients room by family. Upon arrival around 0250 patient pulseless, no breath sounds. On called paged regarding patients condition. Patients death called @0254. Patients family left with patients wallet and patient cleaned for readied for pickup. security called and patient transported off floor.   "

## 2021-07-16 NOTE — PROGRESS NOTES
Patient arrived on floor from 4A. Patients fentanyl driprunning low and pharmacy contacted. Pharmacy states new non ICU orders needed and that patient will need morphine as floor can not have fentanyl drip. on call surgical resident paged and will speak with pharmacy to correct pain medication. Patient given 0.5 IV dilaudid while awaiting new cartilage of drip. Patients extremities are cold to touch and labored breathing. Wife in room and she states  will call other family members in for support. Patient incont of stool x 1, no urine output from [facility]. Cont with Cares.

## 2021-07-16 NOTE — PROGRESS NOTES
MD DEATH PRONOUNCEMENT    Called to pronounce Nick Zamudio dead.    Physical Exam: Unresponsive to noxious stimuli, Spontaneous respirations absent, Breath sounds absent, Carotid pulse absent, Heart sounds absent and Pupillary light reflex absent    Patient was pronounced dead at 0254 AM, 2021.    Preliminary Cause of Death: Septic Shock    Principal Problem:    Pneumatosis intestinalis  Active Problems:    Abdominal pain, epigastric    Ischemic necrosis of small bowel (H)       Infectious disease present?: NO    Communicable disease present? (examples: HIV, chicken pox, TB, Ebola, CJD) :  NO    Multi-drug resistant organism present? (example: MRSA): NO    Please consider an autopsy if any of the following exist:  NO Unexpected or unexplained death during or following any dental, medical, or surgical diagnostic treatment procedures.   NO Death of mother at or up to seven days after delivery.     NO All  and pediatric deaths.     NO Death where the cause is sufficiently obscure to delay completion of the death certificate.   NO Deaths in which autopsy would confirm a suspected illness/condition that would affect surviving family members or recipients of transplanted organs.     The following deaths must be reported to the 's Office:  NO A death that may be due entirely or in part to any factors other than natural disease (recent surgery, recent trauma, suspected abuse/neglect).   NO A death that may be an accident, suicide, or homicide.     NO Any sudden, unexpected death in which there is no prior history of significant heart disease or any other condition associated with sudden death.   NO A death under suspicious, unusual, or unexpected circumstances.    NO Any death which is apparently due to natural causes but in which the  does not have a personal physician familiar with the patient s medical history, social, or environmental situation or the circumstances of the terminal  event.   NO Any death apparently due to Sudden Infant Death Syndrome.     NO Deaths that occur during, in association with, or as consequences of a diagnostic, therapeutic, or anesthetic procedure.   NO Any death in which a fracture of a major bone has occurred within the past (6) six months.   NO A death of persons note seen by their physician within 120 days of demise.     NO Any death in which the  was an inmate of a public institution or was in the custody of Law Enforcement personnel.   NO  All unexpected deaths of children   NO Solid organ donors   NO Unidentified bodies   NO Deaths of persons whose bodies are to be cremated or otherwise disposed of so that the bodies will later be unavailable for examination;   NO Deaths unattended by a physician outside of a licensed healthcare facility or licensed residential hospice program   NO Deaths occurring within 24 hours of arrival to a health care facility if death is unexpected.    NO Deaths associated with the decedent s employment.   NO Deaths attributed to acts of terrorism.   NO Any death in which there is uncertainty as to whether it is a medical examiner s care should be discussed with the medical investigator.        Body disposition: Organ donation was discussed with family member:  Spouse.  Permission for organ donation was deferred.  Body released to the  home.    Please page if questions,  Devaughn Jasso MD  Surgical Resident  #[phone]

## 2021-07-16 NOTE — PROGRESS NOTES
Received call from surgery resident - was told unable to continue the fentanyl drip on the floor.  Seen by Dr. Zamorano today, is on comfort care after surgery for ischemic bowel and extubation, fentanyl drip at 100 mcg/hr continued with expected survival hours to a few days.      Given this is a high dose of fentanyl/hr, I think would be difficult to transition this to a push or sublingual regimen, and would recommend this be continued.  Patient was moved out of the ICU for bed need, but I think this should still be continued on the floor and discussed this with pharmacist.      Reordered previous order of fentanyl 100 mcg/hr drip continuous + 50 mcg bolus q1h PRN for pain or dyspnea.     Crissy Valencia MD  Pager: [phone]  [facility] Inpatient Team Consult pager [phone] (M-F 8-4:30)  After-hours Answering Service [phone]

## 2021-07-21 LAB
PATH REPORT.COMMENTS IMP SPEC: NORMAL
PATH REPORT.COMMENTS IMP SPEC: NORMAL
PATH REPORT.FINAL DX SPEC: NORMAL
PATH REPORT.GROSS SPEC: NORMAL
PATH REPORT.MICROSCOPIC SPEC OTHER STN: NORMAL
PATH REPORT.RELEVANT HX SPEC: NORMAL
PHOTO IMAGE: NORMAL

## 2021-09-26 ENCOUNTER — HEALTH MAINTENANCE LETTER (OUTPATIENT)
Age: 66
End: 2021-09-26

## 2021-11-10 NOTE — ANESTHESIA POSTPROCEDURE EVALUATION
Patient: Nick Zamudio    Procedure: Procedure(s):  LAPAROTOMY, POSSIBLE BOWEL RESECTION, temporary abdominal closure with abthera       Diagnosis:Pneumatosis coli [K63.89]  Diagnosis Additional Information: No value filed.    Anesthesia Type:  General    Note:  Disposition: ICU            ICU Sign Out: Anesthesiologist/ICU physician sign out WAS performed   Postop Pain Control: Uneventful            Sign Out: Well controlled pain   PONV: No   Neuro/Psych: Uneventful            Sign Out: PLANNED postop sedation   Airway/Respiratory: Uneventful            Sign Out: Acceptable/Baseline resp. status; AIRWAY IN SITU/Resp. Support               Airway in situ/Resp. Support: ETT                 Reason: Planned Pre-op   CV/Hemodynamics: Uneventful            Sign Out: No obvious hypovolemia; No obvious fluid overload   Other NRE:    DID A NON-ROUTINE EVENT OCCUR?            Last vitals:  Vitals Value Taken Time   /72 07/14/21 2010   Temp 36.1  C (96.9  F) 07/15/21 0200   Pulse 88 07/15/21 0600   Resp 14 07/15/21 0800   SpO2 99 % 07/15/21 0600       Electronically Signed By: Holden Narvaez MD  November 10, 2021  2:44 PM  
Dr Lau

## 2022-01-16 ENCOUNTER — HEALTH MAINTENANCE LETTER (OUTPATIENT)
Age: 67
End: 2022-01-16

## 2022-05-08 ENCOUNTER — HEALTH MAINTENANCE LETTER (OUTPATIENT)
Age: 67
End: 2022-05-08

## 2023-02-03 NOTE — PLAN OF CARE
/64 (BP Location: Right arm)   Pulse 87   Temp 98.9  F (37.2  C) (Oral)   Resp 18   Ht 1.829 m (6')   Wt 60.3 kg (132 lb 15 oz)   SpO2 98%   BMI 18.03 kg/m      Assumed Cares: [id number]  Neuro: A&O x 4  Respiratory: LS diminished at bases; denies SOB  Cardiac: Murmur heard; denies chest pain   GI/: TF through J tube at goal rate of 60ml.hr and tolerating it well; G tube clamped; intermittent nausea alleviated with zofran x 1; primofit for urinary incontinence with good output; small BM this shift; stool sample sent to lab  Skin: Mepilex over coccyx for protection; radiation injuries to groin and lower abdomen from hx of testicular cancer; generalized bruising throughout body; lymphedema to BLE; PCDs on  Lines: L single lumen PICC SL; R PIV SL  Pain: Upper abdominal pain managed with oxycodone x 2  Diet: Regular diet with no oral intake this shift; tolerates apple juice well; , 201, and 205; covered with ssi and scheduled NPH appropriately  Activity Level: 2 assist with a lift; able to turn well in bed with assistance from 1 staff member  Significant Events: No significant events this shift  Plan: Continue with POC; notify provider with changes in pt condition   No

## 2023-05-19 NOTE — TELEPHONE ENCOUNTER
I called and left a voicemail including my call back number.  GISEL Rolle, RN, BSN  Interventional Radiology Nurse Coordinator   Phone:  [phone]     Tried to call patient not able to get in touch with him

## (undated) DEVICE — SUCTION MANIFOLD DORNOCH ULTRA CART UL-CL500

## (undated) DEVICE — ENDO TUBING CO2 SMARTCAP STERILE DISP 100145CO2EXT

## (undated) DEVICE — RAD RX ISOVUE 300 (50ML) 61% IOPAMIDOL CHARGE PER ML

## (undated) DEVICE — SU SILK 5-0 TIE 12X30" A302H

## (undated) DEVICE — SU VICRYL 3-0 SH 27" UND J416H

## (undated) DEVICE — SYR 50ML CATH TIP W/O NDL 309620

## (undated) DEVICE — BLADE KNIFE SURG 15 371115

## (undated) DEVICE — ENDO DEVICE LOCKING AND BIOPSY CAP M00545261

## (undated) DEVICE — SU PDS II 4-0 RB-1 27" Z304H

## (undated) DEVICE — DRSG GAUZE 4X4" TRAY 6939

## (undated) DEVICE — TUBE TRANS GASTROJEJUNOSTOMY ENFIT 18FRX45CM 8250-18

## (undated) DEVICE — PACK ENDOSCOPY GI CUSTOM UMMC

## (undated) DEVICE — ENDO CAP AND TUBING STERILE FOR ENDOGATOR  100130

## (undated) DEVICE — SURGICEL HEMOSTAT 4X8" 1952

## (undated) DEVICE — SOL NACL 0.9% IRRIG 1000ML BOTTLE 2F7124

## (undated) DEVICE — GLOVE PROTEXIS POWDER FREE SMT 8.0  2D72PT80X

## (undated) DEVICE — NDL COUNTER 20CT 31142493

## (undated) DEVICE — SOL WATER IRRIG 1000ML BOTTLE 2F7114

## (undated) DEVICE — BALLOON EXTRACTION 15X1950MM 3.2MM TL B-V243Q-A

## (undated) DEVICE — GUIDEWIRE NOVAGOLD .018X260CM STR TIP M00552000

## (undated) DEVICE — SPONGE LAP 18X18" X8435

## (undated) DEVICE — ENDO FUSION OMNI-TOME G31903

## (undated) DEVICE — SPHINCTEROTOME 5.5FRX25MM OMNI-TOME FS-OMNI-35 G31911

## (undated) DEVICE — DRSG GAUZE 4X4" 3033

## (undated) DEVICE — BIOPSY VALVE BIOSHIELD 00711135

## (undated) DEVICE — SU SILK 3-0 SH CR 8X18" C013D

## (undated) DEVICE — BLADE KNIFE SURG 11 371111

## (undated) DEVICE — CATH RETRIEVAL BALLOON EXTRACTOR PRO RX-S INJ ABOVE 9-12MM

## (undated) DEVICE — GLOVE PROTEXIS POWDER FREE SMT 7.5  2D72PT75X

## (undated) DEVICE — DRAIN JACKSON PRATT ROUND SIL 19FR W/TROCAR LF JP-2232

## (undated) DEVICE — BLADE CLIPPER SGL USE 9680

## (undated) DEVICE — LINEN TOWEL PACK X6 WHITE 5487

## (undated) DEVICE — GUIDEWIRE NOVAGOLD .018X480CM STR TIP M00552010

## (undated) DEVICE — STPL LINEAR CUT 60X3.5MM TX60B

## (undated) DEVICE — WIRE GUIDE 0.025"X270CM ANG VISIGLIDE G-240-2527A

## (undated) DEVICE — SU SILK 0 TIE 6X30" A306H

## (undated) DEVICE — Device

## (undated) DEVICE — SU SILK 3-0 SH 30" K832H

## (undated) DEVICE — STPL RELOAD LINEAR CUT 100X3.8MM TCR10

## (undated) DEVICE — SYR 03ML LL W/O NDL 309657

## (undated) DEVICE — ESU GROUND PAD ADULT W/CORD E7507

## (undated) DEVICE — SU SILK 4-0 TIE 12X30" A303H

## (undated) DEVICE — SU SILK 2-0 SH 30" K833H

## (undated) DEVICE — INFLATION DEVICE BIG 60 ENDO-AN6012

## (undated) DEVICE — INTR ENDOSCOPIC STENT FUSION OASIS 09.0FRX200CM

## (undated) DEVICE — LINEN TOWEL PACK X30 5481

## (undated) DEVICE — ENDO FUSION OMNI-TOME 21 FS-OMNI-21 G48675

## (undated) DEVICE — ESU ELEC BLADE 2.75" COATED/INSULATED E1455

## (undated) DEVICE — DRSG MEDIPORE 3 1/2X13 3/4" 3573

## (undated) DEVICE — DRSG KERLIX 4 1/2"X4YDS ROLL 6715

## (undated) DEVICE — KIT ENDO FIRST STEP DISINFECTANT 200ML W/POUCH EP-4

## (undated) DEVICE — PREP CHLORAPREP 26ML TINTED ORANGE  260815

## (undated) DEVICE — SU PROLENE 3-0 SHDA 36" 8522H

## (undated) DEVICE — KIT CONNECTOR FOR OLYMPUS ENDOSCOPES DEFENDO 100310

## (undated) DEVICE — SU ETHILON 3-0 PS-1 18" 1663H

## (undated) DEVICE — CLIP HORIZON LG ORANGE 004200

## (undated) DEVICE — ENDO BITE BLOCK ADULT OMNI-BLOC

## (undated) DEVICE — SOL NACL 0.9% 10ML VIAL 0409-4888-02

## (undated) DEVICE — LINEN TOWEL PACK X5 5464

## (undated) DEVICE — INTRODUCER KIT GASTROSTOMY MIC G 22FR 98433

## (undated) DEVICE — DRSG GAUZE 4X4" TRAY

## (undated) DEVICE — LINEN GOWN XLG 5407

## (undated) DEVICE — WIPE PREMOIST CLEANSING WASHCLOTHS 7988

## (undated) DEVICE — ENDO SNARE POLYPECTOMY OVAL 15MM LOOP SD-240U-15

## (undated) DEVICE — GLOVE PROTEXIS BLUE W/NEU-THERA 8.0  2D73EB80

## (undated) DEVICE — STPL RELOAD LINEAR CUT 75MM TCR75

## (undated) DEVICE — CLIP HORIZON SM RED WIDE SLOT 001201

## (undated) DEVICE — DRAIN JACKSON PRATT RESERVOIR 100ML SU130-1305

## (undated) DEVICE — CATH BALLOON ELATION ESOPH/PYL/COL 12-13.5-15MMX240CM EPCB12

## (undated) DEVICE — DRAPE C-ARM W/STRAPS 42X72" 07-CA104

## (undated) DEVICE — SU PDS II 0 TP-1 60" Z991G

## (undated) DEVICE — ENDO PROBE COVER ULTRASOUND BALLOON LATEX  MAJ-249

## (undated) DEVICE — WIRE GUIDE 0.025"X270CM STR VISIGLIDE G-240-2527S

## (undated) DEVICE — SUCTION MANIFOLD NEPTUNE 2 SYS 4 PORT 0702-020-000

## (undated) DEVICE — SU NUROLON 0 CT-2 8X18" C527D

## (undated) DEVICE — PREP POVIDONE IODINE SCRUB 7.5% 4OZ APL82212

## (undated) DEVICE — DRSG ABTHERA ADVANCE OPEN ABDOMEN ABT1055

## (undated) DEVICE — STENT FREEMAN PANCREA FLEX 4FRX2CM STR
Type: IMPLANTABLE DEVICE | Site: BILE DUCT | Status: NON-FUNCTIONAL
Removed: 2020-12-09

## (undated) DEVICE — CANISTER WOUND VAC W/GEL 1000ML M8275093/5

## (undated) DEVICE — GLOVE PROTEXIS BLUE W/NEU-THERA 7.5  2D73EB75

## (undated) DEVICE — SPONGE RAY-TEC 4X8" 7318

## (undated) DEVICE — SU ETHILON 2 LP 60" D7597

## (undated) DEVICE — LIGHT HANDLE X1 31140133

## (undated) DEVICE — SPONGE KITTNER 30-101

## (undated) DEVICE — PREP CHLORHEXIDINE 4% 4OZ (HIBICLENS) 57504

## (undated) DEVICE — DRAPE MAYO STAND 23X54 8337

## (undated) DEVICE — WIRE GUIDE 0.025"X450CM STR VISIGLIDE G-240-2545S

## (undated) DEVICE — TUBING SUCTION 10'X3/16" N510

## (undated) DEVICE — SU VICRYL 0 CT-1 36" J346H

## (undated) DEVICE — SU SILK 0 TIE 6X18" A186H

## (undated) DEVICE — SU VICRYL 3-0 SH 27" J316H

## (undated) DEVICE — WIPES FOLEY CARE SURESTEP PROVON DFC100

## (undated) DEVICE — STPL LINEAR CUT 100MM TLC10

## (undated) DEVICE — NDL CANNULA INTERLINK BLUNT 18GA

## (undated) DEVICE — DRSG TEGADERM 8X12" 1629

## (undated) DEVICE — SU SILK 2-0 TIE 12X30" A305H

## (undated) DEVICE — PREP POVIDONE IODINE SOLUTION 10% 4OZ

## (undated) DEVICE — DRSG DRAIN 2X2" 7087

## (undated) DEVICE — GLOVE PROTEXIS W/NEU-THERA 7.5  2D73TE75

## (undated) DEVICE — ESU LIGASURE IMPACT OPEN SEALER/DVDR CVD LG JAW LF4418

## (undated) DEVICE — DRSG ABDOMINAL 07 1/2X8" 7197D

## (undated) DEVICE — DRSG KERLIX 4 1/2"X4YDS ROLL 6730

## (undated) DEVICE — STPL LINEAR CUT 75MM TLC75

## (undated) DEVICE — PACK AB HYST II

## (undated) DEVICE — TUBE GASTROSTOMY PEG SET 24FR G22636 PEG-24-PULL-S

## (undated) DEVICE — DRAPE SHEET MED 44X70" 9355

## (undated) DEVICE — STENT ADVANIX PANCREA PIGTAIL 3FRX11CM M00536830: Type: IMPLANTABLE DEVICE | Site: BILE DUCT | Status: NON-FUNCTIONAL

## (undated) DEVICE — PAD CHUX UNDERPAD 23X24" 7136

## (undated) DEVICE — SU SILK 3-0 TIE 12X30" A304H

## (undated) DEVICE — CATH BALLOON ELATION ESOPH/PYL/COL 15-16.5-18MMX240CM EPCB15

## (undated) DEVICE — INTR PEELAWAY 22FRX13CM G04096 PLVW-22.0-38

## (undated) DEVICE — DRSG QUICKCLOT TRAUMA PAD 8X8" 4010

## (undated) DEVICE — CATH BALLOON DILATOR STERLING OTW 3.5X60X150 H74939032350610

## (undated) DEVICE — DRSG GAUZE FLUFTEX RADIOPAQUE 4.5"X4.1YD 11-020

## (undated) DEVICE — CATH BALLOON DILATOR STERLING OTW 3.5X40X150 H74939032350410

## (undated) RX ORDER — FENTANYL CITRATE-0.9 % NACL/PF 10 MCG/ML
PLASTIC BAG, INJECTION (ML) INTRAVENOUS
Status: DISPENSED
Start: 2021-01-01

## (undated) RX ORDER — GLYCOPYRROLATE 0.2 MG/ML
INJECTION, SOLUTION INTRAMUSCULAR; INTRAVENOUS
Status: DISPENSED
Start: 2021-01-01

## (undated) RX ORDER — DEXAMETHASONE SODIUM PHOSPHATE 4 MG/ML
INJECTION, SOLUTION INTRA-ARTICULAR; INTRALESIONAL; INTRAMUSCULAR; INTRAVENOUS; SOFT TISSUE
Status: DISPENSED
Start: 2020-01-01

## (undated) RX ORDER — LIDOCAINE HYDROCHLORIDE 20 MG/ML
INJECTION, SOLUTION EPIDURAL; INFILTRATION; INTRACAUDAL; PERINEURAL
Status: DISPENSED
Start: 2021-01-01

## (undated) RX ORDER — EPHEDRINE SULFATE 50 MG/ML
INJECTION, SOLUTION INTRAMUSCULAR; INTRAVENOUS; SUBCUTANEOUS
Status: DISPENSED
Start: 2021-01-01

## (undated) RX ORDER — ALBUMIN, HUMAN INJ 5% 5 %
SOLUTION INTRAVENOUS
Status: DISPENSED
Start: 2021-01-01

## (undated) RX ORDER — PROPOFOL 10 MG/ML
INJECTION, EMULSION INTRAVENOUS
Status: DISPENSED
Start: 2020-01-01

## (undated) RX ORDER — FENTANYL CITRATE 50 UG/ML
INJECTION, SOLUTION INTRAMUSCULAR; INTRAVENOUS
Status: DISPENSED
Start: 2021-01-01

## (undated) RX ORDER — ETOMIDATE 2 MG/ML
INJECTION INTRAVENOUS
Status: DISPENSED
Start: 2021-01-01

## (undated) RX ORDER — DEXAMETHASONE SODIUM PHOSPHATE 4 MG/ML
INJECTION, SOLUTION INTRA-ARTICULAR; INTRALESIONAL; INTRAMUSCULAR; INTRAVENOUS; SOFT TISSUE
Status: DISPENSED
Start: 2021-01-01

## (undated) RX ORDER — NEOSTIGMINE METHYLSULFATE 1 MG/ML
VIAL (ML) INJECTION
Status: DISPENSED
Start: 2020-07-13

## (undated) RX ORDER — PROPOFOL 10 MG/ML
INJECTION, EMULSION INTRAVENOUS
Status: DISPENSED
Start: 2021-01-01

## (undated) RX ORDER — ONDANSETRON 2 MG/ML
INJECTION INTRAMUSCULAR; INTRAVENOUS
Status: DISPENSED
Start: 2020-01-01

## (undated) RX ORDER — LIDOCAINE HYDROCHLORIDE 20 MG/ML
INJECTION, SOLUTION EPIDURAL; INFILTRATION; INTRACAUDAL; PERINEURAL
Status: DISPENSED
Start: 2020-01-01

## (undated) RX ORDER — ONDANSETRON 2 MG/ML
INJECTION INTRAMUSCULAR; INTRAVENOUS
Status: DISPENSED
Start: 2020-07-13

## (undated) RX ORDER — SODIUM CHLORIDE 9 MG/ML
INJECTION, SOLUTION INTRAVENOUS
Status: DISPENSED
Start: 2021-01-01

## (undated) RX ORDER — FENTANYL CITRATE 50 UG/ML
INJECTION, SOLUTION INTRAMUSCULAR; INTRAVENOUS
Status: DISPENSED
Start: 2020-01-01

## (undated) RX ORDER — ONDANSETRON 2 MG/ML
INJECTION INTRAMUSCULAR; INTRAVENOUS
Status: DISPENSED
Start: 2021-01-01

## (undated) RX ORDER — PROPOFOL 10 MG/ML
INJECTION, EMULSION INTRAVENOUS
Status: DISPENSED
Start: 2020-07-13

## (undated) RX ORDER — WATER 10 ML/10ML
INJECTION INTRAMUSCULAR; INTRAVENOUS; SUBCUTANEOUS
Status: DISPENSED
Start: 2021-01-01

## (undated) RX ORDER — SODIUM CHLORIDE, SODIUM LACTATE, POTASSIUM CHLORIDE, CALCIUM CHLORIDE 600; 310; 30; 20 MG/100ML; MG/100ML; MG/100ML; MG/100ML
INJECTION, SOLUTION INTRAVENOUS
Status: DISPENSED
Start: 2020-01-01

## (undated) RX ORDER — ESMOLOL HYDROCHLORIDE 10 MG/ML
INJECTION INTRAVENOUS
Status: DISPENSED
Start: 2020-01-01

## (undated) RX ORDER — CALCIUM CHLORIDE 100 MG/ML
INJECTION INTRAVENOUS; INTRAVENTRICULAR
Status: DISPENSED
Start: 2021-01-01

## (undated) RX ORDER — GLYCOPYRROLATE 0.2 MG/ML
INJECTION, SOLUTION INTRAMUSCULAR; INTRAVENOUS
Status: DISPENSED
Start: 2020-07-13

## (undated) RX ORDER — EPHEDRINE SULFATE 50 MG/ML
INJECTION, SOLUTION INTRAMUSCULAR; INTRAVENOUS; SUBCUTANEOUS
Status: DISPENSED
Start: 2020-01-01

## (undated) RX ORDER — LIDOCAINE HYDROCHLORIDE 20 MG/ML
INJECTION, SOLUTION EPIDURAL; INFILTRATION; INTRACAUDAL; PERINEURAL
Status: DISPENSED
Start: 2020-07-13

## (undated) RX ORDER — FENTANYL CITRATE 50 UG/ML
INJECTION, SOLUTION INTRAMUSCULAR; INTRAVENOUS
Status: DISPENSED
Start: 2020-07-13

## (undated) RX ORDER — GLYCOPYRROLATE 0.2 MG/ML
INJECTION, SOLUTION INTRAMUSCULAR; INTRAVENOUS
Status: DISPENSED
Start: 2020-01-01

## (undated) RX ORDER — CEFAZOLIN SODIUM 2 G/100ML
INJECTION, SOLUTION INTRAVENOUS
Status: DISPENSED
Start: 2021-01-01

## (undated) RX ORDER — SODIUM CHLORIDE, SODIUM LACTATE, POTASSIUM CHLORIDE, CALCIUM CHLORIDE 600; 310; 30; 20 MG/100ML; MG/100ML; MG/100ML; MG/100ML
INJECTION, SOLUTION INTRAVENOUS
Status: DISPENSED
Start: 2021-01-01